# Patient Record
Sex: MALE | Race: WHITE | NOT HISPANIC OR LATINO | Employment: OTHER | URBAN - METROPOLITAN AREA
[De-identification: names, ages, dates, MRNs, and addresses within clinical notes are randomized per-mention and may not be internally consistent; named-entity substitution may affect disease eponyms.]

---

## 2017-01-04 ENCOUNTER — TELEPHONE (OUTPATIENT)
Dept: INTERNAL MEDICINE | Facility: CLINIC | Age: 72
End: 2017-01-04

## 2017-01-04 RX ORDER — VARENICLINE TARTRATE 0.5 (11)-1
KIT ORAL
Qty: 1 PACKAGE | Refills: 0 | Status: SHIPPED | OUTPATIENT
Start: 2017-01-04 | End: 2017-04-17

## 2017-01-04 NOTE — TELEPHONE ENCOUNTER
----- Message from Frida Cole sent at 1/4/2017  8:09 AM CST -----  Contact: Wife-Vanessa- [phone]  Patients wife Vanessa is calling for the patient. Patient stopped taking varenicline (CHANTIX) 1 mg Tab and would like to start taking it again. He would need to start over with the first box again.  If you can send the prescription to the pharmacy for him.    [facility]/PHARMACY #[id number] - [place], [place] - [address].

## 2017-01-04 NOTE — TELEPHONE ENCOUNTER
Patient requesting to restart the chantix start box@ 1mg. Send to [facility]    LS: 11-22-16

## 2017-01-12 ENCOUNTER — TELEPHONE (OUTPATIENT)
Dept: CARDIOLOGY | Facility: CLINIC | Age: 72
End: 2017-01-12

## 2017-01-12 RX ORDER — ROSUVASTATIN CALCIUM 20 MG/1
20 TABLET, COATED ORAL DAILY
Qty: 30 TABLET | Refills: 11 | Status: ON HOLD | OUTPATIENT
Start: 2017-01-12 | End: 2017-08-14 | Stop reason: HOSPADM

## 2017-01-12 NOTE — TELEPHONE ENCOUNTER
Pt's wife calling to report pt complaining of pain in his legs for months now. Wife states pt can barely walk in AM due to leg pain.Wife states she feels pain is caused by Atorvastatin. Wife asking for alternative medication. Please advise.

## 2017-01-12 NOTE — TELEPHONE ENCOUNTER
D/C atorvastatin. Wait 2 weeks, then begin rosuvastatin  20 mg. He can take it every other day for a few weeks, then increase to once a day. If he has pain w rosuvastatin he should cut back to every other day.  Notify pharmacy.

## 2017-01-12 NOTE — TELEPHONE ENCOUNTER
Wife notified, verbalized understanding.Melissa with [facility] pharmacy notified to cancel Atorvastatin 20mg prescription from pt's profile.

## 2017-01-18 ENCOUNTER — TELEPHONE (OUTPATIENT)
Dept: INTERNAL MEDICINE | Facility: CLINIC | Age: 72
End: 2017-01-18

## 2017-01-18 RX ORDER — HYDROCODONE BITARTRATE AND ACETAMINOPHEN 5; 325 MG/1; MG/1
1 TABLET ORAL EVERY 6 HOURS PRN
Qty: 30 TABLET | Refills: 0 | Status: SHIPPED | OUTPATIENT
Start: 2017-01-18 | End: 2017-01-28

## 2017-01-18 NOTE — TELEPHONE ENCOUNTER
----- Message from Magaly Dos Santos sent at 1/18/2017 11:15 AM CST -----  Contact: pt wife-Vanessa  [phone]  RX request - refill or new RX.  Is this a refill or new RX:  refill  RX name and strength: hydrocodone 5-325mg  Directions:   Is this a 30 day or 90 day RX:  30  [facility]@[phone]    Comments:  Pt not sure if this need to be picked up or sent to the pharmacy,please advise

## 2017-01-19 ENCOUNTER — TELEPHONE (OUTPATIENT)
Dept: INTERNAL MEDICINE | Facility: CLINIC | Age: 72
End: 2017-01-19

## 2017-01-19 NOTE — TELEPHONE ENCOUNTER
----- Message from Magaly Dos Santos sent at 1/18/2017  4:20 PM CST -----  Contact: pt [phone]  Pt wife will like to know the status on a script that was to be sent in earlier today,please advise

## 2017-03-14 ENCOUNTER — LAB VISIT (OUTPATIENT)
Dept: LAB | Facility: HOSPITAL | Age: 72
End: 2017-03-14
Attending: INTERNAL MEDICINE
Payer: MEDICARE

## 2017-03-14 DIAGNOSIS — E78.00 HYPERCHOLESTEREMIA: ICD-10-CM

## 2017-03-14 DIAGNOSIS — I25.119 CORONARY ARTERY DISEASE INVOLVING NATIVE CORONARY ARTERY OF NATIVE HEART WITH ANGINA PECTORIS: ICD-10-CM

## 2017-03-14 LAB
ALT SERPL W/O P-5'-P-CCNC: 13 U/L
ANION GAP SERPL CALC-SCNC: 9 MMOL/L
AST SERPL-CCNC: 18 U/L
BUN SERPL-MCNC: 17 MG/DL
CALCIUM SERPL-MCNC: 9.5 MG/DL
CHLORIDE SERPL-SCNC: 108 MMOL/L
CHOLEST/HDLC SERPL: 5.7 {RATIO}
CO2 SERPL-SCNC: 26 MMOL/L
CREAT SERPL-MCNC: 1.4 MG/DL
EST. GFR  (AFRICAN AMERICAN): 58 ML/MIN/1.73 M^2
EST. GFR  (NON AFRICAN AMERICAN): 50.2 ML/MIN/1.73 M^2
GLUCOSE SERPL-MCNC: 108 MG/DL
HDL/CHOLESTEROL RATIO: 17.5 %
HDLC SERPL-MCNC: 183 MG/DL
HDLC SERPL-MCNC: 32 MG/DL
LDLC SERPL CALC-MCNC: 126.4 MG/DL
NONHDLC SERPL-MCNC: 151 MG/DL
POTASSIUM SERPL-SCNC: 4.2 MMOL/L
SODIUM SERPL-SCNC: 143 MMOL/L
TRIGL SERPL-MCNC: 123 MG/DL

## 2017-03-14 PROCEDURE — 36415 COLL VENOUS BLD VENIPUNCTURE: CPT | Mod: PO

## 2017-03-14 PROCEDURE — 84460 ALANINE AMINO (ALT) (SGPT): CPT

## 2017-03-14 PROCEDURE — 84450 TRANSFERASE (AST) (SGOT): CPT

## 2017-03-14 PROCEDURE — 80048 BASIC METABOLIC PNL TOTAL CA: CPT

## 2017-03-14 PROCEDURE — 80061 LIPID PANEL: CPT

## 2017-03-21 ENCOUNTER — OFFICE VISIT (OUTPATIENT)
Dept: CARDIOLOGY | Facility: CLINIC | Age: 72
End: 2017-03-21
Payer: MEDICARE

## 2017-03-21 VITALS
HEART RATE: 84 BPM | DIASTOLIC BLOOD PRESSURE: 84 MMHG | WEIGHT: 195.75 LBS | HEIGHT: 70 IN | SYSTOLIC BLOOD PRESSURE: 140 MMHG | BODY MASS INDEX: 28.02 KG/M2

## 2017-03-21 DIAGNOSIS — I48.92 ATRIAL FLUTTER WITH RAPID VENTRICULAR RESPONSE: ICD-10-CM

## 2017-03-21 DIAGNOSIS — N18.30 CKD (CHRONIC KIDNEY DISEASE) STAGE 3, GFR 30-59 ML/MIN: ICD-10-CM

## 2017-03-21 DIAGNOSIS — I74.10 AORTIC MURAL THROMBUS: Primary | ICD-10-CM

## 2017-03-21 DIAGNOSIS — Z72.0 TOBACCO ABUSE: ICD-10-CM

## 2017-03-21 DIAGNOSIS — Z95.828 S/P ASCENDING AORTIC REPLACEMENT: ICD-10-CM

## 2017-03-21 DIAGNOSIS — I25.2 OLD MYOCARDIAL INFARCTION: ICD-10-CM

## 2017-03-21 DIAGNOSIS — I10 ESSENTIAL HYPERTENSION: ICD-10-CM

## 2017-03-21 DIAGNOSIS — E78.00 HYPERCHOLESTEREMIA: ICD-10-CM

## 2017-03-21 DIAGNOSIS — I25.10 CORONARY ARTERY DISEASE INVOLVING NATIVE CORONARY ARTERY OF NATIVE HEART WITHOUT ANGINA PECTORIS: ICD-10-CM

## 2017-03-21 DIAGNOSIS — S22.32XG CLOSED FRACTURE OF ONE RIB OF LEFT SIDE WITH DELAYED HEALING, SUBSEQUENT ENCOUNTER: ICD-10-CM

## 2017-03-21 PROBLEM — S22.32XA CLOSED FRACTURE OF ONE RIB OF LEFT SIDE: Status: ACTIVE | Noted: 2017-03-21

## 2017-03-21 PROCEDURE — 99999 PR PBB SHADOW E&M-EST. PATIENT-LVL III: CPT | Mod: PBBFAC,,, | Performed by: INTERNAL MEDICINE

## 2017-03-21 PROCEDURE — 93000 ELECTROCARDIOGRAM COMPLETE: CPT | Mod: S$GLB,,, | Performed by: INTERNAL MEDICINE

## 2017-03-21 PROCEDURE — 99214 OFFICE O/P EST MOD 30 MIN: CPT | Mod: S$GLB,,, | Performed by: INTERNAL MEDICINE

## 2017-03-21 RX ORDER — ASPIRIN 81 MG/1
81 TABLET ORAL DAILY
Refills: 0 | Status: ON HOLD
Start: 2017-03-21 | End: 2017-08-14 | Stop reason: HOSPADM

## 2017-03-21 NOTE — PATIENT INSTRUCTIONS
LDL - bad type - improves with diet and medications: typically statins; most other medications can lower LDL but have not been proven to prevent heart attacks.             May not improve significantly with exercise alone.             Ideally less than 70     HDL - good type - improves with exercise             Ideally greater than 50    TGs (triglycerides) - also bad - can change very quickly and considerably with food - improves with diet and exercise            In some cases a low carbohydrate diet will lower TGs better than a low fat diet.            Ideal range     Sugar, fat and cholesterol in food:    A sensible diet that limits the intake of sugars, saturated (bad) fats and trans fats while increasing the intake of unsaturated (good)  fats and plant proteins is the basis of the current dietary recommendations.      We now recommend drastically reducing the intake of sugar. There is less emphasis on excluding fat. And cholesterol in our food is no longer a significant concern. However please do not confuse this with the role of cholesterol in our blood and arteries. It is still cholesterol that clogs up our arteries whether it comes from our food or is manufactured by our bodies.       Most foods that are high in cholesterol are also high in saturated fat. But there is way more saturated fat than cholesterol in these foods. On the other hand there are a handful of foods that are high in cholesterol but do not contain much saturated fat: eggs, shrimp, crab legs and crawfish; these are OK to eat.       Saturated fat is the bad fat - you should limit your intake of this. Deep fried foods, meats and other animal fats are high in saturated fat. Cookies, donuts and most dessert and cakes are usually high in both saturated fat and sugar.       Unsaturated fat is the good fat. It contains the same number of calories as saturated fat but does not get deposited in our arteries. The Mediterranean style diet  encourages the intake of unsaturated fat - olive oil, avocado and unsalted nuts.      You should eat a few servings of vegetables (and fruit as long as you are not diabetic) everyday. Substitute some plant proteins in place of meat: soy, beans, lentils, quinoa and oatmeal.     Do not use stick butter or stick margarine. Butter that comes in a tub is soft butter. It consists of 1/2 butter and 1/2 canola or another type of vegetable oil. It is fine to use that.       Trans fats should definitely be avoided. Most foods that are labelled as containing 0 gms of trans fat can still contain several hundred milligrams of trans fat: creamer, margarine, refrigerator dough, deep fried foods, ready made frosting, potato, corn and torilla chips, cakes, cookies, pie crusts and crackers containing shortening made with hydrogenated vegetable oil.      =======================    Avoid NSAIDs: Ibuprofen, Advil, Motrin, Aleeve, Naproxen, Meloxicam, Mobic, Diclofenac and Voltaren.  Aleeve (naproxen) is safer from the cardiac stand point but should be avoided if kidney function is not normal.  Also do not take NSAIDs at the same time as aspirin - it may make aspirin less effective.  You can take Tylenol for pain. It is not a NSAID. Limit Tylenol to 4 tabs (500 mg each) in a 24 hr period.

## 2017-03-21 NOTE — ASSESSMENT & PLAN NOTE
No imaging performed since surgery. Creatinine a bit elevated will avoid contrast CT study.  Noncontrast CT chest.

## 2017-03-21 NOTE — PROGRESS NOTES
Subjective:   Patient ID:  Clint Brown is a 71 y.o. male who presents for follow-up of Coronary Artery Disease      Problem List:  dissection/intramural hematoma of thoracic aorta  CAD - nonobstructive  NSTEMI  HTN    HPI:   Clint Brown is s/p surgical repair by Dr. MARILYN Williamson of a dissection/intramural hematoma of the thoracic aorta that extended the entire length of the thoracic aorta. At the time of surgery there was blood in the pericardium. The aortic valve was intact and did not need to be replaced. He had presented a few days earlier to [facility] with chest discomfort and a NSTEMI. Coronary angiography was negative except for a nonobstructive lesion in the ramus.The ascending aorta was minimally enlarged measuring 4.2 cm. The descending thoracic aorta was minimally enlarged measuring 3.5 cm.   Leg stiffness w atorvastatin. Switched to rosuvastatin but did not fill prescription.   He continues to smoke. He had nausea w Chantix. Their 49 yr old daughter  last week.   He developed severe pain on the side of his chest in . Fracture of the 10th rib on left side.  He has not been checking his BP at home.      Review of Systems   Constitution: Positive for malaise/fatigue. Negative for weight gain and weight loss.   Cardiovascular: Positive for chest pain and dyspnea on exertion. Negative for leg swelling and palpitations.   Respiratory: Positive for wheezing.    Hematologic/Lymphatic: Does not bruise/bleed easily.   Musculoskeletal: Negative for arthritis and muscle cramps.   Gastrointestinal: Negative for abdominal pain and melena.   Genitourinary: Positive for frequency and nocturia.   Neurological: Positive for numbness.   Psychiatric/Behavioral: The patient is nervous/anxious.        Current Outpatient Prescriptions   Medication Sig    aspirin 325 MG tablet Take 1 tablet (325 mg total) by mouth once daily.    DOCUSATE SODIUM (COLACE ORAL) Take 1 capsule by mouth once daily.      "labetalol (NORMODYNE) 200 MG tablet TAKE 1 TABLET (200 MG TOTAL) BY MOUTH EVERY 12 (TWELVE) HOURS.    multivitamin (THERAGRAN) per tablet Take 1 tablet by mouth once daily.    omeprazole (PRILOSEC) 20 MG capsule Take 1 capsule (20 mg total) by mouth once daily.    ondansetron (ZOFRAN) 4 MG tablet Take 1 tablet (4 mg total) by mouth every 8 (eight) hours as needed for Nausea.    rosuvastatin (CRESTOR) 20 MG tablet Not taking    varenicline (CHANTIX STARTING MONTH PAK) 0.5 mg (11)- 1 mg (42) tablet Not taking         Social History   Substance Use Topics    Smoking status: Current Every Day Smoker     Packs/day: 1.00     Years: 55.00     Types: Cigarettes    Smokeless tobacco: Never Used    Alcohol use No         Objective:     Physical Exam   Constitutional: He is oriented to person, place, and time. He appears well-developed and well-nourished.   BP (!) 140/84  Pulse 84  Ht 5' 10" (1.778 m)  Wt 88.8 kg (195 lb 12.3 oz)  BMI 28.09 kg/m2     HENT:   Head: Normocephalic.   Neck: No JVD present.   Cardiovascular: Normal rate, regular rhythm, S1 normal and S2 normal.    No murmur heard.  Pulmonary/Chest: Breath sounds normal. Chest wall is not dull to percussion.   Abdominal: Soft. He exhibits no mass. There is no splenomegaly or hepatomegaly.   Musculoskeletal:        Right lower leg: He exhibits no edema.        Left lower leg: He exhibits no edema.   Neurological: He is alert and oriented to person, place, and time. Gait normal.   Skin: No bruising noted. Nails show no clubbing.   Psychiatric: He has a normal mood and affect. His speech is normal and behavior is normal.           Lab Results   Component Value Date    CHOL 183 03/14/2017    HDL 32 (L) 03/14/2017    LDLCALC 126.4 03/14/2017    TRIG 123 03/14/2017    CHOLHDL 17.5 (L) 03/14/2017     Lab Results   Component Value Date     03/14/2017    CREATININE 1.4 03/14/2017    BUN 17 03/14/2017     03/14/2017    K 4.2 03/14/2017     " 03/14/2017    CO2 26 03/14/2017     Lab Results   Component Value Date    ALT 13 03/14/2017    AST 18 03/14/2017    ALKPHOS 79 11/20/2016    BILITOT 0.8 11/20/2016         Assessment:     1. Aortic mural thrombus    2. S/P ascending aortic replacement    3. Coronary artery disease - nonobstructive ramus only    4. Old myocardial infarction    5. Essential hypertension    6. Hypercholesteremia    7. CKD (chronic kidney disease) stage 3, GFR 30-59 ml/min    8. Tobacco abuse    9. Closed fracture of one rib of left side     10. Atrial flutter - post op - resolved          Plan:     Aortic mural thrombus  No imaging performed since surgery. Creatinine a bit elevated will avoid contrast CT study.  Noncontrast CT chest.    Hypercholesteremia  Cholesterol education.  Resume rosuvastatin.      Essential hypertension  Stable.   Monitor BP at home.    CKD (chronic kidney disease) stage 3, GFR 30-59 ml/min  Slightly better.  Avoid NSAIDs.    RTC 1 year.

## 2017-03-21 NOTE — MR AVS SNAPSHOT
[facility]   [facility]  [address] [address]  Phone: [phone]                  Clint Brown   3/21/2017 8:30 AM   Office Visit    Description:  Male : 1945   Provider:  Vito Vick MD   Department:  [facility]           Reason for Visit     Coronary Artery Disease           Diagnoses this Visit        Comments    Aortic mural thrombus    -  Primary     Atrial flutter with rapid ventricular response         S/P ascending aortic replacement         Coronary artery disease involving native coronary artery of native heart without angina pectoris         Old myocardial infarction         Essential hypertension         Hypercholesteremia         CKD (chronic kidney disease) stage 3, GFR 30-59 ml/min         Tobacco abuse                To Do List           Goals (5 Years of Data)     None      Follow-Up and Disposition     Return in about 1 year (around 3/21/2018).       These Medications        Disp Refills Start End    aspirin (ECOTRIN) 81 MG EC tablet  0 3/21/2017 3/21/2018    Take 1 tablet (81 mg total) by mouth once daily. - Oral    Pharmacy: [facility]/pharmacy #[id number] - [place] - [address]  #: [phone]         [facility]     [facility] Care [facility] -  Assistance  Registered nurses in the [facility] Center provide clinical advisement, health education, appointment booking, and other advisory services.  Call for this free service at [phone].             Medications           Message regarding Medications     Verify the changes and/or additions to your medication regime listed below are the same as discussed with your clinician today.  If any of these changes or additions are incorrect, please notify your healthcare provider.        START taking these NEW medications        Refills    aspirin (ECOTRIN) 81 MG EC tablet 0    Sig: Take 1 tablet (81 mg total) by mouth once daily.    Class: No Print    Route:  "Oral      STOP taking these medications     hydrOXYzine pamoate (VISTARIL) 50 MG Cap Take 1 capsule (50 mg total) by mouth nightly as needed (insomnia).    methylPREDNISolone (MEDROL, RAÚL,) 4 mg tablet use as directed    aspirin 325 MG tablet Take 1 tablet (325 mg total) by mouth once daily.           Verify that the below list of medications is an accurate representation of the medications you are currently taking.  If none reported, the list may be blank. If incorrect, please contact your healthcare provider. Carry this list with you in case of emergency.           Current Medications     DOCUSATE SODIUM (COLACE ORAL) Take 1 capsule by mouth once daily.     labetalol (NORMODYNE) 200 MG tablet TAKE 1 TABLET (200 MG TOTAL) BY MOUTH EVERY 12 (TWELVE) HOURS.    multivitamin (THERAGRAN) per tablet Take 1 tablet by mouth once daily.    omeprazole (PRILOSEC) 20 MG capsule Take 1 capsule (20 mg total) by mouth once daily.    ondansetron (ZOFRAN) 4 MG tablet Take 1 tablet (4 mg total) by mouth every 8 (eight) hours as needed for Nausea.    aspirin (ECOTRIN) 81 MG EC tablet Take 1 tablet (81 mg total) by mouth once daily.    rosuvastatin (CRESTOR) 20 MG tablet Take 1 tablet (20 mg total) by mouth once daily.    varenicline (CHANTIX STARTING MONTH PAK) 0.5 mg (11)- 1 mg (42) tablet Take one 0.5mg tab by mouth once daily X3 days,then increase to one 0.5mg tab twice daily X4 days,then increase to one 1mg tab twice daily           Clinical Reference Information           Your Vitals Were     BP Pulse Height Weight BMI    140/84 84 5' 10" (1.778 m) 88.8 kg (195 lb 12.3 oz) 28.09 kg/m2      Blood Pressure          Most Recent Value    BP  (!)  140/84      Allergies as of 3/21/2017     Cephalexin    Codeine    Iodine And Iodide Containing Products    Lipitor [Atorvastatin]      Immunizations Administered on Date of Encounter - 3/21/2017     None      Orders Placed During Today's Visit      Normal Orders This Visit    EKG 12-lead  "      [facility] Sign-Up     Activating your [facility] account is as easy as 1-2-3!     1) Visit [email or web address], select Sign Up Now, enter this activation code and your date of birth, then select Next.  [email or web address]  Expires: 5/5/2017 10:01 AM      2) Create a username and password to use when you visit [facility] in the future and select a security question in case you lose your password and select Next.    3) Enter your e-mail address and click Sign Up!    Additional Information  If you have questions, please e-mail [email or web address] or call [phone] to talk to our [facility] staff. Remember, [facility] is NOT to be used for urgent needs. For medical emergencies, dial 911.         Instructions    LDL - bad type - improves with diet and medications: typically statins; most other medications can lower LDL but have not been proven to prevent heart attacks.             May not improve significantly with exercise alone.             Ideally less than 70     HDL - good type - improves with exercise             Ideally greater than 50    TGs (triglycerides) - also bad - can change very quickly and considerably with food - improves with diet and exercise            In some cases a low carbohydrate diet will lower TGs better than a low fat diet.            Ideal range     Sugar, fat and cholesterol in food:    A sensible diet that limits the intake of sugars, saturated (bad) fats and trans fats while increasing the intake of unsaturated (good)  fats and plant proteins is the basis of the current dietary recommendations.      We now recommend drastically reducing the intake of sugar. There is less emphasis on excluding fat. And cholesterol in our food is no longer a significant concern. However please do not confuse this with the role of cholesterol in our blood and arteries. It is still cholesterol that clogs up our arteries whether it comes from our food or is manufactured by our bodies.       Most foods that  are high in cholesterol are also high in saturated fat. But there is way more saturated fat than cholesterol in these foods. On the other hand there are a handful of foods that are high in cholesterol but do not contain much saturated fat: eggs, shrimp, crab legs and crawfish; these are OK to eat.       Saturated fat is the bad fat - you should limit your intake of this. Deep fried foods, meats and other animal fats are high in saturated fat. Cookies, donuts and most dessert and cakes are usually high in both saturated fat and sugar.       Unsaturated fat is the good fat. It contains the same number of calories as saturated fat but does not get deposited in our arteries. The Mediterranean style diet encourages the intake of unsaturated fat - olive oil, avocado and unsalted nuts.      You should eat a few servings of vegetables (and fruit as long as you are not diabetic) everyday. Substitute some plant proteins in place of meat: soy, beans, lentils, quinoa and oatmeal.     Do not use stick butter or stick margarine. Butter that comes in a tub is soft butter. It consists of 1/2 butter and 1/2 canola or another type of vegetable oil. It is fine to use that.       Trans fats should definitely be avoided. Most foods that are labelled as containing 0 gms of trans fat can still contain several hundred milligrams of trans fat: creamer, margarine, refrigerator dough, deep fried foods, ready made frosting, potato, corn and torilla chips, cakes, cookies, pie crusts and crackers containing shortening made with hydrogenated vegetable oil.      =======================    Avoid NSAIDs: Ibuprofen, Advil, Motrin, Aleeve, Naproxen, Meloxicam, Mobic, Diclofenac and Voltaren.  Aleeve (naproxen) is safer from the cardiac stand point but should be avoided if kidney function is not normal.  Also do not take NSAIDs at the same time as aspirin - it may make aspirin less effective.  You can take Tylenol for pain. It is not a NSAID. Limit  Tylenol to 4 tabs (500 mg each) in a 24 hr period.             Smoking Cessation     If you would like to quit smoking:   You may be eligible for free services if you are a [place] resident and started smoking cigarettes before September 1, 1988.  Call the Smoking Cessation [employer] (SCT) toll free at (800) 979-0082 or (171) 000-6342.   Call 1-800-QUIT-NOW if you do not meet the above criteria.            Language Assistance Services     ATTENTION: Language assistance services are available, free of charge. Please call [phone].      ATENCIÓN: Si habla español, tiene a hebert disposición servicios gratuitos de asistencia lingüística. Llame al [phone].     CHÚ Ý: N?u b?n nói Ti?ng Vi?t, có các d?ch v? h? tr? ngôn ng? mi?n phí dành cho b?n. G?i s? [phone].         [facility] complies with applicable Federal civil rights laws and does not discriminate on the basis of race, color, national origin, age, disability, or sex.

## 2017-03-27 ENCOUNTER — HOSPITAL ENCOUNTER (OUTPATIENT)
Dept: RADIOLOGY | Facility: HOSPITAL | Age: 72
Discharge: HOME | End: 2017-03-27
Attending: INTERNAL MEDICINE
Payer: MEDICARE

## 2017-03-27 ENCOUNTER — OFFICE VISIT (OUTPATIENT)
Dept: INTERNAL MEDICINE | Facility: CLINIC | Age: 72
End: 2017-03-27
Payer: COMMERCIAL

## 2017-03-27 VITALS
BODY MASS INDEX: 28.06 KG/M2 | RESPIRATION RATE: 16 BRPM | DIASTOLIC BLOOD PRESSURE: 70 MMHG | HEART RATE: 64 BPM | TEMPERATURE: 99 F | SYSTOLIC BLOOD PRESSURE: 132 MMHG | WEIGHT: 196 LBS | HEIGHT: 70 IN

## 2017-03-27 DIAGNOSIS — R06.02 SOB (SHORTNESS OF BREATH): Primary | ICD-10-CM

## 2017-03-27 DIAGNOSIS — R05.9 COUGH: ICD-10-CM

## 2017-03-27 DIAGNOSIS — R06.02 SOB (SHORTNESS OF BREATH): ICD-10-CM

## 2017-03-27 DIAGNOSIS — Z91.89 AT RISK FOR SLEEP APNEA: ICD-10-CM

## 2017-03-27 PROCEDURE — 99214 OFFICE O/P EST MOD 30 MIN: CPT | Mod: S$GLB,,, | Performed by: INTERNAL MEDICINE

## 2017-03-27 PROCEDURE — 99999 PR PBB SHADOW E&M-EST. PATIENT-LVL IV: CPT | Mod: PBBFAC,,, | Performed by: INTERNAL MEDICINE

## 2017-03-27 PROCEDURE — 71020 XR CHEST PA AND LATERAL: CPT | Mod: 26,,, | Performed by: RADIOLOGY

## 2017-03-27 PROCEDURE — 71020 XR CHEST PA AND LATERAL: CPT | Mod: TC,PO

## 2017-03-27 RX ORDER — BENZONATATE 200 MG/1
200 CAPSULE ORAL 3 TIMES DAILY PRN
Qty: 30 CAPSULE | Refills: 0 | Status: SHIPPED | OUTPATIENT
Start: 2017-03-27 | End: 2017-04-06

## 2017-03-27 NOTE — PROGRESS NOTES
Subjective:       Patient ID: Clint Brown is a 71 y.o. male who presents for Shortness of Breath and Cough (dry)      Cough   This is a new problem. Episode onset: in the past 5 days. The problem has been unchanged. The problem occurs constantly. The cough is non-productive. Associated symptoms include chest pain (held something overhead and shortly after, reports onset of left lateral chest wall pain in  area of prior fracture, no pain with cough or breathing), shortness of breath (and ARROYO) and wheezing. Pertinent negatives include no chills, ear congestion, ear pain, fever, headaches, hemoptysis, myalgias, nasal congestion, postnasal drip, rash, rhinorrhea or sweats. Nothing aggravates the symptoms. He has tried OTC cough suppressant for the symptoms. The treatment provided mild relief. His past medical history is significant for pneumonia. There is no history of asthma or COPD.   Shortness of Breath   This is a new problem. The current episode started in the past 7 days. The problem occurs intermittently. The problem has been waxing and waning. Associated symptoms include chest pain (held something overhead and shortly after, reports onset of left lateral chest wall pain in  area of prior fracture, no pain with cough or breathing) and wheezing. Pertinent negatives include no abdominal pain, ear pain, fever, headaches, hemoptysis, leg swelling, orthopnea, PND, rash, rhinorrhea, sputum production, swollen glands or vomiting. The symptoms are aggravated by any activity. The patient has no known risk factors for DVT/PE. He has tried nothing for the symptoms. His past medical history is significant for CAD and pneumonia. There is no history of allergies, asthma, COPD or a heart failure.      No PND, no orthopnea, no LE edema      STOP-Bang Questionnaire         YES             NO       x  Snoring?  Do you snore loudly (loud enough to be heard through closed doors, or your bed partner elbows you for snoring at  night)?        x  Tired?  Do you often feel tired, fatigued, or sleepy during the daytime (such as falling asleep during driving)?        x  Observed?  Has anyone observed you stop breathing or choking/gasping during your sleep?        x  Pressure?  Do you have or are being treated for high blood pressure?         x Body mass index more than 35 kg/m2?          x  Age older than 50 years old?             x Neck size large? (measured around Augie's apple)  For male, is your shirt collar 17 inches or larger?  For female, is your shirt collar 16 inches or larger?        x  Gender = Male?           Review of Systems   Constitutional: Positive for fatigue. Negative for chills, diaphoresis and fever.   HENT: Negative for congestion, ear discharge, ear pain, postnasal drip, rhinorrhea, sinus pressure and tinnitus.    Eyes: Negative for visual disturbance.   Respiratory: Positive for cough, shortness of breath (and ARROYO) and wheezing. Negative for hemoptysis, sputum production and chest tightness.    Cardiovascular: Positive for chest pain (held something overhead and shortly after, reports onset of left lateral chest wall pain in  area of prior fracture, no pain with cough or breathing). Negative for palpitations, orthopnea, leg swelling and PND.   Gastrointestinal: Negative for abdominal pain, diarrhea, nausea and vomiting.   Musculoskeletal: Negative for arthralgias and myalgias.   Skin: Negative for rash.   Neurological: Negative for dizziness, weakness, light-headedness and headaches.   Hematological: Negative for adenopathy.       Objective:      Physical Exam   Constitutional: He is oriented to person, place, and time. Vital signs are normal. He appears well-developed and well-nourished. No distress.   HENT:   Head: Normocephalic and atraumatic.   Right Ear: Hearing, tympanic membrane, external ear and ear canal normal. Tympanic membrane is not erythematous and not bulging.   Left Ear: Hearing, tympanic membrane,  external ear and ear canal normal. Tympanic membrane is not erythematous and not bulging.   Nose: Nose normal. Right sinus exhibits no maxillary sinus tenderness and no frontal sinus tenderness. Left sinus exhibits no maxillary sinus tenderness and no frontal sinus tenderness.   Mouth/Throat: Uvula is midline, oropharynx is clear and moist and mucous membranes are normal. No oropharyngeal exudate or posterior oropharyngeal erythema.   Eyes: EOM and lids are normal.   Neck: Trachea normal and normal range of motion. Neck supple.   Cardiovascular: Normal rate, normal heart sounds, intact distal pulses and normal pulses.  An irregular rhythm present.   No murmur heard.  Pulmonary/Chest: Effort normal. No tachypnea and no bradypnea. No respiratory distress. He has decreased breath sounds in the right lower field and the left lower field. He has no wheezes. He has no rhonchi.   Abdominal: Soft. Bowel sounds are normal. He exhibits no distension. There is no tenderness.   Musculoskeletal: He exhibits no edema or tenderness.   Lymphadenopathy:     He has no cervical adenopathy.        Right: No supraclavicular adenopathy present.        Left: No supraclavicular adenopathy present.   Neurological: He is alert and oriented to person, place, and time. Coordination and gait normal.   Skin: Skin is warm, dry and intact. No rash noted. He is not diaphoretic.   Psychiatric: He has a normal mood and affect.   Vitals reviewed.      Assessment:       1. SOB (shortness of breath)    2. Cough    3. At risk for sleep apnea        Plan:       -- check CXR  -- cbc, cmp, bnp, reviewed recent EKG  -- tessalon perles 200mg three times daily as needed for cough  -- referral to Sleep Medicine    Patricia De La Torre MD

## 2017-03-27 NOTE — MR AVS SNAPSHOT
[place] [facility]   [facility]  [place] [address]  Phone: [phone]  Fax: [phone]                  Clint Brown   3/27/2017 3:40 PM   Office Visit    Description:  Male : 1945   Provider:  Patricia De La Torre MD   Department:  [place] [place] [facility]           Reason for Visit     Shortness of Breath     Cough           Diagnoses this Visit        Comments    SOB (shortness of breath)    -  Primary     Cough         At risk for sleep apnea                To Do List           Future Appointments        Provider Department Dept Phone    3/30/2017 7:00 AM [place] [address] [address] [facility] [phone]    2017 8:00 AM LAB, [place] [place] [facility] [phone]      Goals (5 Years of Data)     None       These Medications        Disp Refills Start End    benzonatate (TESSALON) 200 MG capsule 30 capsule 0 3/27/2017 2017    Take 1 capsule (200 mg total) by mouth 3 (three) times daily as needed. - Oral    Pharmacy: [facility]/pharmacy #[id number] - [place] - [address]  #: [phone]         [facility]     [facility] On [facility] Nurse Care Line -  Assistance  Registered nurses in the [facility] Center provide clinical advisement, health education, appointment booking, and other advisory services.  Call for this free service at [phone].             Medications           Message regarding Medications     Verify the changes and/or additions to your medication regime listed below are the same as discussed with your clinician today.  If any of these changes or additions are incorrect, please notify your healthcare provider.        START taking these NEW medications        Refills    benzonatate (TESSALON) 200 MG capsule 0    Sig: Take 1 capsule (200 mg total) by mouth 3 (three) times daily as needed.    Class: Normal    Route: Oral           Verify that the below list of medications is an accurate  "representation of the medications you are currently taking.  If none reported, the list may be blank. If incorrect, please contact your healthcare provider. Carry this list with you in case of emergency.           Current Medications     aspirin (ECOTRIN) 81 MG EC tablet Take 1 tablet (81 mg total) by mouth once daily.    DOCUSATE SODIUM (COLACE ORAL) Take 1 capsule by mouth once daily.     labetalol (NORMODYNE) 200 MG tablet TAKE 1 TABLET (200 MG TOTAL) BY MOUTH EVERY 12 (TWELVE) HOURS.    multivitamin (THERAGRAN) per tablet Take 1 tablet by mouth once daily.    omeprazole (PRILOSEC) 20 MG capsule Take 1 capsule (20 mg total) by mouth once daily.    ondansetron (ZOFRAN) 4 MG tablet Take 1 tablet (4 mg total) by mouth every 8 (eight) hours as needed for Nausea.    rosuvastatin (CRESTOR) 20 MG tablet Take 1 tablet (20 mg total) by mouth once daily.    varenicline (CHANTIX STARTING MONTH RAÚL) 0.5 mg (11)- 1 mg (42) tablet Take one 0.5mg tab by mouth once daily X3 days,then increase to one 0.5mg tab twice daily X4 days,then increase to one 1mg tab twice daily    benzonatate (TESSALON) 200 MG capsule Take 1 capsule (200 mg total) by mouth 3 (three) times daily as needed.           Clinical Reference Information           Your Vitals Were     BP Pulse Temp Resp    132/70 (BP Location: Left arm, Patient Position: Sitting, BP Method: Manual) 64 98.9 °F (37.2 °C) (Oral) 16    Height Weight BMI    5' 10" (1.778 m) 88.9 kg (195 lb 15.8 oz) 28.12 kg/m2      Blood Pressure          Most Recent Value    BP  132/70      Allergies as of 3/27/2017     Cephalexin    Codeine    Iodine And Iodide Containing Products    Lipitor [Atorvastatin]      Immunizations Administered on Date of Encounter - 3/27/2017     None      Orders Placed During Today's Visit      Normal Orders This Visit    Ambulatory consult to Sleep Disorders     Future Labs/Procedures Expected by Expires    Brain natriuretic peptide  3/27/2017 5/26/2018    CBC auto " differential  3/27/2017 5/26/2018    Comprehensive metabolic panel  3/27/2017 5/26/2018    X-Ray Chest PA And Lateral  3/27/2017 3/27/2018      [facility] Sign-Up     Activating your [facility] account is as easy as 1-2-3!     1) Visit [email or web address], select Sign Up Now, enter this activation code and your date of birth, then select Next.  [email or web address]  Expires: 5/5/2017 10:01 AM      2) Create a username and password to use when you visit [facility] in the future and select a security question in case you lose your password and select Next.    3) Enter your e-mail address and click Sign Up!    Additional Information  If you have questions, please e-mail [email or web address] or call [phone] to talk to our [facility] staff. Remember, [facility] is NOT to be used for urgent needs. For medical emergencies, dial 911.         Smoking Cessation     If you would like to quit smoking:   You may be eligible for free services if you are a [place] resident and started smoking cigarettes before September 1, 1988.  Call the Smoking Cessation Trust ([facility]) toll free at (306) [phone] or ([phone] 435-2958.   Call 6-239-QUIT-NOW if you do not meet the above criteria.            Language Assistance Services     ATTENTION: Language assistance services are available, free of charge. Please call [phone].      ATENCIÓN: Si habla español, tiene a hebert disposición servicios gratuitos de asistencia lingüística. Llame al [phone].     CHÚ Ý: N?u b?n nói Ti?ng Vi?t, có các d?ch v? h? tr? ngôn ng? mi?n phí dành cho b?n. G?i s? [phone].         [facility] complies with applicable Federal civil rights laws and does not discriminate on the basis of race, color, national origin, age, disability, or sex.

## 2017-03-30 ENCOUNTER — HOSPITAL ENCOUNTER (OUTPATIENT)
Dept: RADIOLOGY | Facility: HOSPITAL | Age: 72
Discharge: HOME | End: 2017-03-30
Attending: INTERNAL MEDICINE
Payer: MEDICARE

## 2017-03-30 DIAGNOSIS — Z95.828 S/P ASCENDING AORTIC REPLACEMENT: ICD-10-CM

## 2017-03-30 DIAGNOSIS — I74.10 AORTIC MURAL THROMBUS: ICD-10-CM

## 2017-03-30 PROCEDURE — 71250 CT THORAX DX C-: CPT | Mod: TC

## 2017-03-30 PROCEDURE — 71250 CT THORAX DX C-: CPT | Mod: 26,,, | Performed by: RADIOLOGY

## 2017-04-17 ENCOUNTER — HOSPITAL ENCOUNTER (OUTPATIENT)
Dept: RADIOLOGY | Facility: HOSPITAL | Age: 72
Discharge: HOME | End: 2017-04-17
Attending: STUDENT IN AN ORGANIZED HEALTH CARE EDUCATION/TRAINING PROGRAM
Payer: COMMERCIAL

## 2017-04-17 ENCOUNTER — OFFICE VISIT (OUTPATIENT)
Dept: PULMONOLOGY | Facility: CLINIC | Age: 72
End: 2017-04-17
Payer: MEDICARE

## 2017-04-17 VITALS
BODY MASS INDEX: 27.58 KG/M2 | HEART RATE: 70 BPM | HEIGHT: 70 IN | WEIGHT: 192.69 LBS | OXYGEN SATURATION: 94 % | DIASTOLIC BLOOD PRESSURE: 64 MMHG | SYSTOLIC BLOOD PRESSURE: 105 MMHG

## 2017-04-17 DIAGNOSIS — J90 PLEURAL EFFUSION, LEFT: ICD-10-CM

## 2017-04-17 DIAGNOSIS — J90 PLEURAL EFFUSION, LEFT: Primary | ICD-10-CM

## 2017-04-17 LAB
APPEARANCE FLD: NORMAL
BODY FLD TYPE: NORMAL
BODY FLUID SOURCE, LDH: NORMAL
COLOR FLD: NORMAL
EOSINOPHIL NFR FLD MANUAL: 4 %
LDH FLD L TO P-CCNC: 5523 U/L
LYMPHOCYTES NFR FLD MANUAL: 91 %
MONOS+MACROS NFR FLD MANUAL: 2 %
NEUTROPHILS NFR FLD MANUAL: 3 %
PROT FLD-MCNC: 2.9 G/DL
SPECIMEN SOURCE: NORMAL
WBC # FLD: 5961 /CU MM

## 2017-04-17 PROCEDURE — 88305 TISSUE EXAM BY PATHOLOGIST: CPT | Performed by: PATHOLOGY

## 2017-04-17 PROCEDURE — 88342 IMHCHEM/IMCYTCHM 1ST ANTB: CPT | Mod: 26,,, | Performed by: PATHOLOGY

## 2017-04-17 PROCEDURE — 83615 LACTATE (LD) (LDH) ENZYME: CPT | Mod: 91

## 2017-04-17 PROCEDURE — 87205 SMEAR GRAM STAIN: CPT

## 2017-04-17 PROCEDURE — 1159F MED LIST DOCD IN RCRD: CPT | Mod: S$GLB,,, | Performed by: INTERNAL MEDICINE

## 2017-04-17 PROCEDURE — 99204 OFFICE O/P NEW MOD 45 MIN: CPT | Mod: 25,S$GLB,, | Performed by: INTERNAL MEDICINE

## 2017-04-17 PROCEDURE — 1157F ADVNC CARE PLAN IN RCRD: CPT | Mod: 8P,S$GLB,, | Performed by: INTERNAL MEDICINE

## 2017-04-17 PROCEDURE — 87070 CULTURE OTHR SPECIMN AEROBIC: CPT

## 2017-04-17 PROCEDURE — 3074F SYST BP LT 130 MM HG: CPT | Mod: S$GLB,,, | Performed by: INTERNAL MEDICINE

## 2017-04-17 PROCEDURE — 89051 BODY FLUID CELL COUNT: CPT

## 2017-04-17 PROCEDURE — 99999 PR PBB SHADOW E&M-EST. PATIENT-LVL IV: CPT | Mod: PBBFAC,,, | Performed by: INTERNAL MEDICINE

## 2017-04-17 PROCEDURE — 71020 XR CHEST PA AND LATERAL: CPT | Mod: 26,,, | Performed by: RADIOLOGY

## 2017-04-17 PROCEDURE — 3078F DIAST BP <80 MM HG: CPT | Mod: S$GLB,,, | Performed by: INTERNAL MEDICINE

## 2017-04-17 PROCEDURE — 88341 IMHCHEM/IMCYTCHM EA ADD ANTB: CPT | Mod: 26,59,, | Performed by: PATHOLOGY

## 2017-04-17 PROCEDURE — 71020 XR CHEST PA AND LATERAL: CPT | Mod: TC

## 2017-04-17 PROCEDURE — 84157 ASSAY OF PROTEIN OTHER: CPT

## 2017-04-17 PROCEDURE — 88365 INSITU HYBRIDIZATION (FISH): CPT | Mod: 26,,, | Performed by: PATHOLOGY

## 2017-04-17 PROCEDURE — 88112 CYTOPATH CELL ENHANCE TECH: CPT | Mod: 26,,, | Performed by: PATHOLOGY

## 2017-04-17 PROCEDURE — 1160F RVW MEDS BY RX/DR IN RCRD: CPT | Mod: S$GLB,,, | Performed by: INTERNAL MEDICINE

## 2017-04-17 RX ORDER — BENZONATATE 100 MG/1
100 CAPSULE ORAL 3 TIMES DAILY PRN
COMMUNITY
End: 2017-07-19

## 2017-04-17 NOTE — PROGRESS NOTES
Pulmonary Clinic: Initial Consultaiton       HPI     72 y/o male with a PMH significant for a 55 pack year history of tobacco (actively smoking, no prior history of PFTS/COPD), Type A Aortic Dissection s/p ascending aortic graft repair (4/13/16), CAD (non-obstructive), HTN, HLD, and CKD who presents as a referral by his PCP (Dr. De La Torre) after a recent CT chest for interval surveillance of aortic mural thrombus was performed (3/30/17)-->post surgical changes noted of ascending aortic graft repair for thoracic intramural hematoma, small left pleural effusion with associated compressive atelectasis of LLL.     In clinic today with progressive ARROYO for the last two months (rib fracture last November with air conditioning, reaggravated a couple of weeks ago with another air conditioner incident) associated with a non-productive cough and intermittent wheezing, no fevers, chills, night sweats or unintentional weight loss.  Patient's mother had lung cancer (diagnosed in her 70's).  Patient states he doesn't use any inhalers and has never been diagnosed with COPD.  No prior history of heart failure, liver failure or kidney failure.    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    3/16    Thoracic aortic dissection     Type A dissection s/p repair 4/13/16     Past Surgical History:   Procedure Laterality Date    APPENDECTOMY      CARDIAC CATHETERIZATION      KIDNEY STONE SURGERY      4 surgeries     Review of patient's allergies indicates:   Allergen Reactions    Cephalexin      Other reaction(s): Unknown    Iodine and iodide containing products Nausea And Vomiting    Lipitor [atorvastatin] Other (See Comments)     Leg cramps         Review of Systems      Negative, except per HPI    Objective:       Vitals:     "04/17/17 1350   BP: 105/64   Pulse: 70   SpO2: (!) 94%   Weight: 87.4 kg (192 lb 10.9 oz)   Height: 5' 10" (1.778 m)       Physical Exam     Gen: AAOx3, NAD  Head: NC/AT  Eyes: PERRLA, EOMI  Ears: no discharge  Nose: no nasal polyps  Throat: no tonsillar exudate  Neck: no masses  Lungs: CTAB, no wheezing, rhonci or rales  Cardiac: S1/S2=normal, no murmurs, rubs or gallops  Abdomen: soft, nontender, nondistended, BS+  Extremities: no edema  Skin: no rashes        Assessment/Plan:       70 y/o male with a PMH significant for a 55 pack year history of tobacco (actively smoking, no prior history of PFTS/COPD), Type A Aortic Dissection s/p ascending aortic graft repair (4/13/16), CAD (non-obstructive), HTN, HLD, and CKD who presents as a referral by his PCP (Dr. De La Torre) after a recent CT chest for interval surveillance of aortic mural thrombus was performed (3/30/17)-->post surgical changes noted of ascending aortic graft repair for thoracic intramural hematoma, small left pleural effusion with associated compressive atelectasis of LLL.     In clinic today with progressive ARROYO for the last two months (rib fracture last November with air conditioning, reaggravated a couple of weeks ago with another air conditioner incident) associated with a non-productive cough and intermittent wheezing, no fevers, chills, night sweats or unintentional weight loss.  Patient's mother had lung cancer (diagnosed in her 70's).  Patient states he doesn't use any inhalers and has never been diagnosed with COPD.  No prior history of heart failure, liver failure or kidney failure.    Small Left Sided Pleural Effusion/Atelectasis, in the setting of reaggravated rib fractures (moving air conditioners).      -Working Differential at this point: Pleural Effusion 2/2 Lung Injury vs. Malignancy.  Low suspicion for empyema/parapneumonic effusion given lack of fevers, chills, productive cough.  No prior history of heart, liver or kidney failure (which " would typically be bilateral)    -Diagnostic and Therapeutic Thoracentesis performed in clinic: 1400 cc (sent for WBC/LDH/Total Protein/Cytology)  -PFTs and 6 minute walk test ordered 1 month from now (when more optimized from a respiratory standpoint)  -Follow up chest x-ray ordered s/p thoracentesis in clinic today.    Adrien Dumont MD, MSc  Pulmonary/Critical Care Fellow    Thoracentesis Procedure Note    04/17/2017    Pre-operative Diagnosis:   Pleural Effusion on Left    Post-operative Diagnosis:  same    Indications:   Pleural Effusion on Left   Dyspnea    Consent:   Informed consent was obtained. Risks of the procedure were discussed including infection, bleeding, pain, and pneumothorax. The patient signed the consent freely in the presence of a witness (nursing staff) after all questions were answered.     Procedure Details:  After the patient was positioned, an acceptable site for fluid aspiration was visualized and marked under ultrasound guidance. The usual sterile field was created using Chlorhexadine solution and sterile drapes. 1% plain lidocaine was then used to create a wheal, and then given via deeper infiltration between the rib space.  A small nick was created using a #11 blade scalpel. The thoracentesis catheter was inserted without difficulty, and fluid was obtained via aspiration with 60mL syringe. The catheter was then withdrawn and a clean dressing was applied.     Findings:  1400 ml of bloody pleural fluid was obtained. Samples were sent for microbiology, cell count, and cytology.     Plan:   A follow up chest x-ray was ordered.    Adrien Dumont MD, MSc  Pulmonary/Critical Care Fellow

## 2017-04-17 NOTE — LETTER
April 17, 2017      Patricia De La Torre MD  [address]  [place] [address]           [facility] - Pulmonary [facility]  [address]  [place] [address]  Phone: [phone]          Patient: Clint Brown   MR Number: [id number]   YOB: 1945   Date of Visit: 4/17/2017       Dear Dr. Patricia De La Torre:    Thank you for referring Clint Brown to me for evaluation. Attached you will find relevant portions of my assessment and plan of care.    If you have questions, please do not hesitate to call me. I look forward to following Clint Brown along with you.    Sincerely,    Bree Hurd MD    Enclosure  CC:  No Recipients    If you would like to receive this communication electronically, please contact [email or web address] or [phone] to request more information on [facility] access.    For providers and/or their staff who would like to refer a patient to [facility], please contact us through our one-stop-shop provider referral line, [facility], at [phone].    If you feel you have received this communication in error or would no longer like to receive these types of communications, please e-mail [email or web address]

## 2017-04-18 ENCOUNTER — TELEPHONE (OUTPATIENT)
Dept: CARDIOLOGY | Facility: CLINIC | Age: 72
End: 2017-04-18

## 2017-04-18 NOTE — TELEPHONE ENCOUNTER
----- Message from Vito Vick MD sent at 4/18/2017  8:48 AM CDT -----  CT scan of the chest looks fine. The part of the aorta behind the heart has shrunk a bit in size - that is good, it means it is less swollen.

## 2017-04-18 NOTE — PROGRESS NOTES
[facility]  Thoracentesis  Procedure Note    Pre-operative Diagnosis:   Pleural Effusion on Left     Post-operative Diagnosis:  same     Indications:   Pleural Effusion on Left   Dyspnea     Consent:   Informed consent was obtained. Risks of the procedure were discussed including infection, bleeding, pain, and pneumothorax. The patient signed the consent freely in the presence of a witness (nursing staff) after all questions were answered.      Procedure Details:  After the patient was positioned, an acceptable site for fluid aspiration was visualized and marked under ultrasound guidance. The usual sterile field was created using Chlorhexadine solution and sterile drapes. 1% plain lidocaine was then used to create a wheal, and then given via deeper infiltration between the rib space. A small nick was created using a #11 blade scalpel. The thoracentesis catheter was inserted without difficulty, and fluid was obtained via aspiration with 60mL syringe. The catheter was then withdrawn and a clean dressing was applied.      Findings:  1400 ml of bloody pleural fluid was obtained. Samples were sent for microbiology, cell count, and cytology.      Plan:   A follow up chest x-ray was ordered.     Adrien Dumont MD, MSc  Pulmonary/Critical Care Fellow  Attestation: I was present and scrubbed for the entire procedure.     Bree Hurd MD

## 2017-04-20 LAB
BACTERIA FLD AEROBE CULT: NO GROWTH
GRAM STN SPEC: NORMAL
GRAM STN SPEC: NORMAL

## 2017-04-21 ENCOUNTER — PATIENT MESSAGE (OUTPATIENT)
Dept: PULMONOLOGY | Facility: CLINIC | Age: 72
End: 2017-04-21

## 2017-04-26 ENCOUNTER — HOSPITAL ENCOUNTER (OUTPATIENT)
Dept: RADIOLOGY | Facility: HOSPITAL | Age: 72
Discharge: HOME | End: 2017-04-26
Attending: INTERNAL MEDICINE
Payer: COMMERCIAL

## 2017-04-26 DIAGNOSIS — R06.02 SOB (SHORTNESS OF BREATH): ICD-10-CM

## 2017-04-26 DIAGNOSIS — R06.02 SOB (SHORTNESS OF BREATH): Primary | ICD-10-CM

## 2017-04-26 PROCEDURE — 71020 XR CHEST PA AND LATERAL: CPT | Mod: 26,,, | Performed by: RADIOLOGY

## 2017-04-26 PROCEDURE — 71020 XR CHEST PA AND LATERAL: CPT | Mod: TC

## 2017-05-11 ENCOUNTER — HOSPITAL ENCOUNTER (OUTPATIENT)
Dept: PULMONOLOGY | Facility: CLINIC | Age: 72
Discharge: HOME | End: 2017-05-11
Payer: MEDICARE

## 2017-05-11 ENCOUNTER — OFFICE VISIT (OUTPATIENT)
Dept: PULMONOLOGY | Facility: CLINIC | Age: 72
End: 2017-05-11
Payer: MEDICARE

## 2017-05-11 VITALS — HEIGHT: 70 IN | WEIGHT: 187 LBS | BODY MASS INDEX: 26.77 KG/M2

## 2017-05-11 VITALS
BODY MASS INDEX: 26.89 KG/M2 | WEIGHT: 187.81 LBS | DIASTOLIC BLOOD PRESSURE: 78 MMHG | HEIGHT: 70 IN | OXYGEN SATURATION: 94 % | SYSTOLIC BLOOD PRESSURE: 116 MMHG | HEART RATE: 81 BPM

## 2017-05-11 DIAGNOSIS — J44.9 CHRONIC OBSTRUCTIVE PULMONARY DISEASE, UNSPECIFIED COPD TYPE: ICD-10-CM

## 2017-05-11 DIAGNOSIS — J44.9 CHRONIC OBSTRUCTIVE PULMONARY DISEASE, UNSPECIFIED COPD TYPE: Primary | ICD-10-CM

## 2017-05-11 PROCEDURE — 3074F SYST BP LT 130 MM HG: CPT | Mod: S$GLB,,, | Performed by: INTERNAL MEDICINE

## 2017-05-11 PROCEDURE — 1160F RVW MEDS BY RX/DR IN RCRD: CPT | Mod: S$GLB,,, | Performed by: INTERNAL MEDICINE

## 2017-05-11 PROCEDURE — 99214 OFFICE O/P EST MOD 30 MIN: CPT | Mod: S$GLB,,, | Performed by: INTERNAL MEDICINE

## 2017-05-11 PROCEDURE — 99999 PR PBB SHADOW E&M-EST. PATIENT-LVL III: CPT | Mod: PBBFAC,,, | Performed by: INTERNAL MEDICINE

## 2017-05-11 PROCEDURE — 94620 PR PULMONARY STRESS TESTING,SIMPLE: CPT | Mod: S$GLB,,, | Performed by: INTERNAL MEDICINE

## 2017-05-11 PROCEDURE — 1159F MED LIST DOCD IN RCRD: CPT | Mod: S$GLB,,, | Performed by: INTERNAL MEDICINE

## 2017-05-11 PROCEDURE — 3078F DIAST BP <80 MM HG: CPT | Mod: S$GLB,,, | Performed by: INTERNAL MEDICINE

## 2017-05-11 PROCEDURE — 1125F AMNT PAIN NOTED PAIN PRSNT: CPT | Mod: S$GLB,,, | Performed by: INTERNAL MEDICINE

## 2017-05-11 RX ORDER — HYDROCODONE BITARTRATE AND ACETAMINOPHEN 10; 325 MG/1; MG/1
1 TABLET ORAL EVERY 6 HOURS PRN
Qty: 50 TABLET | Refills: 0 | Status: SHIPPED | OUTPATIENT
Start: 2017-05-11 | End: 2017-05-11 | Stop reason: SDUPTHER

## 2017-05-11 RX ORDER — HYDROCODONE BITARTRATE AND ACETAMINOPHEN 10; 325 MG/1; MG/1
1 TABLET ORAL EVERY 6 HOURS PRN
Qty: 50 TABLET | Refills: 0 | Status: ON HOLD | OUTPATIENT
Start: 2017-05-11 | End: 2017-06-01 | Stop reason: HOSPADM

## 2017-05-12 DIAGNOSIS — R06.02 SOB (SHORTNESS OF BREATH): ICD-10-CM

## 2017-05-12 DIAGNOSIS — J91.0 MALIGNANT PLEURAL EFFUSION: Primary | ICD-10-CM

## 2017-05-12 NOTE — PROGRESS NOTES
"Subjective:       Patient ID: Clint Brown is a 71 y.o. male.    Chief Complaint: No chief complaint on file.    HPI Comments: 71 year old male with hx of tobacco abuse who presents for follow up for pleural effusion. Patient had cells suggestive of malignant effusion. Cytology sent out to [facility] and is still pending. Patient has had severe dyspnea while ambulating. He continues to smoke. Unintentional weight loss.        Review of Systems   Constitutional: Positive for weight loss, fatigue and weakness.   Respiratory: Positive for cough and shortness of breath. Negative for hemoptysis and sputum production.    Cardiovascular: Negative for chest pain and leg swelling.   Musculoskeletal: Negative for arthralgias and myalgias.   Skin: Negative for rash.       Objective:       Vitals:    05/11/17 1559   BP: 116/78   Pulse: 81   SpO2: (!) 94%   Weight: 85.2 kg (187 lb 13.3 oz)   Height: 5' 10" (1.778 m)   PainSc:   4   PainLoc: Rib Cage       Physical Exam   Constitutional: He appears well-developed and well-nourished.   HENT:   Head: Normocephalic.   Nose: Nose normal.   Neck: Normal range of motion. Neck supple.   Cardiovascular: Normal rate and regular rhythm.    Pulmonary/Chest: Normal expansion, symmetric chest wall expansion, effort normal and breath sounds normal. He has no rhonchi. He has no rales.   Abdominal: Soft. Bowel sounds are normal.   Musculoskeletal: Normal range of motion. He exhibits no edema or deformity.   Skin: Skin is warm and dry. No rash noted. No erythema.   Psychiatric: He has a normal mood and affect. His behavior is normal. Judgment and thought content normal.   Nursing note and vitals reviewed.    Personal Diagnostic Review  Pulse oximetry, exercise Desaturation to 86% with exercise. 2L with improvement in O2 sat.   No flowsheet data found.      Assessment:       1. Chronic obstructive pulmonary disease, unspecified COPD type        Outpatient Encounter Prescriptions as of " 5/11/2017   Medication Sig Dispense Refill    aspirin (ECOTRIN) 81 MG EC tablet Take 1 tablet (81 mg total) by mouth once daily.  0    DOCUSATE SODIUM (COLACE ORAL) Take 1 capsule by mouth once daily.       labetalol (NORMODYNE) 200 MG tablet TAKE 1 TABLET (200 MG TOTAL) BY MOUTH EVERY 12 (TWELVE) HOURS. 60 tablet 11    multivitamin (THERAGRAN) per tablet Take 1 tablet by mouth once daily.      omeprazole (PRILOSEC) 20 MG capsule Take 1 capsule (20 mg total) by mouth once daily. 30 capsule 11    rosuvastatin (CRESTOR) 20 MG tablet Take 1 tablet (20 mg total) by mouth once daily. 30 tablet 11    benzonatate (TESSALON) 100 MG capsule Take 100 mg by mouth 3 (three) times daily as needed for Cough.      hydrocodone-acetaminophen 10-325mg (NORCO)  mg Tab Take 1 tablet by mouth every 6 (six) hours as needed for Pain. 50 tablet 0    ondansetron (ZOFRAN) 4 MG tablet Take 1 tablet (4 mg total) by mouth every 8 (eight) hours as needed for Nausea. 20 tablet 0    [DISCONTINUED] hydrocodone-acetaminophen 10-325mg (NORCO)  mg Tab Take 1 tablet by mouth every 6 (six) hours as needed for Pain. 50 tablet 0     No facility-administered encounter medications on file as of 5/11/2017.      Orders Placed This Encounter   Procedures    Stress test, pulmonary     Standing Status:   Future     Number of Occurrences:   1     Standing Expiration Date:   5/11/2018     Plan:       71 year old male with     1. 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 Sent for special stains to [facility]. Awaiting results.     2. ARROYO- patient with desaturation with exercise to 86%. Will start patient on home O2.    Follow up after Pathology results.     Bree Hurd MD

## 2017-05-12 NOTE — PROCEDURES
Clint Brown is a 71 y.o.  male patient, who presents for a 6 minute walk test ordered by Bree Hurd MD.  The diagnosis is Qualify for Oxygen; COPD.  The patient's BMI is 26.9 kg/m2. Predicted distance (lower limit of normal) is 343.35 meters.    Test Results:    The test was not completed.  The patient stopped 1 time for a total of 232 seconds.  The total time walked was 128 seconds.  During walking, the patient reported:  Dyspnea.  The patient used supplemental oxygen during repeat testing.     05/11/2017---------Distance: 121.92 meters (400 feet)     O2 Sat % Supplemental Oxygen Heart Rate Blood Pressure Kathi Scale   Pre-exercise  (Resting) 95 % Room Air 90 bpm 132/74 mmHg 0   During Exercise 86 % Room Air 102 bpm 149/74 mmHg 4   Post-exercise   95 % Room Air  92 bpm         Recovery Time: 57 seconds    Oxygen Qualification:     O2 Sat % Supplemental Oxygen Heart Rate Blood Pressure Kathi Scale   Pre-exercise  (Resting) 97 % 2 L/M  88 bpm  144/71 mmHg 0    During Exercise 98 %  2 L/M  101 bpm  152/71 mmHg 2    Post-exercise   97 %  2 L/M  94 bpm            Performing nurse/tech: KAMRYN Lindsey      PREVIOUS STUDY:   The patient has not had a previous study.      CLINICAL INTERPRETATION:  Six minute walk distance is 121.92 meters (400 feet) with somewhat heavy dyspnea.  During exercise, there was significant desaturation while breathing room air.  Both blood pressure and heart rate remained stable with walking.  The patient did not report non-pulmonary symptoms during exercise.  Severe exercise impairment is likely due to desaturation and subjective symptoms.  The patient did not complete the study, walking 128 seconds of the 360 second test.  The patient may benefit from using supplemental oxygen during exertion.  No previous study performed.  Based upon age and body mass index, exercise capacity is less than predicted.   Oxygen saturation did improve while breathing supplemental oxygen.

## 2017-05-16 ENCOUNTER — PATIENT MESSAGE (OUTPATIENT)
Dept: PULMONOLOGY | Facility: CLINIC | Age: 72
End: 2017-05-16

## 2017-05-18 ENCOUNTER — TELEPHONE (OUTPATIENT)
Dept: PULMONOLOGY | Facility: CLINIC | Age: 72
End: 2017-05-18

## 2017-05-18 ENCOUNTER — HOSPITAL ENCOUNTER (OUTPATIENT)
Dept: RADIOLOGY | Facility: HOSPITAL | Age: 72
Discharge: HOME | End: 2017-05-18
Attending: INTERNAL MEDICINE
Payer: MEDICARE

## 2017-05-18 DIAGNOSIS — J91.0 MALIGNANT PLEURAL EFFUSION: ICD-10-CM

## 2017-05-18 NOTE — TELEPHONE ENCOUNTER
----- Message from Jacki Chavira sent at 5/17/2017  9:35 AM CDT -----  Contact: Bonnie/ Daughter [phone]  Bonnie is requesting a call on today at [phone] concerning portable oxygen for her Father.

## 2017-05-18 NOTE — TELEPHONE ENCOUNTER
Pt daughter does not have a voice mail set up. A message has been sent to Dr Hurd to respond to pt

## 2017-05-19 ENCOUNTER — HOSPITAL ENCOUNTER (OUTPATIENT)
Dept: RADIOLOGY | Facility: HOSPITAL | Age: 72
Discharge: HOME | End: 2017-05-19
Attending: INTERNAL MEDICINE
Payer: MEDICARE

## 2017-05-19 ENCOUNTER — TELEPHONE (OUTPATIENT)
Dept: PULMONOLOGY | Facility: CLINIC | Age: 72
End: 2017-05-19

## 2017-05-19 PROCEDURE — 74150 CT ABDOMEN W/O CONTRAST: CPT | Mod: 26,,, | Performed by: RADIOLOGY

## 2017-05-19 PROCEDURE — 71250 CT THORAX DX C-: CPT | Mod: TC

## 2017-05-19 PROCEDURE — 74150 CT ABDOMEN W/O CONTRAST: CPT | Mod: TC

## 2017-05-19 NOTE — TELEPHONE ENCOUNTER
----- Message from Bree Hurd MD sent at 5/18/2017 12:54 PM CDT -----  Contact: Bonnie/ Daughter [phone]  Rachelle has spoken to the daughter regarding this.  ----- Message -----     From: Nivia Enciso     Sent: 5/18/2017  12:39 PM       To: Bree Hurd MD    Pt would like a order in for a portable concentrator  O2 and can you put a referral in for the oncology   ----- Message -----     From: Bree Hurd MD     Sent: 5/18/2017  12:27 PM       To: Rachelle Be LPN, Nivia Enciso    I didn't schedule an Oncology appointment, but we can.     Bree  ----- Message -----     From: Nivia Enciso     Sent: 5/18/2017  12:24 PM       To: Bree Hurd MD    Please check pt e-mail   ----- Message -----     From: Jacki Chavira     Sent: 5/17/2017   9:35 AM       To: Vannessa Saucedo    Bonnie is requesting a call on today at [phone] concerning portable oxygen for her Father.

## 2017-05-22 ENCOUNTER — TELEPHONE (OUTPATIENT)
Dept: PULMONOLOGY | Facility: CLINIC | Age: 72
End: 2017-05-22

## 2017-05-22 NOTE — TELEPHONE ENCOUNTER
----- Message from Amarilys Gallagher sent at 5/19/2017  1:11 PM CDT -----  Contact: pt  daughter Bonnie  [phone]  Vannessa   -   Calling to get orders for an portable concentrator for the pt   Call back number [phone]  Thanks,

## 2017-05-23 ENCOUNTER — OFFICE VISIT (OUTPATIENT)
Dept: PULMONOLOGY | Facility: CLINIC | Age: 72
End: 2017-05-23
Payer: MEDICARE

## 2017-05-23 ENCOUNTER — HOSPITAL ENCOUNTER (OUTPATIENT)
Dept: PULMONOLOGY | Facility: CLINIC | Age: 72
Discharge: HOME | End: 2017-05-23
Payer: MEDICARE

## 2017-05-23 VITALS
OXYGEN SATURATION: 93 % | BODY MASS INDEX: 26.64 KG/M2 | DIASTOLIC BLOOD PRESSURE: 78 MMHG | HEART RATE: 96 BPM | WEIGHT: 186.06 LBS | SYSTOLIC BLOOD PRESSURE: 118 MMHG | HEIGHT: 70 IN

## 2017-05-23 DIAGNOSIS — J90 PLEURAL EFFUSION: ICD-10-CM

## 2017-05-23 DIAGNOSIS — J90 PLEURAL EFFUSION: Primary | ICD-10-CM

## 2017-05-23 LAB
POST FEV1 FVC: 0.71
POST FEV1: 1.49
POST FVC: 2.09
PRE FEV1 FVC: 71
PRE FEV1: 1.37
PRE FVC: 1.93
PREDICTED FEV1 FVC: 79
PREDICTED FEV1: 2.91
PREDICTED FVC: 3.67

## 2017-05-23 PROCEDURE — 88305 TISSUE EXAM BY PATHOLOGIST: CPT | Performed by: PATHOLOGY

## 2017-05-23 PROCEDURE — 88305 TISSUE EXAM BY PATHOLOGIST: CPT | Mod: 26,,, | Performed by: PATHOLOGY

## 2017-05-23 PROCEDURE — 94060 EVALUATION OF WHEEZING: CPT | Mod: S$GLB,,, | Performed by: INTERNAL MEDICINE

## 2017-05-23 PROCEDURE — 94727 GAS DIL/WSHOT DETER LNG VOL: CPT | Mod: S$GLB,,, | Performed by: INTERNAL MEDICINE

## 2017-05-23 PROCEDURE — 94729 DIFFUSING CAPACITY: CPT | Mod: S$GLB,,, | Performed by: INTERNAL MEDICINE

## 2017-05-23 PROCEDURE — 99214 OFFICE O/P EST MOD 30 MIN: CPT | Mod: 25,S$GLB,, | Performed by: INTERNAL MEDICINE

## 2017-05-23 PROCEDURE — 99999 PR PBB SHADOW E&M-EST. PATIENT-LVL IV: CPT | Mod: PBBFAC,,, | Performed by: INTERNAL MEDICINE

## 2017-05-23 PROCEDURE — 88112 CYTOPATH CELL ENHANCE TECH: CPT | Mod: 26,,, | Performed by: PATHOLOGY

## 2017-05-23 PROCEDURE — 32554 ASPIRATE PLEURA W/O IMAGING: CPT | Mod: LT,S$GLB,, | Performed by: INTERNAL MEDICINE

## 2017-05-23 NOTE — PROGRESS NOTES
[facility] - Pulmonary Services  Thoracentesis  Procedure Note    SUMMARY     Date of Procedure:      Procedure: [unfilled]     * Surgery not found *    @ORCONDITIONALSMARKERRY(ORSMISHA,[facility],1,Assisting,NOASTSURG)@    Indications: SOB    Pre-Operative Diagnosis: Pleural effusion    Post-Operative Diagnosis: same    Anesthesia: * No surgery found *            Consent: Informed consent was obtained. Risks of the procedure were discussed including: infection, bleeding, pain, pneumothorax.    Under sterile conditions the patient was positioned. Chlorhexadine solution and sterile drapes were utilized.  1% plain lidocaine was used to anesthetize between the rib space after localized under ultrasound. Fluid was obtained after catheter inserted without  difficulty and suction applied with minimal blood loss.  A dressing was applied to the wound and wound care instructions were provided.     1750 ml of serosanguinous pleural fluid was obtained. A sample was sent to Pathology for cytogenetics, flow, and cell counts, as well as for infection analysis.    Plan:  Follow up with Thoracic surgery  Follow up Cytology        Complications: None; patient tolerated the procedure well.    Total IV Fluids: 1750 ml                Condition: stable    Disposition: home    Attestation: I was present and scrubbed for the entire procedure.

## 2017-05-23 NOTE — PROGRESS NOTES
"Subjective:       Patient ID: Clint Brown is a 71 y.o. male.    Chief Complaint: No chief complaint on file.    72 y/o male with a PMH significant for a 55 pack year history of tobacco (actively smoking, no prior history of PFTS/COPD), Type A Aortic Dissection s/p ascending aortic graft repair (4/13/16), CAD (non-obstructive), HTN, HLD, and CKD who presents as a referral by his PCP (Dr. De La Torre) after a recent CT chest for interval surveillance of aortic mural thrombus was performed (3/30/17)-->post surgical changes noted of ascending aortic graft repair for thoracic intramural hematoma, small left pleural effusion with associated compressive atelectasis of LLL.      In clinic today with progressive ARROYO for the last two months (rib fracture last November with air conditioning, reaggravated a couple of weeks ago with another air conditioner incident) associated with a non-productive cough and intermittent wheezing, no fevers, chills, night sweats or unintentional weight loss.  Patient's mother had lung cancer (diagnosed in her 70's).  Patient states he doesn't use any inhalers and has never been diagnosed with COPD.  No prior history of heart failure, liver failure or kidney failure.    Patient had pathology that was suggestive of malignancy. Pathology sent out to [facility] and was unable to identify cancer type.     He continues to have SOB. He presents for thoracentesis.       Review of Systems   Respiratory: Positive for cough, shortness of breath and dyspnea on extertion.        Objective:       Vitals:    05/23/17 1104   BP: 118/78   Pulse: 96   SpO2: (!) 93%   Weight: 84.4 kg (186 lb 1.1 oz)   Height: 5' 10" (1.778 m)   PainSc: 0-No pain       Physical Exam   Constitutional: He appears well-developed and well-nourished. No distress.   Cardiovascular: Normal rate, regular rhythm and normal heart sounds.    Pulmonary/Chest: Normal expansion. He has decreased breath sounds.   Musculoskeletal: Normal range of " motion. He exhibits no edema or deformity.   Skin: He is not diaphoretic.   Nursing note and vitals reviewed.    Personal Diagnostic Review  none pertinent  No flowsheet data found.      Assessment:       1. Pleural effusion        Outpatient Encounter Prescriptions as of 5/23/2017   Medication Sig Dispense Refill    aspirin (ECOTRIN) 81 MG EC tablet Take 1 tablet (81 mg total) by mouth once daily.  0    DOCUSATE SODIUM (COLACE ORAL) Take 1 capsule by mouth once daily.       hydrocodone-acetaminophen 10-325mg (NORCO)  mg Tab Take 1 tablet by mouth every 6 (six) hours as needed for Pain. 50 tablet 0    labetalol (NORMODYNE) 200 MG tablet TAKE 1 TABLET (200 MG TOTAL) BY MOUTH EVERY 12 (TWELVE) HOURS. 60 tablet 11    multivitamin (THERAGRAN) per tablet Take 1 tablet by mouth once daily.      omeprazole (PRILOSEC) 20 MG capsule Take 1 capsule (20 mg total) by mouth once daily. 30 capsule 11    ondansetron (ZOFRAN) 4 MG tablet Take 1 tablet (4 mg total) by mouth every 8 (eight) hours as needed for Nausea. 20 tablet 0    rosuvastatin (CRESTOR) 20 MG tablet Take 1 tablet (20 mg total) by mouth once daily. 30 tablet 11    benzonatate (TESSALON) 100 MG capsule Take 100 mg by mouth 3 (three) times daily as needed for Cough.       No facility-administered encounter medications on file as of 5/23/2017.      No orders of the defined types were placed in this encounter.    Plan:          71 year old male with     Lung malignancy- L lung nodules and lymphadenopathy. Patient's thoracentesis fluid sent to [facility] concerning for malignancy but unable to identify source. Report pasted below.     There is pleural involvement on the left side. Differential includes mesothelioma. Patient has an asbestos exposure history. Patient is a smoker. Cytology took 4 weeks to return and was non diagnostic. Will refer to Thoracic surgery for evaluation for possible VATS biopsy/pleurodesis.       L pleural effusion- Patient has recurrent  effusion. Thoracentesis performed today in clinic. See procedure note.    Bree Hurd MD

## 2017-05-25 ENCOUNTER — OFFICE VISIT (OUTPATIENT)
Dept: CARDIOTHORACIC SURGERY | Facility: CLINIC | Age: 72
End: 2017-05-25
Payer: MEDICARE

## 2017-05-25 VITALS
WEIGHT: 184.06 LBS | DIASTOLIC BLOOD PRESSURE: 66 MMHG | BODY MASS INDEX: 26.35 KG/M2 | HEIGHT: 70 IN | SYSTOLIC BLOOD PRESSURE: 103 MMHG | OXYGEN SATURATION: 95 % | TEMPERATURE: 84 F

## 2017-05-25 DIAGNOSIS — J90 PLEURAL EFFUSION: Primary | ICD-10-CM

## 2017-05-25 PROCEDURE — 99999 PR PBB SHADOW E&M-EST. PATIENT-LVL IV: CPT | Mod: PBBFAC,,, | Performed by: THORACIC SURGERY (CARDIOTHORACIC VASCULAR SURGERY)

## 2017-05-25 PROCEDURE — 99205 OFFICE O/P NEW HI 60 MIN: CPT | Mod: S$GLB,,, | Performed by: THORACIC SURGERY (CARDIOTHORACIC VASCULAR SURGERY)

## 2017-05-25 NOTE — PROGRESS NOTES
History & Physical    SUBJECTIVE:     History of Present Illness:  71 year old male smoker with PMH of Type A ascending aortic dissection repaired on 4/13/17, CAD, HTN, HLD, and CKD presents for surgical evaluation of recurrent left pleural effusion and mediastinal lymphadenopathy. Patient reports he was in his usual state of health after ascending aortic dissection and repair in April 2016. He was back at work. About 1-2 months ago he began having worsening left chest wall pain and SOB. After seeing his cardiologist, a chest CT was ordered which revealed a large effusion and mediastinal lymphadenopathy. He has had two thoracenteses, 4/17/17 and 5/23/17 both drained over 1.5L of serosanguinous fluid. Today he reports breathing has improved since last thoracentesis. Only wears home O2 with activity. Left rib pain is constant, worse at night. Denies fever,chills, weight loss or changes in appetite. L PSH significant for sternotomy for ascending aortic dissection repair and appendectomy.     Current smoker. 1/2 ppd. 55 pack year history. Works as an air conditioning contractor.      Chief Complaint   Patient presents with    Consult       Review of patient's allergies indicates:   Allergen Reactions    Cephalexin      Other reaction(s): Unknown    Iodine and iodide containing products Nausea And Vomiting    Lipitor [atorvastatin] Other (See Comments)     Leg cramps       Current Outpatient Prescriptions   Medication Sig Dispense Refill    aspirin (ECOTRIN) 81 MG EC tablet Take 1 tablet (81 mg total) by mouth once daily.  0    benzonatate (TESSALON) 100 MG capsule Take 100 mg by mouth 3 (three) times daily as needed for Cough.      DOCUSATE SODIUM (COLACE ORAL) Take 1 capsule by mouth once daily.       hydrocodone-acetaminophen 10-325mg (NORCO)  mg Tab Take 1 tablet by mouth every 6 (six) hours as needed for Pain. 50 tablet 0    labetalol (NORMODYNE) 200 MG tablet TAKE 1 TABLET (200 MG TOTAL) BY MOUTH  EVERY 12 (TWELVE) HOURS. 60 tablet 11    multivitamin (THERAGRAN) per tablet Take 1 tablet by mouth once daily.      omeprazole (PRILOSEC) 20 MG capsule Take 1 capsule (20 mg total) by mouth once daily. 30 capsule 11    ondansetron (ZOFRAN) 4 MG tablet Take 1 tablet (4 mg total) by mouth every 8 (eight) hours as needed for Nausea. 20 tablet 0    rosuvastatin (CRESTOR) 20 MG tablet Take 1 tablet (20 mg total) by mouth once daily. 30 tablet 11     No current facility-administered medications for this visit.        Past Medical History:   Diagnosis Date    Atrial flutter     cardioversion 4/12/16    Chronic kidney disease     Coronary artery disease     [facility] 4/1/16: 50% proximal RAMUS    Hypertension     Kidney stones     Old 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/20/2016    3/16    Thoracic aortic dissection     Type A dissection s/p repair 4/13/16     Past Surgical History:   Procedure Laterality Date    APPENDECTOMY      CARDIAC CATHETERIZATION      KIDNEY STONE SURGERY      4 surgeries     No family history on file.  Social History   Substance Use Topics    Smoking status: Current Every Day Smoker     Packs/day: 0.25     Years: 55.00     Types: Cigarettes    Smokeless tobacco: Never Used    Alcohol use No        Review of Systems:  Review of Systems   Constitutional: Positive for activity change and fatigue. Negative for appetite change, fever and unexpected weight change.   HENT: Negative for congestion, sore throat, trouble swallowing and voice change.    Eyes: Negative.    Respiratory: Positive for cough, chest tightness, shortness of breath and wheezing.         Left chest wall pain   Cardiovascular: Negative.    Gastrointestinal: Negative.    Endocrine: Negative.    Genitourinary: Negative.    Musculoskeletal: Negative.         Left rib pain   Skin: Negative.    Allergic/Immunologic: Negative.    Neurological: Negative.    Hematological: Negative.    Psychiatric/Behavioral: Negative.        OBJECTIVE:  "    Vital Signs (Most Recent)  Temp: (!) 84 °F (28.9 °C) (05/25/17 0956)  BP: 103/66 (05/25/17 0956)  SpO2: 95 % (05/25/17 0956)  5' 10" (1.778 m)  83.5 kg (184 lb 1.4 oz)     Physical Exam:  Physical Exam   Constitutional: He is oriented to person, place, and time. He appears well-developed and well-nourished.   HENT:   Head: Normocephalic and atraumatic.   Eyes: Pupils are equal, round, and reactive to light.   Neck: Normal range of motion. Neck supple. No thyromegaly present.   Cardiovascular: Intact distal pulses and normal pulses.  An irregularly irregular rhythm present.   No murmur heard.  Pulmonary/Chest: No accessory muscle usage. No tachypnea. No respiratory distress. He has decreased breath sounds in the right lower field, the left middle field and the left lower field. He has wheezes in the right upper field and the left upper field.   Left chest wall tenderness    Abdominal: Soft. Bowel sounds are normal. He exhibits no distension. There is no tenderness.   Musculoskeletal: Normal range of motion. He exhibits no edema or deformity.   Lymphadenopathy:     He has no cervical adenopathy.   Neurological: He is alert and oriented to person, place, and time.   Skin: Skin is warm and dry.   Psychiatric: He has a normal mood and affect.   Vitals reviewed.    Laboratory    FEV1- 1.37 47%  DLCO- 11.8 24%    Diagnostic Results:    5/23/17 2D ECHO CONCLUSIONS     1 - Normal left ventricular systolic function (EF 55-60%).     2 - Normal right ventricular systolic function .     3 - Normal left ventricular diastolic function.     4 - The estimated PA systolic pressure is 26 mmHg.     5- The aortic root at the sinus of valsalva is dilated and measures 4.2 cm.     6- The dissection flap/ Thickening in the aortic root seen on the prior study, not seen on this study (although this study was not done for a dignosis of dissection hence dedicated images of the root with color flow were not performed).     Chest and Abdomen " CT:   1.  Prominence of the left hilar region with questionable 2.6 cm lesion at the bifurcation of left upper and lower bronchi.  Evaluation limited without the use of contrast.  This appears more prominent compared to previous CTs and concerning for potential neoplastic process and possible primary malignancy, although alternatively this finding could represent nonopacified pulmonary vasculature.  2.   Large left-sided pleural effusion resulting in near complete collapse of the left lower lobe and partial collapse of the left upper lobe.  Potential underlying pulmonary lesion difficult to exclude.  3.  Mediastinal lymphadenopathy concerning for local spread of disease.  Lymph nodes appear increased in size and number compared to recent CT from 3/30/17.  Abnormal soft tissue attenuation visualized within the left anterior aspect of the mediastinum for possible pleural thickening with additional questionable areas of mild pleural thickening along the posterior aspect of the left lung.  4.  No acute intra-abdominal process identified.  Additional abdominal findings as detailed above.    ASSESSMENT/PLAN:     71 year old male smoker with PMH of Type A ascending aortic dissection repaired on 4/13/17, CAD, HTN, HLD, and CKD presents for surgical evaluation of recurrent left pleural effusion and mediastinal lymphadenopathy.     PLAN:Plan    Proceed with left VATS for drainage of effusion, possible talc pleurodesis, possible Pleurx, mediastinal LN biopsy, pleural biopsy and possible wedge biopsy   Appropriate patient education regarding the denise-operative period as well as intraperative details were discussed. Risks, including but not limited to, bleeding, infection, pain and anesthetic complication were discussed. Patient was given the opportunity to ask questions and to have those questions answered to their satisfaction. Patient verbalized understanding to both procedure and associated risks. Consent was  obtained.    ATTENDING ATTESTATION:    I evaluated the patient and I agree with the assessment and plan.  Recurrent left pleural effusion, previous pleural fluid cytology suspicious for malignancy.  CT also shows mediastinal and hilar LAD.  Plan for Left thoracoscopy with drainage of effusion, pleural biopsy, pleurodesis vs. PleurX.  Offered 5/26, patient requests 5/31.

## 2017-05-25 NOTE — LETTER
May 25, 2017      Bree Hurd MD  [address]  [address]  [place] [address]           [facility] Thoracic Surgery  [facility]  [place] [address]  Phone: [phone]  Fax: [phone]          Patient: Clint Brown   MR Number: [id number]   YOB: 1945   Date of Visit: 5/25/2017       Dear Dr. Bree Hurd:    Thank you for referring Clint Brown to me for evaluation. Attached you will find relevant portions of my assessment and plan of care.    If you have questions, please do not hesitate to call me. I look forward to following Clint Brown along with you.    Sincerely,    Evans Armas MD    Enclosure  CC:  No Recipients    If you would like to receive this communication electronically, please contact [email or web address] or [phone] to request more information on [employer] Link access.    For providers and/or their staff who would like to refer a patient to [facility], please contact us through our one-stop-shop provider referral line, [facility] [place], at [phone].    If you feel you have received this communication in error or would no longer like to receive these types of communications, please e-mail [email or web address]

## 2017-05-30 ENCOUNTER — TELEPHONE (OUTPATIENT)
Dept: CARDIOTHORACIC SURGERY | Facility: CLINIC | Age: 72
End: 2017-05-30

## 2017-05-31 ENCOUNTER — ANESTHESIA EVENT (OUTPATIENT)
Dept: SURGERY | Facility: HOSPITAL | Age: 72
DRG: 167 | End: 2017-05-31
Payer: MEDICARE

## 2017-05-31 ENCOUNTER — ANESTHESIA (OUTPATIENT)
Dept: SURGERY | Facility: HOSPITAL | Age: 72
DRG: 167 | End: 2017-05-31
Payer: MEDICARE

## 2017-05-31 ENCOUNTER — HOSPITAL ENCOUNTER (INPATIENT)
Facility: HOSPITAL | Age: 72
LOS: 1 days | Discharge: HOME HEALTH | DRG: 167 | End: 2017-06-01
Attending: THORACIC SURGERY (CARDIOTHORACIC VASCULAR SURGERY) | Admitting: THORACIC SURGERY (CARDIOTHORACIC VASCULAR SURGERY)
Payer: MEDICARE

## 2017-05-31 DIAGNOSIS — N18.9 ACUTE ON CHRONIC RENAL FAILURE: ICD-10-CM

## 2017-05-31 DIAGNOSIS — J90 PLEURAL EFFUSION: Primary | ICD-10-CM

## 2017-05-31 DIAGNOSIS — N17.9 ACUTE ON CHRONIC RENAL FAILURE: ICD-10-CM

## 2017-05-31 DIAGNOSIS — I25.10 CORONARY ARTERY DISEASE INVOLVING NATIVE CORONARY ARTERY OF NATIVE HEART WITHOUT ANGINA PECTORIS: ICD-10-CM

## 2017-05-31 DIAGNOSIS — C80.1 SPINDLE CELL CARCINOMA: Primary | ICD-10-CM

## 2017-05-31 LAB
ABO + RH BLD: NORMAL
ANION GAP SERPL CALC-SCNC: 10 MMOL/L
BASOPHILS # BLD AUTO: 0.02 K/UL
BASOPHILS NFR BLD: 0.2 %
BLD GP AB SCN CELLS X3 SERPL QL: NORMAL
BUN SERPL-MCNC: 17 MG/DL
CALCIUM SERPL-MCNC: 8.9 MG/DL
CHLORIDE SERPL-SCNC: 105 MMOL/L
CO2 SERPL-SCNC: 22 MMOL/L
CREAT SERPL-MCNC: 1 MG/DL
DIFFERENTIAL METHOD: ABNORMAL
EOSINOPHIL # BLD AUTO: 0.1 K/UL
EOSINOPHIL NFR BLD: 1.3 %
ERYTHROCYTE [DISTWIDTH] IN BLOOD BY AUTOMATED COUNT: 13.1 %
EST. GFR  (AFRICAN AMERICAN): >60 ML/MIN/1.73 M^2
EST. GFR  (NON AFRICAN AMERICAN): >60 ML/MIN/1.73 M^2
GLUCOSE SERPL-MCNC: 113 MG/DL
GRAM STN SPEC: NORMAL
GRAM STN SPEC: NORMAL
HCT VFR BLD AUTO: 34.3 %
HGB BLD-MCNC: 11.1 G/DL
LYMPHOCYTES # BLD AUTO: 0.7 K/UL
LYMPHOCYTES NFR BLD: 8.6 %
MAGNESIUM SERPL-MCNC: 1.8 MG/DL
MCH RBC QN AUTO: 28.7 PG
MCHC RBC AUTO-ENTMCNC: 32.4 %
MCV RBC AUTO: 89 FL
MONOCYTES # BLD AUTO: 1 K/UL
MONOCYTES NFR BLD: 11.3 %
NEUTROPHILS # BLD AUTO: 6.6 K/UL
NEUTROPHILS NFR BLD: 78.1 %
PHOSPHATE SERPL-MCNC: 4.3 MG/DL
PLATELET # BLD AUTO: 227 K/UL
PMV BLD AUTO: 10.2 FL
POCT GLUCOSE: 118 MG/DL (ref 70–110)
POTASSIUM SERPL-SCNC: 5.1 MMOL/L
RBC # BLD AUTO: 3.87 M/UL
SODIUM SERPL-SCNC: 137 MMOL/L
WBC # BLD AUTO: 8.47 K/UL

## 2017-05-31 PROCEDURE — 36000711: Performed by: THORACIC SURGERY (CARDIOTHORACIC VASCULAR SURGERY)

## 2017-05-31 PROCEDURE — 0W9B40Z DRAINAGE OF LEFT PLEURAL CAVITY WITH DRAINAGE DEVICE, PERCUTANEOUS ENDOSCOPIC APPROACH: ICD-10-PCS | Performed by: THORACIC SURGERY (CARDIOTHORACIC VASCULAR SURGERY)

## 2017-05-31 PROCEDURE — 25000003 PHARM REV CODE 250: Performed by: PHYSICIAN ASSISTANT

## 2017-05-31 PROCEDURE — 32550 INSERT PLEURAL CATH: CPT | Mod: ,,, | Performed by: THORACIC SURGERY (CARDIOTHORACIC VASCULAR SURGERY)

## 2017-05-31 PROCEDURE — 83735 ASSAY OF MAGNESIUM: CPT

## 2017-05-31 PROCEDURE — 82962 GLUCOSE BLOOD TEST: CPT | Performed by: THORACIC SURGERY (CARDIOTHORACIC VASCULAR SURGERY)

## 2017-05-31 PROCEDURE — 37000009 HC ANESTHESIA EA ADD 15 MINS: Performed by: THORACIC SURGERY (CARDIOTHORACIC VASCULAR SURGERY)

## 2017-05-31 PROCEDURE — 84100 ASSAY OF PHOSPHORUS: CPT

## 2017-05-31 PROCEDURE — 88305 TISSUE EXAM BY PATHOLOGIST: CPT | Mod: 26,,, | Performed by: PATHOLOGY

## 2017-05-31 PROCEDURE — 88342 IMHCHEM/IMCYTCHM 1ST ANTB: CPT | Mod: 26,,, | Performed by: PATHOLOGY

## 2017-05-31 PROCEDURE — 88112 CYTOPATH CELL ENHANCE TECH: CPT | Mod: 26,,, | Performed by: PATHOLOGY

## 2017-05-31 PROCEDURE — 63600175 PHARM REV CODE 636 W HCPCS: Performed by: NURSE ANESTHETIST, CERTIFIED REGISTERED

## 2017-05-31 PROCEDURE — 86901 BLOOD TYPING SEROLOGIC RH(D): CPT

## 2017-05-31 PROCEDURE — 63600175 PHARM REV CODE 636 W HCPCS: Performed by: ANESTHESIOLOGY

## 2017-05-31 PROCEDURE — 87102 FUNGUS ISOLATION CULTURE: CPT

## 2017-05-31 PROCEDURE — 71000033 HC RECOVERY, INTIAL HOUR: Performed by: THORACIC SURGERY (CARDIOTHORACIC VASCULAR SURGERY)

## 2017-05-31 PROCEDURE — 27000221 HC OXYGEN, UP TO 24 HOURS

## 2017-05-31 PROCEDURE — 0BBP4ZX EXCISION OF LEFT PLEURA, PERCUTANEOUS ENDOSCOPIC APPROACH, DIAGNOSTIC: ICD-10-PCS | Performed by: THORACIC SURGERY (CARDIOTHORACIC VASCULAR SURGERY)

## 2017-05-31 PROCEDURE — 88305 TISSUE EXAM BY PATHOLOGIST: CPT | Performed by: PATHOLOGY

## 2017-05-31 PROCEDURE — 20600001 HC STEP DOWN PRIVATE ROOM

## 2017-05-31 PROCEDURE — 71000039 HC RECOVERY, EACH ADD'L HOUR: Performed by: THORACIC SURGERY (CARDIOTHORACIC VASCULAR SURGERY)

## 2017-05-31 PROCEDURE — 25000003 PHARM REV CODE 250: Performed by: NURSE ANESTHETIST, CERTIFIED REGISTERED

## 2017-05-31 PROCEDURE — 63600175 PHARM REV CODE 636 W HCPCS

## 2017-05-31 PROCEDURE — 88341 IMHCHEM/IMCYTCHM EA ADD ANTB: CPT | Mod: 26,,, | Performed by: PATHOLOGY

## 2017-05-31 PROCEDURE — 87070 CULTURE OTHR SPECIMN AEROBIC: CPT

## 2017-05-31 PROCEDURE — 85025 COMPLETE CBC W/AUTO DIFF WBC: CPT

## 2017-05-31 PROCEDURE — D9220A PRA ANESTHESIA: Mod: CRNA,ICN,, | Performed by: NURSE ANESTHETIST, CERTIFIED REGISTERED

## 2017-05-31 PROCEDURE — 88331 PATH CONSLTJ SURG 1 BLK 1SPC: CPT | Mod: 26,,, | Performed by: PATHOLOGY

## 2017-05-31 PROCEDURE — C1729 CATH, DRAINAGE: HCPCS | Performed by: THORACIC SURGERY (CARDIOTHORACIC VASCULAR SURGERY)

## 2017-05-31 PROCEDURE — 25000003 PHARM REV CODE 250: Performed by: THORACIC SURGERY (CARDIOTHORACIC VASCULAR SURGERY)

## 2017-05-31 PROCEDURE — 88341 IMHCHEM/IMCYTCHM EA ADD ANTB: CPT | Mod: 26,59,, | Performed by: PATHOLOGY

## 2017-05-31 PROCEDURE — 86900 BLOOD TYPING SEROLOGIC ABO: CPT

## 2017-05-31 PROCEDURE — 94760 N-INVAS EAR/PLS OXIMETRY 1: CPT

## 2017-05-31 PROCEDURE — D9220A PRA ANESTHESIA: Mod: ANES,ICN,, | Performed by: ANESTHESIOLOGY

## 2017-05-31 PROCEDURE — 80048 BASIC METABOLIC PNL TOTAL CA: CPT

## 2017-05-31 PROCEDURE — 37000008 HC ANESTHESIA 1ST 15 MINUTES: Performed by: THORACIC SURGERY (CARDIOTHORACIC VASCULAR SURGERY)

## 2017-05-31 PROCEDURE — 36000710: Performed by: THORACIC SURGERY (CARDIOTHORACIC VASCULAR SURGERY)

## 2017-05-31 PROCEDURE — 32609 THORACOSCOPY W/BX PLEURA: CPT | Mod: ,,, | Performed by: THORACIC SURGERY (CARDIOTHORACIC VASCULAR SURGERY)

## 2017-05-31 PROCEDURE — 27201423 OPTIME MED/SURG SUP & DEVICES STERILE SUPPLY: Performed by: THORACIC SURGERY (CARDIOTHORACIC VASCULAR SURGERY)

## 2017-05-31 PROCEDURE — 87075 CULTR BACTERIA EXCEPT BLOOD: CPT

## 2017-05-31 PROCEDURE — 87205 SMEAR GRAM STAIN: CPT

## 2017-05-31 PROCEDURE — 87116 MYCOBACTERIA CULTURE: CPT

## 2017-05-31 RX ORDER — DROPERIDOL 2.5 MG/ML
0.62 INJECTION, SOLUTION INTRAMUSCULAR; INTRAVENOUS
Status: DISCONTINUED | OUTPATIENT
Start: 2017-05-31 | End: 2017-05-31

## 2017-05-31 RX ORDER — METOCLOPRAMIDE HYDROCHLORIDE 5 MG/ML
INJECTION INTRAMUSCULAR; INTRAVENOUS
Status: DISPENSED
Start: 2017-05-31 | End: 2017-06-01

## 2017-05-31 RX ORDER — METOCLOPRAMIDE HYDROCHLORIDE 5 MG/ML
10 INJECTION INTRAMUSCULAR; INTRAVENOUS ONCE AS NEEDED
Status: COMPLETED | OUTPATIENT
Start: 2017-05-31 | End: 2017-05-31

## 2017-05-31 RX ORDER — LIDOCAINE HCL/PF 100 MG/5ML
SYRINGE (ML) INTRAVENOUS
Status: DISCONTINUED | OUTPATIENT
Start: 2017-05-31 | End: 2017-05-31

## 2017-05-31 RX ORDER — OXYCODONE HYDROCHLORIDE 5 MG/1
5 TABLET ORAL EVERY 4 HOURS PRN
Status: DISCONTINUED | OUTPATIENT
Start: 2017-05-31 | End: 2017-06-01 | Stop reason: HOSPADM

## 2017-05-31 RX ORDER — LIDOCAINE HYDROCHLORIDE 10 MG/ML
1 INJECTION, SOLUTION EPIDURAL; INFILTRATION; INTRACAUDAL; PERINEURAL ONCE
Status: COMPLETED | OUTPATIENT
Start: 2017-05-31 | End: 2017-05-31

## 2017-05-31 RX ORDER — PROPOFOL 10 MG/ML
VIAL (ML) INTRAVENOUS
Status: DISCONTINUED | OUTPATIENT
Start: 2017-05-31 | End: 2017-05-31

## 2017-05-31 RX ORDER — ONDANSETRON 2 MG/ML
INJECTION INTRAMUSCULAR; INTRAVENOUS
Status: DISCONTINUED | OUTPATIENT
Start: 2017-05-31 | End: 2017-05-31

## 2017-05-31 RX ORDER — LABETALOL 200 MG/1
200 TABLET, FILM COATED ORAL EVERY 12 HOURS
Status: DISCONTINUED | OUTPATIENT
Start: 2017-06-01 | End: 2017-06-01 | Stop reason: HOSPADM

## 2017-05-31 RX ORDER — ACETAMINOPHEN 10 MG/ML
INJECTION, SOLUTION INTRAVENOUS
Status: DISCONTINUED | OUTPATIENT
Start: 2017-05-31 | End: 2017-05-31

## 2017-05-31 RX ORDER — ROSUVASTATIN CALCIUM 20 MG/1
20 TABLET, COATED ORAL DAILY
Status: DISCONTINUED | OUTPATIENT
Start: 2017-06-01 | End: 2017-06-01 | Stop reason: HOSPADM

## 2017-05-31 RX ORDER — MIDAZOLAM HYDROCHLORIDE 1 MG/ML
INJECTION, SOLUTION INTRAMUSCULAR; INTRAVENOUS
Status: DISCONTINUED | OUTPATIENT
Start: 2017-05-31 | End: 2017-05-31

## 2017-05-31 RX ORDER — ASPIRIN 81 MG/1
81 TABLET ORAL DAILY
Status: DISCONTINUED | OUTPATIENT
Start: 2017-06-01 | End: 2017-06-01 | Stop reason: HOSPADM

## 2017-05-31 RX ORDER — SODIUM CHLORIDE 9 MG/ML
INJECTION, SOLUTION INTRAVENOUS CONTINUOUS
Status: DISCONTINUED | OUTPATIENT
Start: 2017-05-31 | End: 2017-05-31

## 2017-05-31 RX ORDER — CLINDAMYCIN PHOSPHATE 900 MG/50ML
900 INJECTION, SOLUTION INTRAVENOUS
Status: COMPLETED | OUTPATIENT
Start: 2017-05-31 | End: 2017-05-31

## 2017-05-31 RX ORDER — FENTANYL CITRATE 50 UG/ML
INJECTION, SOLUTION INTRAMUSCULAR; INTRAVENOUS
Status: DISCONTINUED | OUTPATIENT
Start: 2017-05-31 | End: 2017-05-31

## 2017-05-31 RX ORDER — NEOSTIGMINE METHYLSULFATE 1 MG/ML
INJECTION, SOLUTION INTRAVENOUS
Status: DISCONTINUED | OUTPATIENT
Start: 2017-05-31 | End: 2017-05-31

## 2017-05-31 RX ORDER — ACETAMINOPHEN 325 MG/1
650 TABLET ORAL EVERY 4 HOURS PRN
Status: DISCONTINUED | OUTPATIENT
Start: 2017-05-31 | End: 2017-06-01 | Stop reason: HOSPADM

## 2017-05-31 RX ORDER — SODIUM CHLORIDE 9 MG/ML
INJECTION, SOLUTION INTRAVENOUS CONTINUOUS
Status: DISCONTINUED | OUTPATIENT
Start: 2017-05-31 | End: 2017-06-01

## 2017-05-31 RX ORDER — ONDANSETRON 8 MG/1
8 TABLET, ORALLY DISINTEGRATING ORAL EVERY 8 HOURS PRN
Status: DISCONTINUED | OUTPATIENT
Start: 2017-05-31 | End: 2017-06-01 | Stop reason: HOSPADM

## 2017-05-31 RX ORDER — HYDROMORPHONE HYDROCHLORIDE 1 MG/ML
0.2 INJECTION, SOLUTION INTRAMUSCULAR; INTRAVENOUS; SUBCUTANEOUS EVERY 5 MIN PRN
Status: DISCONTINUED | OUTPATIENT
Start: 2017-05-31 | End: 2017-05-31 | Stop reason: HOSPADM

## 2017-05-31 RX ORDER — HYDROMORPHONE HYDROCHLORIDE 1 MG/ML
INJECTION, SOLUTION INTRAMUSCULAR; INTRAVENOUS; SUBCUTANEOUS
Status: COMPLETED
Start: 2017-05-31 | End: 2017-05-31

## 2017-05-31 RX ORDER — PHENYLEPHRINE HYDROCHLORIDE 10 MG/ML
INJECTION INTRAVENOUS
Status: DISCONTINUED | OUTPATIENT
Start: 2017-05-31 | End: 2017-05-31

## 2017-05-31 RX ORDER — GLYCOPYRROLATE 0.2 MG/ML
INJECTION INTRAMUSCULAR; INTRAVENOUS
Status: DISCONTINUED | OUTPATIENT
Start: 2017-05-31 | End: 2017-05-31

## 2017-05-31 RX ORDER — OXYCODONE HYDROCHLORIDE 5 MG/1
10 TABLET ORAL EVERY 4 HOURS PRN
Status: DISCONTINUED | OUTPATIENT
Start: 2017-05-31 | End: 2017-06-01 | Stop reason: HOSPADM

## 2017-05-31 RX ORDER — ROCURONIUM BROMIDE 10 MG/ML
INJECTION, SOLUTION INTRAVENOUS
Status: DISCONTINUED | OUTPATIENT
Start: 2017-05-31 | End: 2017-05-31

## 2017-05-31 RX ADMIN — LIDOCAINE HYDROCHLORIDE 10 MG: 10 INJECTION, SOLUTION EPIDURAL; INFILTRATION; INTRACAUDAL; PERINEURAL at 09:05

## 2017-05-31 RX ADMIN — FENTANYL CITRATE 125 MCG: 50 INJECTION, SOLUTION INTRAMUSCULAR; INTRAVENOUS at 10:05

## 2017-05-31 RX ADMIN — HYDROMORPHONE HYDROCHLORIDE 0.2 MG: 1 INJECTION, SOLUTION INTRAMUSCULAR; INTRAVENOUS; SUBCUTANEOUS at 01:05

## 2017-05-31 RX ADMIN — METOCLOPRAMIDE 10 MG: 5 INJECTION, SOLUTION INTRAMUSCULAR; INTRAVENOUS at 01:05

## 2017-05-31 RX ADMIN — FENTANYL CITRATE 25 MCG: 50 INJECTION, SOLUTION INTRAMUSCULAR; INTRAVENOUS at 12:05

## 2017-05-31 RX ADMIN — CLINDAMYCIN PHOSPHATE 900 MG: 18 INJECTION, SOLUTION INTRAVENOUS at 10:05

## 2017-05-31 RX ADMIN — FENTANYL CITRATE 50 MCG: 50 INJECTION, SOLUTION INTRAMUSCULAR; INTRAVENOUS at 12:05

## 2017-05-31 RX ADMIN — PHENYLEPHRINE HYDROCHLORIDE 200 MCG: 10 INJECTION INTRAVENOUS at 12:05

## 2017-05-31 RX ADMIN — OXYCODONE HYDROCHLORIDE 10 MG: 5 TABLET ORAL at 10:05

## 2017-05-31 RX ADMIN — OXYCODONE HYDROCHLORIDE 10 MG: 5 TABLET ORAL at 02:05

## 2017-05-31 RX ADMIN — NEOSTIGMINE METHYLSULFATE 5 MG: 1 INJECTION INTRAVENOUS at 12:05

## 2017-05-31 RX ADMIN — LIDOCAINE HYDROCHLORIDE 20 MG: 20 INJECTION, SOLUTION INTRAVENOUS at 10:05

## 2017-05-31 RX ADMIN — PHENYLEPHRINE HYDROCHLORIDE 100 MCG: 10 INJECTION INTRAVENOUS at 11:05

## 2017-05-31 RX ADMIN — ROCURONIUM BROMIDE 40 MG: 10 INJECTION, SOLUTION INTRAVENOUS at 10:05

## 2017-05-31 RX ADMIN — SODIUM CHLORIDE, SODIUM GLUCONATE, SODIUM ACETATE, POTASSIUM CHLORIDE, MAGNESIUM CHLORIDE, SODIUM PHOSPHATE, DIBASIC, AND POTASSIUM PHOSPHATE: .53; .5; .37; .037; .03; .012; .00082 INJECTION, SOLUTION INTRAVENOUS at 11:05

## 2017-05-31 RX ADMIN — GLYCOPYRROLATE 0.6 MG: 0.2 INJECTION, SOLUTION INTRAMUSCULAR; INTRAVENOUS at 12:05

## 2017-05-31 RX ADMIN — ONDANSETRON 8 MG: 8 TABLET, ORALLY DISINTEGRATING ORAL at 04:05

## 2017-05-31 RX ADMIN — SODIUM CHLORIDE: 0.9 INJECTION, SOLUTION INTRAVENOUS at 09:05

## 2017-05-31 RX ADMIN — PROPOFOL 100 MG: 10 INJECTION, EMULSION INTRAVENOUS at 10:05

## 2017-05-31 RX ADMIN — OXYCODONE HYDROCHLORIDE 10 MG: 5 TABLET ORAL at 06:05

## 2017-05-31 RX ADMIN — ONDANSETRON 4 MG: 2 INJECTION INTRAMUSCULAR; INTRAVENOUS at 12:05

## 2017-05-31 RX ADMIN — ACETAMINOPHEN 1000 MG: 10 INJECTION, SOLUTION INTRAVENOUS at 11:05

## 2017-05-31 RX ADMIN — MIDAZOLAM HYDROCHLORIDE 2 MG: 1 INJECTION, SOLUTION INTRAMUSCULAR; INTRAVENOUS at 10:05

## 2017-05-31 RX ADMIN — SODIUM CHLORIDE: 0.9 INJECTION, SOLUTION INTRAVENOUS at 04:05

## 2017-05-31 NOTE — PLAN OF CARE
S/p Pleurx 5/31/17. CM attempt to see pt earlier in PACU but, resting quietly with eyes closed. Cm called pt's wife/ Vanessa [phone] and informed pt has pleurx cath and plan is to dc home with home health. CM offered HH list, declined. Wishes for first available. CM informed her [facility] can take him. She wishes to cont to speak to CM but, will call back bc its visiting hours in PACU. Of note, pt wears oxygen at home and has brought portable tank with him. She is unaware of who is provider. CM will update SW.        05/31/17 6932   Discharge Assessment   Assessment Type Discharge Planning Assessment   Confirmed/corrected address and phone number on facesheet? Yes   Assessment information obtained from? Caregiver   Expected Length of Stay (days) 1   Prior to hospitilization cognitive status: Alert/Oriented   Prior to hospitalization functional status: Independent   Current cognitive status: Alert/Oriented   Current Functional Status: Independent   Arrived From admitted as an inpatient   Lives With spouse   Able to Return to Prior Arrangements yes   Is patient able to care for self after discharge? Yes   How many people do you have in your home that can help with your care after discharge? 1   Who are your caregiver(s) and their phone number(s)? Vanessa, wife [phone]   Patient's perception of discharge disposition admitted as an inpatient   Readmission Within The Last 30 Days no previous admission in last 30 days   Patient currently being followed by outpatient case management? No   Patient currently receives home health services? No   Does the patient currently use HME? No   Patient currently receives private duty nursing? No   Patient currently receives any other outside agency services? No   Equipment Currently Used at Home oxygen   Do you have any problems affording any of your prescribed medications? No   Is the patient taking medications as prescribed? yes   Do you have any financial concerns  preventing you from receiving the healthcare you need? No   Does the patient have transportation to healthcare appointments? Yes   Transportation Available family or friend will provide   On Dialysis? No   Does the patient receive services at the Coumadin Clinic? No   Discharge Plan A Home Health;Home with family   Discharge Plan B Home with family   Patient/Family In Agreement With Plan yes

## 2017-05-31 NOTE — PROGRESS NOTES
Notified Dr. Medrano that RN is awaiting orders. He states okay to get portable CXR and then the physician assistant will place orders.

## 2017-05-31 NOTE — PLAN OF CARE
[facility]    HOME HEALTH ORDERS  FACE TO FACE ENCOUNTER    Patient Name: Clint Brown  YOB: 1945    PCP: Jean Claude Griffith DO   PCP Address: [address] / [place] [address]  PCP Phone Number: [phone]  PCP Fax: [phone]    Encounter Date: 05/31/2017    Admit to Home Health    Diagnoses:  Active Hospital Problems    Diagnosis  POA    Pleural effusion [J90]  Yes      Resolved Hospital Problems    Diagnosis Date Resolved POA   No resolved problems to display.       Future Appointments  Date Time Provider Department Center   6/19/2017 8:00 AM LAB, [facility] [place]           I have seen and examined this patient face to face today. My clinical findings that support the need for the home health skilled services and home bound status are the following:  Weakness/numbness causing balance and gait disturbance due to Surgery making it taxing to leave home.    Allergies:  Review of patient's allergies indicates:   Allergen Reactions    Cephalexin      Other reaction(s): Unknown    Iodine and iodide containing products Nausea And Vomiting    Lipitor [atorvastatin] Other (See Comments)     Leg cramps       Diet: regular diet    Activities: activity as tolerated and no heavy lifting for 2 weeks    Nursing:   SN to complete comprehensive assessment including routine vital signs. Instruct on disease process and s/s of complications to report to MD. Review/verify medication list sent home with the patient at time of discharge  and instruct patient/caregiver as needed. Frequency may be adjusted depending on start of care date.    Notify MD if SBP > 160 or < 90; DBP > 90 or < 50; HR > 120 or < 50; Temp > 101      CONSULTS:    Aide to provide assistance with personal care, ADLs, and vital signs.    MISCELLANEOUS CARE:      Pleurx Home Health Instructions    Please drain Pleurx catheter every other day and record the amount of drainage. Please also take  note of the color of the drainage. Encourage patient to change positions and cough at least once while draining to get more fluid. Patient can shower and go about normal activities as long as the dressing provided in Pleurx kits in securely intact.  If gauze underneath become wet or dirty, please change.   Notify MD if:   - Patient is draining more than 1,000 mL in one sitting.   - Drainage abruptly stops.  It is normal for drainage to gradually decrease.  - You see purulent drainage coming from the catheter or near the drain exit site.   - The catheter will not flush and appears clogged.  - Any signs of infection at the Pleurx catheter exit site.    Th[facility] Surgery Clinic: [phone]          Medications: Review discharge medications with patient and family and provide education.      Current Discharge Medication List      CONTINUE these medications which have NOT CHANGED    Details   aspirin (ECOTRIN) 81 MG EC tablet Take 1 tablet (81 mg total) by mouth once daily.  Refills: 0      DOCUSATE SODIUM (COLACE ORAL) Take 1 capsule by mouth once daily.       hydrocodone-acetaminophen 10-325mg (NORCO)  mg Tab Take 1 tablet by mouth every 6 (six) hours as needed for Pain.  Qty: 50 tablet, Refills: 0      labetalol (NORMODYNE) 200 MG tablet TAKE 1 TABLET (200 MG TOTAL) BY MOUTH EVERY 12 (TWELVE) HOURS.  Qty: 60 tablet, Refills: 11      omeprazole (PRILOSEC) 20 MG capsule Take 1 capsule (20 mg total) by mouth once daily.  Qty: 30 capsule, Refills: 11      benzonatate (TESSALON) 100 MG capsule Take 100 mg by mouth 3 (three) times daily as needed for Cough.      multivitamin (THERAGRAN) per tablet Take 1 tablet by mouth once daily.      ondansetron (ZOFRAN) 4 MG tablet Take 1 tablet (4 mg total) by mouth every 8 (eight) hours as needed for Nausea.  Qty: 20 tablet, Refills: 0    Associated Diagnoses: Nausea      rosuvastatin (CRESTOR) 20 MG tablet Take 1 tablet (20 mg total) by mouth once daily.  Qty: 30 tablet,  Refills: 11             I certify that this patient is confined to his home and needs intermittent skilled nursing care.      Jennifer Cunningham PA-C  Thoracic Surgery  [id number]

## 2017-05-31 NOTE — PROGRESS NOTES
CXR completed by x-ray tech. Pt continues to c/o nausea. Pt given Reglan first because droperidol not available at this time.

## 2017-05-31 NOTE — PLAN OF CARE
"PAULINO received phone call from pt's wife [phone] PAULINO informed her about HH and they will see pt tomorrow and they will call pt for visit. She states she doesn't think pt will discharge home today bc he is "out of it". Paulino informed her HH will be arranged either way. She is agreeable to dc plan. Will cont to follow.       "

## 2017-05-31 NOTE — INTERVAL H&P NOTE
The patient has been examined and the H&P has been reviewed:    I concur with the findings and no changes have occurred since H&P was written.    Anesthesia/Surgery risks, benefits and alternative options discussed and understood by patient/family.          Active Hospital Problems    Diagnosis  POA    Pleural effusion [J90]  Yes      Resolved Hospital Problems    Diagnosis Date Resolved POA   No resolved problems to display.

## 2017-05-31 NOTE — ANESTHESIA RELEASE NOTE
Anesthesia Release from PACU Note    Patient name: Clint Brown    Procedure(s): Procedure(s) (LRB):  THORACOSCOPY-VIDEO ASSISTED W/ DRAINAGE PLEURAL EFFUSION (Left)  INSERTION-CATHETER-CHEST (Left)  BIOPSY-PLEURA (Left)    Anesthesia type: general    Post pain: adequate analgesia    Post assessment: no apparent complications    Last vitals:   Vitals:    05/31/17 1615   BP: 128/72   Pulse: 70   Resp: 14   Temp:        Post vital signs: stable    Level of consciousness: alert     Nausea/Vomiting: no nausea/no vomiting    Complications: none    Airway Patency:  patent    Respiratory: unassisted    Cardiovascular: stable and blood pressure at baseline    Hydration: euvolemic

## 2017-05-31 NOTE — H&P (VIEW-ONLY)
History & Physical    SUBJECTIVE:     History of Present Illness:  71 year old male smoker with PMH of Type A ascending aortic dissection repaired on 4/13/17, CAD, HTN, HLD, and CKD presents for surgical evaluation of recurrent left pleural effusion and mediastinal lymphadenopathy. Patient reports he was in his usual state of health after ascending aortic dissection and repair in April 2016. He was back at work. About 1-2 months ago he began having worsening left chest wall pain and SOB. After seeing his cardiologist, a chest CT was ordered which revealed a large effusion and mediastinal lymphadenopathy. He has had two thoracenteses, 4/17/17 and 5/23/17 both drained over 1.5L of serosanguinous fluid. Today he reports breathing has improved since last thoracentesis. Only wears home O2 with activity. Left rib pain is constant, worse at night. Denies fever,chills, weight loss or changes in appetite. L PSH significant for sternotomy for ascending aortic dissection repair and appendectomy.     Current smoker. 1/2 ppd. 55 pack year history. Works as an air conditioning contractor.      Chief Complaint   Patient presents with    Consult       Review of patient's allergies indicates:   Allergen Reactions    Cephalexin      Other reaction(s): Unknown    Iodine and iodide containing products Nausea And Vomiting    Lipitor [atorvastatin] Other (See Comments)     Leg cramps       Current Outpatient Prescriptions   Medication Sig Dispense Refill    aspirin (ECOTRIN) 81 MG EC tablet Take 1 tablet (81 mg total) by mouth once daily.  0    benzonatate (TESSALON) 100 MG capsule Take 100 mg by mouth 3 (three) times daily as needed for Cough.      DOCUSATE SODIUM (COLACE ORAL) Take 1 capsule by mouth once daily.       hydrocodone-acetaminophen 10-325mg (NORCO)  mg Tab Take 1 tablet by mouth every 6 (six) hours as needed for Pain. 50 tablet 0    labetalol (NORMODYNE) 200 MG tablet TAKE 1 TABLET (200 MG TOTAL) BY MOUTH  EVERY 12 (TWELVE) HOURS. 60 tablet 11    multivitamin (THERAGRAN) per tablet Take 1 tablet by mouth once daily.      omeprazole (PRILOSEC) 20 MG capsule Take 1 capsule (20 mg total) by mouth once daily. 30 capsule 11    ondansetron (ZOFRAN) 4 MG tablet Take 1 tablet (4 mg total) by mouth every 8 (eight) hours as needed for Nausea. 20 tablet 0    rosuvastatin (CRESTOR) 20 MG tablet Take 1 tablet (20 mg total) by mouth once daily. 30 tablet 11     No current facility-administered medications for this visit.        Past Medical History:   Diagnosis Date    Atrial flutter     cardioversion 4/12/16    Chronic kidney disease     Coronary artery disease     [facility] 4/1/16: 50% proximal RAMUS    Hypertension     Kidney stones     Old 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/20/2016    3/16    Thoracic aortic dissection     Type A dissection s/p repair 4/13/16     Past Surgical History:   Procedure Laterality Date    APPENDECTOMY      CARDIAC CATHETERIZATION      KIDNEY STONE SURGERY      4 surgeries     No family history on file.  Social History   Substance Use Topics    Smoking status: Current Every Day Smoker     Packs/day: 0.25     Years: 55.00     Types: Cigarettes    Smokeless tobacco: Never Used    Alcohol use No        Review of Systems:  Review of Systems   Constitutional: Positive for activity change and fatigue. Negative for appetite change, fever and unexpected weight change.   HENT: Negative for congestion, sore throat, trouble swallowing and voice change.    Eyes: Negative.    Respiratory: Positive for cough, chest tightness, shortness of breath and wheezing.         Left chest wall pain   Cardiovascular: Negative.    Gastrointestinal: Negative.    Endocrine: Negative.    Genitourinary: Negative.    Musculoskeletal: Negative.         Left rib pain   Skin: Negative.    Allergic/Immunologic: Negative.    Neurological: Negative.    Hematological: Negative.    Psychiatric/Behavioral: Negative.        OBJECTIVE:  "    Vital Signs (Most Recent)  Temp: (!) 84 °F (28.9 °C) (05/25/17 0956)  BP: 103/66 (05/25/17 0956)  SpO2: 95 % (05/25/17 0956)  5' 10" (1.778 m)  83.5 kg (184 lb 1.4 oz)     Physical Exam:  Physical Exam   Constitutional: He is oriented to person, place, and time. He appears well-developed and well-nourished.   HENT:   Head: Normocephalic and atraumatic.   Eyes: Pupils are equal, round, and reactive to light.   Neck: Normal range of motion. Neck supple. No thyromegaly present.   Cardiovascular: Intact distal pulses and normal pulses.  An irregularly irregular rhythm present.   No murmur heard.  Pulmonary/Chest: No accessory muscle usage. No tachypnea. No respiratory distress. He has decreased breath sounds in the right lower field, the left middle field and the left lower field. He has wheezes in the right upper field and the left upper field.   Left chest wall tenderness    Abdominal: Soft. Bowel sounds are normal. He exhibits no distension. There is no tenderness.   Musculoskeletal: Normal range of motion. He exhibits no edema or deformity.   Lymphadenopathy:     He has no cervical adenopathy.   Neurological: He is alert and oriented to person, place, and time.   Skin: Skin is warm and dry.   Psychiatric: He has a normal mood and affect.   Vitals reviewed.    Laboratory    FEV1- 1.37 47%  DLCO- 11.8 24%    Diagnostic Results:    5/23/17 2D ECHO CONCLUSIONS     1 - Normal left ventricular systolic function (EF 55-60%).     2 - Normal right ventricular systolic function .     3 - Normal left ventricular diastolic function.     4 - The estimated PA systolic pressure is 26 mmHg.     5- The aortic root at the sinus of valsalva is dilated and measures 4.2 cm.     6- The dissection flap/ Thickening in the aortic root seen on the prior study, not seen on this study (although this study was not done for a dignosis of dissection hence dedicated images of the root with color flow were not performed).     Chest and Abdomen " CT:   1.  Prominence of the left hilar region with questionable 2.6 cm lesion at the bifurcation of left upper and lower bronchi.  Evaluation limited without the use of contrast.  This appears more prominent compared to previous CTs and concerning for potential neoplastic process and possible primary malignancy, although alternatively this finding could represent nonopacified pulmonary vasculature.  2.   Large left-sided pleural effusion resulting in near complete collapse of the left lower lobe and partial collapse of the left upper lobe.  Potential underlying pulmonary lesion difficult to exclude.  3.  Mediastinal lymphadenopathy concerning for local spread of disease.  Lymph nodes appear increased in size and number compared to recent CT from 3/30/17.  Abnormal soft tissue attenuation visualized within the left anterior aspect of the mediastinum for possible pleural thickening with additional questionable areas of mild pleural thickening along the posterior aspect of the left lung.  4.  No acute intra-abdominal process identified.  Additional abdominal findings as detailed above.    ASSESSMENT/PLAN:     71 year old male smoker with PMH of Type A ascending aortic dissection repaired on 4/13/17, CAD, HTN, HLD, and CKD presents for surgical evaluation of recurrent left pleural effusion and mediastinal lymphadenopathy.     PLAN:Plan    Proceed with left VATS for drainage of effusion, possible talc pleurodesis, possible Pleurx, mediastinal LN biopsy, pleural biopsy and possible wedge biopsy   Appropriate patient education regarding the denise-operative period as well as intraperative details were discussed. Risks, including but not limited to, bleeding, infection, pain and anesthetic complication were discussed. Patient was given the opportunity to ask questions and to have those questions answered to their satisfaction. Patient verbalized understanding to both procedure and associated risks. Consent was  obtained.    ATTENDING ATTESTATION:    I evaluated the patient and I agree with the assessment and plan.  Recurrent left pleural effusion, previous pleural fluid cytology suspicious for malignancy.  CT also shows mediastinal and hilar LAD.  Plan for Left thoracoscopy with drainage of effusion, pleural biopsy, pleurodesis vs. PleurX.  Offered 5/26, patient requests 5/31.

## 2017-05-31 NOTE — PROGRESS NOTES
DORON Deleon at , connected PleurX catheter to chest tube drainage system. She placed chest tube to -20cm H20 suction. Pt tolerated well. Pt noted to have serosanguineous colored drainage from tube.

## 2017-05-31 NOTE — OP NOTE
Date of Procedure: 5/31/2017    Pre-operative Diagnosis: Malignant Left Pleural Effusion    Post-operative Diagnosis: Same    Procedure(s): 1. Left Thoracoscopy with Drainage of Pleural Effusion and Pleural Biopsy.  2. Insertion of Indwelling Tunneled Left Pleural Catheter (PleurX)    Surgeon: Evans Armas MD    Assistant(s): Uziel Medrano DO    Anesthesia: GETA    Findings: ~1L serosanguinous fluid drained.  Diffuse parietal pleural thickening with scattered plaques.  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  Left Pleural Biopsies and Debris for Permanent    Complications: None    Indications for Procedure: 70 yo male with recurrent left pleural effusion after thoracentesis.  Cytology revealed malignant cells but not able to provide final histologic diagnosis.  It was decided to proceed with thoracoscopy for diagnosis and definitive management of effusion.  Risks, benefits and possible outcomes were discussed in detail with the patient, and he and his family were given the opportunity to ask questions and have those questions answered to their satisfaction.  he desires to proceed and signed consent.    Procedure in Detail: The patient was taken to the operating room and placed supine on the operating table.  Adequate general anesthesia was achieved.  Bronchial blocker was placed and its position and function were confirmed with bronchoscopy.  He was turned to the right lateral decubitus position, padded appropriately and secured.  Left chest was prepped and draped in standard sterile fashion.  Intravenous antibiotics were administered.  Left lung was isolated.  Time-out was performed.  A 1.5cm incision was made in the 8th intercostal space in the posterior axillary line.  Port and camera were inserted.  There was no evidence of extraparenchymal  disease.  A 3cm incision was made in the 5th intercostal space in the anterior axillary line.  Subcutaneous tissue was divided with electrocautery and the chest was entered.  Wound protector was placed.  Over one liter of serosanguinous fluid was drained.  There was diffuse parietal pleural thickening and scattered pleural plaques.  Multiple biopsies were taken.  Frozen section revealed spindle cell neoplasm.  It was decided to proceed with PleurX placement.  A separate incision was made in the 8th intercostal space in the anterior axillary line. The pleurx catheter was then passed through the incision, tunneled in the subcutaneous tissue and brought out posteriorly through the camera port incision. The catheter was then passed into the pleural space under direct vision and manipulated to sit over the diaphragm terminating in the diaphragmatic sulcus posteriorly. The Pleurx was secured to the skin anteriorly with silk suture. The lung was inflated under direct vision.  Incisions were closed in layers with absorbable suture.  Steri-strips and sterile dressings were applied.  All sponge, needle and instrument counts were correct at the end of the case.  The patient tolerated the procedure well.  There were no immediate complications.  He was extubated in the operating room.    Disposition: PACU in stable condition

## 2017-05-31 NOTE — PROGRESS NOTES
DORON Deleon called to have family at  for PleurX teaching. Pt's wife currently at home. She states she will be arriving in about 30 minutes.  Notified DORON Deleon.

## 2017-05-31 NOTE — PROGRESS NOTES
Pt's family at . Updated on plan of care. Pt and pt's wife have decided to stay overnight in hospital. DORON Deleon notified of patient's wife arrival.

## 2017-05-31 NOTE — PROGRESS NOTES
DORON Deleon at bs to come see patient and provide teaching for PleurX catheter to patient, pt's wife, and daughter.

## 2017-05-31 NOTE — PLAN OF CARE
Pt has no preference with HH.  SW sent the referral to [facility] through [facility]. The pt was accepted and they will see the pt tomorrow.    Nivia Salvador, [facility]  [phone]

## 2017-05-31 NOTE — ANESTHESIA POSTPROCEDURE EVALUATION
"Anesthesia Post Evaluation    Patient: Clint Brown    Procedure(s) Performed: Procedure(s) (LRB):  THORACOSCOPY-VIDEO ASSISTED W/ DRAINAGE PLEURAL EFFUSION (Left)  INSERTION-CATHETER-CHEST (Left)  BIOPSY-PLEURA (Left)    Final Anesthesia Type: general  Patient location during evaluation: PACU  Patient participation: Yes- Able to Participate  Level of consciousness: awake  Post-procedure vital signs: reviewed and stable  Pain management: adequate  Airway patency: patent  PONV status at discharge: No PONV  Anesthetic complications: no      Cardiovascular status: stable  Respiratory status: unassisted  Hydration status: euvolemic  Follow-up not needed.        Visit Vitals  /72   Pulse 70   Temp 36.4 °C (97.5 °F) (Oral)   Resp 14   Ht 5' 10" (1.778 m)   Wt 82.6 kg (182 lb)   SpO2 97%   BMI 26.11 kg/m²       Pain/Bernice Score: Pain Assessment Performed: Yes (5/31/2017  3:30 PM)  Presence of Pain: complains of pain/discomfort (5/31/2017  3:30 PM)  Pain Rating Prior to Med Admin: 5 (5/31/2017  2:12 PM)  Bernice Score: 10 (5/31/2017  3:30 PM)      "

## 2017-05-31 NOTE — TRANSFER OF CARE
"Anesthesia Transfer of Care Note    Patient: Clint Brown    Procedure(s) Performed: Procedure(s) (LRB):  THORACOSCOPY-VIDEO ASSISTED W/ DRAINAGE PLEURAL EFFUSION (Left)  INSERTION-CATHETER-CHEST (Left)  BIOPSY-PLEURA (Left)    Patient location: PACU    Anesthesia Type: general    Transport from OR: Transported from OR on 6-10 L/min O2 by face mask with adequate spontaneous ventilation    Post pain: adequate analgesia    Post assessment: no apparent anesthetic complications and tolerated procedure well    Post vital signs: stable    Level of consciousness: awake    Nausea/Vomiting: no nausea/vomiting    Complications: none    Transfer of care protocol was followed      Last vitals:   Visit Vitals  /72 (BP Location: Right arm, Patient Position: Lying, BP Method: Automatic)   Pulse 86   Temp 36.8 °C (98.3 °F) (Oral)   Resp 18   Ht 5' 10" (1.778 m)   Wt 82.6 kg (182 lb)   SpO2 97%   BMI 26.11 kg/m²     "

## 2017-06-01 VITALS
RESPIRATION RATE: 18 BRPM | SYSTOLIC BLOOD PRESSURE: 147 MMHG | HEIGHT: 70 IN | BODY MASS INDEX: 26.05 KG/M2 | DIASTOLIC BLOOD PRESSURE: 71 MMHG | HEART RATE: 80 BPM | WEIGHT: 182 LBS | OXYGEN SATURATION: 94 % | TEMPERATURE: 98 F

## 2017-06-01 LAB
ANION GAP SERPL CALC-SCNC: 12 MMOL/L
BASOPHILS # BLD AUTO: 0.01 K/UL
BASOPHILS NFR BLD: 0.1 %
BUN SERPL-MCNC: 16 MG/DL
CALCIUM SERPL-MCNC: 8.8 MG/DL
CHLORIDE SERPL-SCNC: 105 MMOL/L
CO2 SERPL-SCNC: 23 MMOL/L
CREAT SERPL-MCNC: 0.9 MG/DL
DIFFERENTIAL METHOD: ABNORMAL
EOSINOPHIL # BLD AUTO: 0.1 K/UL
EOSINOPHIL NFR BLD: 0.9 %
ERYTHROCYTE [DISTWIDTH] IN BLOOD BY AUTOMATED COUNT: 13.2 %
EST. GFR  (AFRICAN AMERICAN): >60 ML/MIN/1.73 M^2
EST. GFR  (NON AFRICAN AMERICAN): >60 ML/MIN/1.73 M^2
GLUCOSE SERPL-MCNC: 101 MG/DL
HCT VFR BLD AUTO: 35.8 %
HGB BLD-MCNC: 11.4 G/DL
LYMPHOCYTES # BLD AUTO: 1.2 K/UL
LYMPHOCYTES NFR BLD: 12.8 %
MAGNESIUM SERPL-MCNC: 1.6 MG/DL
MCH RBC QN AUTO: 28.5 PG
MCHC RBC AUTO-ENTMCNC: 31.8 %
MCV RBC AUTO: 90 FL
MONOCYTES # BLD AUTO: 0.8 K/UL
MONOCYTES NFR BLD: 9.3 %
NEUTROPHILS # BLD AUTO: 6.9 K/UL
NEUTROPHILS NFR BLD: 76.6 %
PHOSPHATE SERPL-MCNC: 3.2 MG/DL
PLATELET # BLD AUTO: 224 K/UL
PMV BLD AUTO: 9.8 FL
POTASSIUM SERPL-SCNC: 4.6 MMOL/L
RBC # BLD AUTO: 4 M/UL
SODIUM SERPL-SCNC: 140 MMOL/L
WBC # BLD AUTO: 8.96 K/UL

## 2017-06-01 PROCEDURE — 85025 COMPLETE CBC W/AUTO DIFF WBC: CPT

## 2017-06-01 PROCEDURE — 36415 COLL VENOUS BLD VENIPUNCTURE: CPT

## 2017-06-01 PROCEDURE — 25000003 PHARM REV CODE 250: Performed by: PHYSICIAN ASSISTANT

## 2017-06-01 PROCEDURE — 80048 BASIC METABOLIC PNL TOTAL CA: CPT

## 2017-06-01 PROCEDURE — 84100 ASSAY OF PHOSPHORUS: CPT

## 2017-06-01 PROCEDURE — 83735 ASSAY OF MAGNESIUM: CPT

## 2017-06-01 RX ORDER — OXYCODONE AND ACETAMINOPHEN 5; 325 MG/1; MG/1
1 TABLET ORAL EVERY 4 HOURS PRN
Qty: 61 TABLET | Refills: 0 | Status: SHIPPED | OUTPATIENT
Start: 2017-06-01 | End: 2017-06-22 | Stop reason: SDUPTHER

## 2017-06-01 RX ORDER — ONDANSETRON 4 MG/1
4 TABLET, ORALLY DISINTEGRATING ORAL EVERY 6 HOURS PRN
Qty: 21 TABLET | Refills: 0 | Status: SHIPPED | OUTPATIENT
Start: 2017-06-01 | End: 2017-06-02

## 2017-06-01 RX ADMIN — OXYCODONE HYDROCHLORIDE 10 MG: 5 TABLET ORAL at 11:06

## 2017-06-01 RX ADMIN — ONDANSETRON 8 MG: 8 TABLET, ORALLY DISINTEGRATING ORAL at 10:06

## 2017-06-01 RX ADMIN — ACETAMINOPHEN 650 MG: 325 TABLET ORAL at 08:06

## 2017-06-01 RX ADMIN — LABETALOL HYDROCHLORIDE 200 MG: 200 TABLET, FILM COATED ORAL at 08:06

## 2017-06-01 RX ADMIN — ROSUVASTATIN CALCIUM 20 MG: 20 TABLET, FILM COATED ORAL at 08:06

## 2017-06-01 RX ADMIN — ASPIRIN 81 MG: 81 TABLET, COATED ORAL at 08:06

## 2017-06-01 RX ADMIN — SODIUM CHLORIDE: 0.9 INJECTION, SOLUTION INTRAVENOUS at 02:06

## 2017-06-01 RX ADMIN — OXYCODONE HYDROCHLORIDE 10 MG: 5 TABLET ORAL at 06:06

## 2017-06-01 RX ADMIN — OXYCODONE HYDROCHLORIDE 10 MG: 5 TABLET ORAL at 02:06

## 2017-06-01 NOTE — ANESTHESIA PREPROCEDURE EVALUATION
06/01/2017  Clint Brown is a 71 y.o., male.    Anesthesia Evaluation    I have reviewed the Patient Summary Reports.     I have reviewed the Medications.     Review of Systems  Anesthesia Hx:  History of prior surgery of interest to airway management or planning:  Denies Personal Hx of Anesthesia complications.   Social:  Non-Smoker, No Alcohol Use    Hematology/Oncology:  Hematology Normal   Oncology Normal     EENT/Dental:EENT/Dental Normal   Cardiovascular:   Exercise tolerance: good Hypertension, well controlled Past MI (remote, asymp) CAD (good function on recent TTE, excellent METs) asymptomatic  Open type A dissection repair   Pulmonary:   COPD, mild New unilateral pleural effusion, drained twice, on home O2 at night PRN, mild SOB   Renal/:   Chronic Renal Disease, CRI    Hepatic/GI:  Hepatic/GI Normal    Musculoskeletal:  Musculoskeletal Normal    Neurological:  Neurology Normal    Dermatological:  Skin Normal    Psych:  Psychiatric Normal           Physical Exam  General:  Well nourished    Airway/Jaw/Neck:  Airway Findings: Mouth Opening: Normal Tongue: Normal  General Airway Assessment: Adult, Average  Mallampati: III  TM Distance: Normal, at least 6 cm     Eyes/Ears/Nose:  EYES/EARS/NOSE FINDINGS: Normal   Dental:  Dental Findings: In tact   Chest/Lungs:  Chest/Lungs Findings: Normal Respiratory Rate, Decreased Breathe Sounds Left, Rhonchi     Heart/Vascular:  Heart Findings: Rate: Normal  Rhythm: Regular Rhythm  Sounds: Normal  Heart murmur: negative       Mental Status:  Mental Status Findings:  Cooperative, Alert and Oriented         Anesthesia Plan  Type of Anesthesia, risks & benefits discussed:  Anesthesia Type:  general  Patient's Preference:   Intra-op Monitoring Plan:   Intra-op Monitoring Plan Comments:   Post Op Pain Control Plan:   Post Op Pain Control Plan Comments:   Induction:    IV  Beta Blocker:  Patient is not currently on a Beta-Blocker (No further documentation required).       Informed Consent: Patient understands risks and agrees with Anesthesia plan.  Questions answered. Anesthesia consent signed with patient.  ASA Score: 3     Day of Surgery Review of History & Physical:    H&P update referred to the surgeon.     Anesthesia Plan Notes:   71M asymp CAD with good function/METs, HTN, CRI, new unilateral pleural effusion for VATs-biopsy and drainage under GETA, OLV, possible postop vent/ICU        Ready For Surgery From Anesthesia Perspective.

## 2017-06-01 NOTE — PROGRESS NOTES
Progress Note    Admit Date: 5/31/2017   LOS: 1 day     SUBJECTIVE:     NAEON. Pain moderately controlled. Post-op nausea resolved. Tolerating clears. Stable on room air. UOP good.     Follow-up For:  1. Left Thoracoscopy with Drainage of Pleural Effusion and Pleural Biopsy.  2. Insertion of Indwelling Tunneled Left Pleural Catheter (PleurX)    Scheduled Meds:   aspirin  81 mg Oral Daily    labetalol  200 mg Oral Q12H    rosuvastatin  20 mg Oral Daily     Continuous Infusions:   PRN Meds:acetaminophen, ondansetron, oxycodone, oxycodone    Review of patient's allergies indicates:   Allergen Reactions    Cephalexin      Other reaction(s): Unknown    Iodine and iodide containing products Nausea And Vomiting    Lipitor [atorvastatin] Other (See Comments)     Leg cramps       Review of Systems  Constitutional: negative  Respiratory: positive for dyspnea on exertion and pleurisy/chest pain, negative for cough and hemoptysis  Cardiovascular: negative for chest pressure/discomfort and palpitations  Gastrointestinal: positive for nausea, negative for diarrhea and vomiting  Musculoskeletal:negative    OBJECTIVE:     Vital Signs (Most Recent)  Temp: 98.4 °F (36.9 °C) (06/01/17 0739)  Pulse: 80 (06/01/17 0739)  Resp: 18 (06/01/17 0739)  BP: (!) 147/71 (06/01/17 0739)  SpO2: (!) 94 % (06/01/17 0739)    Vital Signs Range (Last 24H):  Temp:  [97 °F (36.1 °C)-98.4 °F (36.9 °C)]   Pulse:  [64-88]   Resp:  [12-20]   BP: (111-165)/(60-85)   SpO2:  [93 %-100 %]     I & O (Last 24H):  Intake/Output Summary (Last 24 hours) at 06/01/17 0748  Last data filed at 06/01/17 0518   Gross per 24 hour   Intake          3466.67 ml   Output              770 ml   Net          2696.67 ml     Physical Exam:  General: well developed, well nourished, no distress  Lungs:  normal respiratory effort and diminished breath sounds bibasilar  Cardiovascular: Heart: regular rate and rhythm, S1, S2 normal, no murmur, click, rub or gallop. Chest Wall: left  sided chest wall tenderness. Extremities: no cyanosis or edema, or clubbing. Pulses: 2+ and symmetric.  Abdomen/Rectal: Abdomen: soft, non-tender non-distented; bowel sounds normal; no masses,  no organomegaly. Rectal: not examined  Musculoskeletal:no clubbing, cyanosis  Neurologic: Normal strength and tone. No focal numbness or weakness    Laboratory:  CBC:   Recent Labs  Lab 06/01/17  0539   WBC 8.96   RBC 4.00*   HGB 11.4*   HCT 35.8*      MCV 90   MCH 28.5   MCHC 31.8*     BMP:   Recent Labs  Lab 06/01/17  0539         K 4.6      CO2 23   BUN 16   CREATININE 0.9   CALCIUM 8.8   MG 1.6     CMP:   Recent Labs  Lab 06/01/17  0539      CALCIUM 8.8      K 4.6   CO2 23      BUN 16   CREATININE 0.9     Coagulation: No results for input(s): INR, APTT in the last 168 hours.    Invalid input(s): PT    Diagnostic Results:  X-Ray: Reviewed   Pleurx in good position. No pneumothorax.    ASSESSMENT/PLAN:     71 year old male POD1 s/p left VATS for drainage of effusion, pleural biopsy and Pleurx insertion     Plan:  Regular diet  HLIV   Cap pleurx drain   IS and duonebs   Restart home meds including anti-hypertensives.   Dispo-Discharge today after home health secured       Adrienne Gillespie PA-C  Thoracic Surgery  [id number]     ATTENDING ATTESTATION:    I evaluated the patient and I agree with the assessment and plan.

## 2017-06-01 NOTE — PROGRESS NOTES
Discharge education completed with patient; patient verbalized understanding. Prescriptions given to patient. IVs removed per orders, catheters intact, patient tolerated well. Family at the bedside. Awaiting transportation. JUANJOSE.

## 2017-06-01 NOTE — PLAN OF CARE
Problem: Patient Care Overview  Goal: Plan of Care Review  Outcome: Ongoing (interventions implemented as appropriate)  POC reviewed with patient. Pt AAOx4, VSS, afebrile, SpO2> 92% on room air. Pt complains of moderate pain, well relieved with PRN meds. L chest incision with guaze and transparent dressing CDI. L chest PleureX drain site with guaze and transparent dressing CDI. Both dressings changed during shift, per orders. Chest tube to continuous wall suction at -20, serosanguinous output monitored. No subq emphysema present.   Fluids continued. Pt tolerating clear liquid diet, will advance as tolerated. Urinating adequately per urinal. Up with 1 person assist. Patient remains free from falls and injuries, will continue to monitor.

## 2017-06-01 NOTE — DISCHARGE SUMMARY
[facility]  General Surgery  Discharge Summary      Patient Name: Clint Brown  MRN: [id number]  Admission Date: 5/31/2017  Hospital Length of Stay: 1 days  Discharge Date and Time:  06/01/2017 9:12 AM  Attending Physician: Evans Armas MD   Discharging Provider: DORON Escoto  Primary Care Provider: Jean Claude Griffith DO     HPI:     71 year old male smoker with PMH of Type A ascending aortic dissection repaired on 4/13/17, CAD, HTN, HLD, and CKD presents for surgical evaluation of recurrent left pleural effusion and mediastinal lymphadenopathy. Patient reports he was in his usual state of health after ascending aortic dissection and repair in April 2016. He was back at work. About 1-2 months ago he began having worsening left chest wall pain and SOB. After seeing his cardiologist, a chest CT was ordered which revealed a large effusion and mediastinal lymphadenopathy. He has had two thoracenteses, 4/17/17 and 5/23/17 both drained over 1.5L of serosanguinous fluid. Today he reports breathing has improved since last thoracentesis. Only wears home O2 with activity. Left rib pain is constant, worse at night. Denies fever,chills, weight loss or changes in appetite. L PSH significant for sternotomy for ascending aortic dissection repair and appendectomy.   Current smoker. 1/2 ppd. 55 pack year history. Works as an air conditioning contractor.       Follow-up For:  1. Left Thoracoscopy with Drainage of Pleural Effusion and Pleural Biopsy.  2. Insertion of Indwelling Tunneled Left Pleural Catheter (PleurX)       Hospital Course:   Patient was admitted for the abovementioned procedure which he tolerated well. He did have post-op nausea and poor pain control initially. Pleurx connected to [facility]rium for drainage overnight and capped after POD1 CXR stable. Patient and family educated on Pleurx drainage. Discharged home.       Consults: None     Significant Diagnostic Studies: Labs:   BMP:    Recent Labs  Lab 05/31/17  1339 06/01/17  0539   * 101    140   K 5.1 4.6    105   CO2 22* 23   BUN 17 16   CREATININE 1.0 0.9   CALCIUM 8.9 8.8   MG 1.8 1.6   , CMP   Recent Labs  Lab 05/31/17  1339 06/01/17  0539    140   K 5.1 4.6    105   CO2 22* 23   * 101   BUN 17 16   CREATININE 1.0 0.9   CALCIUM 8.9 8.8   ANIONGAP 10 12   ESTGFRAFRICA >60.0 >60.0   EGFRNONAA >60.0 >60.0    and CBC   Recent Labs  Lab 05/31/17  1339 06/01/17  0539   WBC 8.47 8.96   HGB 11.1* 11.4*   HCT 34.3* 35.8*    224     Radiology: X-Ray: CXR: X-Ray Chest 1 View (CXR):   Results for orders placed or performed during the hospital encounter of 05/31/17   X-Ray Chest 1 View    Narrative    Time of Procedure: 06/01/17 05:19:00  Accession # [id number]    Comparison: 5/31/2017.    Number of views: 1.    Findings:  There is a small amount of dependent LEFT pleural fluid.  LEFT Pleurx catheter is difficult to appreciate because of technical factor; it would probably be better seen on lateral chest radiograph.    Sternal sutures are aligned and intact.  Mediastinal structures are midline.    I detect no acute pulmonary disease and no RIGHT pleural fluid.    There is no pneumothorax, pneumomediastinum, pneumoperitoneum or significant osseous abnormality.    Impression    Stable radiographic appearance of the chest in this patient with LEFT Pleurx catheter.      Electronically signed by: Nenita Levi MD  Date:     06/01/17  Time:    08:08     and X-Ray Chest PA and Lateral (CXR): No results found for this visit on 05/31/17.    Pending Diagnostic Studies:     None        Final Active Diagnoses:    Diagnosis Date Noted POA    PRINCIPAL PROBLEM:  Pleural effusion [J90]  Yes    Chronic obstructive pulmonary disease [J44.9]  Yes    CKD (chronic kidney disease) stage 3, GFR 30-59 ml/min [N18.3] 06/27/2016 Yes    Hyperglycemia [R73.9] 04/14/2016 Yes    Tobacco abuse [Z72.0] 04/01/2016 Yes    Essential  hypertension [I10] 04/01/2016 Yes      Problems Resolved During this Admission:    Diagnosis Date Noted Date Resolved POA      Discharged Condition: good    Disposition:     Follow Up:  Follow-up Information     [facility].    Specialty:  Home Health Services  Why:  Home Health  Contact information:  [address]  [address]  [place] [address]  [phone]             Evans Armas MD In 2 weeks.    Specialty:  Cardiothoracic Surgery  Contact information:  [address]  [facility] [id number] [phone]                 Patient Instructions:   No discharge procedures on file.  Medications:  Reconciled Home Medications:   Current Discharge Medication List      START taking these medications    Details   ondansetron (ZOFRAN-ODT) 4 MG TbDL Take 1 tablet (4 mg total) by mouth every 6 (six) hours as needed (nausea).  Qty: 21 tablet, Refills: 0      oxycodone-acetaminophen (PERCOCET) 5-325 mg per tablet Take 1 tablet by mouth every 4 (four) hours as needed for Pain.  Qty: 61 tablet, Refills: 0         CONTINUE these medications which have NOT CHANGED    Details   aspirin (ECOTRIN) 81 MG EC tablet Take 1 tablet (81 mg total) by mouth once daily.  Refills: 0      DOCUSATE SODIUM (COLACE ORAL) Take 1 capsule by mouth once daily.       labetalol (NORMODYNE) 200 MG tablet TAKE 1 TABLET (200 MG TOTAL) BY MOUTH EVERY 12 (TWELVE) HOURS.  Qty: 60 tablet, Refills: 11      omeprazole (PRILOSEC) 20 MG capsule Take 1 capsule (20 mg total) by mouth once daily.  Qty: 30 capsule, Refills: 11      benzonatate (TESSALON) 100 MG capsule Take 100 mg by mouth 3 (three) times daily as needed for Cough.      multivitamin (THERAGRAN) per tablet Take 1 tablet by mouth once daily.      ondansetron (ZOFRAN) 4 MG tablet Take 1 tablet (4 mg total) by mouth every 8 (eight) hours as needed for Nausea.  Qty: 20 tablet, Refills: 0    Associated Diagnoses: Nausea      rosuvastatin (CRESTOR) 20 MG tablet Take 1 tablet (20  mg total) by mouth once daily.  Qty: 30 tablet, Refills: 11         STOP taking these medications       hydrocodone-acetaminophen 10-325mg (NORCO)  mg Tab Comments:   Reason for Stopping:               DORON Escoto  General Surgery  [facility]    ATTENDING ATTESTATION:    I evaluated the patient and I agree with the assessment and plan.

## 2017-06-01 NOTE — PLAN OF CARE
Problem: Patient Care Overview  Goal: Plan of Care Review  Outcome: Ongoing (interventions implemented as appropriate)  POC reviewed with patient. Pt AAOx4, VSS, afebrile, SpO2> 92% on room air. Pt complains of moderate pain, well relieved with PRN meds. L chest incision with guaze and transparent dressing CDI. L chest PleureX drain site with guaze and transparent dressing CDI. Both dressings changed during shift, per orders. Chest tube to continuous wall suction at -20, serosanguinous output monitored. Fluids continued. Pt tolerating clear liquid diet, will advance as tolerated. Urinating adequately per urinal. Up with 1 person assist. Patient remains free from falls and injuries, will continue to monitor.

## 2017-06-02 ENCOUNTER — TELEPHONE (OUTPATIENT)
Dept: CARDIOTHORACIC SURGERY | Facility: CLINIC | Age: 72
End: 2017-06-02

## 2017-06-02 DIAGNOSIS — R11.0 NAUSEA: ICD-10-CM

## 2017-06-02 RX ORDER — ONDANSETRON 4 MG/1
4 TABLET, FILM COATED ORAL EVERY 8 HOURS PRN
Qty: 31 TABLET | Refills: 0 | Status: SHIPPED | OUTPATIENT
Start: 2017-06-02 | End: 2017-06-23 | Stop reason: ALTCHOICE

## 2017-06-02 NOTE — TELEPHONE ENCOUNTER
Spoke with the pt's daughter, reviewed all appointments and instructions. She is agreeable to all appts and denies any questions.   Pt is doing well, HH nurse is coming today to admit the pt and will drain his pleurx. Minimal drainage is expected as the pt was drained yesterday. Advised the pt and his daughter that he should be drained at least twice a week, output amounts will dictate the frequency of being drained. Pt has our phone # to call with any needs and to pass on our phone # to the HH nurse.   Also advised the pt that Zofran was sent to his pharmacy in the event of nausea.    ----- Message from Gonsalo Wright sent at 6/2/2017  8:23 AM CDT -----  Contact: Yue//Daughter  Caller is returning a call for the pt//please call back at [phone]//thank you

## 2017-06-03 LAB — BACTERIA SPEC AEROBE CULT: NO GROWTH

## 2017-06-05 ENCOUNTER — HOSPITAL ENCOUNTER (OUTPATIENT)
Dept: RADIOLOGY | Facility: HOSPITAL | Age: 72
Discharge: HOME | End: 2017-06-05
Attending: THORACIC SURGERY (CARDIOTHORACIC VASCULAR SURGERY)
Payer: MEDICARE

## 2017-06-05 DIAGNOSIS — C80.1 SPINDLE CELL CARCINOMA: ICD-10-CM

## 2017-06-05 PROCEDURE — 70553 MRI BRAIN STEM W/O & W/DYE: CPT | Mod: 26,,, | Performed by: RADIOLOGY

## 2017-06-05 PROCEDURE — 70553 MRI BRAIN STEM W/O & W/DYE: CPT | Mod: TC

## 2017-06-05 PROCEDURE — A9585 GADOBUTROL INJECTION: HCPCS | Performed by: THORACIC SURGERY (CARDIOTHORACIC VASCULAR SURGERY)

## 2017-06-05 PROCEDURE — 25500020 PHARM REV CODE 255: Performed by: THORACIC SURGERY (CARDIOTHORACIC VASCULAR SURGERY)

## 2017-06-05 RX ORDER — GADOBUTROL 604.72 MG/ML
9 INJECTION INTRAVENOUS
Status: COMPLETED | OUTPATIENT
Start: 2017-06-05 | End: 2017-06-05

## 2017-06-05 RX ADMIN — GADOBUTROL 9 ML: 604.72 INJECTION INTRAVENOUS at 07:06

## 2017-06-07 LAB — BACTERIA SPEC ANAEROBE CULT: NORMAL

## 2017-06-13 ENCOUNTER — HOSPITAL ENCOUNTER (OUTPATIENT)
Dept: RADIOLOGY | Facility: HOSPITAL | Age: 72
Discharge: HOME | End: 2017-06-13
Attending: THORACIC SURGERY (CARDIOTHORACIC VASCULAR SURGERY)
Payer: MEDICARE

## 2017-06-13 DIAGNOSIS — C80.1 SPINDLE CELL CARCINOMA: ICD-10-CM

## 2017-06-13 PROCEDURE — A9552 F18 FDG: HCPCS

## 2017-06-13 PROCEDURE — 78815 PET IMAGE W/CT SKULL-THIGH: CPT | Mod: 26,,, | Performed by: RADIOLOGY

## 2017-06-14 ENCOUNTER — DOCUMENTATION ONLY (OUTPATIENT)
Dept: CARDIOTHORACIC SURGERY | Facility: CLINIC | Age: 72
End: 2017-06-14

## 2017-06-14 NOTE — PATIENT CARE CONFERENCE
"[facility]      THORACIC MULTIDISCIPLINARY TUMOR BOARD  PATIENT REVIEW FORM  ________________________________________________________________________    CLINIC #: [id number]  DATE: 06/14/2017    TUMOR SITE:   Pleural Effusion, Mediastinal Lymphadenopathy    PRESENTER:   Dr. Armas    PATIENT SUMMARY:   72 y/o smoker with PMH of Type A ascending aortic dissection repaired on 4/13/17, CAD, HTN, HLD, and CKD presents for surgical evaluation of recurrent left pleural effusion and mediastinal lymphadenopathy. Patient reports he was in his usual state of health after ascending aortic dissection and repair in April 2016. About 1-2 months ago he began having worsening left chest wall pain and SOB. After seeing his cardiologist, a chest CT was ordered which revealed a large effusion and mediastinal lymphadenopathy. He has had two thoracenteses, 4/17/17 and 5/23/17 both drained over 1.5L of serosanguinous fluid. Today he reports breathing has improved since last thoracentesis. Only wears home oxygen with activity. Left rib pain is constant, worse at night. Current smoker, 1/2 ppd, 55 pack year history.   PET on 6/13/17 revealed "Index lesions are left retrocardiac pleura SUV max 14.63.  Subcarinal lymphadenopathy SUV max 15.14.  Left para-aortic upper abdomen lymphadenopathy SUV max 16.69, left iliac bone metastasis SUV max 15.15."   Pathology still pending per [place].      BOARD RECOMMENDATIONS:   Present the patient again with confirmed pathology. If mesothelioma is confirmed, refer to heme/onc.    CONSULT NEEDED:     [] Surgery    [] Hem/Onc    [] Rad/Onc    [] Dietary                 [] Social Service    [] Psychology       [] Pulmonology    Clinical Stage: Tumor  Node(s)  Metastasis   Pathologic Stage: Tumor  Node(s)  Metastasis        GROUP STAGE:     [] O    [] 1A    [] IB    [] IIA    [] IIB     [] IIIA     [] IIIB     [] IIIC    [] IV                               [] Local recurrence     [] Regional recurrence   "   [] Distant recurrence                   [] NSCLC     [] SCLC     Tumor type     Unstageable:      [] Yes     [] No  Metastatic site(s):           [x] [facility] Treatment Guidelines reviewed and care planned is consistent with guidelines.         (i.e., NCCN, NCI, PD, ACO, AUA, etc.)    PRESENTATION AT CANCER CONFERENCE:         [] Prospective    [] Retrospective     [] Follow-Up          [] Eligible for clinical trial

## 2017-06-15 ENCOUNTER — TELEPHONE (OUTPATIENT)
Dept: CARDIOTHORACIC SURGERY | Facility: CLINIC | Age: 72
End: 2017-06-15

## 2017-06-15 NOTE — TELEPHONE ENCOUNTER
Spoke with pathology, they contacted [facility] for an update on pt's pathology report. Pathologist may be able to provide a prelimary report today, but it is not certain. Discussed with Dr. Nichols, will move pt's appt to next Friday 6/23/17 so a final report is available.  Moved pt's appt with Dr. Nichols to 6/23 at . Spoke with the pt and he is agreeable with the new appt. He is aware of Dr. Armas's f/u on 6/22.

## 2017-06-19 ENCOUNTER — LAB VISIT (OUTPATIENT)
Dept: LAB | Facility: HOSPITAL | Age: 72
End: 2017-06-19
Attending: INTERNAL MEDICINE
Payer: MEDICARE

## 2017-06-19 DIAGNOSIS — I25.10 CORONARY ATHEROSCLEROSIS OF UNSPECIFIED TYPE OF VESSEL, NATIVE OR GRAFT: Primary | ICD-10-CM

## 2017-06-19 LAB
ALT SERPL W/O P-5'-P-CCNC: 47 U/L
AST SERPL-CCNC: 49 U/L
CHOLEST/HDLC SERPL: 3.4 {RATIO}
HDL/CHOLESTEROL RATIO: 29.8 %
HDLC SERPL-MCNC: 104 MG/DL
HDLC SERPL-MCNC: 31 MG/DL
LDLC SERPL CALC-MCNC: 58.6 MG/DL
NONHDLC SERPL-MCNC: 73 MG/DL
TRIGL SERPL-MCNC: 72 MG/DL

## 2017-06-19 PROCEDURE — 84460 ALANINE AMINO (ALT) (SGPT): CPT

## 2017-06-19 PROCEDURE — 80061 LIPID PANEL: CPT

## 2017-06-19 PROCEDURE — 84450 TRANSFERASE (AST) (SGOT): CPT

## 2017-06-20 ENCOUNTER — TELEPHONE (OUTPATIENT)
Dept: CARDIOTHORACIC SURGERY | Facility: CLINIC | Age: 72
End: 2017-06-20

## 2017-06-20 NOTE — PHYSICIAN QUERY
PT Name: Clint Brown  MR #: [id number]    Physician Query Form - Pathology Findings Clarification     CDS/: Funmilayo Beal               Contact information:  This form is a permanent document in the medical record.     Query Date: June 20, 2017      By submitting this query, we are merely seeking further clarification of documentation.  Please utilize your independent clinical judgment when addressing the question(s) below.      The medical record contains the following:     Findings Supporting Clinical Information Location in Medical Record     PLEURA, AND BIOPSIES AND CYTOLOGY PREPARATIONS ([id number]; 5/31/17; AND [id number]; 5/31/17)  SHOWING BIPHASIC MALIGNANCY, POORLY-DIFFERENTIATED BIPHASIC MESOTHELIOMA IS FAVORED   Malignant Left Pleural Effusion    Left Thoracoscopy with Drainage of Pleural Effusion and Pleural Biopsy   Path report, OP note     Please document the clinical significance of the Pathologists findings of __MESOTHELIOMA______.          [X] I agree with the Pathology Findings        [  ] I do not agree with the Pathology Findings        [  ] Clinically Insignificant        [  ] Clinically Undetermined        [  ] Other/Clarification of Findings: ______________________________________________    Please document in your progress notes daily for the duration of treatment until resolved and include in your discharge summary.

## 2017-06-20 NOTE — TELEPHONE ENCOUNTER
Received a call from home health nurse concerning delay of pleurx kits to pt home.  Given 1 pleurx drainage container so she could drain catheter on 6/21 due to non delivery of ordered pleurx and bad weather.

## 2017-06-21 ENCOUNTER — DOCUMENTATION ONLY (OUTPATIENT)
Dept: CARDIOTHORACIC SURGERY | Facility: CLINIC | Age: 72
End: 2017-06-21

## 2017-06-21 NOTE — PATIENT CARE CONFERENCE
"[facility]      THORACIC MULTIDISCIPLINARY TUMOR BOARD  PATIENT REVIEW FORM  ________________________________________________________________________    CLINIC #: [id number]  DATE: 06/21/2017    TUMOR SITE:   Mesothelioma    PRESENTER:        PATIENT SUMMARY:   72 y/o smoker with PMH of Type A ascending aortic dissection repaired on 4/13/17, CAD, HTN, HLD, and CKD presents for surgical evaluation of recurrent left pleural effusion and mediastinal lymphadenopathy. Patient reports he was in his usual state of health after ascending aortic dissection and repair in April 2016. About 1-2 months ago he began having worsening left chest wall pain and SOB. After seeing his cardiologist, a chest CT was ordered which revealed a large effusion and mediastinal lymphadenopathy. He had two thoracenteses, 4/17/17 and 5/23/17, both drained over 1.5L of serosanguinous fluid. Only wears home oxygen with activity. Left rib pain is constant, worse at night. Current smoker, 1/2 ppd, 55 pack year history.   PET on 6/13/17 revealed "Index lesions are left retrocardiac pleura SUV max 14.63.  Subcarinal lymphadenopathy SUV max 15.14.  Left para-aortic upper abdomen lymphadenopathy SUV max 16.69, left iliac bone metastasis SUV max 15.15."   Pathology sent to [place], confirmed poorly-differentiated biphasic mesothelioma.     BOARD RECOMMENDATIONS:   Refer to heme/onc, start chemotherapy.    CONSULT NEEDED:     [] Surgery    [x] Hem/Onc    [] Rad/Onc    [] Dietary                 [] Social Service    [] Psychology       [] Pulmonology    Clinical Stage: Tumor  Node(s)  Metastasis   Pathologic Stage: Tumor  Node(s)  Metastasis       GROUP STAGE:     [] O    [] 1A    [] IB    [] IIA    [] IIB     [] IIIA     [] IIIB     [] IIIC    [] IV                               [] Local recurrence     [] Regional recurrence     [] Distant recurrence                   [] NSCLC     [] SCLC     Tumor type: Mesothelioma    Unstageable:      [] Yes     " [] No  Metastatic site(s):          [x] [facility] Treatment Guidelines reviewed and care planned is consistent with guidelines.         (i.e., NCCN, NCI, PD, ACO, AUA, etc.)    PRESENTATION AT CANCER CONFERENCE:         [] Prospective    [] Retrospective     [] Follow-Up          [] Eligible for clinical trial

## 2017-06-22 ENCOUNTER — HOSPITAL ENCOUNTER (OUTPATIENT)
Dept: RADIOLOGY | Facility: HOSPITAL | Age: 72
Discharge: HOME | End: 2017-06-22
Attending: THORACIC SURGERY (CARDIOTHORACIC VASCULAR SURGERY)
Payer: MEDICARE

## 2017-06-22 ENCOUNTER — OFFICE VISIT (OUTPATIENT)
Dept: CARDIOTHORACIC SURGERY | Facility: CLINIC | Age: 72
End: 2017-06-22
Payer: MEDICARE

## 2017-06-22 VITALS
OXYGEN SATURATION: 95 % | SYSTOLIC BLOOD PRESSURE: 111 MMHG | WEIGHT: 174.81 LBS | HEIGHT: 70 IN | HEART RATE: 82 BPM | BODY MASS INDEX: 25.03 KG/M2 | DIASTOLIC BLOOD PRESSURE: 63 MMHG

## 2017-06-22 DIAGNOSIS — J90 PLEURAL EFFUSION: ICD-10-CM

## 2017-06-22 DIAGNOSIS — C45.9: Primary | ICD-10-CM

## 2017-06-22 PROCEDURE — 71020 XR CHEST PA AND LATERAL: CPT | Mod: 26,,, | Performed by: RADIOLOGY

## 2017-06-22 PROCEDURE — 99024 POSTOP FOLLOW-UP VISIT: CPT | Mod: S$GLB,,, | Performed by: THORACIC SURGERY (CARDIOTHORACIC VASCULAR SURGERY)

## 2017-06-22 PROCEDURE — 99999 PR PBB SHADOW E&M-EST. PATIENT-LVL V: CPT | Mod: PBBFAC,,, | Performed by: THORACIC SURGERY (CARDIOTHORACIC VASCULAR SURGERY)

## 2017-06-22 RX ORDER — OXYCODONE AND ACETAMINOPHEN 5; 325 MG/1; MG/1
1 TABLET ORAL EVERY 4 HOURS PRN
Qty: 31 TABLET | Refills: 0 | Status: SHIPPED | OUTPATIENT
Start: 2017-06-22 | End: 2017-06-23 | Stop reason: SDUPTHER

## 2017-06-22 RX ORDER — OXYCODONE AND ACETAMINOPHEN 5; 325 MG/1; MG/1
1 TABLET ORAL EVERY 4 HOURS PRN
Qty: 31 TABLET | Refills: 0 | Status: SHIPPED | OUTPATIENT
Start: 2017-06-22 | End: 2017-06-22 | Stop reason: SDUPTHER

## 2017-06-22 NOTE — PROGRESS NOTES
Subjective:       Patient ID: Clint Brown is a 71 y.o. male.    Chief Complaint: Post-op Evaluation    HPI   71 year old male smoker with Type A ascending aortic dissection repaired on 4/13/17, CAD, HTN, HLD, CKD and recurrent left pleural effusion and mediastinal lymphadenopathy s/p Left VATS with drainage of pleural effusion with pleural biopsy and PleurX placement on 5/31/17. Uncomplicated post-op course. Draining every other day, apx 300mL. Last drained yesterday. Endorses fatigue and Left chest wall soreness. Infrequent cough. Ambulating and tolerating diet. Denies fever, chills, SOB, CP, nausea, vomiting.     Review of Systems   Constitutional: Positive for activity change (decreased) and fatigue.   Eyes: Negative for pain.   Respiratory: Positive for cough. Negative for shortness of breath.    Cardiovascular: Positive for chest pain (chest wall pain ). Negative for palpitations.   Gastrointestinal: Negative for abdominal pain.   Genitourinary: Negative for difficulty urinating.   Musculoskeletal: Negative for arthralgias.   Skin: Negative for color change and rash.         Objective:      Physical Exam   Constitutional: He is oriented to person, place, and time. He appears well-developed.   HENT:   Head: Normocephalic and atraumatic.   Eyes: EOM are normal. Pupils are equal, round, and reactive to light.   Neck: Normal range of motion. Neck supple.   Cardiovascular: Normal rate, regular rhythm and normal heart sounds.    Pulmonary/Chest: Effort normal. He has decreased breath sounds in the left lower field.   Abdominal: Soft.   Neurological: He is alert and oriented to person, place, and time.   Skin:   Left Pleurx with suture in place. No surrounding erythema or fluctuance.  Left VATS incisions with steri strips. Removed in clinic. Healing well, no drainage or erythema.     Vitals reviewed.        Pathology   Biphasic malignancy, poorly-differentiated biphasic mesothelioma is favored.     CXR  6/22/17:  Cardiac size remains normal.  postoperative changes are noted.  No pneumothorax can be seen.  On the left there is some pleural thickening and pleural fluid blunting the left costophrenic angle.  Appearance is similar to previous exam    Assessment:       71 year old male smoker with Type A ascending aortic dissection repaired on 4/13/17, CAD, HTN, HLD, and CKD presents for surgical evaluation of recurrent left pleural effusion and mediastinal lymphadenopathy s/p Left VATS with drainage of pleural effusion with pleural biopsy and PleurX placement on 5/31/17. Pathology reviewed with patient.     Plan:       Will plan for left side port placement on 6/28/17   RTC in 3-4 weeks with CXR. Will try to coordinate with Dr. Nichols appointment.   Keep consult visit with Dr. Nichols tomorrow.   Appropriate patient education regarding the denise-operative period as well as intraperative details were discussed. Risks, including but not limited to, bleeding, infection, pain and anesthetic complication were discussed. Patient was given the opportunity to ask questions and to have those questions answered to their satisfaction. Patient verbalized understanding to both procedure and associated risks. Consent was obtained.    ATTENDING ATTESTATION:    I evaluated the patient and I agree with the assessment and plan.  Pleurx in place and draining well.  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  PET shows metastatic disease to paraortic LN and left iliac.  Appointment with medical oncology tomorrow.  Will set up for port-a-cath placement on 6/28/2017.

## 2017-06-23 ENCOUNTER — HOSPITAL ENCOUNTER (OUTPATIENT)
Dept: CARDIOLOGY | Facility: CLINIC | Age: 72
Discharge: HOME | End: 2017-06-23
Payer: MEDICARE

## 2017-06-23 ENCOUNTER — INITIAL CONSULT (OUTPATIENT)
Dept: HEMATOLOGY/ONCOLOGY | Facility: CLINIC | Age: 72
End: 2017-06-23
Payer: MEDICARE

## 2017-06-23 ENCOUNTER — PATIENT MESSAGE (OUTPATIENT)
Dept: HEMATOLOGY/ONCOLOGY | Facility: CLINIC | Age: 72
End: 2017-06-23

## 2017-06-23 VITALS
WEIGHT: 175.06 LBS | TEMPERATURE: 99 F | OXYGEN SATURATION: 93 % | HEIGHT: 70 IN | BODY MASS INDEX: 25.06 KG/M2 | HEART RATE: 106 BPM | DIASTOLIC BLOOD PRESSURE: 65 MMHG | RESPIRATION RATE: 18 BRPM | SYSTOLIC BLOOD PRESSURE: 111 MMHG

## 2017-06-23 DIAGNOSIS — R11.0 NAUSEA: ICD-10-CM

## 2017-06-23 DIAGNOSIS — G89.3 NEOPLASM RELATED PAIN: ICD-10-CM

## 2017-06-23 DIAGNOSIS — K21.9 GASTROESOPHAGEAL REFLUX DISEASE, ESOPHAGITIS PRESENCE NOT SPECIFIED: ICD-10-CM

## 2017-06-23 DIAGNOSIS — R63.0 ANOREXIA: ICD-10-CM

## 2017-06-23 DIAGNOSIS — C45.9 MESOTHELIOMA: Primary | ICD-10-CM

## 2017-06-23 DIAGNOSIS — I49.9 IRREGULAR HEART BEAT: ICD-10-CM

## 2017-06-23 PROCEDURE — 93000 ELECTROCARDIOGRAM COMPLETE: CPT | Mod: S$GLB,,, | Performed by: INTERNAL MEDICINE

## 2017-06-23 PROCEDURE — 99205 OFFICE O/P NEW HI 60 MIN: CPT | Mod: 25,S$GLB,, | Performed by: INTERNAL MEDICINE

## 2017-06-23 PROCEDURE — 99999 PR PBB SHADOW E&M-EST. PATIENT-LVL V: CPT | Mod: PBBFAC,,, | Performed by: INTERNAL MEDICINE

## 2017-06-23 PROCEDURE — 1159F MED LIST DOCD IN RCRD: CPT | Mod: S$GLB,,, | Performed by: INTERNAL MEDICINE

## 2017-06-23 PROCEDURE — 96372 THER/PROPH/DIAG INJ SC/IM: CPT | Mod: S$GLB,,, | Performed by: INTERNAL MEDICINE

## 2017-06-23 PROCEDURE — 1125F AMNT PAIN NOTED PAIN PRSNT: CPT | Mod: S$GLB,,, | Performed by: INTERNAL MEDICINE

## 2017-06-23 RX ORDER — CYANOCOBALAMIN 1000 UG/ML
1000 INJECTION, SOLUTION INTRAMUSCULAR; SUBCUTANEOUS
Status: COMPLETED | OUTPATIENT
Start: 2017-06-23 | End: 2017-06-23

## 2017-06-23 RX ORDER — ONDANSETRON HYDROCHLORIDE 8 MG/1
8 TABLET, FILM COATED ORAL 4 TIMES DAILY PRN
Qty: 60 TABLET | Refills: 2 | Status: ON HOLD | OUTPATIENT
Start: 2017-06-23 | End: 2017-08-14 | Stop reason: HOSPADM

## 2017-06-23 RX ORDER — PROMETHAZINE HYDROCHLORIDE 25 MG/1
25 TABLET ORAL EVERY 6 HOURS PRN
Qty: 60 TABLET | Refills: 7 | Status: SHIPPED | OUTPATIENT
Start: 2017-06-23 | End: 2017-06-30

## 2017-06-23 RX ORDER — CYPROHEPTADINE HYDROCHLORIDE 4 MG/1
4 TABLET ORAL 4 TIMES DAILY
Qty: 120 TABLET | Refills: 3 | Status: ON HOLD | OUTPATIENT
Start: 2017-06-23 | End: 2017-08-14 | Stop reason: HOSPADM

## 2017-06-23 RX ORDER — MORPHINE SULFATE 15 MG/1
15 TABLET, FILM COATED, EXTENDED RELEASE ORAL 2 TIMES DAILY
Qty: 60 TABLET | Refills: 0 | Status: SHIPPED | OUTPATIENT
Start: 2017-06-23 | End: 2017-07-20 | Stop reason: SDUPTHER

## 2017-06-23 RX ORDER — OXYCODONE AND ACETAMINOPHEN 5; 325 MG/1; MG/1
1 TABLET ORAL EVERY 4 HOURS PRN
Qty: 120 TABLET | Refills: 0 | Status: SHIPPED | OUTPATIENT
Start: 2017-06-23 | End: 2017-07-20 | Stop reason: SDUPTHER

## 2017-06-23 RX ORDER — FOLIC ACID 1 MG/1
1 TABLET ORAL DAILY
Qty: 60 TABLET | Refills: 6 | Status: ON HOLD | OUTPATIENT
Start: 2017-06-23 | End: 2017-08-14 | Stop reason: HOSPADM

## 2017-06-23 RX ORDER — DEXAMETHASONE 6 MG/1
6 TABLET ORAL 2 TIMES DAILY WITH MEALS
Qty: 60 TABLET | Refills: 3 | Status: SHIPPED | OUTPATIENT
Start: 2017-06-23 | End: 2017-07-03

## 2017-06-23 RX ADMIN — CYANOCOBALAMIN 1000 MCG: 1000 INJECTION, SOLUTION INTRAMUSCULAR; SUBCUTANEOUS at 08:06

## 2017-06-23 NOTE — PROGRESS NOTES
DATE OF CONSULTATION:  06/23/2017    REASON FOR VISIT:  Mesothelioma, biphasic.    HISTORY OF PRESENT ILLNESS:  Mr. Clint Brown is a 71-year-old male who has had   symptoms of some left-sided heaviness in his chest and pain, which prompted an   Emergency Room visit in November 2016.  He had an x-ray done, which did not   reveal any abnormalities and hence was discharged; however, his symptoms   persisted.  He underwent a CT chest in 03/03/2017 and was noted to have some   mild pleural effusion.  Eventually, he got referred to see Pulmonary  for the   left-sided effusion that was tapped on 04/17/2017.  Pathology revealed malignant   epithelial neoplasm.  This was again sent to [facility] and was noted to have   biphasic malignant mesothelioma.  He was also referred to see Dr. Armas and   underwent another procedure on 05/31/2017, which was a VATS with the drainage of   effusion and pathology now confirmed that it is malignant biphasic   mesothelioma.  He had scans done, MRI brain on 06/05/2017, which did not reveal   any evidence of metastatic disease.  PET scan on 06/13/2017 revealed a left   retrocardiac pleura with an SUV max of 14.63, subcarinal lymphadenopathy with an   SUV max of 15, left paraaortic upper abdomen lymphadenopathy with an SUV max of   16, left iliac bone metastasis with an SUV max of 15.5.  He is considered to be   stage IV secondary to metastasis to the upper abdomen as well as to the left   iliac bones, and because of the biphasic histology, has been referred to discuss   palliative chemotherapy.  He is accompanied to the clinic today with his wife   and daughter.  His main complaints include appetite changes, he has lost about   20 pounds since the diagnosis of this problem.  He also has cough and shortness   of breath.  He does have a PleurX catheter in place.  He is also very anxious   and nervous.    REVIEW OF SYSTEMS:  CONSTITUTIONAL:  Reports appetite change, fatigue and unexpected  weight change.  HEENT:  Negative for mouth sores.  EYES:  Reports visual disturbances.  CHEST:  Reports cough and shortness of breath.  CARDIOVASCULAR:  Denies chest pain.   GASTROINTESTINAL: Negative for abdominal pain, diarrhea and abdominal   distention.  GENITOURINARY:  Negative for frequency.  MUSCULOSKELETAL:  Negative for back pain.  SKIN:  Negative for rash.  NEUROLOGIC:  Negative for headaches.  HEMATOLOGIC:  Negative for adenopathy.  PSYCHIATRIC AND BEHAVIORAL:  He is nervous and anxious.    COMORBID MEDICAL CONDITIONS:  He has had his aortic dissection repaired last   year in April 2017.  Old MI several years ago.  Chronic kidney disease, atrial   flutter for which he underwent cardioversion in April 2016.  Thoracic aortic   dissection repaired in April 2016.    PAST SURGICAL HISTORY:  Kidney stone.    PAST SURGICAL HISTORY:  Appendectomy and cardiac catheterization.    SOCIAL HISTORY:  He smokes 0.25 packs a day for the last 55 years.  Denies any   drug use.  He does have some vague history of exposure to asbestos.  His father   worked in a javier industry and he worked in tiles and with pipes but he does   say that it is a prolonged exposure, he says it is a sporadic exposure.    PHYSICAL EXAMINATION:  VITALS:  Reviewed in EPIC.  GENERAL:  The patient is oriented to person, place and time, appears  moderately   built, moderately nourished, in no acute distress.  HEENT:  The right and left ears are normal.  Mouth and throat:  No oropharyngeal   exudates.  No sinus tenderness.  EYES:  Conjunctivae and lids are normal.  NECK:  Supple.  No JVD.  No thyromegaly.  CARDIOVASCULAR:  Normal rate, regular rhythm.  No edema or tenderness in the   extremities.  PULMONARY AND CHEST:  Decreased breath sounds noted on the left side.  Right   side within normal limits.  ABDOMEN:  Soft, nontender, nondistended.  No hepatomegaly or splenomegaly.  MUSCULOSKELETAL:  Normal range of motion.  Gait is normal.  No clubbing or    cyanosis.  LYMPHADENOPATHY:  No submental, submandibular or supraclavicular lymph nodes   noted.  NEUROLOGIC:  Alert and oriented to person, place and time, has normal strength,   normal reflexes.  No sensory deficit.  Gait is normal.  SKIN:  Warm, dry and intact.  No bruising, no rashes.  Nail show no clubbing.  PSYCHIATRIC:  Normal mood and affect.  Speech, behavior, judgment, thought   content, cognition and memory are normal.    LABORATORY DATA:  From today reveals a CBC, with a hemoglobin 12.3, otherwise   within normal limits.  CMP with a creatinine of 1.2, albumin of 2.4, glucose   112.  Otherwise, within normal limits.  Mag 1.6, within normal limits, and phos   3.7, within normal limits.  Imaging and pathology as discussed above.    ASSESSMENT AND PLAN:  Mr. Clint Brown is a 71-year-old male with a new   diagnosis of biphasic mesothelioma, which is metastatic to the abdominal lymph   nodes as well as iliac bone.  He is considered to be stage IV at this time and   treatment is offered for palliative intent and control of symptoms.  I have   recommended him to a regimen of cisplatin, Alimta and Avastin.  The cycle 1 will   be with cisplatin and Alimta, we will add Avastin to cycle 2, although we will   obtain insurance approval for all three drugs.  We will plan on starting it as   soon as insurance approves.  Side effects were discussed, handouts were   provided.  He has been given prescriptions for folic acid to  start today.  He   received B12 injection in the clinic and dexamethasone with instructions to   start the day before and for 4 days after chemo as well as Phenergan and Zofran   were given.  For neoplasm related pain, he is taking Percocet 5 to 6 times a   day, so I have given him long-acting MS Contin 15 mg b.i.d. and also refilled   his Percocet.  For anorexia, I have started him on Percocet and for GERD I have   started him on Zantac as Prilosec interferes with absorption of Alimta.  I have    told him to stop taking Prilosec.  He is also noted to have irregular heartbeat   on exam today, so I have recommended for him to undergo an EKG today and I will   call him with the results of that once available.  His distress score is 2 and   no intervention is indicated.  All of the patient's and family's questions were   answered to their satisfaction.      SPS/HN  dd: 06/23/2017 09:32:08 (CDT)  td: 06/24/2017 02:57:52 (CDT)  Doc ID   #[id number]  Job ID #[id number]    CC:       Answers for HPI/ROS submitted by the patient on 6/21/2017   appetite change : Yes  unexpected weight change: Yes  visual disturbance: Yes  cough: Yes  shortness of breath: Yes  chest pain: Yes  abdominal pain: No  diarrhea: No  frequency: Yes  back pain: No  rash: No  headaches: No  adenopathy: Yes  nervous/ anxious: Yes    Distress Score    Distress Score: 2        Practical Problems Physical Problems   : No Appearance: No   Housing: No Bathing / Dressing: No   Insurance / Financial: No Breathing: Yes    Transportation: No  Changes in Urination: No    Work / School: No  Constipation: No   Treatment Decisions: No  Diarrhea: No     Eating: No    Family Problems Fatigue: No    Dealing with Children: No Feeling Swollen: No    Dealing with Partner: No Fevers: No    Ability to Have Children: No  Getting Around: Yes    Family Health Issues: No  Indigestion: No     Memory / Concentration: No   Emotional Problems Mouth Sores: No    Depression: No  Nausea: No    Fears: No  Nose Dry / Congested: No    Nervousness: No  Pain: No    Sadness: No Sexual: No    Worry: No Skin Dry / Itchy: No    Lost of Interest in Usual Activities: No Sleep: No     Substance Abuse: No    Spiritual/Religions Concerns Tingling in Hands / Feet: No   Spritual / Spiritism Concerns: No         Other Problems              Dictated # [id number]

## 2017-06-23 NOTE — PATIENT INSTRUCTIONS
Cisplatin injection  What is this medicine?  CISPLATIN (SIS asia tin) is a chemotherapy drug. It targets fast dividing cells, like cancer cells, and causes these cells to die. This medicine is used to treat many types of cancer like bladder, ovarian, and testicular cancers.  How should I use this medicine?  This drug is given as an infusion into a vein. It is administered in a hospital or clinic by a specially trained health care professional.  Talk to your pediatrician regarding the use of this medicine in children. Special care may be needed.  What side effects may I notice from receiving this medicine?  Side effects that you should report to your doctor or health care professional as soon as possible:  · allergic reactions like skin rash, itching or hives, swelling of the face, lips, or tongue  · signs of infection - fever or chills, cough, sore throat, pain or difficulty passing urine  · signs of decreased platelets or bleeding - bruising, pinpoint red spots on the skin, black, tarry stools, nosebleeds  · signs of decreased red blood cells - unusually weak or tired, fainting spells, lightheadedness  · breathing problems  · changes in hearing  · gout pain  · low blood counts - This drug may decrease the number of white blood cells, red blood cells and platelets. You may be at increased risk for infections and bleeding.  · nausea and vomiting  · pain, swelling, redness or irritation at the injection site  · pain, tingling, numbness in the hands or feet  · problems with balance, movement  · trouble passing urine or change in the amount of urine  Side effects that usually do not require medical attention (report to your doctor or health care professional if they continue or are bothersome):  · changes in vision  · loss of appetite  · metallic taste in the mouth or changes in taste  What may interact with this medicine?  · dofetilide  · foscarnet  · medicines for seizures  · medicines to increase blood counts like  filgrastim, pegfilgrastim, sargramostim  · probenecid  · pyridoxine used with altretamine  · rituximab  · some antibiotics like amikacin, gentamicin, neomycin, polymyxin B, streptomycin, tobramycin  · sulfinpyrazone  · vaccines  · zalcitabine  Talk to your doctor or health care professional before taking any of these medicines:  · acetaminophen  · aspirin  · ibuprofen  · ketoprofen  · naproxen  What if I miss a dose?  It is important not to miss a dose. Call your doctor or health care professional if you are unable to keep an appointment.  Where should I keep my medicine?  This drug is given in a hospital or clinic and will not be stored at home.  What should I tell my health care provider before I take this medicine?  They need to know if you have any of these conditions:  · blood disorders  · hearing problems  · kidney disease  · recent or ongoing radiation therapy  · an unusual or allergic reaction to cisplatin, carboplatin, other chemotherapy, other medicines, foods, dyes, or preservatives  · pregnant or trying to get pregnant  · breast-feeding  What should I watch for while using this medicine?  Your condition will be monitored carefully while you are receiving this medicine. You will need important blood work done while you are taking this medicine.  This drug may make you feel generally unwell. This is not uncommon, as chemotherapy can affect healthy cells as well as cancer cells. Report any side effects. Continue your course of treatment even though you feel ill unless your doctor tells you to stop.  In some cases, you may be given additional medicines to help with side effects. Follow all directions for their use.  Call your doctor or health care professional for advice if you get a fever, chills or sore throat, or other symptoms of a cold or flu. Do not treat yourself. This drug decreases your body's ability to fight infections. Try to avoid being around people who are sick.  This medicine may increase  your risk to bruise or bleed. Call your doctor or health care professional if you notice any unusual bleeding.  Be careful brushing and flossing your teeth or using a toothpick because you may get an infection or bleed more easily. If you have any dental work done, tell your dentist you are receiving this medicine.  Avoid taking products that contain aspirin, acetaminophen, ibuprofen, naproxen, or ketoprofen unless instructed by your doctor. These medicines may hide a fever.  Do not become pregnant while taking this medicine. Women should inform their doctor if they wish to become pregnant or think they might be pregnant. There is a potential for serious side effects to an unborn child. Talk to your health care professional or pharmacist for more information. Do not breast-feed an infant while taking this medicine.  Drink fluids as directed while you are taking this medicine. This will help protect your kidneys.  Call your doctor or health care professional if you get diarrhea. Do not treat yourself.  Date Last Reviewed:   NOTE:This sheet is a summary. It may not cover all possible information. If you have questions about this medicine, talk to your doctor, pharmacist, or health care provider. Copyright© 2016 [employer]        Cisplatin injection  What is this medicine?  CISPLATIN (SIS asia tin) is a chemotherapy drug. It targets fast dividing cells, like cancer cells, and causes these cells to die. This medicine is used to treat many types of cancer like bladder, ovarian, and testicular cancers.  How should I use this medicine?  This drug is given as an infusion into a vein. It is administered in a hospital or clinic by a specially trained health care professional.  Talk to your pediatrician regarding the use of this medicine in children. Special care may be needed.  What side effects may I notice from receiving this medicine?  Side effects that you should report to your doctor or health care professional as soon  as possible:  · allergic reactions like skin rash, itching or hives, swelling of the face, lips, or tongue  · signs of infection - fever or chills, cough, sore throat, pain or difficulty passing urine  · signs of decreased platelets or bleeding - bruising, pinpoint red spots on the skin, black, tarry stools, nosebleeds  · signs of decreased red blood cells - unusually weak or tired, fainting spells, lightheadedness  · breathing problems  · changes in hearing  · gout pain  · low blood counts - This drug may decrease the number of white blood cells, red blood cells and platelets. You may be at increased risk for infections and bleeding.  · nausea and vomiting  · pain, swelling, redness or irritation at the injection site  · pain, tingling, numbness in the hands or feet  · problems with balance, movement  · trouble passing urine or change in the amount of urine  Side effects that usually do not require medical attention (report to your doctor or health care professional if they continue or are bothersome):  · changes in vision  · loss of appetite  · metallic taste in the mouth or changes in taste  What may interact with this medicine?  · dofetilide  · foscarnet  · medicines for seizures  · medicines to increase blood counts like filgrastim, pegfilgrastim, sargramostim  · probenecid  · pyridoxine used with altretamine  · rituximab  · some antibiotics like amikacin, gentamicin, neomycin, polymyxin B, streptomycin, tobramycin  · sulfinpyrazone  · vaccines  · zalcitabine  Talk to your doctor or health care professional before taking any of these medicines:  · acetaminophen  · aspirin  · ibuprofen  · ketoprofen  · naproxen  What if I miss a dose?  It is important not to miss a dose. Call your doctor or health care professional if you are unable to keep an appointment.  Where should I keep my medicine?  This drug is given in a hospital or clinic and will not be stored at home.  What should I tell my health care provider  before I take this medicine?  They need to know if you have any of these conditions:  · blood disorders  · hearing problems  · kidney disease  · recent or ongoing radiation therapy  · an unusual or allergic reaction to cisplatin, carboplatin, other chemotherapy, other medicines, foods, dyes, or preservatives  · pregnant or trying to get pregnant  · breast-feeding  What should I watch for while using this medicine?  Your condition will be monitored carefully while you are receiving this medicine. You will need important blood work done while you are taking this medicine.  This drug may make you feel generally unwell. This is not uncommon, as chemotherapy can affect healthy cells as well as cancer cells. Report any side effects. Continue your course of treatment even though you feel ill unless your doctor tells you to stop.  In some cases, you may be given additional medicines to help with side effects. Follow all directions for their use.  Call your doctor or health care professional for advice if you get a fever, chills or sore throat, or other symptoms of a cold or flu. Do not treat yourself. This drug decreases your body's ability to fight infections. Try to avoid being around people who are sick.  This medicine may increase your risk to bruise or bleed. Call your doctor or health care professional if you notice any unusual bleeding.  Be careful brushing and flossing your teeth or using a toothpick because you may get an infection or bleed more easily. If you have any dental work done, tell your dentist you are receiving this medicine.  Avoid taking products that contain aspirin, acetaminophen, ibuprofen, naproxen, or ketoprofen unless instructed by your doctor. These medicines may hide a fever.  Do not become pregnant while taking this medicine. Women should inform their doctor if they wish to become pregnant or think they might be pregnant. There is a potential for serious side effects to an unborn child. Talk  to your health care professional or pharmacist for more information. Do not breast-feed an infant while taking this medicine.  Drink fluids as directed while you are taking this medicine. This will help protect your kidneys.  Call your doctor or health care professional if you get diarrhea. Do not treat yourself.  Date Last Reviewed:   NOTE:This sheet is a summary. It may not cover all possible information. If you have questions about this medicine, talk to your doctor, pharmacist, or health care provider. Copyright© 2016 [employer]        Pemetrexed injection  What is this medicine?  PEMETREXED (PEM e TREX ed) is a chemotherapy drug. This medicine affects cells that are rapidly growing, such as cancer cells and cells in your mouth and stomach. It is usually used to treat lung cancers like non-small cell lung cancer and mesothelioma. It may also be used to treat other cancers.  How should I use this medicine?  This drug is given as an infusion into a vein. It is administered in a hospital or clinic by a specially trained health care professional.  Talk to your pediatrician regarding the use of this medicine in children. Special care may be needed.  What side effects may I notice from receiving this medicine?  Side effects that you should report to your doctor or health care professional as soon as possible:  · allergic reactions like skin rash, itching or hives, swelling of the face, lips, or tongue  · low blood counts - this medicine may decrease the number of white blood cells, red blood cells and platelets. You may be at increased risk for infections and bleeding.  · signs of infection - fever or chills, cough, sore throat, pain or difficulty passing urine  · signs of decreased platelets or bleeding - bruising, pinpoint red spots on the skin, black, tarry stools, blood in the urine  · signs of decreased red blood cells - unusually weak or tired, fainting spells, lightheadedness  · breathing problems, like a  dry cough  · changes in emotions or moods  · chest pain  · confusion  · diarrhea  · high blood pressure  · mouth or throat sores or ulcers  · pain, swelling, warmth in the leg  · pain on swallowing  · swelling of the ankles, feet, hands  · trouble passing urine or change in the amount of urine  · vomiting  · yellowing of the eyes or skin  Side effects that usually do not require medical attention (report to your doctor or health care professional if they continue or are bothersome):  · hair loss  · loss of appetite  · nausea  · stomach upset  What may interact with this medicine?  · aspirin and aspirin-like medicines  · medicines to increase blood counts like filgrastim, pegfilgrastim, sargramostim  · methotrexate  · NSAIDS, medicines for pain and inflammation, like ibuprofen or naproxen  · probenecid  · pyrimethamine  · vaccines  Talk to your doctor or health care professional before taking any of these medicines:  · acetaminophen  · aspirin  · ibuprofen  · ketoprofen  · naproxen  What if I miss a dose?  It is important not to miss your dose. Call your doctor or health care professional if you are unable to keep an appointment.  Where should I keep my medicine?  This drug is given in a hospital or clinic and will not be stored at home.  What should I tell my health care provider before I take this medicine?  They need to know if you have any of these conditions:  · if you frequently drink alcohol containing beverages  · infection (especially a virus infection such as chickenpox, cold sores, or herpes)  · kidney disease  · liver disease  · low blood counts, like low platelets, red bloods, or white blood cells  · an unusual or allergic reaction to pemetrexed, mannitol, other medicines, foods, dyes, or preservatives  · pregnant or trying to get pregnant  · breast-feeding  What should I watch for while using this medicine?  Visit your doctor for checks on your progress. This drug may make you feel generally unwell.  This is not uncommon, as chemotherapy can affect healthy cells as well as cancer cells. Report any side effects. Continue your course of treatment even though you feel ill unless your doctor tells you to stop.  In some cases, you may be given additional medicines to help with side effects. Follow all directions for their use.  Call your doctor or health care professional for advice if you get a fever, chills or sore throat, or other symptoms of a cold or flu. Do not treat yourself. This drug decreases your body's ability to fight infections. Try to avoid being around people who are sick.  This medicine may increase your risk to bruise or bleed. Call your doctor or health care professional if you notice any unusual bleeding.  Be careful brushing and flossing your teeth or using a toothpick because you may get an infection or bleed more easily. If you have any dental work done, tell your dentist you are receiving this medicine.  Avoid taking products that contain aspirin, acetaminophen, ibuprofen, naproxen, or ketoprofen unless instructed by your doctor. These medicines may hide a fever.  Call your doctor or health care professional if you get diarrhea or mouth sores. Do not treat yourself.  To protect your kidneys, drink water or other fluids as directed while you are taking this medicine.  Men and women must use effective birth control while taking this medicine. You may also need to continue using effective birth control for a time after stopping this medicine. Do not become pregnant while taking this medicine. Tell your doctor right away if you think that you or your partner might be pregnant. There is a potential for serious side effects to an unborn child. Talk to your health care professional or pharmacist for more information. Do not breast-feed an infant while taking this medicine. This medicine may lower sperm counts.  Date Last Reviewed:   NOTE:This sheet is a summary. It may not cover all possible  information. If you have questions about this medicine, talk to your doctor, pharmacist, or health care provider. Copyright© 2016 [employer]        Discharge Instructions for Chemotherapy  Your healthcare provider prescribed a type of medicine therapy for you called chemotherapy. Healthcare providers prescribe chemotherapy for many different types of illnesses, including cancer. There are many types of chemotherapy. This sheet provides general guidelines on how you can take care of yourself after your chemotherapy.  Mouth care  Dont be discouraged if you get mouth sores, even if you are following all your healthcare providers instructions. Many people get mouth sores as a side effect of chemotherapy. Heres what you can do to prevent mouth sores:  · Keep your mouth clean. Brush your teeth with a soft-bristle toothbrush after every meal.  · Ask if you should use a toothpaste with fluoride, or a mixture of 1 teaspoon of salt in 8-ounces of water to brush your teeth.   · Use an oral swab or special soft toothbrush if your gums bleed during regular brushing.  · Don't use dental floss if it causes your gums to bleed.  · Use any mouthwashes given to you as directed.  · If you cant tolerate regular methods, use salt and baking soda to clean your mouth. Mix 1 teaspoon of salt and 1 teaspoon of baking soda into an 8-ounce glass of warm water. Swish and spit.  · If you wear dentures, you may be told to wear them only when you eat, ask your healthcare provider. Clean dentures twice a day and soak in antimicrobial solution when you aren't wearing them. Rinse your mouth after each meal.   · Watch your mouth and tongue for white patches. This may be a sign of a type of yeast infection (thrush), a common side effect of chemotherapy. Be sure to tell your healthcare provider about these patches. Medicine can be prescribed to treat it.  Other home care  Here's what else you can do:  · Try to exercise. Exercise keeps you strong  and keeps your heart and lungs active. Walking and yoga are good types of exercise.   · Keep clean. During chemotherapy, your body cant fight infection very well. Take short baths or showers.  ¨ Wash your hands before you eat and after going to the bathroom.  ¨ Use moisturizing soap. Chemotherapy can make your skin dry.  ¨ Apply moisturizing lotion several times a day to help relieve dry skin.  ¨ Dont take very hot or very cold showers or baths.  · Dont be surprised if your chemotherapy causes slight burns to your skin--usually on the hands and feet. Some medicines used in high doses cause this to happen. Ask for a special cream to help relieve the burn and protect your skin.  · Avoid people who are sick with illnesses and diseases you could catch, such as colds, flu, measles, or chicken pox as well as people who have recently had vaccinations for these illnesses.   · Let your healthcare provider know if your throat is sore. You may have an infection that needs treatment.  · Remember, many patients feel sick and lose their appetites during treatment. Eat small meals several times a day to keep your strength up:  ¨ Choose bland foods with little taste or smell if you are reacting strongly to food.  ¨ Be sure to cook all food thoroughly. This kills bacteria and helps you avoid infection.  ¨ Eat foods that are soft. Soft foods are less likely to cause stomach irritation.  ¨ Try to eat a variety of foods for a well-balanced diet. Drink plenty of fluids and eat foods with fiber to avoid constipation.      When to call your healthcare provider  Call your healthcare provider right away if you have any of the following:  · Unexplained bleeding  · Trouble concentrating  · Ongoing fatigue  · Shortness of breath, wheezing, trouble breathing, or bad cough  · Rapid, irregular heartbeat, or chest pain  · Dizziness, lightheadedness  · Constant feeling of being cold  · Hives or a cut or rash that swells, turns red, feels hot or  painful, or begins to ooze  · Burning when you urinate  · Fever of 100.4°F (38°C) or higher, or chills   Date Last Reviewed: 5/1/2016 © 2000-2016 [employer]. [address] [place] [address], [place] [address]. All rights reserved. This information is not intended as a substitute for professional medical care. Always follow your healthcare professional's instructions.        Mouth Care During Chemotherapy     Brush gently with an extra soft toothbrush and mild toothpaste.     Mouth sores (stomatitis) and dry mouth are common side effects of chemotherapy and radiation therapy. These side effects occur because these treatments affect normal cells as well as cancer cells. Using the tips on this handout may help you feel better.   Remedies that help  · Rinse with 1/2 teaspoon baking soda and 1/4 teaspoon salt mixed in 1 cup of warm water. This helps keep your mouth free of germs.  · Use products that coat and protect the mouth and throat. Or use medications that coat and soothe mouth sores themselves.  · Numb your mouth and throat with special sprays or lozenges to make eating easier.  Prevent mouth sores  · Buy an extra soft toothbrush and mild toothpaste.  · Gently brush your teeth and gums.  · Have your dentist treat any dental problems before your therapy begins.  Moisten a dry mouth  · Drink plenty of water. Take frequent sips or suck on ice chips.  · Suck on sugar-free candy and lozenges. Chew sugar-free gum.  · Use products that moisten the mouth if your doctor prescribes them.  · Apply lip balm to help prevent dry lips.  · Avoid mouthwash that contains alcohol.  Choose foods less likely to irritate  Try foods that are:  · Soft and go down smoothly, such as a milkshake or food puréed with a   · Served cold or at room temperature  · Cooked until tender and cut into small pieces  Avoid foods that are:  · Sharp or crunchy  · Hot, salty, or spicy  · Acidic, such as citrus juices  When to seek medical  advice  · You develop mouth sores  · Mouth pain keeps you from eating or resting   Date Last Reviewed: 1/5/2016  © 8791-1262 [employer]. [address] [place] [address], [place] [address]. All rights reserved. This information is not intended as a substitute for professional medical care. Always follow your healthcare professional's instructions.        Nutrition During Chemotherapy     Drink plenty of liquids, such as water.     During chemotherapy, the energy provided by a healthy diet can help you rebuild normal cells. It can also help you keep up your strength and fight infection. As a result, you may feel better and be more able to cope with side effects. Ask your doctor about your nutrition needs.  Drink plenty of fluids  · Fluids help the body produce urine and decrease constipation. They help prevent kidney and bladder problems. They also help replace fluids lost from vomiting and diarrhea.  · Try water, unsweetened juices, and other flavored drinks without caffeine. They flush toxins from the body.  Get enough calories  · Calories are fuel. The body uses this fuel to perform all of its functions, including healing.  · Its OK to be lean, but be sure you are not underweight. If you are, try eating more calories.  · Eat calorie-dense foods such as avocados, peanut butter, eggs, and ice cream.  · If you need extra calories, add butter, gravy, and sauces to foods (if tolerated).  · If you don't need the extra calories, try to limit foods that are fried, greasy, or high in fat or added sugar.  Eat protein, fruits, and vegetables  · Protein builds muscle, bone, skin, and blood. It helps your body heal and fight infection. It also helps boost your energy level.  · Good protein choices include yogurt, eggs, chicken, lean meats, beans, and peanut butter.  · Fruits and vegetables are full of important vitamins, minerals, and fiber to help your body function properly.  · Try to eat a variety of vegetables,  fruits, whole grains, and beans.  · Ask your doctor about instant protein powder or other supplements.  Eating right during treatment  Side effects may make it a little harder to eat well on some days. The following tips will help you continue to get the nutrition you need:  · Be open to new foods and recipes.  · Eat small portions often and slowly.  · Have a healthy snack instead of a meal if you are not very hungry.  · Try eating in a new setting.  · Physical activity, such as walking, can help increase your appetite. Try to be active for at least 30 minutes each day.  · Boost your diet by getting the vitamins and minerals you need from fruits, vegetables, and whole grains.  · If you live alone and are not up to cooking, ask your healthcare provider about Meals on Wheels or other outreach programs.  · Sometimes, it is best to follow your appetitie. Eat when you are hungry, but when you ar enot, forcing yourself to eat can make you feel bad, nauseated, or even cause you to vomit.   Date Last Reviewed: 1/6/2016 © 2000-2016 [employer]. [address] [place] [address], [place] [address]. All rights reserved. This information is not intended as a substitute for professional medical care. Always follow your healthcare professional's instructions.

## 2017-06-23 NOTE — Clinical Note
Schedule CBC, CMP, mag and phos and see me to start New Cisplatin and ALimta, Avastin Day 2 IV fluids

## 2017-06-23 NOTE — PROGRESS NOTES
Distress Screening Results: Psychosocial Distress screening score of Distress Score: 2 noted and reviewed. No intervention indicated.  Answers for HPI/ROS submitted by the patient on 6/21/2017   appetite change : Yes  unexpected weight change: Yes  visual disturbance: Yes  cough: Yes  shortness of breath: Yes  chest pain: Yes  abdominal pain: No  diarrhea: No  frequency: Yes  back pain: No  rash: No  headaches: No  adenopathy: Yes  nervous/ anxious: Yes

## 2017-06-23 NOTE — LETTER
June 23, 2017      Evans Armas MD  [address]  [facility] [address]           [facility]ology On[facility]  [address]  [facility] [address]  Phone: [phone]          Patient: Clint Brown   MR Number: [id number]   YOB: 1945   Date of Visit: 6/23/2017       Dear Dr. Evans Armas:    Thank you for referring Clint Brown to me for evaluation. Attached you will find relevant portions of my assessment and plan of care.    If you have questions, please do not hesitate to call me. I look forward to following Clint Brown along with you.    Sincerely,    Josephine Nichols MD    Enclosure  CC:  No Recipients    If you would like to receive this communication electronically, please contact [email or web address] or [phone] to request more information on [employer] access.    For providers and/or their staff who would like to refer a patient to [facility], please contact us through our one-stop-shop provider referral line, [facility] , at [phone].    If you feel you have received this communication in error or would no longer like to receive these types of communications, please e-mail [email or web address]

## 2017-06-27 ENCOUNTER — TELEPHONE (OUTPATIENT)
Dept: CARDIOTHORACIC SURGERY | Facility: CLINIC | Age: 72
End: 2017-06-27

## 2017-06-28 ENCOUNTER — HOSPITAL ENCOUNTER (OUTPATIENT)
Facility: HOSPITAL | Age: 72
Discharge: HOME | End: 2017-06-28
Attending: THORACIC SURGERY (CARDIOTHORACIC VASCULAR SURGERY) | Admitting: THORACIC SURGERY (CARDIOTHORACIC VASCULAR SURGERY)
Payer: MEDICARE

## 2017-06-28 ENCOUNTER — ANESTHESIA EVENT (OUTPATIENT)
Dept: SURGERY | Facility: HOSPITAL | Age: 72
End: 2017-06-28
Payer: MEDICARE

## 2017-06-28 ENCOUNTER — ANESTHESIA (OUTPATIENT)
Dept: SURGERY | Facility: HOSPITAL | Age: 72
End: 2017-06-28
Payer: MEDICARE

## 2017-06-28 ENCOUNTER — SURGERY (OUTPATIENT)
Age: 72
End: 2017-06-28

## 2017-06-28 VITALS
OXYGEN SATURATION: 100 % | WEIGHT: 175 LBS | DIASTOLIC BLOOD PRESSURE: 51 MMHG | SYSTOLIC BLOOD PRESSURE: 111 MMHG | RESPIRATION RATE: 18 BRPM | BODY MASS INDEX: 25.05 KG/M2 | HEIGHT: 70 IN | HEART RATE: 85 BPM | TEMPERATURE: 98 F

## 2017-06-28 DIAGNOSIS — C45.9 MESOTHELIOMA: Primary | ICD-10-CM

## 2017-06-28 DIAGNOSIS — G89.3 NEOPLASM RELATED PAIN: ICD-10-CM

## 2017-06-28 LAB
ABO + RH BLD: NORMAL
BLD GP AB SCN CELLS X3 SERPL QL: NORMAL

## 2017-06-28 PROCEDURE — 25000003 PHARM REV CODE 250: Performed by: THORACIC SURGERY (CARDIOTHORACIC VASCULAR SURGERY)

## 2017-06-28 PROCEDURE — 25000003 PHARM REV CODE 250: Performed by: ANESTHESIOLOGY

## 2017-06-28 PROCEDURE — D9220A PRA ANESTHESIA: Mod: CRNA,,, | Performed by: NURSE ANESTHETIST, CERTIFIED REGISTERED

## 2017-06-28 PROCEDURE — C1788 PORT, INDWELLING, IMP: HCPCS | Performed by: THORACIC SURGERY (CARDIOTHORACIC VASCULAR SURGERY)

## 2017-06-28 PROCEDURE — D9220A PRA ANESTHESIA: Mod: ANES,,, | Performed by: ANESTHESIOLOGY

## 2017-06-28 PROCEDURE — 71000015 HC POSTOP RECOV 1ST HR: Performed by: THORACIC SURGERY (CARDIOTHORACIC VASCULAR SURGERY)

## 2017-06-28 PROCEDURE — 71000044 HC DOSC ROUTINE RECOVERY FIRST HOUR: Performed by: THORACIC SURGERY (CARDIOTHORACIC VASCULAR SURGERY)

## 2017-06-28 PROCEDURE — 63600175 PHARM REV CODE 636 W HCPCS: Performed by: NURSE ANESTHETIST, CERTIFIED REGISTERED

## 2017-06-28 PROCEDURE — 86900 BLOOD TYPING SEROLOGIC ABO: CPT

## 2017-06-28 PROCEDURE — 37000008 HC ANESTHESIA 1ST 15 MINUTES: Performed by: THORACIC SURGERY (CARDIOTHORACIC VASCULAR SURGERY)

## 2017-06-28 PROCEDURE — 25000003 PHARM REV CODE 250: Performed by: NURSE ANESTHETIST, CERTIFIED REGISTERED

## 2017-06-28 PROCEDURE — 86850 RBC ANTIBODY SCREEN: CPT

## 2017-06-28 PROCEDURE — 37000009 HC ANESTHESIA EA ADD 15 MINS: Performed by: THORACIC SURGERY (CARDIOTHORACIC VASCULAR SURGERY)

## 2017-06-28 PROCEDURE — 36000706: Performed by: THORACIC SURGERY (CARDIOTHORACIC VASCULAR SURGERY)

## 2017-06-28 PROCEDURE — 25000003 PHARM REV CODE 250: Performed by: PHYSICIAN ASSISTANT

## 2017-06-28 PROCEDURE — 36000707: Performed by: THORACIC SURGERY (CARDIOTHORACIC VASCULAR SURGERY)

## 2017-06-28 DEVICE — KIT POWERPORT SINGLE 8FR: Type: IMPLANTABLE DEVICE | Site: CHEST | Status: FUNCTIONAL

## 2017-06-28 RX ORDER — HEPARIN SODIUM (PORCINE) LOCK FLUSH IV SOLN 100 UNIT/ML 100 UNIT/ML
SOLUTION INTRAVENOUS
Status: DISCONTINUED | OUTPATIENT
Start: 2017-06-28 | End: 2017-06-28 | Stop reason: HOSPADM

## 2017-06-28 RX ORDER — BUPIVACAINE HYDROCHLORIDE 5 MG/ML
INJECTION, SOLUTION EPIDURAL; INTRACAUDAL
Status: DISCONTINUED | OUTPATIENT
Start: 2017-06-28 | End: 2017-06-28 | Stop reason: HOSPADM

## 2017-06-28 RX ORDER — OXYCODONE AND ACETAMINOPHEN 5; 325 MG/1; MG/1
1 TABLET ORAL EVERY 4 HOURS PRN
Qty: 6 TABLET | Refills: 0 | Status: ON HOLD | OUTPATIENT
Start: 2017-06-28 | End: 2017-07-13 | Stop reason: SDUPTHER

## 2017-06-28 RX ORDER — PROPOFOL 10 MG/ML
VIAL (ML) INTRAVENOUS
Status: DISCONTINUED | OUTPATIENT
Start: 2017-06-28 | End: 2017-06-28

## 2017-06-28 RX ORDER — PROPOFOL 10 MG/ML
VIAL (ML) INTRAVENOUS CONTINUOUS PRN
Status: DISCONTINUED | OUTPATIENT
Start: 2017-06-28 | End: 2017-06-28

## 2017-06-28 RX ORDER — LIDOCAINE HCL/PF 100 MG/5ML
SYRINGE (ML) INTRAVENOUS
Status: DISCONTINUED | OUTPATIENT
Start: 2017-06-28 | End: 2017-06-28

## 2017-06-28 RX ORDER — SODIUM CHLORIDE 9 MG/ML
INJECTION, SOLUTION INTRAVENOUS CONTINUOUS
Status: DISCONTINUED | OUTPATIENT
Start: 2017-06-28 | End: 2017-06-28 | Stop reason: HOSPADM

## 2017-06-28 RX ORDER — LIDOCAINE HYDROCHLORIDE 10 MG/ML
1 INJECTION, SOLUTION EPIDURAL; INFILTRATION; INTRACAUDAL; PERINEURAL ONCE
Status: COMPLETED | OUTPATIENT
Start: 2017-06-28 | End: 2017-06-28

## 2017-06-28 RX ADMIN — HEPARIN SODIUM (PORCINE) LOCK FLUSH IV SOLN 100 UNIT/ML 100 UNITS: 100 SOLUTION at 09:06

## 2017-06-28 RX ADMIN — PROPOFOL 100 MCG/KG/MIN: 10 INJECTION, EMULSION INTRAVENOUS at 09:06

## 2017-06-28 RX ADMIN — BUPIVACAINE HYDROCHLORIDE 9 ML: 5 INJECTION, SOLUTION EPIDURAL; INTRACAUDAL; PERINEURAL at 10:06

## 2017-06-28 RX ADMIN — PROPOFOL 20 MG: 10 INJECTION, EMULSION INTRAVENOUS at 09:06

## 2017-06-28 RX ADMIN — LIDOCAINE HYDROCHLORIDE 50 MG: 20 INJECTION, SOLUTION INTRAVENOUS at 09:06

## 2017-06-28 RX ADMIN — PROPOFOL 30 MG: 10 INJECTION, EMULSION INTRAVENOUS at 09:06

## 2017-06-28 RX ADMIN — PROPOFOL 50 MG: 10 INJECTION, EMULSION INTRAVENOUS at 09:06

## 2017-06-28 RX ADMIN — SODIUM CHLORIDE: 0.9 INJECTION, SOLUTION INTRAVENOUS at 08:06

## 2017-06-28 RX ADMIN — LIDOCAINE HYDROCHLORIDE 10 MG: 10 INJECTION, SOLUTION EPIDURAL; INFILTRATION; INTRACAUDAL; PERINEURAL at 08:06

## 2017-06-28 RX ADMIN — Medication 2 G: at 09:06

## 2017-06-28 NOTE — INTERVAL H&P NOTE
The patient has been examined and the H&P has been reviewed:    I concur with the findings and no changes have occurred since H&P was written.    Anesthesia/Surgery risks, benefits and alternative options discussed and understood by patient/family.          Active Hospital Problems    Diagnosis  POA    Mesothelioma [C45.9]  Yes      Resolved Hospital Problems    Diagnosis Date Resolved POA   No resolved problems to display.

## 2017-06-28 NOTE — ANESTHESIA RELEASE NOTE
"Anesthesia Release from PACU Note    Patient: Clint Brown    Procedure(s) Performed: Procedure(s) (LRB):  INSERTION-PORT (Left)    Anesthesia type: general    Post pain: Adequate analgesia    Post assessment: no apparent anesthetic complications    Last Vitals:   Visit Vitals  BP (!) 103/50   Pulse 79   Temp 36.8 °C (98.2 °F) (Skin)   Resp 20   Ht 5' 10" (1.778 m)   Wt 79.4 kg (175 lb)   SpO2 95%   BMI 25.11 kg/m²       Post vital signs: stable    Level of consciousness: awake    Nausea/Vomiting: no nausea/no vomiting    Complications: none    Airway Patency: patent    Respiratory: unassisted    Cardiovascular: stable and blood pressure at baseline    Hydration: euvolemic  "

## 2017-06-28 NOTE — TRANSFER OF CARE
"Anesthesia Transfer of Care Note    Patient: Clint Brown    Procedure(s) Performed: Procedure(s) (LRB):  INSERTION-PORT (Left)    Patient location: [facility]    Anesthesia Type: general    Transport from OR: Transported from OR on room air with adequate spontaneous ventilation    Post pain: adequate analgesia    Post assessment: no apparent anesthetic complications    Post vital signs: stable    Level of consciousness: sedated    Nausea/Vomiting: no nausea/vomiting    Complications: none    Transfer of care protocol was followed      Last vitals:   Visit Vitals  BP 92/75   Pulse 85   Temp 36.8 °C (98.2 °F) (Skin)   Resp 20   Ht 5' 10" (1.778 m)   Wt 79.4 kg (175 lb)   SpO2 100%   BMI 25.11 kg/m²     "

## 2017-06-28 NOTE — DISCHARGE INSTRUCTIONS
Vascular Access Port Implantation   Port implantation is surgery to place (implant) a port under the skin. For vascular access, it is placed into a vein. The port allows medicines or nutrition to be sent right into your bloodstream. Blood can also be taken or given through the port. During the procedure, a long, thin tube called a catheter is threaded into one of your large veins. The tube is then attached to the port. This usually sits under the skin of your chest and causes a small bump. To use the port, a special needle is passed through your skin and into the port. The needle can stay in your skin for up to 7 days, if needed. A port can stay in place for weeks or months or longer.    Why is a vascular access port needed?  A vascular access port may allow healthcare providers to give you:  · Chemotherapy or other cancer-fighting drugs  · IV treatments, such as antibiotics or nutrition  · Hemodialysis (for kidney failure)  The port may also be used to draw blood.  Before the procedure  Follow any instructions you are given on how to prepare.  Tell your provider about any medicines you are taking. This includes:  · All prescription medicines  · Over-the-counter medicines such as aspirin or ibuprofen  · Herbs, vitamins, and other supplements  Also be sure your provider knows:  · If you are pregnant or think you may be pregnant  · If you are allergic to any medicines or substances, especially local anesthetics or iodine  · Your full medical history, including why you will need the port  · If you plan on doing any contact sports  During the procedure  · Before the procedure, an IV may be put into a vein in your arm or hand. This gives you fluids and medicines. You may be given medicine through the IV to help you relax during the procedure. This is called sedation. But some surgeons place ports using general anesthesia.  · The chest is used most often for the port. In some cases, your belly (abdomen) or arm will be  used instead.  · The skin over the insertion area is numbed with local anesthetic.  · Ultrasound or X-rays are used to help the healthcare provider guide the catheter into the proper location during the procedure.  · A cut (incision) is made in the skin where the port will be placed. A small pocket for the port is formed under the skin.  · A second small incision is made in the skin near the first incision. A tunnel under the skin is created. The catheter is put through the tunnel and into the blood vessel.  · The skin is closed over the port. It is held shut with stitches (sutures) or surgical glue or tape. The second small incision is also closed.  · A chest X-ray may be done to make sure the port is placed properly.  After the procedure  You may be taken to a recovery room where youll recover from the sedation. Nurses will check on you as you rest. If you have pain, nurses can give you medicine. If you are not staying in the hospital overnight, you will be sent home a few hours after the procedure is done. A healthcare provider will tell you when you can go home. An adult family member or friend will need to drive you home.  Recovering at home  · Take pain medicine as directed by your healthcare provider.  · Take it easy for 24 hours after the procedure. Avoid physical activity and heavy lifting until your healthcare provider says its OK.  · Keep the port clean and dry. Ask when you can shower again. You will need to keep the port dry by covering it when you shower.  · Care for the insertion site as you are directed.  · Dont swim, bathe, or do other activities that cause water to cover the insertion site.  · To keep the port from getting blocked with blood clots, flush it as often as directed. You should be shown the proper way to flush the port before you go home. It is important to follow these directions.     Risks and possible complications of implantation  · Bleeding  · Infection of the insertion  site  · Damage to a blood vessel  · Nerve injury or irritation  · Collapsed lung (for chest port placements)  · Skin breakdown over the port  Risks and possible complications of having a port  · Blocked  port or catheter  · Leakage or breakage of the port or catheter  · The port moves out of position  · Blood clot  · Skin or bloodstream infection  · Skin breakdown over the port      When to seek medical care  Call your healthcare provider right away if you have any of the following:  · A fever of 100.4°F (38.0°C) or higher  · You can't access or use the port properly  · You can't flush the port or get a blood return  · The skin near the port is red, warm, swollen, or broken  · You have shoulder pain on the side where the port is located  · You feel a heart flutter or racing heart   · Swollen arm, if the port is placed in your arm   Date Last Reviewed: 7/1/2016  © 4631-5793 [employer]. [address] [place] [address], [place] [address]. All rights reserved. This information is not intended as a substitute for professional medical care. Always follow your healthcare professional's instructions.

## 2017-06-28 NOTE — OP NOTE
Date of Procedure: 6/28/2017    Pre-operative Diagnosis: Biphasic Mesothelioma    Post-operative Diagnosis: Same    Procedure(s): Insertion of Left Subclavian Vein 8-[place] PowerPort Using Fluoroscopic Guidance    Surgeon: Evans Armas MD    Assistant(s): Patti Miller MD    Anesthesia: Local/MAC    Findings: Normal Anatomy.  Tip of tubing in SVC    Estimated Blood Loss: Minimal    Specimen(s): None    Complications: None    Indications for Procedure: 70 yo male with advanced stage Malignant Pleural Mesothelioma, Biphasic Variant.  He is not a candidate for surgical management.  It was decided to proceed with port-a-cath placement for systemic chemotherapy.  Risks, benefits and possible outcomes were discussed in detail with the patient, and he and his family were given the opportunity to ask questions and have those questions answered to their satisfaction.  he desires to proceed and signed consent.    Procedure in Detail: The patient was taken to the operating room and placed in the supine position on the OR table. Intravenous sedation and analgesia were administered. Pre-operative IV antibiotics were administered. Left upper chest and neck were prepped and draped in standard sterile fashion. Time-out was performed. Local anesthesia was infiltrated into the subcutaneous tissue in the area of the anticipated access point and subcutaneous pocket. Using anatomic landmarks, access to the left subclavian vein was achieved using a large bore needle. Dark, non-pulsatile blood was aspirated. Guidewire was passed and correct placement was confirmed with fluoroscopy with the wire traversing the diaphragm. An incision was made in the left upper chest and a subcutaneous pocket was created. Port tubing was cut to the appropriate length and connected to the port hub. The tubing was tunneled subcutaneously. Peel-away dilator sheath was then passed over the wire under fluoroscopic guidance and wire was removed. Tubing was  passed through the peel away sheath. Placement was confirmed using fluoroscopy and showed the tip of the catheter in the SVC. Peel-away sheath was removed. The port was placed in the subcutaneous pocket and secured with absorbable suture. The port was aspirated, then flushed with normal saline followed by heparin flush. Incisions were closed in layers. Steri-strips and sterile dressings were applied. All sponge, needle and instrument counts were correct at the end of the case. The patient tolerated the procedure well. There were no immediate complications.     Disposition: PACU in stable condition

## 2017-06-28 NOTE — ANESTHESIA POSTPROCEDURE EVALUATION
"Anesthesia Post Evaluation    Patient: Clint Brown    Procedure(s) Performed: Procedure(s) (LRB):  INSERTION-PORT (Left)    Final Anesthesia Type: general  Patient location during evaluation: PACU  Patient participation: Yes- Able to Participate  Level of consciousness: awake and alert  Pain management: adequate  Airway patency: patent  PONV status at discharge: No PONV  Anesthetic complications: no      Cardiovascular status: blood pressure returned to baseline  Respiratory status: unassisted, spontaneous ventilation and room air  Hydration status: euvolemic  Follow-up not needed.        Visit Vitals  BP (!) 103/50   Pulse 79   Temp 36.8 °C (98.2 °F) (Skin)   Resp 20   Ht 5' 10" (1.778 m)   Wt 79.4 kg (175 lb)   SpO2 95%   BMI 25.11 kg/m²       Pain/Bernice Score: Pain Assessment Performed: Yes (6/28/2017 10:35 AM)  Presence of Pain: non-verbal indicators absent (6/28/2017 10:35 AM)  Bernice Score: 9 (6/28/2017 10:35 AM)      "

## 2017-06-28 NOTE — BRIEF OP NOTE
[facility]  Brief Operative Note     SUMMARY     Surgery Date: 6/28/2017     Surgeon(s) and Role:     * Evans Armas MD - Primary     * Patti Miller MD - Resident - Assisting    Pre-op Diagnosis:  Mediastinal lymphadenopathy [R59.0]    Post-op Diagnosis:  Post-Op Diagnosis Codes:     * Mediastinal lymphadenopathy [R59.0]    Procedure(s) (LRB):  INSERTION-PORT (Left)    Anesthesia: General    Description of the findings of the procedure:  left subclavian port    Findings/Key Components: left subclavian port placed and in good position, confirmed in the SVC under fluoroscopic guidance    Estimated Blood Loss: * No values recorded between 6/28/2017  9:46 AM and 6/28/2017 10:34 AM *         Specimens:   Specimen (12h ago through future)    None          Discharge Note    SUMMARY     Admit Date: 6/28/2017    Discharge Date and Time:  06/28/2017 11:01 AM    Hospital Course (synopsis of major diagnoses, care, treatment, and services provided during the course of the hospital stay): Patient admitted for outpatient procedure. Tolerated well, with no immediate complications. CXR with no evidence of pneumothorax. Discharged from PACU.      Final Diagnosis: Post-Op Diagnosis Codes:     * Mediastinal lymphadenopathy [R59.0]    Disposition: Home or Self Care    Medications:  Reconciled Home Medications:   Current Discharge Medication List      START taking these medications    Details   !! oxycodone-acetaminophen (PERCOCET) 5-325 mg per tablet Take 1 tablet by mouth every 4 (four) hours as needed.  Qty: 6 tablet, Refills: 0       !! - Potential duplicate medications found. Please discuss with provider.      CONTINUE these medications which have NOT CHANGED    Details   cyproheptadine (PERIACTIN) 4 mg tablet Take 1 tablet (4 mg total) by mouth 4 (four) times daily.  Qty: 120 tablet, Refills: 3    Associated Diagnoses: Anorexia      DOCUSATE SODIUM (COLACE ORAL) Take 1 capsule by mouth once  daily.       folic acid (FOLVITE) 1 MG tablet Take 1 tablet (1 mg total) by mouth once daily. Start today  Qty: 60 tablet, Refills: 6    Associated Diagnoses: Nausea      labetalol (NORMODYNE) 200 MG tablet TAKE 1 TABLET (200 MG TOTAL) BY MOUTH EVERY 12 (TWELVE) HOURS.  Qty: 60 tablet, Refills: 11      aspirin (ECOTRIN) 81 MG EC tablet Take 1 tablet (81 mg total) by mouth once daily.  Refills: 0      benzonatate (TESSALON) 100 MG capsule Take 100 mg by mouth 3 (three) times daily as needed for Cough.      dexamethasone (DECADRON) 6 MG tablet Take 1 tablet (6 mg total) by mouth 2 (two) times daily with meals. Start The day before and for 4 days after chemo. DO NOT TAKE ON THE DAY OF CHEMO  Qty: 60 tablet, Refills: 3    Associated Diagnoses: Nausea      morphine (MS CONTIN) 15 MG 12 hr tablet Take 1 tablet (15 mg total) by mouth 2 (two) times daily.  Qty: 60 tablet, Refills: 0    Associated Diagnoses: Neoplasm related pain      multivitamin (THERAGRAN) per tablet Take 1 tablet by mouth once daily.      omeprazole (PRILOSEC) 20 MG capsule Take 1 capsule (20 mg total) by mouth once daily.  Qty: 30 capsule, Refills: 11      ondansetron (ZOFRAN) 8 MG tablet Take 1 tablet (8 mg total) by mouth 4 (four) times daily as needed for Nausea.  Qty: 60 tablet, Refills: 2    Associated Diagnoses: Nausea      !! oxycodone-acetaminophen (PERCOCET) 5-325 mg per tablet Take 1 tablet by mouth every 4 (four) hours as needed for Pain.  Qty: 120 tablet, Refills: 0    Associated Diagnoses: Neoplasm related pain      promethazine (PHENERGAN) 25 MG tablet Take 1 tablet (25 mg total) by mouth every 6 (six) hours as needed for Nausea.  Qty: 60 tablet, Refills: 7    Associated Diagnoses: Nausea      ranitidine (ZANTAC) 150 MG tablet Take 1 tablet (150 mg total) by mouth 2 (two) times daily.  Qty: 60 tablet, Refills: 1    Associated Diagnoses: Gastroesophageal reflux disease, esophagitis presence not specified      rosuvastatin (CRESTOR) 20 MG  tablet Take 1 tablet (20 mg total) by mouth once daily.  Qty: 30 tablet, Refills: 11       !! - Potential duplicate medications found. Please discuss with provider.          Discharge Procedure Orders  Diet general     Activity as tolerated     Call MD for:  temperature >100.4     Call MD for:  difficulty breathing or increased cough     Call MD for:  redness, tenderness, or signs of infection (pain, swelling, redness, odor or green/yellow discharge around incision site)     Call MD for:  severe uncontrolled pain     Call MD for:  persistent nausea and vomiting or diarrhea     Remove dressing in 48 hours   Order Comments: Steristrips to stay in place x 2 weeks.     Shower on day dressing removed (No bath)       Follow-up Information     Evans Armas MD.    Specialty:  Cardiothoracic Surgery  Why:  As needed  Contact information:  [place]  [facility] [address] [phone]

## 2017-06-29 ENCOUNTER — TELEPHONE (OUTPATIENT)
Dept: CARDIOLOGY | Facility: CLINIC | Age: 72
End: 2017-06-29

## 2017-06-29 ENCOUNTER — TELEPHONE (OUTPATIENT)
Dept: CARDIOTHORACIC SURGERY | Facility: CLINIC | Age: 72
End: 2017-06-29

## 2017-06-29 NOTE — ANESTHESIA PREPROCEDURE EVALUATION
06/29/2017  Clint Brown is a 71 y.o., male.    Pre-op Assessment    I have reviewed the Patient Summary Reports.      I have reviewed the Medications.     Review of Systems  Anesthesia Hx:  History of prior surgery of interest to airway management or planning:  Denies Personal Hx of Anesthesia complications.   Social:  Non-Smoker, No Alcohol Use    Hematology/Oncology:  Hematology Normal      Oncology: mesothelioma.   EENT/Dental:EENT/Dental Normal   Cardiovascular:   Exercise tolerance: good Hypertension, well controlled Past MI (remote, asymp) CAD (good function on recent TTE, excellent METs) asymptomatic  Open type A dissection repair   Pulmonary:   COPD, mild Recurrent L pleural effusion, last pleurex drain two days ago. On home O2 at night PRN, mild SOB   Renal/:   Chronic Renal Disease, CRI    Hepatic/GI:  Hepatic/GI Normal    Musculoskeletal:  Musculoskeletal Normal    Neurological:  Neurology Normal    Dermatological:  Skin Normal    Psych:  Psychiatric Normal           Physical Exam  General:  Well nourished    Airway/Jaw/Neck:  Airway Findings: Mouth Opening: Normal Tongue: Normal  General Airway Assessment: Adult, Average  Mallampati: III  TM Distance: Normal, at least 6 cm     Eyes/Ears/Nose:  EYES/EARS/NOSE FINDINGS: Normal   Dental:  Dental Findings: In tact   Chest/Lungs:  Chest/Lungs Findings: Normal Respiratory Rate, Decreased Breathe Sounds Left, Rhonchi     Heart/Vascular:  Heart Findings: Rate: Normal  Rhythm: Regular Rhythm  Sounds: Normal  Heart murmur: negative       Mental Status:  Mental Status Findings:  Cooperative, Alert and Oriented         Anesthesia Plan  Type of Anesthesia, risks & benefits discussed:  Anesthesia Type:  general  Patient's Preference:   Intra-op Monitoring Plan:   Intra-op Monitoring Plan Comments:   Post Op Pain Control Plan:   Post Op Pain Control Plan  Comments:   Induction:   IV  Beta Blocker:  Patient is not currently on a Beta-Blocker (No further documentation required).       Informed Consent: Patient understands risks and agrees with Anesthesia plan.  Questions answered. Anesthesia consent signed with patient.  ASA Score: 3     Day of Surgery Review of History & Physical:    H&P update referred to the surgeon.     Anesthesia Plan Notes:   71M asymp CAD with good function/METs, HTN, CRI, mesothelioma for port placement under GA NC TIVA        Ready For Surgery From Anesthesia Perspective.

## 2017-06-29 NOTE — TELEPHONE ENCOUNTER
Reviewed lipid profile drawn by [facility] on 5/20/17. within normal limits. No change in meds.

## 2017-06-29 NOTE — TELEPHONE ENCOUNTER
Called to check on the pt after port placement, he is recovering well this morning and reports only soreness. He agrees to remove the dressing tomorrow prior to taking a shower and will leave steri strips in place. Advised him that the infusion nurse can remove any remaining steri strips next week prior to accessing his port. He is agreeable and will call with any needs.  Message sent to Dr. Nichols's nurse confirming port placement and to notify us with any port issues/concerns.

## 2017-07-04 LAB — FUNGUS SPEC CULT: NORMAL

## 2017-07-05 ENCOUNTER — TELEPHONE (OUTPATIENT)
Dept: HEMATOLOGY/ONCOLOGY | Facility: CLINIC | Age: 72
End: 2017-07-05

## 2017-07-05 NOTE — TELEPHONE ENCOUNTER
spoke with wife in regards to pt needing to reschedule appointments due to not feeling well, wife has confirmed new appointments on 07/19/17.

## 2017-07-11 ENCOUNTER — HOSPITAL ENCOUNTER (INPATIENT)
Facility: HOSPITAL | Age: 72
LOS: 2 days | Discharge: HOME | DRG: 309 | End: 2017-07-13
Attending: EMERGENCY MEDICINE | Admitting: INTERNAL MEDICINE
Payer: MEDICARE

## 2017-07-11 DIAGNOSIS — C45.9 MESOTHELIOMA: ICD-10-CM

## 2017-07-11 DIAGNOSIS — Z86.79 HISTORY OF AORTIC DISSECTION: ICD-10-CM

## 2017-07-11 DIAGNOSIS — I10 ESSENTIAL HYPERTENSION: ICD-10-CM

## 2017-07-11 DIAGNOSIS — I37.1 NONRHEUMATIC PULMONARY VALVE INSUFFICIENCY: ICD-10-CM

## 2017-07-11 DIAGNOSIS — I48.3 TYPICAL ATRIAL FLUTTER: Primary | ICD-10-CM

## 2017-07-11 DIAGNOSIS — R00.0 TACHYCARDIA: ICD-10-CM

## 2017-07-11 LAB
ALBUMIN SERPL BCP-MCNC: 2 G/DL
ALP SERPL-CCNC: 168 U/L
ALT SERPL W/O P-5'-P-CCNC: 38 U/L
ANION GAP SERPL CALC-SCNC: 9 MMOL/L
AST SERPL-CCNC: 27 U/L
BASOPHILS # BLD AUTO: 0.02 K/UL
BASOPHILS NFR BLD: 0.2 %
BILIRUB SERPL-MCNC: 0.7 MG/DL
BNP SERPL-MCNC: 490 PG/ML
BUN SERPL-MCNC: 20 MG/DL
CALCIUM SERPL-MCNC: 9.2 MG/DL
CHLORIDE SERPL-SCNC: 101 MMOL/L
CO2 SERPL-SCNC: 27 MMOL/L
CREAT SERPL-MCNC: 1.2 MG/DL
DIFFERENTIAL METHOD: ABNORMAL
EOSINOPHIL # BLD AUTO: 0.1 K/UL
EOSINOPHIL NFR BLD: 0.9 %
ERYTHROCYTE [DISTWIDTH] IN BLOOD BY AUTOMATED COUNT: 14.6 %
EST. GFR  (AFRICAN AMERICAN): >60 ML/MIN/1.73 M^2
EST. GFR  (NON AFRICAN AMERICAN): >60 ML/MIN/1.73 M^2
GLUCOSE SERPL-MCNC: 113 MG/DL
HCT VFR BLD AUTO: 42.1 %
HGB BLD-MCNC: 13.5 G/DL
INR PPP: 1.3
INR PPP: 3.3
LYMPHOCYTES # BLD AUTO: 1.1 K/UL
LYMPHOCYTES NFR BLD: 11.1 %
MCH RBC QN AUTO: 28.2 PG
MCHC RBC AUTO-ENTMCNC: 32.1 %
MCV RBC AUTO: 88 FL
MONOCYTES # BLD AUTO: 1.6 K/UL
MONOCYTES NFR BLD: 16.2 %
NEUTROPHILS # BLD AUTO: 6.9 K/UL
NEUTROPHILS NFR BLD: 71.2 %
PLATELET # BLD AUTO: 316 K/UL
PMV BLD AUTO: 10 FL
POTASSIUM SERPL-SCNC: 4.7 MMOL/L
PROT SERPL-MCNC: 6.8 G/DL
PROTHROMBIN TIME: 13.5 SEC
PROTHROMBIN TIME: 32.8 SEC
RBC # BLD AUTO: 4.79 M/UL
SODIUM SERPL-SCNC: 137 MMOL/L
TROPONIN I SERPL DL<=0.01 NG/ML-MCNC: 0.01 NG/ML
WBC # BLD AUTO: 9.67 K/UL

## 2017-07-11 PROCEDURE — 99291 CRITICAL CARE FIRST HOUR: CPT

## 2017-07-11 PROCEDURE — 25000003 PHARM REV CODE 250: Performed by: INTERNAL MEDICINE

## 2017-07-11 PROCEDURE — 25000003 PHARM REV CODE 250: Performed by: EMERGENCY MEDICINE

## 2017-07-11 PROCEDURE — 96365 THER/PROPH/DIAG IV INF INIT: CPT

## 2017-07-11 PROCEDURE — 93005 ELECTROCARDIOGRAM TRACING: CPT

## 2017-07-11 PROCEDURE — 20000000 HC ICU ROOM

## 2017-07-11 PROCEDURE — 96375 TX/PRO/DX INJ NEW DRUG ADDON: CPT

## 2017-07-11 PROCEDURE — 80053 COMPREHEN METABOLIC PANEL: CPT

## 2017-07-11 PROCEDURE — 96367 TX/PROPH/DG ADDL SEQ IV INF: CPT

## 2017-07-11 PROCEDURE — 85610 PROTHROMBIN TIME: CPT | Mod: 91

## 2017-07-11 PROCEDURE — 84484 ASSAY OF TROPONIN QUANT: CPT

## 2017-07-11 PROCEDURE — 85025 COMPLETE CBC W/AUTO DIFF WBC: CPT

## 2017-07-11 PROCEDURE — 85610 PROTHROMBIN TIME: CPT

## 2017-07-11 PROCEDURE — 93010 ELECTROCARDIOGRAM REPORT: CPT | Mod: ,,, | Performed by: INTERNAL MEDICINE

## 2017-07-11 PROCEDURE — 96366 THER/PROPH/DIAG IV INF ADDON: CPT

## 2017-07-11 PROCEDURE — 83880 ASSAY OF NATRIURETIC PEPTIDE: CPT

## 2017-07-11 PROCEDURE — 99291 CRITICAL CARE FIRST HOUR: CPT | Mod: ,,, | Performed by: INTERNAL MEDICINE

## 2017-07-11 RX ORDER — METOPROLOL TARTRATE 1 MG/ML
5 INJECTION, SOLUTION INTRAVENOUS
Status: COMPLETED | OUTPATIENT
Start: 2017-07-11 | End: 2017-07-11

## 2017-07-11 RX ORDER — PANTOPRAZOLE SODIUM 40 MG/1
40 TABLET, DELAYED RELEASE ORAL DAILY
Status: DISCONTINUED | OUTPATIENT
Start: 2017-07-12 | End: 2017-07-13 | Stop reason: HOSPADM

## 2017-07-11 RX ORDER — ESMOLOL HYDROCHLORIDE 20 MG/ML
100 INJECTION, SOLUTION INTRAVENOUS CONTINUOUS
Status: DISCONTINUED | OUTPATIENT
Start: 2017-07-11 | End: 2017-07-12

## 2017-07-11 RX ORDER — MORPHINE SULFATE 15 MG/1
15 TABLET, FILM COATED, EXTENDED RELEASE ORAL 2 TIMES DAILY
Status: DISCONTINUED | OUTPATIENT
Start: 2017-07-12 | End: 2017-07-13 | Stop reason: HOSPADM

## 2017-07-11 RX ORDER — METOPROLOL TARTRATE 1 MG/ML
INJECTION, SOLUTION INTRAVENOUS
Status: DISPENSED
Start: 2017-07-11 | End: 2017-07-12

## 2017-07-11 RX ORDER — GLUCAGON 1 MG
1 KIT INJECTION
Status: DISCONTINUED | OUTPATIENT
Start: 2017-07-11 | End: 2017-07-13 | Stop reason: HOSPADM

## 2017-07-11 RX ORDER — IBUPROFEN 200 MG
16 TABLET ORAL
Status: DISCONTINUED | OUTPATIENT
Start: 2017-07-11 | End: 2017-07-13 | Stop reason: HOSPADM

## 2017-07-11 RX ORDER — FUROSEMIDE 10 MG/ML
20 INJECTION INTRAMUSCULAR; INTRAVENOUS ONCE
Status: DISCONTINUED | OUTPATIENT
Start: 2017-07-11 | End: 2017-07-11

## 2017-07-11 RX ORDER — DILTIAZEM HYDROCHLORIDE 5 MG/ML
10 INJECTION INTRAVENOUS
Status: COMPLETED | OUTPATIENT
Start: 2017-07-11 | End: 2017-07-11

## 2017-07-11 RX ORDER — ESMOLOL HYDROCHLORIDE 10 MG/ML
40 INJECTION INTRAVENOUS ONCE
Status: COMPLETED | OUTPATIENT
Start: 2017-07-11 | End: 2017-07-11

## 2017-07-11 RX ORDER — DILTIAZEM HCL 1 MG/ML
5 INJECTION, SOLUTION INTRAVENOUS
Status: COMPLETED | OUTPATIENT
Start: 2017-07-11 | End: 2017-07-11

## 2017-07-11 RX ORDER — OXYCODONE AND ACETAMINOPHEN 5; 325 MG/1; MG/1
1 TABLET ORAL EVERY 4 HOURS PRN
Status: DISCONTINUED | OUTPATIENT
Start: 2017-07-11 | End: 2017-07-13 | Stop reason: HOSPADM

## 2017-07-11 RX ORDER — ONDANSETRON 4 MG/1
8 TABLET, FILM COATED ORAL 4 TIMES DAILY PRN
Status: DISCONTINUED | OUTPATIENT
Start: 2017-07-11 | End: 2017-07-13 | Stop reason: HOSPADM

## 2017-07-11 RX ORDER — IBUPROFEN 200 MG
24 TABLET ORAL
Status: DISCONTINUED | OUTPATIENT
Start: 2017-07-11 | End: 2017-07-13 | Stop reason: HOSPADM

## 2017-07-11 RX ORDER — FAMOTIDINE 20 MG/1
20 TABLET, FILM COATED ORAL 2 TIMES DAILY
Status: DISCONTINUED | OUTPATIENT
Start: 2017-07-12 | End: 2017-07-13 | Stop reason: HOSPADM

## 2017-07-11 RX ORDER — ROSUVASTATIN CALCIUM 20 MG/1
20 TABLET, COATED ORAL NIGHTLY
Status: DISCONTINUED | OUTPATIENT
Start: 2017-07-11 | End: 2017-07-13 | Stop reason: HOSPADM

## 2017-07-11 RX ADMIN — ROSUVASTATIN CALCIUM 20 MG: 5 TABLET ORAL at 11:07

## 2017-07-11 RX ADMIN — ESMOLOL HYDROCHLORIDE 100 MCG/KG/MIN: 20 INJECTION INTRAVENOUS at 09:07

## 2017-07-11 RX ADMIN — ESMOLOL HYDROCHLORIDE 40 MG: 10 INJECTION, SOLUTION INTRAVENOUS at 09:07

## 2017-07-11 RX ADMIN — SODIUM CHLORIDE 500 ML: 0.9 INJECTION, SOLUTION INTRAVENOUS at 07:07

## 2017-07-11 RX ADMIN — DILTIAZEM HYDROCHLORIDE 5 MG/HR: 5 INJECTION INTRAVENOUS at 08:07

## 2017-07-11 RX ADMIN — ESMOLOL HYDROCHLORIDE 100 MCG/KG/MIN: 20 INJECTION INTRAVENOUS at 11:07

## 2017-07-11 RX ADMIN — METOPROLOL TARTRATE 5 MG: 5 INJECTION INTRAVENOUS at 06:07

## 2017-07-11 RX ADMIN — DILTIAZEM HYDROCHLORIDE 10 MG: 5 INJECTION INTRAVENOUS at 07:07

## 2017-07-11 NOTE — ED TRIAGE NOTES
Clint Brown, a 71 y.o. male presents to the ED seen by [facility] today and he was tachy at 155. Pt has pleurex catheter where he is drained every other day.  Pt c/o fatigue, SOB, and some chest discomfort at this time.  Pt has histroy of AAA and mesotheliom. Pt denies abdominal pain.     Chief Complaint   Patient presents with    Tachycardia     hx ruptured aorta, mesothelioma     Review of patient's allergies indicates:   Allergen Reactions    Iodine and iodide containing products Nausea And Vomiting    Lipitor [atorvastatin] Other (See Comments)     Leg cramps    Cephalexin Nausea And Vomiting     Past Medical History:   Diagnosis Date    Atrial flutter     cardioversion 4/12/16    Chronic kidney disease     Coronary artery disease     [facility] 4/1/16: 50% proximal RAMUS    Hypertension     Kidney stones     Old 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/20/2016    3/16    Thoracic aortic dissection     Type A dissection s/p repair 4/13/16

## 2017-07-11 NOTE — ED PROVIDER NOTES
Encounter Date: 7/11/2017    SCRIBE #1 NOTE: I, Tanvir Pizarro , am scribing for, and in the presence of,  Dr. Machuca . I have scribed the entire note.       History     Chief Complaint   Patient presents with    Tachycardia     hx ruptured aorta, mesothelioma     Time patient was seen by the provider: 6:09 PM    The patient is a 71 y.o. male with hx of:  HTN, old myocardial infarction, atrial flutter, thoracic aortic dissection, CAD,CKD, and mesothelioma that presents to the ED with a complaint of tachycardia. Pt states he was at home watching TV when his home health nurse came over and took his vital signs.He was told that his heart rate was elevated and that he needed to come to the ED.Pt endorses double vision, and feeling lightheaded. Of note: Pt is on 2 liters of oxygen at home and he has been taking long acting morphine twice daily for pain.              Review of patient's allergies indicates:   Allergen Reactions    Iodine and iodide containing products Nausea And Vomiting    Lipitor [atorvastatin] Other (See Comments)     Leg cramps    Cephalexin Nausea And Vomiting     Past Medical History:   Diagnosis Date    Atrial flutter     cardioversion 4/12/16    Chronic kidney disease     Coronary artery disease     [facility] 4/1/16: 50% proximal RAMUS    Hypertension     Kidney stones     Old 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/20/2016    3/16    Thoracic aortic dissection     Type A dissection s/p repair 4/13/16     Past Surgical History:   Procedure Laterality Date    APPENDECTOMY      CARDIAC CATHETERIZATION      KIDNEY STONE SURGERY      4 surgeries     History reviewed. No pertinent family history.  Social History   Substance Use Topics    Smoking status: Current Every Day Smoker     Packs/day: 0.25     Years: 55.00     Types: Cigarettes    Smokeless tobacco: Never Used      Comment: has Chantix at home    Alcohol use No     Review of Systems   Constitutional: Negative for fever.   HENT: Negative for sore  throat.    Eyes:        Pt endorses double vision    Respiratory: Negative for shortness of breath.    Cardiovascular: Negative for chest pain.   Gastrointestinal: Negative for nausea.   Genitourinary: Negative for dysuria.   Musculoskeletal: Negative for back pain.   Skin: Negative for rash.   Neurological: Positive for light-headedness. Negative for weakness.   Hematological: Does not bruise/bleed easily.       Physical Exam     Initial Vitals [07/11/17 1752]   BP Pulse Resp Temp SpO2   137/72 (!) 155 20 98.4 °F (36.9 °C) --      MAP       93.67         Physical Exam    Nursing note and vitals reviewed.  Constitutional: He appears well-developed and well-nourished. He is not diaphoretic.   HENT:   Head: Normocephalic and atraumatic.   Eyes: EOM are normal. Pupils are equal, round, and reactive to light.   Neck: Normal range of motion. Neck supple.   Cardiovascular:   Tachycardia, pt has radial pulses.     Pulmonary/Chest:   Pt has diminished breath sounds at left base    Abdominal: Soft. There is no tenderness.   Musculoskeletal: Normal range of motion. He exhibits no edema.   Neurological: He is alert and oriented to person, place, and time. He has normal strength.   Psychiatric: He has a normal mood and affect. His behavior is normal. Judgment and thought content normal.         ED Course   Procedures  Labs Reviewed   CBC W/ AUTO DIFFERENTIAL - Abnormal; Notable for the following:        Result Value    Hemoglobin 13.5 (*)     RDW 14.6 (*)     Mono # 1.6 (*)     Lymph% 11.1 (*)     Mono% 16.2 (*)     All other components within normal limits   B-TYPE NATRIURETIC PEPTIDE - Abnormal; Notable for the following:      (*)     All other components within normal limits   PROTIME-INR - Abnormal; Notable for the following:     Prothrombin Time 32.8 (*)     INR 3.3 (*)     All other components within normal limits   COMPREHENSIVE METABOLIC PANEL - Abnormal; Notable for the following:     Glucose 113 (*)     Albumin  2.0 (*)     Alkaline Phosphatase 168 (*)     All other components within normal limits   TROPONIN I     EKG Readings: (Independently Interpreted)   Narrow complex tachycardia rate of 156. I suspect A-flutter.           Medical Decision Making:   History:   Old Medical Records: I decided to obtain old medical records.  Initial Assessment:   71 y.o. male presents to the ED with atrial flutter. Pt is a little lightheaded, but does not have cardiac symptoms with it. I will give a low dose of lopressor in attempt to control rate. In review of his old records, pt did have atrial flutter in April 2016.   Independently Interpreted Test(s):   I have ordered and independently interpreted EKG Reading(s) - see prior notes  Clinical Tests:   Lab Tests: Ordered and Reviewed  Radiological Study: Ordered and Reviewed  Medical Tests: Ordered and Reviewed  ED Management:  Review of labs show a normal white count and hematocrit, his INR is 3.3, and his chemistry's are unremarkable. Pt's cardiac enzymes TROP is normal, BNP is elevated. CXR shows a left lower pleural effusion. Clinically, I doubt he has PNA based on symptoms.              Scribe Attestation:   Scribe #1: I performed the above scribed service and the documentation accurately describes the services I performed. I attest to the accuracy of the note.    Attending Attestation:         Attending Critical Care:   Critical Care Times:   Direct Patient Care (initial evaluation, reassessments, and time considering the case)................................................................22 minutes.   Additional History from reviewing old medical records or taking additional history from the family, EMS, PCP, etc.......................4 minutes.   Ordering, Reviewing, and Interpreting Diagnostic Studies...............................................................................................................4 minutes.    Documentation..................................................................................................................................................................................4 minutes.   Consultation with other Physicians. .................................................................................................................................................4 minutes.   ==============================================================  · Total Critical Care Time - exclusive of procedural time: 38 minutes.  ==============================================================    Physician Attestation for Scribe:  Physician Attestation Statement for Scribe #1: I, Dr. Machuca, reviewed documentation, as scribed by Tanvir Pizarro in my presence, and it is both accurate and complete.         Attending ED Notes:   Pt had a dip in his blood pressure after the lopressor. His heart remained at 150. Pt tolerated the mild hypotension well and his asymptomatic blood pressure resolved after 500 cc of saline. Will try cortisone drip for rate control.           ED Course     Clinical Impression:   The primary encounter diagnosis was Typical atrial flutter. Diagnoses of Tachycardia, Essential hypertension, and Mesothelioma were also pertinent to this visit.    Disposition:   Disposition: Admitted  Condition: Serious                        Brett Machuca MD  07/11/17 3718

## 2017-07-12 ENCOUNTER — ANESTHESIA (OUTPATIENT)
Dept: MEDSURG UNIT | Facility: HOSPITAL | Age: 72
DRG: 309 | End: 2017-07-12
Payer: MEDICARE

## 2017-07-12 ENCOUNTER — ANESTHESIA EVENT (OUTPATIENT)
Dept: MEDSURG UNIT | Facility: HOSPITAL | Age: 72
DRG: 309 | End: 2017-07-12
Payer: MEDICARE

## 2017-07-12 ENCOUNTER — SURGERY (OUTPATIENT)
Age: 72
End: 2017-07-12

## 2017-07-12 PROBLEM — I48.92 ATRIAL FLUTTER: Status: ACTIVE | Noted: 2017-07-12

## 2017-07-12 LAB
ALBUMIN SERPL BCP-MCNC: 1.9 G/DL
ALP SERPL-CCNC: 151 U/L
ALT SERPL W/O P-5'-P-CCNC: 35 U/L
ANION GAP SERPL CALC-SCNC: 11 MMOL/L
APTT BLDCRRT: <21 SEC
AST SERPL-CCNC: 23 U/L
BASOPHILS # BLD AUTO: 0.03 K/UL
BASOPHILS NFR BLD: 0.3 %
BILIRUB SERPL-MCNC: 0.7 MG/DL
BUN SERPL-MCNC: 22 MG/DL
CALCIUM SERPL-MCNC: 9 MG/DL
CHLORIDE SERPL-SCNC: 105 MMOL/L
CO2 SERPL-SCNC: 21 MMOL/L
CREAT SERPL-MCNC: 1.1 MG/DL
DIFFERENTIAL METHOD: ABNORMAL
EOSINOPHIL # BLD AUTO: 0.1 K/UL
EOSINOPHIL NFR BLD: 0.8 %
ERYTHROCYTE [DISTWIDTH] IN BLOOD BY AUTOMATED COUNT: 14.6 %
EST. GFR  (AFRICAN AMERICAN): >60 ML/MIN/1.73 M^2
EST. GFR  (NON AFRICAN AMERICAN): >60 ML/MIN/1.73 M^2
ESTIMATED PA SYSTOLIC PRESSURE: 25
FACT X PPP CHRO-ACNC: 0.25 IU/ML
GLUCOSE SERPL-MCNC: 146 MG/DL
HCT VFR BLD AUTO: 35.5 %
HGB BLD-MCNC: 11.5 G/DL
INR PPP: 1.2
LYMPHOCYTES # BLD AUTO: 1.4 K/UL
LYMPHOCYTES NFR BLD: 13.5 %
MAGNESIUM SERPL-MCNC: 1.7 MG/DL
MCH RBC QN AUTO: 27.7 PG
MCHC RBC AUTO-ENTMCNC: 32.4 %
MCV RBC AUTO: 86 FL
MITRAL VALVE MOBILITY: NORMAL
MONOCYTES # BLD AUTO: 1.5 K/UL
MONOCYTES NFR BLD: 15 %
NEUTROPHILS # BLD AUTO: 7 K/UL
NEUTROPHILS NFR BLD: 69.5 %
PHOSPHATE SERPL-MCNC: 3.8 MG/DL
PLATELET # BLD AUTO: 241 K/UL
PMV BLD AUTO: 9.9 FL
POTASSIUM SERPL-SCNC: 4.2 MMOL/L
PROT SERPL-MCNC: 6.3 G/DL
PROTHROMBIN TIME: 12.9 SEC
RBC # BLD AUTO: 4.15 M/UL
RETIRED EF AND QEF - SEE NOTES: 65 (ref 55–65)
SODIUM SERPL-SCNC: 137 MMOL/L
TRICUSPID VALVE REGURGITATION: NORMAL
WBC # BLD AUTO: 10.03 K/UL

## 2017-07-12 PROCEDURE — 85730 THROMBOPLASTIN TIME PARTIAL: CPT

## 2017-07-12 PROCEDURE — 20600001 HC STEP DOWN PRIVATE ROOM

## 2017-07-12 PROCEDURE — 5A2204Z RESTORATION OF CARDIAC RHYTHM, SINGLE: ICD-10-PCS | Performed by: INTERNAL MEDICINE

## 2017-07-12 PROCEDURE — 25000003 PHARM REV CODE 250: Performed by: HOSPITALIST

## 2017-07-12 PROCEDURE — 20000000 HC ICU ROOM

## 2017-07-12 PROCEDURE — 25000003 PHARM REV CODE 250: Performed by: INTERNAL MEDICINE

## 2017-07-12 PROCEDURE — 25000003 PHARM REV CODE 250

## 2017-07-12 PROCEDURE — 99222 1ST HOSP IP/OBS MODERATE 55: CPT | Mod: 25,GW,, | Performed by: INTERNAL MEDICINE

## 2017-07-12 PROCEDURE — 37000009 HC ANESTHESIA EA ADD 15 MINS: Performed by: INTERNAL MEDICINE

## 2017-07-12 PROCEDURE — B246ZZ4 ULTRASONOGRAPHY OF RIGHT AND LEFT HEART, TRANSESOPHAGEAL: ICD-10-PCS | Performed by: INTERNAL MEDICINE

## 2017-07-12 PROCEDURE — 25000003 PHARM REV CODE 250: Performed by: STUDENT IN AN ORGANIZED HEALTH CARE EDUCATION/TRAINING PROGRAM

## 2017-07-12 PROCEDURE — 93312 ECHO TRANSESOPHAGEAL: CPT

## 2017-07-12 PROCEDURE — 93312 ECHO TRANSESOPHAGEAL: CPT | Mod: 26,,, | Performed by: INTERNAL MEDICINE

## 2017-07-12 PROCEDURE — D9220A PRA ANESTHESIA: Mod: ANES,,, | Performed by: ANESTHESIOLOGY

## 2017-07-12 PROCEDURE — 27100006 CARDIOVERSION (DCCV)

## 2017-07-12 PROCEDURE — 93010 ELECTROCARDIOGRAM REPORT: CPT | Mod: ,,, | Performed by: INTERNAL MEDICINE

## 2017-07-12 PROCEDURE — 92960 CARDIOVERSION ELECTRIC EXT: CPT | Mod: ,,, | Performed by: INTERNAL MEDICINE

## 2017-07-12 PROCEDURE — 93325 DOPPLER ECHO COLOR FLOW MAPG: CPT

## 2017-07-12 PROCEDURE — 93320 DOPPLER ECHO COMPLETE: CPT | Mod: 26,,, | Performed by: INTERNAL MEDICINE

## 2017-07-12 PROCEDURE — 85610 PROTHROMBIN TIME: CPT

## 2017-07-12 PROCEDURE — 85025 COMPLETE CBC W/AUTO DIFF WBC: CPT

## 2017-07-12 PROCEDURE — 83735 ASSAY OF MAGNESIUM: CPT

## 2017-07-12 PROCEDURE — 80053 COMPREHEN METABOLIC PANEL: CPT

## 2017-07-12 PROCEDURE — 84100 ASSAY OF PHOSPHORUS: CPT

## 2017-07-12 PROCEDURE — 25000003 PHARM REV CODE 250: Performed by: EMERGENCY MEDICINE

## 2017-07-12 PROCEDURE — 37000008 HC ANESTHESIA 1ST 15 MINUTES: Performed by: INTERNAL MEDICINE

## 2017-07-12 PROCEDURE — 85520 HEPARIN ASSAY: CPT

## 2017-07-12 PROCEDURE — D9220A PRA ANESTHESIA: Mod: CRNA,,, | Performed by: NURSE ANESTHETIST, CERTIFIED REGISTERED

## 2017-07-12 PROCEDURE — 25000003 PHARM REV CODE 250: Performed by: NURSE ANESTHETIST, CERTIFIED REGISTERED

## 2017-07-12 PROCEDURE — 63600175 PHARM REV CODE 636 W HCPCS: Performed by: NURSE ANESTHETIST, CERTIFIED REGISTERED

## 2017-07-12 PROCEDURE — 93306 TTE W/DOPPLER COMPLETE: CPT | Mod: 26,,, | Performed by: INTERNAL MEDICINE

## 2017-07-12 PROCEDURE — 93306 TTE W/DOPPLER COMPLETE: CPT

## 2017-07-12 RX ORDER — ETOMIDATE 2 MG/ML
INJECTION INTRAVENOUS
Status: DISCONTINUED | OUTPATIENT
Start: 2017-07-12 | End: 2017-07-12

## 2017-07-12 RX ORDER — PROPOFOL 10 MG/ML
VIAL (ML) INTRAVENOUS
Status: DISCONTINUED | OUTPATIENT
Start: 2017-07-12 | End: 2017-07-12

## 2017-07-12 RX ORDER — LIDOCAINE HCL/PF 100 MG/5ML
SYRINGE (ML) INTRAVENOUS
Status: DISCONTINUED | OUTPATIENT
Start: 2017-07-12 | End: 2017-07-12

## 2017-07-12 RX ORDER — SODIUM CHLORIDE 9 MG/ML
INJECTION, SOLUTION INTRAVENOUS CONTINUOUS PRN
Status: DISCONTINUED | OUTPATIENT
Start: 2017-07-12 | End: 2017-07-12

## 2017-07-12 RX ORDER — SODIUM CHLORIDE 0.9 % (FLUSH) 0.9 %
3 SYRINGE (ML) INJECTION
Status: DISCONTINUED | OUTPATIENT
Start: 2017-07-12 | End: 2017-07-13 | Stop reason: HOSPADM

## 2017-07-12 RX ORDER — PHENYLEPHRINE HYDROCHLORIDE 10 MG/ML
INJECTION INTRAVENOUS
Status: DISCONTINUED | OUTPATIENT
Start: 2017-07-12 | End: 2017-07-12

## 2017-07-12 RX ORDER — HEPARIN SODIUM,PORCINE/D5W 25000/250
19 INTRAVENOUS SOLUTION INTRAVENOUS CONTINUOUS
Status: DISCONTINUED | OUTPATIENT
Start: 2017-07-12 | End: 2017-07-12

## 2017-07-12 RX ORDER — MIDAZOLAM HYDROCHLORIDE 1 MG/ML
INJECTION, SOLUTION INTRAMUSCULAR; INTRAVENOUS
Status: DISCONTINUED | OUTPATIENT
Start: 2017-07-12 | End: 2017-07-12

## 2017-07-12 RX ADMIN — ETOMIDATE 6 MG: 2 INJECTION, SOLUTION INTRAVENOUS at 09:07

## 2017-07-12 RX ADMIN — SODIUM CHLORIDE: 0.9 INJECTION, SOLUTION INTRAVENOUS at 08:07

## 2017-07-12 RX ADMIN — ESMOLOL HYDROCHLORIDE 250 MCG/KG/MIN: 20 INJECTION INTRAVENOUS at 06:07

## 2017-07-12 RX ADMIN — PROPOFOL 30 MG: 10 INJECTION, EMULSION INTRAVENOUS at 09:07

## 2017-07-12 RX ADMIN — PANTOPRAZOLE SODIUM 40 MG: 40 TABLET, DELAYED RELEASE ORAL at 09:07

## 2017-07-12 RX ADMIN — ESMOLOL HYDROCHLORIDE 225 MCG/KG/MIN: 20 INJECTION INTRAVENOUS at 04:07

## 2017-07-12 RX ADMIN — FAMOTIDINE 20 MG: 20 TABLET, FILM COATED ORAL at 08:07

## 2017-07-12 RX ADMIN — MORPHINE SULFATE 15 MG: 15 TABLET, EXTENDED RELEASE ORAL at 09:07

## 2017-07-12 RX ADMIN — APIXABAN 5 MG: 5 TABLET, FILM COATED ORAL at 01:07

## 2017-07-12 RX ADMIN — PHENYLEPHRINE HYDROCHLORIDE 100 MCG: 10 INJECTION INTRAVENOUS at 09:07

## 2017-07-12 RX ADMIN — DRONEDARONE 400 MG: 400 TABLET, FILM COATED ORAL at 08:07

## 2017-07-12 RX ADMIN — ROSUVASTATIN CALCIUM 20 MG: 5 TABLET ORAL at 08:07

## 2017-07-12 RX ADMIN — APIXABAN 5 MG: 5 TABLET, FILM COATED ORAL at 08:07

## 2017-07-12 RX ADMIN — LIDOCAINE HYDROCHLORIDE 40 MG: 20 INJECTION, SOLUTION INTRAVENOUS at 09:07

## 2017-07-12 RX ADMIN — HEPARIN SODIUM AND DEXTROSE 17 UNITS/KG/HR: 10000; 5 INJECTION INTRAVENOUS at 02:07

## 2017-07-12 RX ADMIN — MORPHINE SULFATE 15 MG: 15 TABLET, EXTENDED RELEASE ORAL at 08:07

## 2017-07-12 RX ADMIN — ESMOLOL HYDROCHLORIDE 200 MCG/KG/MIN: 20 INJECTION INTRAVENOUS at 07:07

## 2017-07-12 RX ADMIN — MIDAZOLAM 0.5 MG: 1 INJECTION INTRAMUSCULAR; INTRAVENOUS at 08:07

## 2017-07-12 RX ADMIN — ESMOLOL HYDROCHLORIDE 150 MCG/KG/MIN: 20 INJECTION INTRAVENOUS at 02:07

## 2017-07-12 NOTE — H&P
Cardiology History & Physical  Attending Physician: Brett Machuca MD  Chief Complaint: Atrial flutter     HPI:   71 y.o. gentleman that presents today after a home health nurse was taking his vitals earlier today and noticed that he had an elevated HR in the 150s. He presented to the ER and was found to be in 2:1 typical AFL.     PMHx of aortic dissection s/p repair in April 2016, HTN, non-obstructive CAD, CKD II.    He has malignant mesothelioma for which he is about to start chemotherapy for, had VATS and PleurX catheter.  Underwent PADMINI/DCCV prior to aortic dissection repair in April 2016. Not placed on anti-coagulation at that time in anticipation for aortic dissection repair. Reports he has not been on anti-coagulation since that time. On 2L NC O2 at baseline. In the ER, he was given 5 mg of IVP Lopressor x 3 and Cardizem 10 mg IVP x 1 and started on Cardizem gtt without improvement in his HR. Required esmolol gtt c PO Lopressor in April 2016 to control HR.     Reports feeling lightheaded and some occasional double vision over past few days. Otherwise, denies chest pain, palpitations, dizziness, syncope, SOB at rest, LE edema.     ROS:    Constitution: Negative for fever, chills, weight loss or gain.   HENT: Negative for sore throat, rhinorrhea, or headache.  Eyes: Negative for blurred or double vision.   Cardiovascular: See above  Pulmonary: Negative for SOB   Gastrointestinal: Negative for abdominal pain, nausea, vomiting, or diarrhea.   : Negative for dysuria.   Neurological: Negative for focal weakness or sensory changes.  PMH:     Past Medical History:   Diagnosis Date    Atrial flutter     cardioversion 4/12/16    Chronic kidney disease     Coronary artery disease     [facility] 4/1/16: 50% proximal RAMUS    Hypertension     Kidney stones     Old 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/20/2016    3/16    Thoracic aortic dissection     Type A dissection s/p repair 4/13/16     Past Surgical History:   Procedure  Laterality Date    APPENDECTOMY      CARDIAC CATHETERIZATION      KIDNEY STONE SURGERY      4 surgeries     Allergies:     Review of patient's allergies indicates:   Allergen Reactions    Iodine and iodide containing products Nausea And Vomiting    Lipitor [atorvastatin] Other (See Comments)     Leg cramps    Cephalexin Nausea And Vomiting     Medications:     No current facility-administered medications on file prior to encounter.      Current Outpatient Prescriptions on File Prior to Encounter   Medication Sig Dispense Refill    aspirin (ECOTRIN) 81 MG EC tablet Take 1 tablet (81 mg total) by mouth once daily.  0    benzonatate (TESSALON) 100 MG capsule Take 100 mg by mouth 3 (three) times daily as needed for Cough.      cyproheptadine (PERIACTIN) 4 mg tablet Take 1 tablet (4 mg total) by mouth 4 (four) times daily. 120 tablet 3    DOCUSATE SODIUM (COLACE ORAL) Take 1 capsule by mouth once daily.       folic acid (FOLVITE) 1 MG tablet Take 1 tablet (1 mg total) by mouth once daily. Start today 60 tablet 6    labetalol (NORMODYNE) 200 MG tablet TAKE 1 TABLET (200 MG TOTAL) BY MOUTH EVERY 12 (TWELVE) HOURS. 60 tablet 11    morphine (MS CONTIN) 15 MG 12 hr tablet Take 1 tablet (15 mg total) by mouth 2 (two) times daily. 60 tablet 0    multivitamin (THERAGRAN) per tablet Take 1 tablet by mouth once daily.      omeprazole (PRILOSEC) 20 MG capsule Take 1 capsule (20 mg total) by mouth once daily. (Patient taking differently: Take 20 mg by mouth every morning. ) 30 capsule 11    ondansetron (ZOFRAN) 8 MG tablet Take 1 tablet (8 mg total) by mouth 4 (four) times daily as needed for Nausea. 60 tablet 2    oxycodone-acetaminophen (PERCOCET) 5-325 mg per tablet Take 1 tablet by mouth every 4 (four) hours as needed for Pain. 120 tablet 0    oxycodone-acetaminophen (PERCOCET) 5-325 mg per tablet Take 1 tablet by mouth every 4 (four) hours as needed. 6 tablet 0    rosuvastatin (CRESTOR) 20 MG tablet Take 1  tablet (20 mg total) by mouth once daily. (Patient taking differently: Take 20 mg by mouth every evening. ) 30 tablet 11    ranitidine (ZANTAC) 150 MG tablet Take 1 tablet (150 mg total) by mouth 2 (two) times daily. 60 tablet 1       Inpatient Medications   Continuous Infusions:   esmolol 150 mcg/kg/min (07/11/17 2150)     Scheduled Meds:   furosemide  20 mg Intravenous Once     PRN Meds:dextrose 50%, dextrose 50%, glucagon (human recombinant), glucose, glucose     Social History:     Social History   Substance Use Topics    Smoking status: Current Every Day Smoker     Packs/day: 0.25     Years: 55.00     Types: Cigarettes    Smokeless tobacco: Never Used      Comment: has Chantix at home    Alcohol use No     Family History:   History reviewed. No pertinent family history.  Physical Exam:     Vitals:  Temp:  [98.4 °F (36.9 °C)]   Pulse:  [150-157]   Resp:  [20-28]   BP: ()/()   SpO2:  [93 %-100 %]  on 2L NC I/O's:  No intake or output data in the 24 hours ending 07/11/17 2200     General: NAD  Neuro: Grossly intact   HEENT: Visual fields and EOM intact, JESSICA, no conjunctival injection, no pallor  Neck: No anterior or posterior lymphadenopathy. No JVD. No carotid bruits.   Cardiac: S1, S2, no murmurs, rubs, or gallops. Pulses 2+, regular.   Pulmonary: Decreased BS in LLL base.   Abdominal: Soft, non-tender. No mass or hepatosplenomegaly. No rebound or rigidity. Bowel sounds present.   Extremities: No clubbing, cyanosis, or edema.   Skin: No bruising or rash    Labs:       Recent Labs  Lab 07/11/17 1941      K 4.7      CO2 27   BUN 20   CREATININE 1.2   ANIONGAP 9       Recent Labs  Lab 07/11/17 1941   AST 27   ALT 38   ALKPHOS 168*   BILITOT 0.7   ALBUMIN 2.0*       Recent Labs  Lab 07/11/17 1819   TROPONINI 0.009   *      Recent Labs  Lab 07/11/17 1819   WBC 9.67   HGB 13.5*   HCT 42.1      GRAN 71.2  6.9       Recent Labs  Lab 07/11/17 1819   INR 3.3*     Lab  Results   Component Value Date    CHOL 104 (L) 2017    HDL 31 (L) 2017    LDLCALC 58.6 (L) 2017    TRIG 72 2017     No results found for: HGBA1C     Micro:  Blood Cultures  No results found for: LABBLOO  Urine Cultures  Lab Results   Component Value Date    LABURIN  2007     MULTIPLE ORGANISMS ISOLATED. NONE IN PREDOMINANCE REPEAT IF CLINICALLY NECESSARY.       Imaging:    CXR (17)   One view: Central line in innominate vein.  There is cardiomegaly, left basal edema, left pleural fluid.  Right lung is clear.    EF   Date Value Ref Range Status   2016 55 55 - 65    2016 60 55 - 65    2016 65 55 - 65    Echo (16)  CONCLUSIONS     1 - Normal left ventricular systolic function (EF 55-60%).     2 - Normal right ventricular systolic function .     3 - Normal left ventricular diastolic function.     4 - The estimated PA systolic pressure is 26 mmHg.     5- The aortic root at the sinus of valsalva is dilated and measures 4.2 cm.     6- The dissection flap/ Thickening in the aortic root seen on the prior study, not seen on this study (although this study was not done for a dignosis of dissection hence dedicated images of the root with color flow were not performed).     EK:1 typical AFL, HR of 155    Telemetry: AFL in the 150s    Assessment:    71 y.o. gentleman that presents today after a home health nurse was taking his vitals earlier today and noticed that he had an elevated HR in the 150s. He presented to the ER and was found to be in 2:1 typical AFL.  He is about to start palliative chemotherapy for stage IV metastatic mesothelioma with mets to the abdominal lymph nodes and iliac bone.     Plan:   2:1 AFL  - Start on esmolol gtt this evening for improved HR control  - Patient was previously on Flecainide post PADMINI/DCCV in 2016, D/C'ed prior to surgery by CTS resident and not restarted following dissection repair.   - Will discuss with EP in AM regarding  PADMINI/DCCV and need for re-initiating AAD to maintain NSR, likely not candidate for RFA due to limited life expectancy from malignant mesothelioma  - Will hold off starting AC at this time as patient with elevated INR of 3.3, not currently on coumadin  - NPO past midnight    Elevated INR  - Unknown cause, patient reports he has not been on coumadin and labs show no evidence of liver dysfunction, not currently on any meds that would increase INR, albumin low which may indicate decreased synthetic function / poor nutrition. Could consider VItamin K supplementation and see if INR improves if indeed elevated following repeat INR  - Repeat INR, if WNL, can consider starting heparin gtt / Lovenox for AC    HTN  - Patient with BPs in the 90s - 100s in the ER, holding anti-hypertensives    Malignant mesothelioma  - Plan for palliative chemotherapy as outpatient  - Continue pain control with long acting MS contin    Discussed with on-call CCU staff, Dr. Hernandez    Signed:  Tim Thao MD, MPH  Cardiovascular Disease Fellow, PGY-4  Pager: [phone]  7/11/2017 10:00 PM    Addendum 07/12/2017  12:47 AM  Repeat INR of 1.3. Will start patient on heparin gtt for anti-coagulation.

## 2017-07-12 NOTE — PLAN OF CARE
Problem: Patient Care Overview  Goal: Plan of Care Review  Outcome: Ongoing (interventions implemented as appropriate)  Pt. Transferred to CMICU overnight. Pt. Remains in aflutter HR ~ 140s-150s. Esmolol gtt infusing and titrated as tolerated by pt. Heparin gtt infusing as ordered. Plan of care is for possible cardioversion during day shift. Plan of care was reviewed and discussed with pt. All questions and concerns were addressed. Pt. Verbalized understanding.

## 2017-07-12 NOTE — TRANSFER OF CARE
"Anesthesia Transfer of Care Note    Patient: Clint Brown    Procedure(s) Performed: Procedure(s) (LRB):  TRANSESOPHAGEAL ECHOCARDIOGRAM (PADMINI) (N/A)    Patient location: ICU    Anesthesia Type: general    Transport from OR: Transported from OR on 2-3 L/min O2 by NC with adequate spontaneous ventilation    Post pain: adequate analgesia    Post assessment: no apparent anesthetic complications    Post vital signs: stable    Level of consciousness: sedated    Nausea/Vomiting: no nausea/vomiting    Complications: none    Transfer of care protocol was followed      Last vitals:   Visit Vitals  BP (!) 81/53   Pulse (!) 140   Temp 36.7 °C (98 °F) (Oral)   Resp (!) 25   Ht 5' 10" (1.778 m)   Wt 79.4 kg (175 lb)   SpO2 98%   BMI 25.11 kg/m²     "

## 2017-07-12 NOTE — PROGRESS NOTES
Progress Note                                                                Team: Networked reference to record PCT    Staff: Lary Hernandez MD    Admit Date: 7/11/2017                     LOS: 1    SUBJECTIVE:     FOLLOW-UP FOR: <principal problem not specified>    H&P  71 y.o. gentleman that presents today after a home health nurse was taking his vitals earlier today and noticed that he had an elevated HR in the 150s. He presented to the ER and was found to be in 2:1 typical AFL.      PMHx of aortic dissection s/p repair in April 2016, HTN, non-obstructive CAD, CKD II.     He has malignant mesothelioma for which he is about to start chemotherapy for, had VATS and PleurX catheter.  Underwent PADMINI/DCCV prior to aortic dissection repair in April 2016. Not placed on anti-coagulation at that time in anticipation for aortic dissection repair. Reports he has not been on anti-coagulation since that time. On 2L NC O2 at baseline. In the ER, he was given 5 mg of IVP Lopressor x 3 and Cardizem 10 mg IVP x 1 and started on Cardizem gtt without improvement in his HR. Required esmolol gtt c PO Lopressor in April 2016 to control HR.     HOSPITAL COURSE:  Underwent PADMINI/CV with subsequent NSR.  Started on Eliquis 7/12.    INTERVAL HISTORY:  Now in NSR after PADMINI/CV.        MEDICATIONS:  Scheduled:   famotidine  20 mg Oral BID    morphine  15 mg Oral BID    pantoprazole  40 mg Oral Daily    rosuvastatin  20 mg Oral QHS     Continuous:   heparin (porcine) in D5W 17 Units/kg/hr (07/12/17 0800)     PRN:  dextrose 50%, dextrose 50%, glucagon (human recombinant), glucose, glucose, ondansetron, oxycodone-acetaminophen, sodium chloride 0.9%          OBJECTIVE:     VITALS    Temp:  [97.7 °F (36.5 °C)-98.4 °F (36.9 °C)]   Pulse:  []   Resp:  [14-30]   BP: ()/()   SpO2:  [93 %-100 %]   Body mass index is 25.11 kg/m².    Intake/Output Summary (Last 24 hours) at 07/12/17 1150  Last data filed at 07/12/17 1100    Gross per 24 hour   Intake           867.68 ml   Output                0 ml   Net           867.68 ml         PHYSICAL EXAM  Physical Exam   Constitutional: He appears well-developed.   HENT:   Head: Normocephalic and atraumatic.   Eyes: Conjunctivae and EOM are normal. Right eye exhibits no discharge. Left eye exhibits no discharge.   Neck: Normal range of motion.   Cardiovascular: Normal rate.    No murmur heard.  Pulmonary/Chest: Effort normal. No respiratory distress.   PleurX in place   Abdominal: Soft. Bowel sounds are normal.   Musculoskeletal: Normal range of motion.   Neurological: He is alert.   Skin: Skin is warm and dry.         LABS  CBC:   Recent Labs  Lab 07/11/17  1819 07/12/17  0226   WBC 9.67 10.03   HGB 13.5* 11.5*   HCT 42.1 35.5*    241     CMP:   Recent Labs  Lab 07/11/17  1941 07/12/17 0226    137   K 4.7 4.2    105   CO2 27 21*   * 146*   BUN 20 22   CREATININE 1.2 1.1   CALCIUM 9.2 9.0   PROT 6.8 6.3   ALBUMIN 2.0* 1.9*   BILITOT 0.7 0.7   ALKPHOS 168* 151*   AST 27 23   ALT 38 35   ANIONGAP 9 11   EGFRNONAA >60.0 >60.0           Imaging Results          X-Ray Chest AP Portable (Final result)  Result time 07/11/17 18:53:02    Final result by Kirby Reaves III, MD (07/11/17 18:53:02)                 Impression:     Left lower lobe pneumonia and pleural fluid.      Electronically signed by: KIRBY REAVES  Date:     07/11/17  Time:    18:53              Narrative:    One view: Central line in innominate vein.  There is cardiomegaly, left basal edema, left pleural fluid.  Right lung is clear.                                  Microbiology:     Microbiology Results (last 7 days)     ** No results found for the last 168 hours. **          ASSESSMENT/PLAN:     Current Problems List:  Active Hospital Problems    Diagnosis  POA    Tachycardia [R00.0]  Yes      Resolved Hospital Problems    Diagnosis Date Resolved POA   No resolved problems to display.          ASSESSMENT/PLAN    Atrial Flutter  - Patient was previously on Flecainide post PADMINI/DCCV in April 2016, D/C'ed prior to surgery by CTS  --s/p PADMINI/DCCV 7/12/17, now in NSR  --Started Eliquis 7/12  --Esmolol drip now off (7/11-7/12).  --antiarrhythmic to be determined given he has history of mesothelioma and amiodarone can cause lung toxicity potentially        HTN  - Patient with BPs in the 90s - 100s in the ER, holding anti-hypertensives     Malignant mesothelioma  - Plan for palliative chemotherapy as outpatient  - Continue pain control with long acting MS contin          Code:  Full Code    VTE Risk Mitigation         Ordered     High Risk of VTE  Once      07/11/17 3855              DISPO: stepping down now that he is not requiring esmolol infusion        Staff Attestation to follow.  Case discussed with Dr. David Mckeon, PGY-2

## 2017-07-12 NOTE — ANESTHESIA POSTPROCEDURE EVALUATION
"Anesthesia Post Evaluation    Patient: Clint Brown    Procedure(s) Performed: Procedure(s) (LRB):  TRANSESOPHAGEAL ECHOCARDIOGRAM (PADMINI) (N/A)    Final Anesthesia Type: general  Patient location during evaluation: ICU  Patient participation: Yes- Able to Participate  Level of consciousness: awake and alert  Post-procedure vital signs: reviewed and stable  Pain management: adequate  Airway patency: patent  PONV status at discharge: No PONV  Anesthetic complications: no      Cardiovascular status: blood pressure returned to baseline  Respiratory status: unassisted  Hydration status: euvolemic  Follow-up not needed.        Visit Vitals  BP (!) 105/55   Pulse 82   Temp 36.5 °C (97.7 °F) (Oral)   Resp 16   Ht 5' 10" (1.778 m)   Wt 79.4 kg (175 lb)   SpO2 100%   BMI 25.11 kg/m²       Pain/Bernice Score: Pain Assessment Performed: Yes (7/12/2017 11:00 AM)  Presence of Pain: denies (7/12/2017 11:00 AM)  Pain Rating Prior to Med Admin: 5 (7/12/2017  9:59 AM)      "

## 2017-07-12 NOTE — PROGRESS NOTES
Post EP Procedure Note  Referring Physician: Lary Hernandez MD  Procedure: TRANSESOPHAGEAL ECHOCARDIOGRAM (PADMINI) (N/A)    Intervention:   PADMINI / DCCV with successful conversion to NSR    Recommendations:   - Routine post-cath care  - D/C esmolol gtt  - Start Amiodarone 400 mg BID x 2 weeks then 200 mg daily  - Anti-coagulation as per primary team, will require AC for minimum of 4 weeks post DCCV  - Can follow-up with general cardiology as outpatient    Signed:  Tim Thao MD, MPH  PGY-IV  Cardiovascular Disease Fellow

## 2017-07-12 NOTE — PHYSICIAN QUERY
PT Name: Clint Brown  MR #: [id number]    Physician Query Form - Atrial Fibrillation Specificity     CDS/: Teetee Santo               Contact information: [email or web address]     This form is a permanent document in the medical record.     Query Date: July 12, 2017    By submitting this query, we are merely seeking further clarification of documentation. Please utilize your independent clinical judgment when addressing the question(s) below.    The medical record contains the following:   Indicators     Supporting Clinical Findings Location in Medical Record   x Atrial Fibrillation Cardioversion Indication: atrial fibrillation Procedure note 7/12    EKG results     x Medication amiodarone Procedure note 7/12   x Treatment Synchronized electrical cardioversion Procedure note 7/12    Other         Provider, please further specify the Atrial Fibrillation diagnosis.    [  ] Chronic  [ x ] Paroxysmal  [  ] Permanent  [  ] Persistent  [  ] Other (please specify): ____________________________  [  ] Clinically Undetermined    Please document in your progress notes daily for the duration of treatment until resolved, and include in your discharge summary.

## 2017-07-12 NOTE — CONSULTS
[facility]  Cardiology Consult Note    Attending Physician: Brett Machuca MD  Reason for Consult: Atrial Flutter    HPI:   Mr. Brown is a 72 yo M with a history of metastatic mesothelioma, Type A aortic dissection s/p repair in April 2016, atrial flutter, CAD, CKD, and HTN who presents with 2:1 atrial flutter.    Pt states that he has felt dizzy and had some double vision since this AM, but was unaware his heart rate was elevated until home health came and took his vitals.  His HR was in the 150s and he was sent to the ED for further evaluation.    On arrival EKG showed aflutter with 2:1 conduction and HRs in the 150s.  He has some mild chest tightness and lightheadedness associated with it.    He was given 5 mg IV metoprolol x 3 with no improvement in his HR.  He was then given 10 mg IV diltiazem and started on a dilt drip, but remained in the 150s.    Upon review of previous records, he had an episode of aflutter prior to his aortic dissection repair and his rate was difficult to control at that time as well requiring both an esmolol drip and PO metoprolol.  He was cardioverted at that time.     He was recently diagnosed with metastatic mesothelioma and is due to start palliative chemo soon.    Review of Systems   Review of Systems   Constitution: Negative for chills, fever and weakness.   HENT: Negative for headaches, hearing loss and hoarse voice.    Eyes: Positive for double vision. Negative for pain and photophobia.   Cardiovascular: Negative for leg swelling, near-syncope and orthopnea.   Respiratory: Negative for shortness of breath and wheezing.    Skin: Negative for dry skin, flushing and itching.   Musculoskeletal: Negative for falls, gout and joint pain.   Gastrointestinal: Negative for abdominal pain, constipation and diarrhea.   Genitourinary: Negative for dysuria, flank pain and frequency.   Neurological: Positive for dizziness. Negative for focal weakness, loss of balance and  numbness.         PMH:     Past Medical History:   Diagnosis Date    Atrial flutter     cardioversion 4/12/16    Chronic kidney disease     Coronary artery disease     [facility] 4/1/16: 50% proximal RAMUS    Hypertension     Kidney stones     Old 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/20/2016    3/16    Thoracic aortic dissection     Type A dissection s/p repair 4/13/16     Past Surgical History:   Procedure Laterality Date    APPENDECTOMY      CARDIAC CATHETERIZATION      KIDNEY STONE SURGERY      4 surgeries        Allergies:     Review of patient's allergies indicates:   Allergen Reactions    Iodine and iodide containing products Nausea And Vomiting    Lipitor [atorvastatin] Other (See Comments)     Leg cramps    Cephalexin Nausea And Vomiting        Medications:     No current facility-administered medications on file prior to encounter.      Current Outpatient Prescriptions on File Prior to Encounter   Medication Sig Dispense Refill    aspirin (ECOTRIN) 81 MG EC tablet Take 1 tablet (81 mg total) by mouth once daily.  0    benzonatate (TESSALON) 100 MG capsule Take 100 mg by mouth 3 (three) times daily as needed for Cough.      cyproheptadine (PERIACTIN) 4 mg tablet Take 1 tablet (4 mg total) by mouth 4 (four) times daily. 120 tablet 3    DOCUSATE SODIUM (COLACE ORAL) Take 1 capsule by mouth once daily.       folic acid (FOLVITE) 1 MG tablet Take 1 tablet (1 mg total) by mouth once daily. Start today 60 tablet 6    labetalol (NORMODYNE) 200 MG tablet TAKE 1 TABLET (200 MG TOTAL) BY MOUTH EVERY 12 (TWELVE) HOURS. 60 tablet 11    morphine (MS CONTIN) 15 MG 12 hr tablet Take 1 tablet (15 mg total) by mouth 2 (two) times daily. 60 tablet 0    multivitamin (THERAGRAN) per tablet Take 1 tablet by mouth once daily.      omeprazole (PRILOSEC) 20 MG capsule Take 1 capsule (20 mg total) by mouth once daily. (Patient taking differently: Take 20 mg by mouth every morning. ) 30 capsule 11    ondansetron (ZOFRAN) 8 MG  tablet Take 1 tablet (8 mg total) by mouth 4 (four) times daily as needed for Nausea. 60 tablet 2    oxycodone-acetaminophen (PERCOCET) 5-325 mg per tablet Take 1 tablet by mouth every 4 (four) hours as needed for Pain. 120 tablet 0    oxycodone-acetaminophen (PERCOCET) 5-325 mg per tablet Take 1 tablet by mouth every 4 (four) hours as needed. 6 tablet 0    rosuvastatin (CRESTOR) 20 MG tablet Take 1 tablet (20 mg total) by mouth once daily. (Patient taking differently: Take 20 mg by mouth every evening. ) 30 tablet 11    ranitidine (ZANTAC) 150 MG tablet Take 1 tablet (150 mg total) by mouth 2 (two) times daily. 60 tablet 1        Social History:     Social History   Substance Use Topics    Smoking status: Current Every Day Smoker     Packs/day: 0.25     Years: 55.00     Types: Cigarettes    Smokeless tobacco: Never Used      Comment: has Chantix at home    Alcohol use No        Family History:     History reviewed. No pertinent family history.     Physical Exam:     Vitals:  Temp:  [98.4 °F (36.9 °C)]   Pulse:  [150-157]   Resp:  [20-28]   BP: ()/()   SpO2:  [93 %-100 %]   I/O's:  No intake or output data in the 24 hours ending 07/11/17 2130     Physical Exam   Constitutional: He is oriented to person, place, and time. He appears well-developed and well-nourished. No distress.   HENT:   Head: Normocephalic and atraumatic.   Eyes: Conjunctivae and EOM are normal. Pupils are equal, round, and reactive to light.   Neck: Normal range of motion. Neck supple. No JVD present.   Cardiovascular: Exam reveals no gallop and no friction rub.    No murmur heard.  Tachycardic, regular   Pulmonary/Chest: No respiratory distress. He has no wheezes. He has no rales.   Pleurex catheter   Abdominal: Soft. Bowel sounds are normal. He exhibits no distension. There is no tenderness.   Musculoskeletal: Normal range of motion. He exhibits no edema.   Neurological: He is alert and oriented to person, place, and time. No  cranial nerve deficit.   Skin: Skin is warm and dry. No rash noted. No erythema.         Labs:     Recent Results (from the past 336 hour(s))   CBC auto differential    Collection Time: 07/11/17  6:19 PM   Result Value Ref Range    WBC 9.67 3.90 - 12.70 K/uL    Hemoglobin 13.5 (L) 14.0 - 18.0 g/dL    Hematocrit 42.1 40.0 - 54.0 %    Platelets 316 150 - 350 K/uL       No results found for this or any previous visit (from the past 336 hour(s)).    Lab Results   Component Value Date    CHOL 104 (L) 06/19/2017    HDL 31 (L) 06/19/2017    LDLCALC 58.6 (L) 06/19/2017    TRIG 72 06/19/2017         Recent Labs  Lab 07/11/17  1819   TROPONINI 0.009       No results found for: HGBA1C    Estimated Creatinine Clearance: 58.3 mL/min (based on Cr of 1.2).    Echo 5/23/16:      CONCLUSIONS     1 - Normal left ventricular systolic function (EF 55-60%).     2 - Normal right ventricular systolic function .     3 - Normal left ventricular diastolic function.     4 - The estimated PA systolic pressure is 26 mmHg.     5- The aortic root at the sinus of valsalva is dilated and measures 4.2 cm.     6- The dissection flap/ Thickening in the aortic root seen on the prior study, not seen on this study (although this study was not done for a dignosis of dissection hence dedicated images of the root with color flow were not performed).     Assessment & Recommendations:   In summary, Mr. Brown is a 70 yo M with a history of metastatic mesothelioma, Type A aortic dissection s/p repair in April 2016, atrial flutter, CAD, CKD, and HTN who presents with 2:1 atrial flutter.    #Atrial flutter: Will admit to CCU as pt required esmolol drip for rate control last time in addition to PO metoprolol.  Diltiazem and IV metoprolol did not improve his rate at all in the ED.  Will likely plan for PADMINI/DCCV tomorrow. NPO after midnight.    Thank you for this consult. Further recommendations are pending the attending addendum. Please do not hesitate to page  with questions.     Signed:  Yaneth Best MD, MPH  Cardiology Fellow, PGY-6  Pager: [phone]  7/11/2017 9:30 PM    Attending Addendum:

## 2017-07-12 NOTE — PROGRESS NOTES
Contacted Cards about PleurX Drain. Orders to not use until pulmonology could be consulted tomorrow morning as long as patient remains stable. Will continue to monitor. See flowsheet for full documentation.

## 2017-07-12 NOTE — PLAN OF CARE
07/12/17 1400   Discharge Assessment   Assessment Type Discharge Planning Assessment   Confirmed/corrected address and phone number on facesheet? Yes   Assessment information obtained from? Caregiver;Medical Record  (spouse and daughter)   Expected Length of Stay (days) 3   Prior to hospitilization cognitive status: Alert/Oriented   Prior to hospitalization functional status: Needs Assistance;Assistive Equipment   Current cognitive status: Alert/Oriented   Current Functional Status: Needs Assistance;Assistive Equipment   Arrived From home or self-care   Lives With spouse   Able to Return to Prior Arrangements yes   How many people do you have in your home that can help with your care after discharge? 1   Who are your caregiver(s) and their phone number(s)? spouse Vanessa Brown [phone]   Patient's perception of discharge disposition home health  (Pt had [facility] prior to admit)   Readmission Within The Last 30 Days no previous admission in last 30 days   Patient currently being followed by outpatient case management? No   Patient currently receives home health services? Yes   Patient previously received home health services and would like to resume services if necessary? Yes   If yes, name of home health provider: [facility] for SN and wound care/He has malignant mesothelioma for which he is about to start chemotherapy for, had VATS and PleurX catheter   Does the patient currently use HME? Yes   Name and contact number for HME provider: The patients spouse was unable to provide case mangaer with name of HME provider for Home O2   Patient currently receives private duty nursing? No   Patient currently receives any other outside agency services? No   Equipment Currently Used at Home oxygen;shower chair   Do you have any problems affording any of your prescribed medications? No   Is the patient taking medications as prescribed? yes   Do you have any financial concerns preventing you from receiving the healthcare you need?  No   Does the patient have transportation to healthcare appointments? Yes   Transportation Available car;family or friend will provide   On Dialysis? No   Does the patient receive services at the [facility]? No   Are there any open cases? No   Discharge Plan A Home Health;Home with family   Discharge Plan B Home with family   Patient/Family In Agreement With Plan yes     Met with patients spouse and daughter in room 3085. The patient is s/p cardioversion and is resting comfortably with eyes closed. The patient has a h/o malignant mesothelioma for which he is about to start chemotherapy for, had VATS and PleurX catheter place. He currently receiving Home Health Services with [facility] for PleurX catheter dressing changes and SN assessments. Call placed to Tianna MEZA inpatient coordinator for [facility] x [phone] and left message on [employer] to notify of the patients recent admission and need to re-initiate HH orders prior to discharge. Discussed with the spouse concerns for the patients current functional mobility and anticipated discharge needs regarding safety in the home. She stated he does not get around very much per his spouse and uses portable O2 @ 2LPM/NC. He has recently been instructed to use the O2 at night while sleeping. He currently does not have any other DME in the home except for O2 and a shower chair. The family discussed the desire to obtain an Rx for a rollator to help the patient with functional mobility. She stated her  has fallen recently and does not ambulate much because when he gets short of breath he needs a place to sit down immediately. Once the Cardiology service feels the patient is medically stable PT/OT should eval the patient for any additional DME needs and/or the need for ongoing HH PT/OT to be added to his current HH orders. Will discuss discharge needs assessment discussion with family with the Cardiology service. Will notify SW of the needs assessed and the need to initiate right care  referral for HH if necessary.

## 2017-07-12 NOTE — ANESTHESIA PREPROCEDURE EVALUATION
07/12/2017  Pre-operative evaluation for Procedure(s) (LRB):  TRANSESOPHAGEAL ECHOCARDIOGRAM (PADMINI) (N/A)  CARDIOVERSION    Clint Brown is a 71 y.o. male with PMHx of aortic dissection s/p repair in April 2016, HTN, non-obstructive CAD, CKD II. He has malignant mesothelioma for which he is about to start chemotherapy for, had VATS and PleurX catheter. He underwent PADMINI/DCCV prior to aortic dissection repair in April 2016. Not placed on anti-coagulation at that time in anticipation for aortic dissection repair. Reports he has not been on anti-coagulation since that time. He is scheduled for PADMINI/Cardioversion for afib.     LDA:   - Chest Tube   - Left Subclavian Port-a-cath  - 20G PIV in Right Hand  - 20G PIV in Left AC    Prev airway:   Present Prior to Hospital Arrival?: No; Placement Date: 05/31/17; Placement Time: 1050; Method of Intubation: Direct laryngoscopy; Inserted by: Other (fannie beard); Airway Device: Endotracheal Tube, Endobronchial Blocker; Mask Ventilation: Easy - oral; Intubated: Postinduction; Blade: Cecile #3; Airway Device Size: 8.0; Style: Cuffed; Cuff Inflation: Minimal occlusive pressure; Placement Verified By: Auscultation, Capnometry, ETT Condensation; Grade: Grade II; Complicating Factors: None; Intubation Findings: Positive EtCO2, Bilateral breath sounds, Atraumatic/Condition of teeth unchanged;  Depth of Insertion: 22; Securment: Lips; Complications: None; Breath Sounds: Equal Bilateral; Insertion Attempts: 1; Removal Date: 05/31/17;  Removal Time: 1220    Drips:   - esmolol gtt at 200 mcg/kg/min  - heparin gtt at 17 units/kg/hr    Patient Active Problem List   Diagnosis    Essential hypertension    Tobacco abuse    CAD - nonobstructive    Chest pain    History of nephrolithiasis    Aortic mural thrombus    Hyperglycemia    Acute on chronic renal failure    S/P ascending  aortic replacement    Hypercholesteremia    Old myocardial infarction    CKD (chronic kidney disease) stage 3, GFR 30-59 ml/min    Ulnar neuropathy at elbow of left upper extremity    Closed fracture of one rib of left side    Chronic obstructive pulmonary disease    Pleural effusion    Mesothelioma    Tachycardia       Review of patient's allergies indicates:   Allergen Reactions    Iodine and iodide containing products Nausea And Vomiting    Lipitor [atorvastatin] Other (See Comments)     Leg cramps    Cephalexin Nausea And Vomiting        No current facility-administered medications on file prior to encounter.      Current Outpatient Prescriptions on File Prior to Encounter   Medication Sig Dispense Refill    aspirin (ECOTRIN) 81 MG EC tablet Take 1 tablet (81 mg total) by mouth once daily.  0    benzonatate (TESSALON) 100 MG capsule Take 100 mg by mouth 3 (three) times daily as needed for Cough.      cyproheptadine (PERIACTIN) 4 mg tablet Take 1 tablet (4 mg total) by mouth 4 (four) times daily. 120 tablet 3    DOCUSATE SODIUM (COLACE ORAL) Take 1 capsule by mouth once daily.       folic acid (FOLVITE) 1 MG tablet Take 1 tablet (1 mg total) by mouth once daily. Start today 60 tablet 6    labetalol (NORMODYNE) 200 MG tablet TAKE 1 TABLET (200 MG TOTAL) BY MOUTH EVERY 12 (TWELVE) HOURS. 60 tablet 11    morphine (MS CONTIN) 15 MG 12 hr tablet Take 1 tablet (15 mg total) by mouth 2 (two) times daily. 60 tablet 0    multivitamin (THERAGRAN) per tablet Take 1 tablet by mouth once daily.      omeprazole (PRILOSEC) 20 MG capsule Take 1 capsule (20 mg total) by mouth once daily. (Patient taking differently: Take 20 mg by mouth every morning. ) 30 capsule 11    ondansetron (ZOFRAN) 8 MG tablet Take 1 tablet (8 mg total) by mouth 4 (four) times daily as needed for Nausea. 60 tablet 2    oxycodone-acetaminophen (PERCOCET) 5-325 mg per tablet Take 1 tablet by mouth every 4 (four) hours as needed for  Pain. 120 tablet 0    oxycodone-acetaminophen (PERCOCET) 5-325 mg per tablet Take 1 tablet by mouth every 4 (four) hours as needed. 6 tablet 0    rosuvastatin (CRESTOR) 20 MG tablet Take 1 tablet (20 mg total) by mouth once daily. (Patient taking differently: Take 20 mg by mouth every evening. ) 30 tablet 11    ranitidine (ZANTAC) 150 MG tablet Take 1 tablet (150 mg total) by mouth 2 (two) times daily. 60 tablet 1       Past Surgical History:   Procedure Laterality Date    APPENDECTOMY      CARDIAC CATHETERIZATION      KIDNEY STONE SURGERY      4 surgeries       Social History     Social History    Marital status:      Spouse name: N/A    Number of children: 2    Years of education: N/A     Occupational History    AC contractor       Social History Main Topics    Smoking status: Current Every Day Smoker     Packs/day: 0.25     Years: 55.00     Types: Cigarettes    Smokeless tobacco: Never Used      Comment: has Chantix at home    Alcohol use No    Drug use: No    Sexual activity: Yes     Partners: Female     Other Topics Concern    Not on file     Social History Narrative    No narrative on file         Vital Signs Range (Last 24H):  Temp:  [36.8 °C (98.2 °F)-36.9 °C (98.4 °F)]   Pulse:  [143-157]   Resp:  [20-30]   BP: ()/()   SpO2:  [93 %-100 %]       CBC:   Recent Labs      07/11/17 1819 07/12/17 0226   WBC  9.67  10.03   RBC  4.79  4.15*   HGB  13.5*  11.5*   HCT  42.1  35.5*   PLT  316  241   MCV  88  86   MCH  28.2  27.7   MCHC  32.1  32.4       CMP:   Recent Labs      07/11/17 1941 07/12/17 0226   NA  137  137   K  4.7  4.2   CL  101  105   CO2  27  21*   BUN  20  22   CREATININE  1.2  1.1   GLU  113*  146*   MG   --   1.7   PHOS   --   3.8   CALCIUM  9.2  9.0   ALBUMIN  2.0*  1.9*   PROT  6.8  6.3   ALKPHOS  168*  151*   ALT  38  35   AST  27  23   BILITOT  0.7  0.7       INR  Recent Labs      07/11/17 1819 07/11/17 2324 07/12/17 0226   INR  3.3*  1.3*   1.2   APTT   --    --   <21.0           Diagnostic Studies:      EKG 7/11/17:  Atrial flutter with 2:1 A-V conduction  Marked ST abnormality, possible inferior subendocardial injury  Abnormal ECG  When compared with ECG of 11-JUL-2017 18:00,  No significant change was found    2D Echo:  CONCLUSIONS     1 - Normal left ventricular systolic function (EF 55-60%).     2 - Normal right ventricular systolic function .     3 - Normal left ventricular diastolic function.     4 - The estimated PA systolic pressure is 26 mmHg.     5- The aortic root at the sinus of valsalva is dilated and measures 4.2 cm.     6- The dissection flap/ Thickening in the aortic root seen on the prior study, not seen on this study (although this study was not done for a dignosis of dissection hence dedicated images of the root with color flow were not performed).     This document has been electronically    SIGNED BY: Sherly Sewell MD On: 05/23/2016 17:01        Anesthesia Evaluation    I have reviewed the Patient Summary Reports.     I have reviewed the Medications.     Review of Systems  Anesthesia Hx:  History of prior surgery of interest to airway management or planning:  Denies Personal Hx of Anesthesia complications.   Social:  Non-Smoker, No Alcohol Use    Hematology/Oncology:  Hematology Normal      Oncology: mesothelioma.   EENT/Dental:EENT/Dental Normal   Cardiovascular:   Exercise tolerance: good Hypertension, well controlled Past MI (remote, asymp) CAD (good function on recent TTE, excellent METs) asymptomatic  Open type A dissection repair   Pulmonary:   COPD, mild Recurrent L pleural effusion, last pleurex drain two days ago. On home O2 at night PRN, mild SOB   Renal/:   Chronic Renal Disease, CRI    Hepatic/GI:  Hepatic/GI Normal    Musculoskeletal:  Musculoskeletal Normal    Neurological:  Neurology Normal    Dermatological:  Skin Normal    Psych:  Psychiatric Normal           Physical Exam  General:  Well nourished     Airway/Jaw/Neck:  Airway Findings: Mouth Opening: Normal Tongue: Normal  General Airway Assessment: Adult, Average  Mallampati: III  TM Distance: Normal, at least 6 cm     Eyes/Ears/Nose:  EYES/EARS/NOSE FINDINGS: Normal   Dental:  Dental Findings: In tact   Chest/Lungs:  Chest/Lungs Findings: Normal Respiratory Rate, Decreased Breathe Sounds Left, Rhonchi     Heart/Vascular:  Heart Findings: Rate: Normal  Rhythm: Regular Rhythm  Sounds: Normal  Heart murmur: negative       Mental Status:  Mental Status Findings:  Cooperative, Alert and Oriented         Anesthesia Plan  Type of Anesthesia, risks & benefits discussed:  Anesthesia Type:  general, MAC  Patient's Preference:   Intra-op Monitoring Plan:   Intra-op Monitoring Plan Comments:   Post Op Pain Control Plan:   Post Op Pain Control Plan Comments:   Induction:   IV  Beta Blocker:  Patient is on a Beta-Blocker and has received one dose within the past 24 hours (No further documentation required).       Informed Consent: Patient understands risks and agrees with Anesthesia plan.  Questions answered. Anesthesia consent signed with patient.  ASA Score: 3     Day of Surgery Review of History & Physical:    H&P update referred to the surgeon.         Ready For Surgery From Anesthesia Perspective.

## 2017-07-12 NOTE — H&P
TRANSESOPHAGEAL ECHOCARDIOGRAPHY   PRE-PROCEDURE NOTE    07/12/2017    HPI:     71 y.o. gentleman that presents today after a home health nurse was taking his vitals earlier today and noticed that he had an elevated HR in the 150s. He presented to the ER and was found to be in 2:1 typical AFL.      PMHx of stage IV malignant mesothelioma, aortic dissection s/p repair in April 2016, HTN, non-obstructive CAD, CKD II.    Dysphagia or odynophagia:  No  Liver Disease, esophageal disease, or known varices:  No  Upper GI Bleeding: No  Snoring:  No  Sleep Apnea:  No  Prior neck surgery or radiation:  No  Able to move neck in all directions:  Yes  History of anesthetic difficulties:  No  Family history of anesthetic difficulties:  No  Last oral intake:  9 PM on 7/11/17    Mallampati Class:  2  ASA Score:  4      Meds:     Scheduled Meds:   famotidine  20 mg Oral BID    morphine  15 mg Oral BID    pantoprazole  40 mg Oral Daily    rosuvastatin  20 mg Oral QHS     PRN Meds:dextrose 50%, dextrose 50%, glucagon (human recombinant), glucose, glucose, ondansetron, oxycodone-acetaminophen  Continuous Infusions:   esmolol 250 mcg/kg/min (07/12/17 0620)    heparin (porcine) in D5W 17 Units/kg/hr (07/12/17 0600)       Physical Exam:     Vitals:  Temp:  [98.2 °F (36.8 °C)-98.4 °F (36.9 °C)]   Pulse:  [143-157]   Resp:  [20-30]   BP: ()/()   SpO2:  [93 %-100 %]        Constitutional:  NAD, conversant  HEENT:   Sclera anicteric, Uvula midline, EOMI, OP clear  Neck:   No JVD, moves to all direction without any limitations  CV:   RRR, no murmurs / rubs / gallops, normal S1/S2  Pulm:   Decreased BS in LLL base. PleurX catheter in place.   GI:   Abdomen soft, NTND, +BS  Extremities:  No LE edema, warm with palpable pulses  Neuro:   AAOX3, no focal motor deficits      Labs:     Recent Results (from the past 336 hour(s))   CBC auto differential    Collection Time: 07/12/17  2:26 AM   Result Value Ref Range    WBC 10.03 3.90 -  12.70 K/uL    Hemoglobin 11.5 (L) 14.0 - 18.0 g/dL    Hematocrit 35.5 (L) 40.0 - 54.0 %    Platelets 241 150 - 350 K/uL   CBC auto differential    Collection Time: 07/11/17  6:19 PM   Result Value Ref Range    WBC 9.67 3.90 - 12.70 K/uL    Hemoglobin 13.5 (L) 14.0 - 18.0 g/dL    Hematocrit 42.1 40.0 - 54.0 %    Platelets 316 150 - 350 K/uL       No results found for this or any previous visit (from the past 336 hour(s)).    Estimated Creatinine Clearance: 63.6 mL/min (based on Cr of 1.1).    INR: 1.1    EKG:   Date: 7/11/17  2:1 typical AFL      Telemetry:  Telemetry currently shows: AFL in the 140s - 150s      Assessment & Plan:     PLAN:  1. PADMINI for evaluation of JABARI thrombus    -The risks, benefits & alternatives of the procedure were explained to the patient.    -The risks of transesophageal echo include but are not limited to:  Dental trauma, esophageal trauma/perforation, bleeding, laryngospasm/brochospasm, aspiration, sore throat/hoarseness, & dislodgement of the endotracheal tube/nasogastric tube (where applicable).    -The risks of moderate sedation include hypotension, respiratory depression, arrhythmias, bronchospasm, & death.    -Informed consent was obtained & the patient is agreeable to proceed with the procedure.    I will discuss with the attending physician. Attending addendum is to follow.    Signed:  Tim Thao MD  Cardiovascular Disease Fellow, PGY-IV  Pager: [phone]  7/12/2017 7:46 AM    Attending Addendum:

## 2017-07-12 NOTE — CONSULTS
Cardiac Electrophysiology Consult Note    Reason for Consultation: AFL management  Attending Requesting: Dr. Hernandez  SUBJECTIVE:     History of Present Illness:  Patient is a 71 y.o. male with hx of stage IV mesothelioma on palliative chemo, malignant effusion s/p PleurX, aortic dissection s/p repair 4/2016, HTN, non-obs CAD, CKD Ii, atrial flutter s/p DCCV 4/2016 presents after HH nurse noted tachycardia to 150s at home. He presented to the ER and was found to be in 2:1 typical flutter. This morning, he had a successful PADMINI/DCCV.  EP consulted for further management for atrial flutter. Telemetry with pt maintaining sinus rhythm. He has not been on anticoagulation or antiarrhythmics in the past; after his last DCCV, he was on flecainide briefly but discontinued prior to his AD repair. Denies a hx of thyroid disorders.  Denies cp, palpitations, SOB.     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 No pertinent family history.  Social History   Substance Use Topics    Smoking status: Current Every Day Smoker     Packs/day: 0.25     Years: 55.00     Types: Cigarettes    Smokeless tobacco: Never Used      Comment: has Chantix at home    Alcohol use No        Review of Systems:  Reviewed and otherwise negative except as noted  above.    OBJECTIVE:     Vital Signs (Most Recent)  Temp: 97.9 °F (36.6 °C) (07/12/17 1500)  Pulse: 86 (07/12/17 1600)  Resp: 20 (07/12/17 1600)  BP: (!) 117/57 (07/12/17 1600)  SpO2: 100 % (07/12/17 1600)    Vital Signs Range (Last 24H):  Temp:  [97.7 °F (36.5 °C)-98.4 °F (36.9 °C)]   Pulse:  []   Resp:  [14-30]   BP: ()/()   SpO2:  [93 %-100 %]     Physical Exam:  GEN: NAD, alert and oriented  HEENT: EOMI, no LAD, no JVD  Cv: RRR, no murmur, no rubs, gallups  PULM: L BS only at apex, dull at base, R side clear, EWOB  ABD: soft, NT/ND  EXT: no SHELBIE  Neuro: no focal deficits    Laboratory:  Chemistry:  Lab Results   Component Value Date     07/12/2017    K 4.2 07/12/2017     07/12/2017    CO2 21 (L) 07/12/2017    BUN 22 07/12/2017    CREATININE 1.1 07/12/2017    CALCIUM 9.0 07/12/2017     (H) 07/11/2017     CBC:   Lab Results   Component Value Date    WBC 10.03 07/12/2017    HGB 11.5 (L) 07/12/2017    HCT 35.5 (L) 07/12/2017    HCT 26 (L) 04/13/2016    MCV 86 07/12/2017     07/12/2017       Diagnostic Results:  ECG: Atrial flutter, 2:1, typical s/p DCCV. Now NSR    ASSESSMENT/PLAN:   71 y.o. male with hx of stage IV mesothelioma on palliative chemo, malignant effusion s/p PleurX, aortic dissection s/p repair 4/2016, HTN, non-obs CAD, CKD Ii, atrial flutter s/p DCCV 4/2016 presenting with 2:1 typical AFL s/p PADMINI/DCCV today. EP consulted for further management.    Discussed options of RFA of CTI vs medical management. Pt strongly prefers medical management at this time. Concern for use of amiodarone and pulm toxicity in setting of mesothelioma, malignant effusion. Other options include sotalol, tikosyn; however on palliative chemo and with CKD II thus would not be ideal options if transient renal dysfunction. Thus, would consider use of dronedarone for reduction of recurrence. If re-presents with atrial flutter, then can consider RFA of CTI.    1. Atrial Flutter s/p  DCCV  -Continue apixaban 5 bid  -Start dronedarone 400 bid    Thank you for this interesting consult. Plan discussed with attending , further recommendations as per attending addendum. Please call with any questions or concerns.    Elliott Tinoco MD  EP Service PGY4  [phone]

## 2017-07-13 VITALS
BODY MASS INDEX: 25.05 KG/M2 | HEIGHT: 70 IN | DIASTOLIC BLOOD PRESSURE: 64 MMHG | TEMPERATURE: 98 F | WEIGHT: 175 LBS | OXYGEN SATURATION: 93 % | RESPIRATION RATE: 20 BRPM | SYSTOLIC BLOOD PRESSURE: 121 MMHG | HEART RATE: 82 BPM

## 2017-07-13 LAB
ALBUMIN SERPL BCP-MCNC: 1.6 G/DL
ALP SERPL-CCNC: 128 U/L
ALT SERPL W/O P-5'-P-CCNC: 35 U/L
ANION GAP SERPL CALC-SCNC: 8 MMOL/L
AST SERPL-CCNC: 28 U/L
BASOPHILS # BLD AUTO: 0.01 K/UL
BASOPHILS NFR BLD: 0.1 %
BILIRUB SERPL-MCNC: 0.7 MG/DL
BUN SERPL-MCNC: 21 MG/DL
CALCIUM SERPL-MCNC: 9.1 MG/DL
CHLORIDE SERPL-SCNC: 106 MMOL/L
CO2 SERPL-SCNC: 24 MMOL/L
CREAT SERPL-MCNC: 0.9 MG/DL
DIFFERENTIAL METHOD: ABNORMAL
EOSINOPHIL # BLD AUTO: 0.1 K/UL
EOSINOPHIL NFR BLD: 1.3 %
ERYTHROCYTE [DISTWIDTH] IN BLOOD BY AUTOMATED COUNT: 14.5 %
EST. GFR  (AFRICAN AMERICAN): >60 ML/MIN/1.73 M^2
EST. GFR  (NON AFRICAN AMERICAN): >60 ML/MIN/1.73 M^2
GLUCOSE SERPL-MCNC: 85 MG/DL
HCT VFR BLD AUTO: 32.9 %
HGB BLD-MCNC: 10.3 G/DL
INR PPP: 1.3
LYMPHOCYTES # BLD AUTO: 0.8 K/UL
LYMPHOCYTES NFR BLD: 9.3 %
MAGNESIUM SERPL-MCNC: 1.5 MG/DL
MCH RBC QN AUTO: 27.5 PG
MCHC RBC AUTO-ENTMCNC: 31.3 %
MCV RBC AUTO: 88 FL
MONOCYTES # BLD AUTO: 1.6 K/UL
MONOCYTES NFR BLD: 19.5 %
NEUTROPHILS # BLD AUTO: 5.7 K/UL
NEUTROPHILS NFR BLD: 69.3 %
PHOSPHATE SERPL-MCNC: 3.5 MG/DL
PLATELET # BLD AUTO: 255 K/UL
PMV BLD AUTO: 10.1 FL
POTASSIUM SERPL-SCNC: 4.2 MMOL/L
PROT SERPL-MCNC: 5.7 G/DL
PROTHROMBIN TIME: 13.7 SEC
RBC # BLD AUTO: 3.75 M/UL
SODIUM SERPL-SCNC: 138 MMOL/L
WBC # BLD AUTO: 8.17 K/UL

## 2017-07-13 PROCEDURE — 83735 ASSAY OF MAGNESIUM: CPT

## 2017-07-13 PROCEDURE — 84100 ASSAY OF PHOSPHORUS: CPT

## 2017-07-13 PROCEDURE — 80053 COMPREHEN METABOLIC PANEL: CPT

## 2017-07-13 PROCEDURE — 25000003 PHARM REV CODE 250: Performed by: INTERNAL MEDICINE

## 2017-07-13 PROCEDURE — 25000003 PHARM REV CODE 250: Performed by: HOSPITALIST

## 2017-07-13 PROCEDURE — 85610 PROTHROMBIN TIME: CPT

## 2017-07-13 PROCEDURE — 85025 COMPLETE CBC W/AUTO DIFF WBC: CPT

## 2017-07-13 PROCEDURE — 99231 SBSQ HOSP IP/OBS SF/LOW 25: CPT | Mod: ,,, | Performed by: INTERNAL MEDICINE

## 2017-07-13 PROCEDURE — 63600175 PHARM REV CODE 636 W HCPCS: Performed by: NURSE PRACTITIONER

## 2017-07-13 PROCEDURE — 25000003 PHARM REV CODE 250: Performed by: STUDENT IN AN ORGANIZED HEALTH CARE EDUCATION/TRAINING PROGRAM

## 2017-07-13 PROCEDURE — 36415 COLL VENOUS BLD VENIPUNCTURE: CPT

## 2017-07-13 RX ORDER — MAGNESIUM SULFATE HEPTAHYDRATE 40 MG/ML
4 INJECTION, SOLUTION INTRAVENOUS ONCE
Status: COMPLETED | OUTPATIENT
Start: 2017-07-13 | End: 2017-07-13

## 2017-07-13 RX ORDER — METOPROLOL SUCCINATE 25 MG/1
25 TABLET, EXTENDED RELEASE ORAL DAILY
Qty: 60 TABLET | Refills: 2 | Status: ON HOLD | OUTPATIENT
Start: 2017-07-13 | End: 2017-08-14 | Stop reason: HOSPADM

## 2017-07-13 RX ADMIN — DRONEDARONE 400 MG: 400 TABLET, FILM COATED ORAL at 10:07

## 2017-07-13 RX ADMIN — MAGNESIUM SULFATE IN WATER 4 G: 40 INJECTION, SOLUTION INTRAVENOUS at 08:07

## 2017-07-13 RX ADMIN — FAMOTIDINE 20 MG: 20 TABLET, FILM COATED ORAL at 08:07

## 2017-07-13 RX ADMIN — MORPHINE SULFATE 15 MG: 15 TABLET, EXTENDED RELEASE ORAL at 08:07

## 2017-07-13 RX ADMIN — PANTOPRAZOLE SODIUM 40 MG: 40 TABLET, DELAYED RELEASE ORAL at 08:07

## 2017-07-13 RX ADMIN — APIXABAN 5 MG: 5 TABLET, FILM COATED ORAL at 08:07

## 2017-07-13 NOTE — DISCHARGE SUMMARY
[facility]  Cardiology  Discharge Summary      Patient Name: Clint Brown  MRN: [id number]  Admission Date: 7/11/2017  Hospital Length of Stay: 2 days  Discharge Date and Time: No discharge date for patient encounter.  Attending Physician: Lary Hernandez MD    Discharging Provider: Ibeth Mckeon MD  Primary Care Physician: Jean Claude Griffith DO    HPI:   71 y.o. gentleman that presents today after a home health nurse was taking his vitals earlier today and noticed that he had an elevated HR in the 150s. He presented to the ER and was found to be in 2:1 typical AFL.      PMHx of aortic dissection s/p repair in April 2016, HTN, non-obstructive CAD, CKD II.     He has malignant mesothelioma for which he is about to start chemotherapy for, had VATS and PleurX catheter.  Underwent PADMINI/DCCV prior to aortic dissection repair in April 2016. Not placed on anti-coagulation at that time in anticipation for aortic dissection repair. Reports he has not been on anti-coagulation since that time. On 2L NC O2 at baseline. In the ER, he was given 5 mg of IVP Lopressor x 3 and Cardizem 10 mg IVP x 1 and started on Cardizem gtt without improvement in his HR. Required esmolol gtt c PO Lopressor in April 2016 to control HR.     Procedure(s) (LRB):  TRANSESOPHAGEAL ECHOCARDIOGRAM (PADMINI) (N/A)     Indwelling Lines/Drains at time of discharge:  Lines/Drains/Airways     Central Venous Catheter Line                 Port A Cath Single Lumen 06/28/17 0954 left subclavian 15 days          Drain                 Chest Tube 05/31/17 1153 Left Pleural 15.5 Fr. 43 days         Drain/Device  05/31/17 0000 Left chest other (see comments) 43 days                Hospital Course:  Underwent PADMINI/CV on 7/12 with subsequent NSR.  Started on Eliquis 7/12 and dronedarone 7/13. Prior to discharge, Mr Brown expressed he may rethink undergoing palliative chemo because he would like to spend more time at home rather than in the  hospital.  He expressed he wanted this week to be at home before starting chemo however he had to spend most of this week in the hospital given recent events.   He was advised to have discussions with his family and with Dr. Nichols given this is a big decision.  Orders for follow up with EP and general cardiology were placed prior to discharge    Consults:   Consults         Status Ordering Provider     Inpatient consult to Cardiology  Once     Provider:  (Not yet assigned)    CAROLINE Erazo              Pending Diagnostic Studies:     None          Final Active Diagnoses:    Diagnosis Date Noted POA    PRINCIPAL PROBLEM:  Atrial flutter [I48.92] 07/12/2017 Yes    Tachycardia [R00.0] 07/11/2017 Yes      Problems Resolved During this Admission:    Diagnosis Date Noted Date Resolved POA     No new Assessment & Plan notes have been filed under this hospital service since the last note was generated.  Service: Cardiology      Discharged Condition: fair    Disposition: Home or Self Care    Follow Up:  Follow-up Information     [facility] CARDIOLOG[facility].    Specialty:  Cardiology  Contact information:  [address] [place] [address] [id number] [phone]               Patient Instructions:     Ambulatory Referral to Electrophysiology   Referral Priority: Routine Referral Type: Consultation   Referral Reason: Specialty Services Required    Referred to Provider: HARJINDER BONILLA Requested Specialty: Electrophysiology   Number of Visits Requested: 1      Ambulatory Referral to Cardiology   Referral Priority: Routine Referral Type: Consultation   Referral Reason: Specialty Services Required    Requested Specialty: Cardiology    Number of Visits Requested: 1      Diet Cardiac     Activity as tolerated       Medications:  Reconciled Home Medications:   Current Discharge Medication List      START taking these medications    Details   apixaban 5 mg Tab Take 1 tablet (5 mg total) by mouth 2 (two) times daily.  Qty:  60 tablet, Refills: 2      dronedarone (MULTAQ) 400 mg Tab Take 1 tablet (400 mg total) by mouth 2 (two) times daily with meals.  Qty: 30 tablet, Refills: 2      metoprolol succinate (TOPROL-XL) 25 MG 24 hr tablet Take 1 tablet (25 mg total) by mouth once daily.  Qty: 60 tablet, Refills: 2         CONTINUE these medications which have NOT CHANGED    Details   aspirin (ECOTRIN) 81 MG EC tablet Take 1 tablet (81 mg total) by mouth once daily.  Refills: 0      benzonatate (TESSALON) 100 MG capsule Take 100 mg by mouth 3 (three) times daily as needed for Cough.      cyproheptadine (PERIACTIN) 4 mg tablet Take 1 tablet (4 mg total) by mouth 4 (four) times daily.  Qty: 120 tablet, Refills: 3    Associated Diagnoses: Anorexia      DOCUSATE SODIUM (COLACE ORAL) Take 1 capsule by mouth once daily.       folic acid (FOLVITE) 1 MG tablet Take 1 tablet (1 mg total) by mouth once daily. Start today  Qty: 60 tablet, Refills: 6    Associated Diagnoses: Nausea      morphine (MS CONTIN) 15 MG 12 hr tablet Take 1 tablet (15 mg total) by mouth 2 (two) times daily.  Qty: 60 tablet, Refills: 0    Associated Diagnoses: Neoplasm related pain      multivitamin (THERAGRAN) per tablet Take 1 tablet by mouth once daily.      omeprazole (PRILOSEC) 20 MG capsule Take 1 capsule (20 mg total) by mouth once daily.  Qty: 30 capsule, Refills: 11      ondansetron (ZOFRAN) 8 MG tablet Take 1 tablet (8 mg total) by mouth 4 (four) times daily as needed for Nausea.  Qty: 60 tablet, Refills: 2    Associated Diagnoses: Nausea      oxycodone-acetaminophen (PERCOCET) 5-325 mg per tablet Take 1 tablet by mouth every 4 (four) hours as needed for Pain.  Qty: 120 tablet, Refills: 0    Associated Diagnoses: Neoplasm related pain      rosuvastatin (CRESTOR) 20 MG tablet Take 1 tablet (20 mg total) by mouth once daily.  Qty: 30 tablet, Refills: 11      ranitidine (ZANTAC) 150 MG tablet Take 1 tablet (150 mg total) by mouth 2 (two) times daily.  Qty: 60 tablet,  Refills: 1    Associated Diagnoses: Gastroesophageal reflux disease, esophagitis presence not specified             Time spent on the discharge of patient: 45 minutes    Ibeth Mckeon MD  Cardiology  [facility]

## 2017-07-13 NOTE — PROGRESS NOTES
PT D/C HOME PER MD ORDERS. TELE REMOVED, IV ACCESS REMOVED AND INTACT X1. VSS, NAD AND NO COMPLAINTS AT THIS TIME. PT GIVEN AND EXPLAINED MED LIST AND PRESCRIPTIONS. PT VERBALIZES COMPLETE UNDERSTANDING OF ALL D/C INSTRUCTIONS AND FOLLOW UP CARE. PT AWAITING FAMILY ARRIVAL AND PT ESCORT. WILL CONTINUE TO MONITOR

## 2017-07-13 NOTE — HOSPITAL COURSE
Underwent PADMINI/CV on 7/12 with subsequent NSR.  Started on Eliquis 7/12 and dronedarone 7/13. Prior to discharge, Mr Brown expressed he may rethink undergoing palliative chemo because he would like to spend more time at home rather than in the hospital.  He expressed he wanted this week to be at home before starting chemo however he had to spend most of this week in the hospital given recent events.   He was advised to have discussions with his family and with Dr. Nichols given this is a big decision.  Orders for follow up with EP and general cardiology were placed prior to discharge

## 2017-07-13 NOTE — PLAN OF CARE
Problem: Patient Care Overview  Goal: Plan of Care Review  Outcome: Ongoing (interventions implemented as appropriate)  Pt. Remains free from falls/ injury/trauma. No complaints of CP, SOB, pain/discomfort. Pt remain in sinus rhythm throughout the night. HR 80s-90s.Plan of Care reviewed with patient. VSS and NADN. Will continue to monitor.

## 2017-07-13 NOTE — HPI
71 y.o. gentleman that presents today after a home health nurse was taking his vitals earlier today and noticed that he had an elevated HR in the 150s. He presented to the ER and was found to be in 2:1 typical AFL.      PMHx of aortic dissection s/p repair in April 2016, HTN, non-obstructive CAD, CKD II.     He has malignant mesothelioma for which he is about to start chemotherapy for, had VATS and PleurX catheter.  Underwent PADMINI/DCCV prior to aortic dissection repair in April 2016. Not placed on anti-coagulation at that time in anticipation for aortic dissection repair. Reports he has not been on anti-coagulation since that time. On 2L NC O2 at baseline. In the ER, he was given 5 mg of IVP Lopressor x 3 and Cardizem 10 mg IVP x 1 and started on Cardizem gtt without improvement in his HR. Required esmolol gtt c PO Lopressor in April 2016 to control HR.

## 2017-07-13 NOTE — PLAN OF CARE
[facility]    HOME HEALTH ORDERS  FACE TO FACE ENCOUNTER    Patient Name: Clint Brown  YOB: 1945    PCP: Jean Claude Griffith DO   PCP Address: [address] [facility] / [place] [address]  PCP Phone Number: [phone]  PCP Fax: [phone]    Encounter Date: 07/13/2017    Admit to Home Health    Diagnoses:  Active Hospital Problems    Diagnosis  POA    Atrial flutter [I48.92]  Yes    Tachycardia [R00.0]  Yes      Resolved Hospital Problems    Diagnosis Date Resolved POA   No resolved problems to display.       Future Appointments  Date Time Provider Department Center   7/19/2017 10:45 AM LAB, HEMONC SAME DAY [facility]   7/19/2017 11:45 AM Josephine Nichols MD [facility] HEM ONC [facility]   7/20/2017 9:00 AM [facility]MO [facility]   7/21/2017 9:00 AM [facility]           I have seen and examined this patient face to face today. My clinical findings that support the need for the home health skilled services and home bound status are the following:  Weakness/numbness causing balance and gait disturbance due to Heart Failure and Malignancy/Cancer making it taxing to leave home.  Requiring assistive device to leave home due to unsteady gait caused by  Heart Failure, Weakness/Debility and Malignancy/Cancer.    Allergies:  Review of patient's allergies indicates:   Allergen Reactions    Iodine and iodide containing products Nausea And Vomiting    Lipitor [atorvastatin] Other (See Comments)     Leg cramps    Cephalexin Nausea And Vomiting       Diet: cardiac diet    Activities: activity as tolerated    Nursing:   SN to complete comprehensive assessment including routine vital signs. Instruct on disease process and s/s of complications to report to MD. Review/verify medication list sent home with the patient at time of discharge  and instruct patient/caregiver as needed. Frequency may be adjusted depending on start of care  date.    Notify MD if SBP > 160 or < 90; DBP > 90 or < 50; HR > 120 or < 50; Temp > 101;     PLEASE RESUME previous Home health orders placed on previous admission. No changes were made to his management of his Pleurex cath.     Medications: Review discharge medications with patient and family and provide education.      Current Discharge Medication List      START taking these medications    Details   apixaban 5 mg Tab Take 1 tablet (5 mg total) by mouth 2 (two) times daily.  Qty: 60 tablet, Refills: 2      dronedarone (MULTAQ) 400 mg Tab Take 1 tablet (400 mg total) by mouth 2 (two) times daily with meals.  Qty: 30 tablet, Refills: 2      metoprolol succinate (TOPROL-XL) 25 MG 24 hr tablet Take 1 tablet (25 mg total) by mouth once daily.  Qty: 60 tablet, Refills: 2         CONTINUE these medications which have NOT CHANGED    Details   aspirin (ECOTRIN) 81 MG EC tablet Take 1 tablet (81 mg total) by mouth once daily.  Refills: 0      benzonatate (TESSALON) 100 MG capsule Take 100 mg by mouth 3 (three) times daily as needed for Cough.      cyproheptadine (PERIACTIN) 4 mg tablet Take 1 tablet (4 mg total) by mouth 4 (four) times daily.  Qty: 120 tablet, Refills: 3    Associated Diagnoses: Anorexia      DOCUSATE SODIUM (COLACE ORAL) Take 1 capsule by mouth once daily.       folic acid (FOLVITE) 1 MG tablet Take 1 tablet (1 mg total) by mouth once daily. Start today  Qty: 60 tablet, Refills: 6    Associated Diagnoses: Nausea      morphine (MS CONTIN) 15 MG 12 hr tablet Take 1 tablet (15 mg total) by mouth 2 (two) times daily.  Qty: 60 tablet, Refills: 0    Associated Diagnoses: Neoplasm related pain      multivitamin (THERAGRAN) per tablet Take 1 tablet by mouth once daily.      omeprazole (PRILOSEC) 20 MG capsule Take 1 capsule (20 mg total) by mouth once daily.  Qty: 30 capsule, Refills: 11      ondansetron (ZOFRAN) 8 MG tablet Take 1 tablet (8 mg total) by mouth 4 (four) times daily as needed for Nausea.  Qty: 60  tablet, Refills: 2    Associated Diagnoses: Nausea      oxycodone-acetaminophen (PERCOCET) 5-325 mg per tablet Take 1 tablet by mouth every 4 (four) hours as needed for Pain.  Qty: 120 tablet, Refills: 0    Associated Diagnoses: Neoplasm related pain      rosuvastatin (CRESTOR) 20 MG tablet Take 1 tablet (20 mg total) by mouth once daily.  Qty: 30 tablet, Refills: 11      ranitidine (ZANTAC) 150 MG tablet Take 1 tablet (150 mg total) by mouth 2 (two) times daily.  Qty: 60 tablet, Refills: 1    Associated Diagnoses: Gastroesophageal reflux disease, esophagitis presence not specified             I certify that this patient is confined to his home and needs intermittent skilled nursing care.

## 2017-07-13 NOTE — NURSING TRANSFER
Nursing Transfer Note      7/12/2017     Transfer From: [id number]    Transfer via wheelchair    Transfer with cardiac monitoring    Transported by RN    Medicines sent: none    Chart send with patient: Yes    Patient reassessed at: 7/12/201710:20    Upon arrival to floor: cardiac monitor applied, patient oriented to room, call bell in reach and bed in lowest position

## 2017-07-13 NOTE — PROGRESS NOTES
Progress Note                                                                Team: Networked reference to record PCT    Staff: Shiela Gardner MD    Admit Date: 7/11/2017                     LOS: 2    SUBJECTIVE:     FOLLOW-UP FOR: <principal problem not specified>    H&P  71 y.o. gentleman that presents today after a home health nurse was taking his vitals earlier today and noticed that he had an elevated HR in the 150s. He presented to the ER and was found to be in 2:1 typical AFL.      PMHx of aortic dissection s/p repair in April 2016, HTN, non-obstructive CAD, CKD II.     He has malignant mesothelioma for which he is about to start chemotherapy for, had VATS and PleurX catheter.  Underwent PADMINI/DCCV prior to aortic dissection repair in April 2016. Not placed on anti-coagulation at that time in anticipation for aortic dissection repair. Reports he has not been on anti-coagulation since that time. On 2L NC O2 at baseline. In the ER, he was given 5 mg of IVP Lopressor x 3 and Cardizem 10 mg IVP x 1 and started on Cardizem gtt without improvement in his HR. Required esmolol gtt c PO Lopressor in April 2016 to control HR.     HOSPITAL COURSE:  Underwent PADMINI/CV on 7/12 with subsequent NSR.  Started on Eliquis 7/12 and dronedarone 7/13. Prior to discharge, Mr Brown expressed he may rethink undergoing palliative chemo because he would like to spend more time at home rather than in the hospital.  He expressed he wanted this week to be at home before starting chemo however he had to spend most of this week in the hospital given recent events.   He was advised to have discussions with his family and with Dr. Nichols given this is a big decision.  Orders for follow up with EP and general cardiology were placed prior to discharge.    INTERVAL HISTORY:  Remains in NSR.  On Eliquis on dronedarone.      MEDICATIONS:  Scheduled:   apixaban  5 mg Oral BID    dronedarone  400 mg Oral BID WM    famotidine  20 mg Oral BID     magnesium sulfate IVPB  4 g Intravenous Once    morphine  15 mg Oral BID    pantoprazole  40 mg Oral Daily    rosuvastatin  20 mg Oral QHS     Continuous:     PRN:  dextrose 50%, dextrose 50%, glucagon (human recombinant), glucose, glucose, ondansetron, oxycodone-acetaminophen, sodium chloride 0.9%          OBJECTIVE:     VITALS    Temp:  [97.7 °F (36.5 °C)-98.7 °F (37.1 °C)]   Pulse:  [82-95]   Resp:  [16-20]   BP: ()/(51-67)   SpO2:  [93 %-100 %]   Body mass index is 25.11 kg/m².    Intake/Output Summary (Last 24 hours) at 07/13/17 0955  Last data filed at 07/13/17 0600   Gross per 24 hour   Intake           516.06 ml   Output              270 ml   Net           246.06 ml         PHYSICAL EXAM  Physical Exam   Constitutional: He appears well-developed.   HENT:   Head: Normocephalic and atraumatic.   Eyes: Conjunctivae and EOM are normal. Right eye exhibits no discharge. Left eye exhibits no discharge.   Neck: Normal range of motion.   Cardiovascular: Normal rate and regular rhythm.    No murmur heard.  Pulmonary/Chest: Effort normal. No respiratory distress.   PleurX in place   Abdominal: Soft. Bowel sounds are normal.   Musculoskeletal: Normal range of motion.   Neurological: He is alert.   Skin: Skin is warm and dry.         LABS  CBC:     Recent Labs  Lab 07/11/17  1819 07/12/17  0226 07/13/17  0547   WBC 9.67 10.03 8.17   HGB 13.5* 11.5* 10.3*   HCT 42.1 35.5* 32.9*    241 255     CMP:     Recent Labs  Lab 07/11/17  1941 07/12/17  0226 07/13/17  0547    137 138   K 4.7 4.2 4.2    105 106   CO2 27 21* 24   * 146* 85   BUN 20 22 21   CREATININE 1.2 1.1 0.9   CALCIUM 9.2 9.0 9.1   PROT 6.8 6.3 5.7*   ALBUMIN 2.0* 1.9* 1.6*   BILITOT 0.7 0.7 0.7   ALKPHOS 168* 151* 128   AST 27 23 28   ALT 38 35 35   ANIONGAP 9 11 8   EGFRNONAA >60.0 >60.0 >60.0           Imaging Results          X-Ray Chest AP Portable (Final result)  Result time 07/11/17 18:53:02    Final result by Kirby PERAZA  Carlos VELÁZQUEZ MD (07/11/17 18:53:02)                 Impression:     Left lower lobe pneumonia and pleural fluid.      Electronically signed by: ROCÍO REAVES  Date:     07/11/17  Time:    18:53              Narrative:    One view: Central line in innominate vein.  There is cardiomegaly, left basal edema, left pleural fluid.  Right lung is clear.                                  Microbiology:     Microbiology Results (last 7 days)     ** No results found for the last 168 hours. **          ASSESSMENT/PLAN:     Current Problems List:  Active Hospital Problems    Diagnosis  POA    Atrial flutter [I48.92]  Yes    Tachycardia [R00.0]  Yes      Resolved Hospital Problems    Diagnosis Date Resolved POA   No resolved problems to display.         ASSESSMENT/PLAN    Atrial Flutter  - Patient was previously on Flecainide post PADMINI/DCCV in April 2016, D/C'ed prior to surgery by CTS  --s/p PADMINI/DCCV 7/12/17, now in NSR  --Started Eliquis 7/12, dronedarone 7/13  - (did not start amiodarone given potential for lung toxicity in the setting of mesothelioma)     HTN  - holding antihypertensives for now     Malignant mesothelioma  - Plan for palliative chemotherapy as outpatient, however he expressed he may rethink his decision to undergo palliative chemotherapy.  - Continue pain control with long acting MS contin          Code:  Full Code    VTE Risk Mitigation         Ordered     apixaban tablet 5 mg  2 times daily     Route:  Oral        07/12/17 1322     High Risk of VTE  Once      07/11/17 0199              DISPO: stepping down now that he is not requiring esmolol infusion        Staff Attestation to follow.  Case discussed with Dr. David Mckeon, PGY-2

## 2017-07-14 ENCOUNTER — TELEPHONE (OUTPATIENT)
Dept: INTERNAL MEDICINE | Facility: CLINIC | Age: 72
End: 2017-07-14

## 2017-07-14 NOTE — TELEPHONE ENCOUNTER
----- Message from Hailey Aldana sent at 7/14/2017  2:23 PM CDT -----  Contact: Josephine - [facility] at [phone]  Josephine has questions/concerns regarding pt's multiple medications. Please call/advise.

## 2017-07-17 ENCOUNTER — HOSPITAL ENCOUNTER (EMERGENCY)
Facility: HOSPITAL | Age: 72
Discharge: HOME | End: 2017-07-17
Attending: EMERGENCY MEDICINE
Payer: MEDICARE

## 2017-07-17 VITALS
TEMPERATURE: 99 F | OXYGEN SATURATION: 96 % | RESPIRATION RATE: 18 BRPM | HEART RATE: 82 BPM | DIASTOLIC BLOOD PRESSURE: 66 MMHG | SYSTOLIC BLOOD PRESSURE: 118 MMHG | HEIGHT: 70 IN | WEIGHT: 165 LBS | BODY MASS INDEX: 23.62 KG/M2

## 2017-07-17 DIAGNOSIS — I48.92 ATRIAL FLUTTER: ICD-10-CM

## 2017-07-17 DIAGNOSIS — R00.0 TACHYCARDIA: ICD-10-CM

## 2017-07-17 LAB
ANION GAP SERPL CALC-SCNC: 13 MMOL/L
BASOPHILS # BLD AUTO: 0.03 K/UL
BASOPHILS NFR BLD: 0.2 %
BUN SERPL-MCNC: 22 MG/DL
CALCIUM SERPL-MCNC: 9.5 MG/DL
CHLORIDE SERPL-SCNC: 103 MMOL/L
CO2 SERPL-SCNC: 22 MMOL/L
CREAT SERPL-MCNC: 1.3 MG/DL
DIFFERENTIAL METHOD: ABNORMAL
EOSINOPHIL # BLD AUTO: 0.1 K/UL
EOSINOPHIL NFR BLD: 0.4 %
ERYTHROCYTE [DISTWIDTH] IN BLOOD BY AUTOMATED COUNT: 14.6 %
EST. GFR  (AFRICAN AMERICAN): >60 ML/MIN/1.73 M^2
EST. GFR  (NON AFRICAN AMERICAN): 54.9 ML/MIN/1.73 M^2
GLUCOSE SERPL-MCNC: 113 MG/DL
HCT VFR BLD AUTO: 37.4 %
HGB BLD-MCNC: 11.6 G/DL
INR PPP: 1.4
LYMPHOCYTES # BLD AUTO: 1.1 K/UL
LYMPHOCYTES NFR BLD: 8.4 %
MAGNESIUM SERPL-MCNC: 1.4 MG/DL
MCH RBC QN AUTO: 27.5 PG
MCHC RBC AUTO-ENTMCNC: 31 %
MCV RBC AUTO: 89 FL
MONOCYTES # BLD AUTO: 1.3 K/UL
MONOCYTES NFR BLD: 9.8 %
NEUTROPHILS # BLD AUTO: 10.7 K/UL
NEUTROPHILS NFR BLD: 80.7 %
PLATELET # BLD AUTO: 330 K/UL
PMV BLD AUTO: 10.6 FL
POTASSIUM SERPL-SCNC: 4.3 MMOL/L
PROTHROMBIN TIME: 14 SEC
RBC # BLD AUTO: 4.22 M/UL
SODIUM SERPL-SCNC: 138 MMOL/L
TROPONIN I SERPL DL<=0.01 NG/ML-MCNC: 0.01 NG/ML
TSH SERPL DL<=0.005 MIU/L-ACNC: 2.42 UIU/ML
WBC # BLD AUTO: 13.24 K/UL

## 2017-07-17 PROCEDURE — 83735 ASSAY OF MAGNESIUM: CPT

## 2017-07-17 PROCEDURE — 93010 ELECTROCARDIOGRAM REPORT: CPT | Mod: ,,, | Performed by: INTERNAL MEDICINE

## 2017-07-17 PROCEDURE — 84443 ASSAY THYROID STIM HORMONE: CPT

## 2017-07-17 PROCEDURE — 99284 EMERGENCY DEPT VISIT MOD MDM: CPT | Mod: 25

## 2017-07-17 PROCEDURE — 99283 EMERGENCY DEPT VISIT LOW MDM: CPT | Mod: ,,, | Performed by: EMERGENCY MEDICINE

## 2017-07-17 PROCEDURE — 99284 EMERGENCY DEPT VISIT MOD MDM: CPT | Mod: ,,, | Performed by: INTERNAL MEDICINE

## 2017-07-17 PROCEDURE — 84484 ASSAY OF TROPONIN QUANT: CPT

## 2017-07-17 PROCEDURE — 85025 COMPLETE CBC W/AUTO DIFF WBC: CPT

## 2017-07-17 PROCEDURE — 93005 ELECTROCARDIOGRAM TRACING: CPT

## 2017-07-17 PROCEDURE — 80048 BASIC METABOLIC PNL TOTAL CA: CPT

## 2017-07-17 PROCEDURE — 85610 PROTHROMBIN TIME: CPT

## 2017-07-17 PROCEDURE — 93010 ELECTROCARDIOGRAM REPORT: CPT | Mod: 76,,, | Performed by: INTERNAL MEDICINE

## 2017-07-17 NOTE — ED PROVIDER NOTES
Encounter Date: 7/17/2017    SCRIBE #1 NOTE: I, Fabiana Deleon, am scribing for, and in the presence of,  Dr. Pradhan. I have scribed the following portions of the note - the EKG reading and the Resident attestation.       History     Chief Complaint   Patient presents with    Tachycardia     was shocked 2 weeks ago for atrial fib and now is back with elevated HR     HPI   The pt is a 72 YO M with history of aflutter, prior MI, CAD, thoracic aortic dissection, recently discharged 7/11 for a flutter (electrically cardioverted). Pt presents to the ED after seeing on home pulse ox that his heart rate was in the 140s the prior evening he states that he has remained in the 140s the entire time. Pt denies having Cp, SOB, Weakness, lightheadedness, confusion, syncope. During interview patient self converted to the 80s.        PMH: HTN, CKD, Mesothelioma  PMD: [facility]  Allergies: atorvastatin, cephalexin, iodine  Family: non contributory  Social: daily smoker, denies ETOH use        Review of patient's allergies indicates:   Allergen Reactions    Iodine and iodide containing products Nausea And Vomiting    Lipitor [atorvastatin] Other (See Comments)     Leg cramps    Cephalexin Nausea And Vomiting     Past Medical History:   Diagnosis Date    Atrial flutter     cardioversion 4/12/16    Chronic kidney disease     Coronary artery disease     [facility] 4/1/16: 50% proximal RAMUS    Hypertension     Kidney stones     Old 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/20/2016    3/16    Thoracic aortic dissection     Type A dissection s/p repair 4/13/16     Past Surgical History:   Procedure Laterality Date    APPENDECTOMY      CARDIAC CATHETERIZATION      KIDNEY STONE SURGERY      4 surgeries     History reviewed. No pertinent family history.  Social History   Substance Use Topics    Smoking status: Current Every Day Smoker     Packs/day: 0.25     Years: 55.00     Types: Cigarettes    Smokeless tobacco: Never Used      Comment:  has Chantix at home    Alcohol use No     Review of Systems   Constitutional: Negative for activity change, diaphoresis and fatigue.   Respiratory: Negative for chest tightness and shortness of breath.    Cardiovascular: Negative for chest pain and leg swelling.   Gastrointestinal: Negative for abdominal distention and abdominal pain.   Endocrine: Negative for cold intolerance and heat intolerance.   Skin: Negative for color change and pallor.   Neurological: Negative for dizziness, seizures, syncope, weakness and light-headedness.   Psychiatric/Behavioral: Negative for confusion.       Physical Exam     Initial Vitals [07/17/17 1315]   BP Pulse Resp Temp SpO2   118/75 (!) 137 18 98.6 °F (37 °C) 97 %      MAP       89.33         Physical Exam    Vitals reviewed.  Constitutional: He is not diaphoretic. No distress.   HENT:   Head: Normocephalic and atraumatic.   Eyes: EOM are normal. Pupils are equal, round, and reactive to light.   Neck: Normal range of motion.   Cardiovascular: Regular rhythm and intact distal pulses. Tachycardia present.    No murmur heard.  Tachycardic   Abdominal: Soft. Bowel sounds are normal. There is no tenderness.   Musculoskeletal: He exhibits no edema.   Neurological: He is alert and oriented to person, place, and time.   Skin: Capillary refill takes less than 2 seconds.         ED Course   Procedures  Labs Reviewed   CBC W/ AUTO DIFFERENTIAL - Abnormal; Notable for the following:        Result Value    WBC 13.24 (*)     RBC 4.22 (*)     Hemoglobin 11.6 (*)     Hematocrit 37.4 (*)     MCHC 31.0 (*)     RDW 14.6 (*)     Gran # 10.7 (*)     Mono # 1.3 (*)     Gran% 80.7 (*)     Lymph% 8.4 (*)     All other components within normal limits   BASIC METABOLIC PANEL - Abnormal; Notable for the following:     CO2 22 (*)     Glucose 113 (*)     eGFR if non  54.9 (*)     All other components within normal limits   MAGNESIUM - Abnormal; Notable for the following:     Magnesium 1.4  (*)     All other components within normal limits   PROTIME-INR - Abnormal; Notable for the following:     Prothrombin Time 14.0 (*)     INR 1.4 (*)     All other components within normal limits   TSH   TROPONIN I     EKG Readings: (Independently Interpreted)   13:22: Atrial flutter in 1-2 conduction with normal axis. T-wave inversions to inferior and lateral leads. 138 BPM   Other EKG Interpretations: 13:46: Normal sinus rhythm with normal axis. ST segments flattening to inferior and lateral leads. 87 BPM.     Imaging Results          X-Ray Chest PA And Lateral (Final result)  Result time 07/17/17 15:00:27    Final result by Leif Arroyo MD (07/17/17 15:00:27)                 Impression:     See above      Electronically signed by: Leif Arroyo MD  Date:     07/17/17  Time:    15:00              Narrative:    2 views    Postoperative changes and central venous catheter as before.  Mild cardiomegaly.  Left-sided pleural effusion and a combination of atelectatic changes.  The right lung is clear.                                   Medical Decision Making:   History:   Old Medical Records: I decided to obtain old medical records.  Independently Interpreted Test(s):   I have ordered and independently interpreted EKG Reading(s) - see prior notes  Clinical Tests:   Lab Tests: Reviewed and Ordered  Radiological Study: Reviewed and Ordered  Medical Tests: Ordered and Reviewed  Other:   I have discussed this case with another health care provider.       <> Summary of the Discussion: Cardiology             Scribe Attestation:   Scribe #1: I performed the above scribed service and the documentation accurately describes the services I performed. I attest to the accuracy of the note.    Attending Attestation:   Physician Attestation Statement for Resident:  As the supervising MD   Physician Attestation Statement: I have personally seen and examined this patient.   I agree with the above history. -: 71 y.o. male with history of  Afib and recent cardioversion presents for evaluation of tachycardia.    As the supervising MD I agree with the above PE.    As the supervising MD I agree with the above treatment, course, plan, and disposition.  I have reviewed and agree with the residents interpretation of the following: lab data, x-rays and EKG.          Physician Attestation for Scribe:  Physician Attestation Statement for Scribe #1: I, Dr. Pradhan, reviewed documentation, as scribed by Fabiana Deleon in my presence, and it is both accurate and complete.                 ED Course     Resident MDM PGY-1  The pt is a 72 YO M with history of aflutter , CAD, thoracic aortic dissection presenting in aflutter. Pt converted one week ago after medical therapy and cardioconversion. Pt self converted while in Ed to the 80s. No complaints of chest pain, SOB, syncope, confusion.Physical exam WNL. Vitals Stable. Differentials include aflutter less likely due to SVT, afib, vtach, anemia, ACS, tachycardia 2/2 hyperthyroidism.     Plan: cbc, bmp, trop, pt/inr, CXR, BNP, repeat EKG    Tamiko Santa MD MPH  [facility] PGY-1  2:22 PM 7/17/2017    Reevaluation HO-1: Pts hear rate remains in the 80s vitals stable. Labs reviewed WNL  (troponin 0.012). CXR shows mild cardiomegaly. Left sided pleural effusion and combination of atelectatic changes. Cardiology consulted.  Tamiko Santa MD MPH  [facility] PGY-1  2:55 PM 7/17/2017    Reevaluation HO-1: Cardiology has seen patient and will follow up outpatient. Discharge home.  Tamiko Santa MD MPH  [facility] PGY-1  5:02 PM 7/17/2017          Clinical Impression:   Diagnoses of Tachycardia and Atrial flutter were pertinent to this visit.    Disposition:   Disposition: Discharged  Condition: Stable                        Gilmer Pradhan MD  07/22/17 2045

## 2017-07-17 NOTE — ED NOTES
Patient identifiers for Clint Brown 71 y.o. male checked and correct.  Chief Complaint   Patient presents with    Tachycardia     was shocked 2 weeks ago for atrial fib and now is back with elevated HR     Past Medical History:   Diagnosis Date    Atrial flutter     cardioversion 4/12/16    Chronic kidney disease     Coronary artery disease     [facility] 4/1/16: 50% proximal RAMUS    Hypertension     Kidney stones     Old 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/20/2016    3/16    Thoracic aortic dissection     Type A dissection s/p repair 4/13/16     Allergies reported:   Review of patient's allergies indicates:   Allergen Reactions    Iodine and iodide containing products Nausea And Vomiting    Lipitor [atorvastatin] Other (See Comments)     Leg cramps    Cephalexin Nausea And Vomiting       LOC: Patient is awake, alert, and aware of environment with an appropriate affect. Patient is oriented x 3 and speaking appropriately.  APPEARANCE: Patient resting comfortably and in no acute distress. Patient is clean and well groomed, patient's clothing is properly fastened.  SKIN: The skin is warm and dry. Patient has normal skin turgor and moist mucus membranes. Skin is intact; no bruising or breakdown noted.  MUSKULOSKELETAL: Patient is moving all extremities well, no obvious deformities noted. Pulses intact.   RESPIRATORY: Airway is open and patent. Respirations are spontaneous and non-labored with normal effort and rate, BBS=clear  CARDIAC: Patient has a normal rate and rhythm now, However on arrival pt presented in AFIB RVR, but has since converted in the room. 87on cardiac monitor,No peripheral edema noted.   ABDOMEN: No distention noted. Bowel sounds active in all 4 quadrants. Soft and non-tender upon palpation. Pt has a drain on the LUQ, pt states the drain is there to drain fluid off his lungs.  NEUROLOGICAL: pupils 4mm, PERRL. Facial expression is symmetrical. Hand grasps are equal bilaterally. Normal sensation in all  extremities when touched with finger.

## 2017-07-17 NOTE — CONSULTS
[facility]  Cardiology Consult Note    Attending Physician: Gilmer Pradhan MD  Reason for Consult: Atrial flutter with RVR    HPI:   Mr. Brown is a 72 yo M with a history of metastatic mesothelioma, Type A aortic dissection s/p repair in April 2016, atrial flutter, CAD, CKD, and HTN who presents with 2:1 atrial flutter.     He has had 3 previous episodes of atrial flutter, one in April 2016 for which he was cardioverted, last week and required a second cardioversion.  Today's episode started around 1:30 PM yesterday.  On arrival to the ED he was still in 2:1 flutter, but converted back to NSR spontaneously.  He was started on Multaq during the last hospitalization and declined atrial flutter ablation at that time.    He was recently diagnosed with metastatic mesothelioma and is due to start palliative chemo soon.      Review of Systems   Review of Systems   Constitution: Negative for chills, fever and weakness.   HENT: Negative for ear pain, headaches and hearing loss.    Eyes: Negative for pain and photophobia.   Cardiovascular: Negative for dyspnea on exertion, leg swelling, near-syncope and orthopnea.   Respiratory: Negative for cough and shortness of breath.    Skin: Negative for dry skin, flushing and itching.   Musculoskeletal: Negative for gout, joint pain and joint swelling.   Gastrointestinal: Negative for abdominal pain, constipation and diarrhea.   Genitourinary: Negative for dysuria, flank pain and frequency.   Neurological: Negative for excessive daytime sleepiness, dizziness and focal weakness.         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      Current Outpatient Prescriptions on File Prior to Encounter   Medication Sig Dispense Refill    apixaban 5 mg Tab Take 1 tablet (5 mg total) by mouth 2 (two) times daily. 60 tablet 2    aspirin (ECOTRIN) 81 MG EC tablet Take 1 tablet (81 mg total) by mouth once daily.  0    benzonatate (TESSALON) 100 MG capsule Take 100 mg by mouth 3 (three) times daily as needed for Cough.      cyproheptadine (PERIACTIN) 4 mg tablet Take 1 tablet (4 mg total) by mouth 4 (four) times daily. 120 tablet 3    DOCUSATE SODIUM (COLACE ORAL) Take 1 capsule by mouth once daily.       dronedarone (MULTAQ) 400 mg Tab Take 1 tablet (400 mg total) by mouth 2 (two) times daily with meals. 30 tablet 2    folic acid (FOLVITE) 1 MG tablet Take 1 tablet (1 mg total) by mouth once daily. Start today 60 tablet 6    metoprolol succinate (TOPROL-XL) 25 MG 24 hr tablet Take 1 tablet (25 mg total) by mouth once daily. 60 tablet 2    morphine (MS CONTIN) 15 MG 12 hr tablet Take 1 tablet (15 mg total) by mouth 2 (two) times daily. 60 tablet 0    multivitamin (THERAGRAN) per tablet Take 1 tablet by mouth once daily.      omeprazole (PRILOSEC) 20 MG capsule Take 1 capsule (20 mg total) by mouth once daily. (Patient taking differently: Take 20 mg by mouth every morning. ) 30 capsule 11    ondansetron (ZOFRAN) 8 MG tablet Take 1 tablet (8 mg total) by mouth 4 (four) times daily as needed for Nausea. 60 tablet 2    oxycodone-acetaminophen (PERCOCET) 5-325 mg per tablet Take 1 tablet by mouth every 4  (four) hours as needed for Pain. 120 tablet 0    ranitidine (ZANTAC) 150 MG tablet Take 1 tablet (150 mg total) by mouth 2 (two) times daily. 60 tablet 1    rosuvastatin (CRESTOR) 20 MG tablet Take 1 tablet (20 mg total) by mouth once daily. (Patient taking differently: Take 20 mg by mouth every evening. ) 30 tablet 11        Social History:     Social History   Substance Use Topics    Smoking status: Current Every Day Smoker     Packs/day: 0.25     Years: 55.00     Types: Cigarettes    Smokeless tobacco: Never Used      Comment: has Chantix at home    Alcohol use No        Family History:     History reviewed. No pertinent family history.     Physical Exam:     Vitals:  Temp:  [98.6 °F (37 °C)]   Pulse:  []   Resp:  [18]   BP: (118-125)/(71-75)   SpO2:  [95 %-97 %]   I/O's:  No intake or output data in the 24 hours ending 07/17/17 1645     Physical Exam   Constitutional: He is oriented to person, place, and time. He appears well-developed and well-nourished. No distress.   HENT:   Head: Normocephalic and atraumatic.   Eyes: Conjunctivae and EOM are normal. Pupils are equal, round, and reactive to light.   Neck: Normal range of motion. Neck supple. No JVD present.   Cardiovascular: Normal rate and regular rhythm.  Exam reveals no gallop and no friction rub.    No murmur heard.  Pulmonary/Chest: Effort normal. No respiratory distress. He has no wheezes.   Abdominal: Soft. Bowel sounds are normal. He exhibits no distension. There is no tenderness.   Musculoskeletal: Normal range of motion. He exhibits no edema.   Neurological: He is alert and oriented to person, place, and time. No cranial nerve deficit.   Skin: Skin is warm and dry. No rash noted. No erythema.         Labs:     Recent Results (from the past 336 hour(s))   CBC auto differential    Collection Time: 07/17/17  2:17 PM   Result Value Ref Range    WBC 13.24 (H) 3.90 - 12.70 K/uL    Hemoglobin 11.6 (L) 14.0 - 18.0 g/dL    Hematocrit 37.4 (L) 40.0  - 54.0 %    Platelets 330 150 - 350 K/uL   CBC auto differential    Collection Time: 17  5:47 AM   Result Value Ref Range    WBC 8.17 3.90 - 12.70 K/uL    Hemoglobin 10.3 (L) 14.0 - 18.0 g/dL    Hematocrit 32.9 (L) 40.0 - 54.0 %    Platelets 255 150 - 350 K/uL   CBC auto differential    Collection Time: 17  2:26 AM   Result Value Ref Range    WBC 10.03 3.90 - 12.70 K/uL    Hemoglobin 11.5 (L) 14.0 - 18.0 g/dL    Hematocrit 35.5 (L) 40.0 - 54.0 %    Platelets 241 150 - 350 K/uL       Recent Results (from the past 336 hour(s))   Basic metabolic panel    Collection Time: 17  2:17 PM   Result Value Ref Range    Sodium 138 136 - 145 mmol/L    Potassium 4.3 3.5 - 5.1 mmol/L    Chloride 103 95 - 110 mmol/L    CO2 22 (L) 23 - 29 mmol/L    BUN, Bld 22 8 - 23 mg/dL    Creatinine 1.3 0.5 - 1.4 mg/dL    Calcium 9.5 8.7 - 10.5 mg/dL    Anion Gap 13 8 - 16 mmol/L       Lab Results   Component Value Date    CHOL 104 (L) 2017    HDL 31 (L) 2017    LDLCALC 58.6 (L) 2017    TRIG 72 2017         Recent Labs  Lab 17  1819 17  1417   TROPONINI 0.009 0.012       No results found for: HGBA1C    Estimated Creatinine Clearance: 53.8 mL/min (based on Cr of 1.3).    Echo 17:    CONCLUSIONS     1 - Normal left ventricular systolic function (EF 60-65%).     2 - Normal right ventricular systolic function .     3 - Indeterminate LV diastolic function- can not assess as patient just underwent cardioversion.     4 - Mild tricuspid regurgitation.     5 - The estimated PA systolic pressure is greater than 25 mmHg.     EK:1 atrial flutter with a rate of 138.  Date: 17    Telemetry:   Telemetry currently shows:  Acute Events:    Assessment & Recommendations:   In summary, Mr. Brown is a 72 yo M with a history of metastatic mesothelioma, Type A aortic dissection s/p repair in 2016, atrial flutter, CAD, CKD, and HTN who presents with 2:1 atrial flutter.    #Atrial flutter: Pt has  now spontaneously converted back to NSR.  Discussed options with the patient including ablation.  Advised pt that this rhythm was likely to recur, especially as he begins chemotherapy and that ablation would likely be the most successful at preventing future occurrences.    Pt stated his daughter was having surgery tomorrow so he was reluctant to stay and have the procedure done.  He would rather schedule it for sometime next week.    Dr. Blackwood's nurse will get in touch with the patient to schedule.  OK to d/c from cardiology perspective.  If patient has recurrence of palpitations, he can double his dose of metoprolol.      He will continue Eliquis and Multaq for now.    Discussed with Dr. Blackwood.      Thank you for this consult. Further recommendations are pending the attending addendum. Please do not hesitate to page with questions.     Signed:  Yaneth Best MD, MPH  Cardiology Fellow, PGY-6  Pager: [phone]  7/17/2017 4:45 PM    Attending Addendum:

## 2017-07-18 ENCOUNTER — TELEPHONE (OUTPATIENT)
Dept: INTERNAL MEDICINE | Facility: CLINIC | Age: 72
End: 2017-07-18

## 2017-07-19 ENCOUNTER — OFFICE VISIT (OUTPATIENT)
Dept: HEMATOLOGY/ONCOLOGY | Facility: CLINIC | Age: 72
End: 2017-07-19
Payer: MEDICARE

## 2017-07-19 ENCOUNTER — LAB VISIT (OUTPATIENT)
Dept: LAB | Facility: HOSPITAL | Age: 72
End: 2017-07-19
Payer: MEDICARE

## 2017-07-19 VITALS — WEIGHT: 166.88 LBS | HEIGHT: 70 IN | BODY MASS INDEX: 23.89 KG/M2

## 2017-07-19 DIAGNOSIS — C45.9 MESOTHELIOMA: ICD-10-CM

## 2017-07-19 DIAGNOSIS — I48.3 TYPICAL ATRIAL FLUTTER: ICD-10-CM

## 2017-07-19 DIAGNOSIS — C45.9 MESOTHELIOMA: Primary | ICD-10-CM

## 2017-07-19 LAB
ALBUMIN SERPL BCP-MCNC: 1.8 G/DL
ALP SERPL-CCNC: 217 U/L
ALT SERPL W/O P-5'-P-CCNC: 59 U/L
ANION GAP SERPL CALC-SCNC: 11 MMOL/L
AST SERPL-CCNC: 70 U/L
BILIRUB SERPL-MCNC: 0.7 MG/DL
BUN SERPL-MCNC: 22 MG/DL
CALCIUM SERPL-MCNC: 9.3 MG/DL
CHLORIDE SERPL-SCNC: 104 MMOL/L
CO2 SERPL-SCNC: 24 MMOL/L
CREAT SERPL-MCNC: 1.2 MG/DL
ERYTHROCYTE [DISTWIDTH] IN BLOOD BY AUTOMATED COUNT: 14.4 %
EST. GFR  (AFRICAN AMERICAN): >60 ML/MIN/1.73 M^2
EST. GFR  (NON AFRICAN AMERICAN): >60 ML/MIN/1.73 M^2
GLUCOSE SERPL-MCNC: 123 MG/DL
HCT VFR BLD AUTO: 35.1 %
HGB BLD-MCNC: 10.8 G/DL
MAGNESIUM SERPL-MCNC: 1.6 MG/DL
MCH RBC QN AUTO: 27 PG
MCHC RBC AUTO-ENTMCNC: 30.8 %
MCV RBC AUTO: 88 FL
NEUTROPHILS # BLD AUTO: 7.1 K/UL
PHOSPHATE SERPL-MCNC: 3.5 MG/DL
PLATELET # BLD AUTO: 295 K/UL
PMV BLD AUTO: 9.6 FL
POTASSIUM SERPL-SCNC: 4.5 MMOL/L
PROT SERPL-MCNC: 6.6 G/DL
RBC # BLD AUTO: 4 M/UL
SODIUM SERPL-SCNC: 139 MMOL/L
WBC # BLD AUTO: 9.07 K/UL

## 2017-07-19 PROCEDURE — 1125F AMNT PAIN NOTED PAIN PRSNT: CPT | Mod: S$GLB,,, | Performed by: INTERNAL MEDICINE

## 2017-07-19 PROCEDURE — 99999 PR PBB SHADOW E&M-EST. PATIENT-LVL III: CPT | Mod: PBBFAC,,, | Performed by: INTERNAL MEDICINE

## 2017-07-19 PROCEDURE — 1159F MED LIST DOCD IN RCRD: CPT | Mod: S$GLB,,, | Performed by: INTERNAL MEDICINE

## 2017-07-19 PROCEDURE — 99214 OFFICE O/P EST MOD 30 MIN: CPT | Mod: S$GLB,,, | Performed by: INTERNAL MEDICINE

## 2017-07-19 NOTE — Clinical Note
Cancel chemo tomorrow. He is going to see Cardiology for his Atrial flutter and when cleared he will call us and we can see him back and schedule chemo

## 2017-07-19 NOTE — PROGRESS NOTES
Subjective:       Patient ID: Clint Brown is a 71 y.o. male.    Chief Complaint: Mesothelioma; 4/10 left chest pain; increase SOB; dry/prod (yellow) cough; and Dysphagia  ONCOLOGIC HISTORY: Mr. Clint Brown is a 71-year-old male who has had symptoms of some left-sided heaviness in his chest and pain, which prompted an Emergency Room visit in November 2016.  He had an x-ray done, which did not reveal any abnormalities and hence was discharged; however, his symptoms persisted.  He underwent a CT chest in 03/03/2017 and was noted to have some mild pleural effusion.  Eventually, he got referred to see Pulmonary  for the left-sided effusion that was tapped on 04/17/2017.  Pathology revealed malignant epithelial neoplasm.  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  He was also referred to see Dr. Armas and underwent another procedure on 05/31/2017, which was a VATS with the drainage of effusion and pathology now confirmed that it is malignant biphasic mesothelioma.  He had scans done, MRI brain on 06/05/2017, which did not reveal any evidence of metastatic disease.  PET scan on 06/13/2017 revealed a left retrocardiac pleura with an SUV max of 14.63, subcarinal lymphadenopathy with an SUV max of 15, left paraaortic upper abdomen lymphadenopathy with an SUV max of 16, left iliac bone metastasis with an SUV max of 15.5.  He is considered to be stage IV secondary to metastasis to the upper abdomen as well as to the left iliac bones,    HPIHe comes in to discuss starting chemo. He went to the ED with atrial flutter X 2 episodes. He was seen by Cardiology and felt to be refractory to multaq and was recommended RFA. He is supposed to call them to schedule his appointment.    His pain from mesothelioma is well controlled with MS Contin 15 mg bid and percocet (only 1/day). He is sleeping better.  Appetite is poor even with periactin and he lost 9 lbs.  Denies any chest pain,  his shortness of breath  Is at baseline..      Review of Systems   Constitutional: Positive for fatigue. Negative for appetite change and unexpected weight change.   HENT: Negative for mouth sores.    Eyes: Negative for visual disturbance.   Respiratory: Positive for shortness of breath. Negative for cough.    Cardiovascular: Negative for chest pain.   Gastrointestinal: Negative for abdominal pain and diarrhea.   Genitourinary: Negative for frequency.   Musculoskeletal: Negative for back pain.   Skin: Negative for rash.   Neurological: Negative for headaches.   Hematological: Negative for adenopathy.   Psychiatric/Behavioral: The patient is not nervous/anxious.    All other systems reviewed and are negative.      PMFSH: all information reviewed and updated as relevant to today's visit  Objective:      Physical Exam   Constitutional: He is oriented to person, place, and time. He appears cachectic.   HENT:   Mouth/Throat: No oropharyngeal exudate.   Cardiovascular: Normal rate and normal heart sounds.    Pulmonary/Chest: Effort normal and breath sounds normal. He has no wheezes.   Abdominal: Soft. Bowel sounds are normal. There is no tenderness.   Musculoskeletal: He exhibits no edema or tenderness.   Lymphadenopathy:     He has no cervical adenopathy.   Neurological: He is alert and oriented to person, place, and time. Coordination normal.   Skin: Skin is warm and dry. No rash noted.   Psychiatric: He has a normal mood and affect. Judgment and thought content normal.   Vitals reviewed.      LABS:  WBC   Date Value Ref Range Status   07/19/2017 9.07 3.90 - 12.70 K/uL Final     Hemoglobin   Date Value Ref Range Status   07/19/2017 10.8 (L) 14.0 - 18.0 g/dL Final     POC Hematocrit   Date Value Ref Range Status   04/13/2016 26 (L) 36 - 54 %PCV Final     Hematocrit   Date Value Ref Range Status   07/19/2017 35.1 (L) 40.0 - 54.0 % Final     Platelets   Date Value Ref Range Status   07/19/2017 295 150 - 350 K/uL Final      Gran #   Date Value Ref Range Status   07/19/2017 7.1 1.8 - 7.7 K/uL Final     Comment:     The ANC is based on a white cell differential from an   automated cell counter. It has not been microscopically   reviewed for the presence of abnormal cells. Clinical   correlation is required.         Chemistry        Component Value Date/Time     07/19/2017 1051    K 4.5 07/19/2017 1051     07/19/2017 1051    CO2 24 07/19/2017 1051    BUN 22 07/19/2017 1051    CREATININE 1.2 07/19/2017 1051     (H) 07/19/2017 1051        Component Value Date/Time    CALCIUM 9.3 07/19/2017 1051    ALKPHOS 217 (H) 07/19/2017 1051    AST 70 (H) 07/19/2017 1051    ALT 59 (H) 07/19/2017 1051    BILITOT 0.7 07/19/2017 1051    ESTGFRAFRICA >60.0 07/19/2017 1051    EGFRNONAA >60.0 07/19/2017 1051          Assessment:       1. Mesothelioma    2. Typical atrial flutter        Plan:        1,2. Hold chemo until Atrial flutter is treated. He will call once that is done and we will schedule him for chemo.    Above care plan was discussed with patient and accompanying wife and all questions were addressed to their satisfaction

## 2017-07-20 ENCOUNTER — PATIENT MESSAGE (OUTPATIENT)
Dept: ELECTROPHYSIOLOGY | Facility: CLINIC | Age: 72
End: 2017-07-20

## 2017-07-20 ENCOUNTER — PATIENT MESSAGE (OUTPATIENT)
Dept: HEMATOLOGY/ONCOLOGY | Facility: CLINIC | Age: 72
End: 2017-07-20

## 2017-07-20 DIAGNOSIS — G89.3 NEOPLASM RELATED PAIN: ICD-10-CM

## 2017-07-20 RX ORDER — MORPHINE SULFATE 15 MG/1
15 TABLET, FILM COATED, EXTENDED RELEASE ORAL 2 TIMES DAILY
Qty: 60 TABLET | Refills: 0 | Status: ON HOLD | OUTPATIENT
Start: 2017-07-20 | End: 2017-08-14 | Stop reason: HOSPADM

## 2017-07-20 RX ORDER — OXYCODONE AND ACETAMINOPHEN 5; 325 MG/1; MG/1
1 TABLET ORAL EVERY 4 HOURS PRN
Qty: 120 TABLET | Refills: 0 | Status: ON HOLD | OUTPATIENT
Start: 2017-07-20 | End: 2017-08-14 | Stop reason: HOSPADM

## 2017-07-20 NOTE — PROGRESS NOTES
ABLATION EDUCATION CHECKLIST    7/28/17 @ 6 AM  Report to Cardiology Waiting Room on 3rd floor of the Hospital    (Do not report to clinic)  Directions for Reporting to Cardiology Waiting Area in the Hospital  If you park in the Parking Garage:  Take elevators to the 2nd floor  Walk up ramp and turn right by Gold Elevators  Take elevator to the 3rd floor  Upon exiting the elevator, turn away from the clinic areas  Walk long garcia around to front of hospital to area with windows overlooking [facility]  Check in at Reception Desk  OR  If family is dropping you off:  Have them drop you off at the front of the Hospital  (Near the ER, where all the flags are hung).  Take the E elevators to the 3rd floor.  Check in at the Reception Desk in the waiting room.      Do not eat or drink anything after: 12 midnight on the night before your procedure    Medications:   HOLD your Eliquis 1 day prior to your procedure. Last dose: Wednesday, July 26, 2017.  You may take your usual morning medications with a sip of water.    You will be spending the night after your procedure  You will need someone to drive you home the day after your procedure.    Your pain during your procedure will be managed by the anesthesia team.     THE ABOVE INSTRUCTIONS WERE GIVEN TO THE PATIENT VERBALLY AND THEY VERBALIZED UNDERSTANDING.  THEY DO NOT REQUIRE ANY SPECIAL NEEDS AND DO NOT HAVE ANY LEARNING BARRIERS.    Any need to reschedule or cancel procedures, or any questions regarding your procedures should be addressed directly with the Arrhythmia Department Nurses at the following phone number: [phone]

## 2017-07-24 ENCOUNTER — TELEPHONE (OUTPATIENT)
Dept: ELECTROPHYSIOLOGY | Facility: CLINIC | Age: 72
End: 2017-07-24

## 2017-07-24 ENCOUNTER — TELEPHONE (OUTPATIENT)
Dept: INTERNAL MEDICINE | Facility: CLINIC | Age: 72
End: 2017-07-24

## 2017-07-24 NOTE — TELEPHONE ENCOUNTER
----- Message from Rikki Garcia sent at 7/24/2017  8:56 AM CDT -----  Contact: Funmilayo/[facility]  Please georgie Funmilayo at [phone]. Has question regarding PT/INR orders for today. Dr BENITEZ Blackwood    Thank you

## 2017-07-24 NOTE — TELEPHONE ENCOUNTER
----- Message from Jo Figueroa sent at 7/24/2017 11:39 AM CDT -----  Contact: [facility]/gonzalez/[phone]  [facility] called in regards to recertifing the pt and needs a verbal ok from the dr.      Please advise

## 2017-07-25 ENCOUNTER — TELEPHONE (OUTPATIENT)
Dept: ELECTROPHYSIOLOGY | Facility: CLINIC | Age: 72
End: 2017-07-25

## 2017-07-25 NOTE — TELEPHONE ENCOUNTER
Spoke to [email or web address]  ; Verbal order Per Dr. Griffith; okay for home health to continue.  Order to be signed when [facility]   Sends it.

## 2017-07-25 NOTE — TELEPHONE ENCOUNTER
Pt's HH Nurse, Maria Esther, called to let us know that she is with the Pt now and he has a HR in the 140's. She states that the Pt has been at this rate all day. He is very cold and sweaty, SOB and very tired. His B/P today is 118/70. Advised that the Pt should go to the ER but he has made several trips for the same reason and states he isn't going back.  Advised I would speak with Dr Blackwood and his condition and get back with her.    Called Dr Blackwood and reported Pt's symptoms. He recommended that the Pt take a now dose  Of Toprol XL 25 mg and see how he feels in the morning.    Returned the call to Maria Esther. Advised of the extra dose of Toprol XL 25 mg today and to see how the Pt feels in the morning. She voiced understanding.

## 2017-07-26 ENCOUNTER — PATIENT MESSAGE (OUTPATIENT)
Dept: HEMATOLOGY/ONCOLOGY | Facility: CLINIC | Age: 72
End: 2017-07-26

## 2017-07-27 ENCOUNTER — LAB VISIT (OUTPATIENT)
Dept: LAB | Facility: HOSPITAL | Age: 72
End: 2017-07-27
Attending: INTERNAL MEDICINE
Payer: MEDICARE

## 2017-07-27 DIAGNOSIS — I48.92 ATRIAL FLUTTER: Primary | ICD-10-CM

## 2017-07-27 LAB
APTT BLDCRRT: 32.9 SEC
INR PPP: 1.4
PROTHROMBIN TIME: 14.1 SEC

## 2017-07-27 PROCEDURE — 85730 THROMBOPLASTIN TIME PARTIAL: CPT

## 2017-07-27 PROCEDURE — 85610 PROTHROMBIN TIME: CPT

## 2017-07-27 NOTE — TELEPHONE ENCOUNTER
He has not started chemo yet right?  Maybe the disease itself is causing him symptoms or maybe his cardiac issue which looks like are being addressed currently.  How is his heart rate and blood pressure?

## 2017-07-28 ENCOUNTER — ANESTHESIA EVENT (OUTPATIENT)
Dept: MEDSURG UNIT | Facility: HOSPITAL | Age: 72
End: 2017-07-28
Payer: MEDICARE

## 2017-07-28 ENCOUNTER — HOSPITAL ENCOUNTER (OUTPATIENT)
Facility: HOSPITAL | Age: 72
Discharge: HOME | End: 2017-07-29
Attending: INTERNAL MEDICINE | Admitting: INTERNAL MEDICINE
Payer: MEDICARE

## 2017-07-28 ENCOUNTER — ANESTHESIA (OUTPATIENT)
Dept: MEDSURG UNIT | Facility: HOSPITAL | Age: 72
End: 2017-07-28
Payer: MEDICARE

## 2017-07-28 DIAGNOSIS — I10 ESSENTIAL (PRIMARY) HYPERTENSION: ICD-10-CM

## 2017-07-28 DIAGNOSIS — I48.3 TYPICAL ATRIAL FLUTTER: Primary | ICD-10-CM

## 2017-07-28 DIAGNOSIS — I48.92 ATRIAL FLUTTER: ICD-10-CM

## 2017-07-28 LAB
DIASTOLIC DYSFUNCTION: NO
MITRAL VALVE REGURGITATION: NORMAL
RETIRED EF AND QEF - SEE NOTES: 55 (ref 55–65)

## 2017-07-28 PROCEDURE — 63600175 PHARM REV CODE 636 W HCPCS: Performed by: NURSE ANESTHETIST, CERTIFIED REGISTERED

## 2017-07-28 PROCEDURE — 93010 ELECTROCARDIOGRAM REPORT: CPT | Mod: ,,, | Performed by: INTERNAL MEDICINE

## 2017-07-28 PROCEDURE — D9220A PRA ANESTHESIA: Mod: ANES,,, | Performed by: ANESTHESIOLOGY

## 2017-07-28 PROCEDURE — 93653 COMPRE EP EVAL TX SVT: CPT | Mod: ,,, | Performed by: INTERNAL MEDICINE

## 2017-07-28 PROCEDURE — 27000221 HC OXYGEN, UP TO 24 HOURS

## 2017-07-28 PROCEDURE — D9220A PRA ANESTHESIA: Mod: CRNA,,, | Performed by: NURSE ANESTHETIST, CERTIFIED REGISTERED

## 2017-07-28 PROCEDURE — 93355 ECHO TRANSESOPHAGEAL (TEE): CPT | Mod: ,,, | Performed by: INTERNAL MEDICINE

## 2017-07-28 PROCEDURE — 25000003 PHARM REV CODE 250: Performed by: NURSE PRACTITIONER

## 2017-07-28 PROCEDURE — 37000008 HC ANESTHESIA 1ST 15 MINUTES: Performed by: INTERNAL MEDICINE

## 2017-07-28 PROCEDURE — 25000003 PHARM REV CODE 250: Performed by: INTERNAL MEDICINE

## 2017-07-28 PROCEDURE — 93010 ELECTROCARDIOGRAM REPORT: CPT | Mod: 76,,, | Performed by: INTERNAL MEDICINE

## 2017-07-28 PROCEDURE — 94799 UNLISTED PULMONARY SVC/PX: CPT

## 2017-07-28 PROCEDURE — 25000003 PHARM REV CODE 250

## 2017-07-28 PROCEDURE — 63600175 PHARM REV CODE 636 W HCPCS

## 2017-07-28 PROCEDURE — 94760 N-INVAS EAR/PLS OXIMETRY 1: CPT

## 2017-07-28 PROCEDURE — C1732 CATH, EP, DIAG/ABL, 3D/VECT: HCPCS

## 2017-07-28 PROCEDURE — 27100006 EP LAB PROCEDURE

## 2017-07-28 PROCEDURE — 37000009 HC ANESTHESIA EA ADD 15 MINS: Performed by: INTERNAL MEDICINE

## 2017-07-28 PROCEDURE — 25000003 PHARM REV CODE 250: Performed by: NURSE ANESTHETIST, CERTIFIED REGISTERED

## 2017-07-28 PROCEDURE — 93653 COMPRE EP EVAL TX SVT: CPT

## 2017-07-28 PROCEDURE — 93005 ELECTROCARDIOGRAM TRACING: CPT

## 2017-07-28 PROCEDURE — 93613 INTRACARDIAC EPHYS 3D MAPG: CPT | Mod: ,,, | Performed by: INTERNAL MEDICINE

## 2017-07-28 PROCEDURE — 93621 COMP EP EVL L PAC&REC C SINS: CPT | Mod: 26,,, | Performed by: INTERNAL MEDICINE

## 2017-07-28 PROCEDURE — 93355 ECHO TRANSESOPHAGEAL (TEE): CPT

## 2017-07-28 RX ORDER — MIDAZOLAM HYDROCHLORIDE 1 MG/ML
INJECTION, SOLUTION INTRAMUSCULAR; INTRAVENOUS
Status: DISCONTINUED | OUTPATIENT
Start: 2017-07-28 | End: 2017-07-28

## 2017-07-28 RX ORDER — MORPHINE SULFATE 15 MG/1
15 TABLET, FILM COATED, EXTENDED RELEASE ORAL 2 TIMES DAILY
Status: DISCONTINUED | OUTPATIENT
Start: 2017-07-28 | End: 2017-07-29 | Stop reason: HOSPADM

## 2017-07-28 RX ORDER — IBUPROFEN 400 MG/1
400 TABLET ORAL EVERY 6 HOURS PRN
Status: DISCONTINUED | OUTPATIENT
Start: 2017-07-28 | End: 2017-07-29 | Stop reason: HOSPADM

## 2017-07-28 RX ORDER — ROSUVASTATIN CALCIUM 20 MG/1
20 TABLET, COATED ORAL DAILY
Status: DISCONTINUED | OUTPATIENT
Start: 2017-07-29 | End: 2017-07-29 | Stop reason: HOSPADM

## 2017-07-28 RX ORDER — CYPROHEPTADINE HYDROCHLORIDE 4 MG/1
4 TABLET ORAL 4 TIMES DAILY
Status: DISCONTINUED | OUTPATIENT
Start: 2017-07-28 | End: 2017-07-29 | Stop reason: HOSPADM

## 2017-07-28 RX ORDER — SODIUM CHLORIDE 9 MG/ML
INJECTION, SOLUTION INTRAVENOUS CONTINUOUS
Status: DISCONTINUED | OUTPATIENT
Start: 2017-07-28 | End: 2017-07-29 | Stop reason: HOSPADM

## 2017-07-28 RX ORDER — FOLIC ACID 1 MG/1
1 TABLET ORAL DAILY
Status: DISCONTINUED | OUTPATIENT
Start: 2017-07-29 | End: 2017-07-29 | Stop reason: HOSPADM

## 2017-07-28 RX ORDER — PROPOFOL 10 MG/ML
VIAL (ML) INTRAVENOUS
Status: DISCONTINUED | OUTPATIENT
Start: 2017-07-28 | End: 2017-07-28

## 2017-07-28 RX ORDER — FENTANYL CITRATE 50 UG/ML
INJECTION, SOLUTION INTRAMUSCULAR; INTRAVENOUS
Status: DISCONTINUED | OUTPATIENT
Start: 2017-07-28 | End: 2017-07-28

## 2017-07-28 RX ORDER — ASPIRIN 81 MG/1
81 TABLET ORAL DAILY
Status: DISCONTINUED | OUTPATIENT
Start: 2017-07-29 | End: 2017-07-29 | Stop reason: HOSPADM

## 2017-07-28 RX ORDER — PROPOFOL 10 MG/ML
VIAL (ML) INTRAVENOUS CONTINUOUS PRN
Status: DISCONTINUED | OUTPATIENT
Start: 2017-07-28 | End: 2017-07-28

## 2017-07-28 RX ORDER — OXYCODONE AND ACETAMINOPHEN 5; 325 MG/1; MG/1
1 TABLET ORAL EVERY 4 HOURS PRN
Status: DISCONTINUED | OUTPATIENT
Start: 2017-07-28 | End: 2017-07-29 | Stop reason: HOSPADM

## 2017-07-28 RX ORDER — LIDOCAINE HCL/PF 100 MG/5ML
SYRINGE (ML) INTRAVENOUS
Status: DISCONTINUED | OUTPATIENT
Start: 2017-07-28 | End: 2017-07-28

## 2017-07-28 RX ORDER — PANTOPRAZOLE SODIUM 40 MG/1
40 TABLET, DELAYED RELEASE ORAL DAILY
Status: DISCONTINUED | OUTPATIENT
Start: 2017-07-29 | End: 2017-07-29 | Stop reason: HOSPADM

## 2017-07-28 RX ADMIN — FENTANYL CITRATE 25 MCG: 50 INJECTION, SOLUTION INTRAMUSCULAR; INTRAVENOUS at 02:07

## 2017-07-28 RX ADMIN — PROPOFOL 50 MCG/KG/MIN: 10 INJECTION, EMULSION INTRAVENOUS at 02:07

## 2017-07-28 RX ADMIN — LIDOCAINE HYDROCHLORIDE 100 MG: 20 INJECTION, SOLUTION INTRAVENOUS at 02:07

## 2017-07-28 RX ADMIN — MIDAZOLAM 0.5 MG: 1 INJECTION INTRAMUSCULAR; INTRAVENOUS at 02:07

## 2017-07-28 RX ADMIN — PROPOFOL 30 MG: 10 INJECTION, EMULSION INTRAVENOUS at 02:07

## 2017-07-28 RX ADMIN — SODIUM CHLORIDE, SODIUM GLUCONATE, SODIUM ACETATE, POTASSIUM CHLORIDE, MAGNESIUM CHLORIDE, SODIUM PHOSPHATE, DIBASIC, AND POTASSIUM PHOSPHATE: .53; .5; .37; .037; .03; .012; .00082 INJECTION, SOLUTION INTRAVENOUS at 04:07

## 2017-07-28 RX ADMIN — MORPHINE SULFATE 15 MG: 15 TABLET, EXTENDED RELEASE ORAL at 09:07

## 2017-07-28 RX ADMIN — VANCOMYCIN HYDROCHLORIDE 1 G: 1 INJECTION, POWDER, LYOPHILIZED, FOR SOLUTION INTRAVENOUS at 03:07

## 2017-07-28 RX ADMIN — PROPOFOL 20 MG: 10 INJECTION, EMULSION INTRAVENOUS at 02:07

## 2017-07-28 RX ADMIN — SODIUM CHLORIDE: 0.9 INJECTION, SOLUTION INTRAVENOUS at 02:07

## 2017-07-28 RX ADMIN — APIXABAN 5 MG: 2.5 TABLET, FILM COATED ORAL at 09:07

## 2017-07-28 RX ADMIN — SODIUM CHLORIDE 1000 ML: 0.9 INJECTION, SOLUTION INTRAVENOUS at 11:07

## 2017-07-28 RX ADMIN — CYPROHEPTADINE HYDROCHLORIDE 4 MG: 4 TABLET ORAL at 06:07

## 2017-07-28 NOTE — PROGRESS NOTES
Procedure Note    Procedure: RFA CTI atrial flutter    Operators:  MD Chad Loza MD    For full procedural details, refer to dictated note in Epic. Patient was brought to lab under conscious sedation and PADMINI ruled out JABARI thrombus. Underwent BL CFV access and CTI RFA without complication. Minimal blood loss, tolerated well    --monitor on telemetry overnight  --bed rest x 4 hours  --restart eliquis 6 hours after sheath removal  --stop multaq  --motrin prn chest pain  --groin checks  --anticipate DC in AM with f/u with Dr. Blackwood in 6 weeks    Further recommendations per attending addendum    Chad Torrez MD  PGY-[phone])  Cardiology Fellow

## 2017-07-28 NOTE — ANESTHESIA RELEASE NOTE
"Anesthesia Release from PACU Note    Patient: Clint Brown    Procedure(s) Performed: Procedure(s) (LRB):  ABLATION (N/A)    Anesthesia type: general    Post pain: Adequate analgesia    Post assessment: no apparent anesthetic complications, tolerated procedure well and no evidence of recall    Last Vitals:   Visit Vitals  /67   Pulse 94   Temp 36.2 °C (97.2 °F) (Temporal)   Resp 18   Ht 5' 10" (1.778 m)   Wt 74.8 kg (165 lb)   SpO2 95%   BMI 23.68 kg/m²       Post vital signs: stable    Level of consciousness: awake, alert  and oriented    Nausea/Vomiting: no nausea/no vomiting    Complications: none    Airway Patency: patent    Respiratory: unassisted    Cardiovascular: stable and blood pressure at baseline    Hydration: euvolemic  "

## 2017-07-28 NOTE — PLAN OF CARE
Pt resting quietly. Vital signs stable, pt remains on room air. Denies pain or nausea. Awaiting transfer to floor.

## 2017-07-28 NOTE — PLAN OF CARE
Patient arrived from home, oriented to environment, time allowed for questions, emotional support provided, iv started. Assessment complete

## 2017-07-28 NOTE — ANESTHESIA PREPROCEDURE EVALUATION
07/28/2017  Pre-operative evaluation for Procedure(s) (LRB):  TRANSESOPHAGEAL ECHOCARDIOGRAM (PADMINI) (N/A)  CARDIOVERSION    Clint Brown is a 71 y.o. male with PMHx of aortic dissection s/p repair in April 2016, HTN, non-obstructive CAD, CKD II. He has malignant mesothelioma for which he is about to start chemotherapy for, had VATS and PleurX catheter. He underwent PADMINI/DCCV prior to aortic dissection repair in April 2016. Not placed on anti-coagulation at that time in anticipation for aortic dissection repair. Reports he has not been on anti-coagulation since that time.       Prev airway:   Present Prior to Hospital Arrival?: No; Placement Date: 05/31/17; Placement Time: 1050; Method of Intubation: Direct laryngoscopy; Inserted by: Other (fannie beard); Airway Device: Endotracheal Tube, Endobronchial Blocker; Mask Ventilation: Easy - oral; Intubated: Postinduction; Blade: Cecile #3; Airway Device Size: 8.0; Style: Cuffed; Cuff Inflation: Minimal occlusive pressure; Placement Verified By: Auscultation, Capnometry, ETT Condensation; Grade: Grade II; Complicating Factors: None; Intubation Findings: Positive EtCO2, Bilateral breath sounds, Atraumatic/Condition of teeth unchanged;  Depth of Insertion: 22; Securment: Lips; Complications: None; Breath Sounds: Equal Bilateral; Insertion Attempts: 1; Removal Date: 05/31/17;  Removal Time: 1220        Patient Active Problem List   Diagnosis    Essential hypertension    Tobacco abuse    CAD - nonobstructive    Chest pain    History of nephrolithiasis    Aortic mural thrombus    Hyperglycemia    Acute on chronic renal failure    S/P ascending aortic replacement    Hypercholesteremia    Old myocardial infarction    CKD (chronic kidney disease) stage 3, GFR 30-59 ml/min    Ulnar neuropathy at elbow of left upper extremity    Closed fracture of one rib of  left side    Chronic obstructive pulmonary disease    Pleural effusion    Mesothelioma    Tachycardia    Atrial flutter       Review of patient's allergies indicates:   Allergen Reactions    Iodine and iodide containing products Nausea And Vomiting    Lipitor [atorvastatin] Other (See Comments)     Leg cramps    Cephalexin Nausea And Vomiting        Current Facility-Administered Medications on File Prior to Visit   Medication Dose Route Frequency Provider Last Rate Last Dose    0.9%  NaCl infusion   Intravenous Continuous Chari Condon, FNP   1,000 mL at 07/28/17 1130     Current Outpatient Prescriptions on File Prior to Visit   Medication Sig Dispense Refill    apixaban 5 mg Tab Take 1 tablet (5 mg total) by mouth 2 (two) times daily. 60 tablet 2    aspirin (ECOTRIN) 81 MG EC tablet Take 1 tablet (81 mg total) by mouth once daily.  0    cyproheptadine (PERIACTIN) 4 mg tablet Take 1 tablet (4 mg total) by mouth 4 (four) times daily. 120 tablet 3    DOCUSATE SODIUM (COLACE ORAL) Take 1 capsule by mouth once daily.       dronedarone (MULTAQ) 400 mg Tab Take 1 tablet (400 mg total) by mouth 2 (two) times daily with meals. 30 tablet 2    folic acid (FOLVITE) 1 MG tablet Take 1 tablet (1 mg total) by mouth once daily. Start today 60 tablet 6    metoprolol succinate (TOPROL-XL) 25 MG 24 hr tablet Take 1 tablet (25 mg total) by mouth once daily. 60 tablet 2    morphine (MS CONTIN) 15 MG 12 hr tablet Take 1 tablet (15 mg total) by mouth 2 (two) times daily. 60 tablet 0    multivitamin (THERAGRAN) per tablet Take 1 tablet by mouth once daily.      omeprazole (PRILOSEC) 20 MG capsule Take 1 capsule (20 mg total) by mouth once daily. (Patient taking differently: Take 20 mg by mouth every morning. ) 30 capsule 11    ondansetron (ZOFRAN) 8 MG tablet Take 1 tablet (8 mg total) by mouth 4 (four) times daily as needed for Nausea. 60 tablet 2    oxycodone-acetaminophen (PERCOCET) 5-325 mg per tablet Take 1  tablet by mouth every 4 (four) hours as needed for Pain. 120 tablet 0    ranitidine (ZANTAC) 150 MG tablet Take 1 tablet (150 mg total) by mouth 2 (two) times daily. 60 tablet 1    rosuvastatin (CRESTOR) 20 MG tablet Take 1 tablet (20 mg total) by mouth once daily. (Patient taking differently: Take 20 mg by mouth every evening. ) 30 tablet 11       Past Surgical History:   Procedure Laterality Date    APPENDECTOMY      CARDIAC CATHETERIZATION      EYE SURGERY      KIDNEY STONE SURGERY      4 surgeries    TONSILLECTOMY      VASCULAR SURGERY         Social History     Social History    Marital status:      Spouse name: N/A    Number of children: 2    Years of education: N/A     Occupational History    AC contractor       Social History Main Topics    Smoking status: Current Every Day Smoker     Packs/day: 0.25     Years: 55.00     Types: Cigarettes    Smokeless tobacco: Never Used      Comment: has Chantix at home    Alcohol use No    Drug use: No    Sexual activity: Yes     Partners: Female     Other Topics Concern    Not on file     Social History Narrative    No narrative on file         Vital Signs Range (Last 24H):  Temp:  [36.2 °C (97.1 °F)]   Pulse:  [142]   Resp:  [18]   BP: (107)/(63)   SpO2:  [94 %]       CBC:   No results for input(s): WBC, RBC, HGB, HCT, PLT, MCV, MCH, MCHC in the last 72 hours.    CMP:   No results for input(s): NA, K, CL, CO2, BUN, CREATININE, GLU, MG, PHOS, CALCIUM, ALBUMIN, PROT, ALKPHOS, ALT, AST, BILITOT in the last 72 hours.    INR  Recent Labs      07/27/17   1024   INR  1.4*   APTT  32.9*           Diagnostic Studies:      EKG 7/11/17:  Atrial flutter with 2:1 A-V conduction  Marked ST abnormality, possible inferior subendocardial injury  Abnormal ECG  When compared with ECG of 11-JUL-2017 18:00,  No significant change was found    2D Echo:  CONCLUSIONS     1 - Normal left ventricular systolic function (EF 55-60%).     2 - Normal right ventricular  systolic function .     3 - Normal left ventricular diastolic function.     4 - The estimated PA systolic pressure is 26 mmHg.     5- The aortic root at the sinus of valsalva is dilated and measures 4.2 cm.     6- The dissection flap/ Thickening in the aortic root seen on the prior study, not seen on this study (although this study was not done for a dignosis of dissection hence dedicated images of the root with color flow were not performed).     This document has been electronically    SIGNED BY: Sherly Sewell MD On: 05/23/2016 17:01        Pre-op Assessment    I have reviewed the Patient Summary Reports.     I have reviewed the Nursing Notes.   I have reviewed the Medications.     Review of Systems  Anesthesia Hx:  History of prior surgery of interest to airway management or planning: Denies Family Hx of Anesthesia complications.   Denies Personal Hx of Anesthesia complications.   Social:  Non-Smoker, No Alcohol Use    Hematology/Oncology:  Hematology Normal      Oncology: mesothelioma.   EENT/Dental:EENT/Dental Normal   Cardiovascular:   Exercise tolerance: good Hypertension, well controlled Past MI (remote, asymp) CAD (good function on recent TTE, excellent METs) asymptomatic  Open type A dissection repair   Pulmonary:   COPD, mild Recurrent L pleural effusion, last pleurex drain two days ago. On home O2 at night PRN, mild SOB   Renal/:   Chronic Renal Disease, CRI    Hepatic/GI:   Denies GERD. Dysphasia to solids and liquids   Musculoskeletal:  Musculoskeletal Normal    Neurological:  Neurology Normal    Dermatological:  Skin Normal    Psych:  Psychiatric Normal           Physical Exam  General:  Well nourished    Airway/Jaw/Neck:  Airway Findings: Mouth Opening: Small, but > 3cm Tongue: Large  General Airway Assessment: Adult, Possible difficult intubation  Mallampati: III  TM Distance: 4 - 6 cm  Jaw/Neck Findings:  Neck ROM: Normal ROM     Eyes/Ears/Nose:  EYES/EARS/NOSE FINDINGS: Normal    Dental:  Dental Findings: In tact   Chest/Lungs:  Chest/Lungs Findings: Normal Respiratory Rate, Decreased Breathe Sounds Left, Rhonchi     Heart/Vascular:  Heart Findings: Rate: Normal  Rhythm: Regular Rhythm  Sounds: Normal  Heart murmur: negative       Mental Status:  Mental Status Findings:  Cooperative, Alert and Oriented         Anesthesia Plan  Type of Anesthesia, risks & benefits discussed:  Anesthesia Type:  general, MAC  Patient's Preference:   Intra-op Monitoring Plan:   Intra-op Monitoring Plan Comments:   Post Op Pain Control Plan:   Post Op Pain Control Plan Comments:   Induction:   IV  Beta Blocker:  Patient is on a Beta-Blocker and has received one dose within the past 24 hours (No further documentation required).       Informed Consent: Patient understands risks and agrees with Anesthesia plan.  Questions answered. Anesthesia consent signed with patient.  ASA Score: 3     Day of Surgery Review of History & Physical:    H&P update referred to the surgeon.         Ready For Surgery From Anesthesia Perspective.

## 2017-07-28 NOTE — ANESTHESIA POSTPROCEDURE EVALUATION
"Anesthesia Post Evaluation    Patient: Clint Brown    Procedure(s) Performed: Procedure(s) (LRB):  ABLATION (N/A)    Final Anesthesia Type: general  Patient location during evaluation: PACU  Patient participation: Yes- Able to Participate  Level of consciousness: awake and alert  Post-procedure vital signs: reviewed and stable  Pain management: adequate  Airway patency: patent  PONV status at discharge: No PONV  Anesthetic complications: no      Cardiovascular status: blood pressure returned to baseline  Respiratory status: unassisted  Hydration status: euvolemic  Follow-up not needed.        Visit Vitals  /67   Pulse 94   Temp 36.2 °C (97.2 °F) (Temporal)   Resp 18   Ht 5' 10" (1.778 m)   Wt 74.8 kg (165 lb)   SpO2 95%   BMI 23.68 kg/m²       Pain/Bernice Score: Pain Assessment Performed: Yes (7/28/2017  5:12 PM)  Presence of Pain: denies (7/28/2017  5:12 PM)  Bernice Score: 8 (7/28/2017  5:12 PM)      "

## 2017-07-28 NOTE — H&P
TRANSESOPHAGEAL ECHOCARDIOGRAPHY   PRE-PROCEDURE NOTE    07/28/2017    HPI:     Clint Brown is a 71 y.o. man with PMHx  metastatic mesothelioma, Type A aortic dissection s/p repair in April 2016, atrial flutter, CAD, CKD, and HTN. Pt with multiple  previous episodes of atrial flutter, one in April 2016 for which he was cardioverted, then on 07/12/17 >  required a PADMINI/ cardioversion and  was started on eliquis and multaq,  Pt presented back  to the ED on 07/22/17 for  2:1 flutter, but converted back to NSR spontaneously.   Pt planned for outpatient RFA for atrial flutter. PADMINI requested prior to RFA ablation.   Patient reported dysphagia for the past week but denied reguritation, denied vomiting blood.     Dysphagia or odynophagia:  Yes for one week  Liver Disease, esophageal disease, or known varices:  No  Upper GI Bleeding: No  Snoring:  No  Sleep Apnea:  No  Prior neck surgery or radiation:  No  History of anesthetic difficulties:  No  Family history of anesthetic difficulties:  No  Last oral intake:  12 hours ago  Able to move neck in all directions:  Yes      Meds:     Scheduled Meds:   PRN Meds:  Continuous Infusions:   sodium chloride 0.9%         Physical Exam:     Vitals:  Temp:  [97.1 °F (36.2 °C)]   Pulse:  [142]   Resp:  [18]   BP: (107)/(63)   SpO2:  [94 %]        Constitutional: NAD, conversant  HEENT:   Sclera anicteric, Uvula midline, EOMI, OP clear  Neck:               No JVD, moves to all direction without any limitations  CV:               RRR, no murmurs / rubs / gallops, normal S1/S2  Pulm:               CTAB, no wheezes, rales, or ronchi  GI:               Abdomen soft, NTND, +BS  Extremities:              No LE edema, warm with palpable pulses  Neuro:   AAOX3, no focal motor deficits    Mallampati:3  ASA:3      Labs:     Recent Results (from the past 336 hour(s))   CBC auto differential    Collection Time: 07/17/17  2:17 PM   Result Value Ref Range    WBC 13.24 (H) 3.90 - 12.70 K/uL     Hemoglobin 11.6 (L) 14.0 - 18.0 g/dL    Hematocrit 37.4 (L) 40.0 - 54.0 %    Platelets 330 150 - 350 K/uL       Recent Results (from the past 336 hour(s))   Basic metabolic panel    Collection Time: 17  2:17 PM   Result Value Ref Range    Sodium 138 136 - 145 mmol/L    Potassium 4.3 3.5 - 5.1 mmol/L    Chloride 103 95 - 110 mmol/L    CO2 22 (L) 23 - 29 mmol/L    BUN, Bld 22 8 - 23 mg/dL    Creatinine 1.3 0.5 - 1.4 mg/dL    Calcium 9.5 8.7 - 10.5 mg/dL    Anion Gap 13 8 - 16 mmol/L       CrCl cannot be calculated (Patient's most recent sCr result is older than the maximum 7 days allowed.).    INR: N/A    Imaging:  PADMINI    71 year old male  with a history of metastatic mesothelioma, Type A aortic dissection s/p repair in 2016, atrial flutter, CAD, CKD, and HTN. Pt with multiple  previous episodes of atrial flutter, one in 2016 for which he was cardioverted, then on 17 >  required a PADMINI/ cardioversion and  was started on eliquis and multaq,  Pt presented back  to the ED on 17 for  2:1 flutter, but converted back to NSR spontaneously.   Pt planned for outpatient RFA for atrial flutter.     EK2017  Aflutter          Assessment & Plan:     PLAN:  1. PADMINI for evaluation of JABARI thrombus prior to RFA ablation.     -The risks, benefits & alternatives of the procedure were explained to the patient.    -The risks of transesophageal echo include but are not limited to:  Dental trauma, esophageal trauma/perforation, bleeding, laryngospasm/brochospasm, aspiration, sore throat/hoarseness, & dislodgement of the endotracheal tube/nasogastric tube (where applicable).    -The risks of moderate sedation include hypotension, respiratory depression, arrhythmias, bronchospasm, & death.    -Informed consent was obtained & the patient is agreeable to proceed with the procedure.    I will discuss with the attending physician. Attending addendum is to follow.    Isidro Knight MD  Cardiology Fellow

## 2017-07-28 NOTE — H&P
[facility]  Cardiology Electrophysiology  History and Physical     Patient Name: Clint Brown  MRN: [id number]  Admission Date: 7/28/2017  Attending Provider: Javier Blackwood MD  Principal Problem: Atrial flutter    Patient information was obtained from patient and daughter Bonnie   Subjective:     Chief Complaint:  Atrial flutter with RVR     HPI:  71 year old male  with a history of metastatic mesothelioma, Type A aortic dissection s/p repair in April 2016, atrial flutter, CAD, CKD, and HTN. Pt with multiple  previous episodes of atrial flutter, one in April 2016 for which he was cardioverted, then on 07/12/17 >  required a PADMINI/ cardioversion and  was started on eliquis and multaq,  Pt presented back  to the ED on 07/22/17 for  2:1 flutter, but converted back to NSR spontaneously.   Pt planned for outpatient RFA for atrial flutter.     He was recently diagnosed with metastatic mesothelioma and is due to start palliative chemo soon.       Past Medical History:   Diagnosis Date    Anticoagulant long-term use     Atrial flutter     cardioversion 4/12/16    Cancer     Chronic kidney disease     COPD (chronic obstructive pulmonary disease)     Coronary artery disease     [facility] 4/[facility]/16: 50% proximal RAMUS    Hypertension     Kidney stones     Old 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/20/2016    3/16    Thoracic aortic dissection     Type A dissection s/p repair 4/13/16       Past Surgical History:   Procedure Laterality Date    APPENDECTOMY      CARDIAC CATHETERIZATION      EYE SURGERY      KIDNEY STONE SURGERY      4 surgeries    TONSILLECTOMY      VASCULAR SURGERY            Review of patient's allergies indicates:   Allergen Reactions    Lipitor [atorvastatin] Other (See Comments)     Leg cramps    Cephalexin Nausea And Vomiting        No current facility-administered medications on file prior to encounter.      Current Outpatient Prescriptions on File Prior to Encounter   Medication Sig Dispense  Refill    apixaban 5 mg Tab Take 1 tablet (5 mg total) by mouth 2 (two) times daily. 60 tablet 2    aspirin (ECOTRIN) 81 MG EC tablet Take 1 tablet (81 mg total) by mouth once daily.  0    DOCUSATE SODIUM (COLACE ORAL) Take 1 capsule by mouth once daily.       dronedarone (MULTAQ) 400 mg Tab Take 1 tablet (400 mg total) by mouth 2 (two) times daily with meals. 30 tablet 2    folic acid (FOLVITE) 1 MG tablet Take 1 tablet (1 mg total) by mouth once daily. Start today 60 tablet 6    metoprolol succinate (TOPROL-XL) 25 MG 24 hr tablet Take 1 tablet (25 mg total) by mouth once daily. 60 tablet 2    multivitamin (THERAGRAN) per tablet Take 1 tablet by mouth once daily.      omeprazole (PRILOSEC) 20 MG capsule Take 1 capsule (20 mg total) by mouth once daily. (Patient taking differently: Take 20 mg by mouth every morning. ) 30 capsule 11    ondansetron (ZOFRAN) 8 MG tablet Take 1 tablet (8 mg total) by mouth 4 (four) times daily as needed for Nausea. 60 tablet 2    ranitidine (ZANTAC) 150 MG tablet Take 1 tablet (150 mg total) by mouth 2 (two) times daily. 60 tablet 1    rosuvastatin (CRESTOR) 20 MG tablet Take 1 tablet (20 mg total) by mouth once daily. (Patient taking differently: Take 20 mg by mouth every evening. ) 30 tablet 11    cyproheptadine (PERIACTIN) 4 mg tablet Take 1 tablet (4 mg total) by mouth 4 (four) times daily. 120 tablet 3       History reviewed. No pertinent family history.    Social History   Substance Use Topics    Smoking status: Current Every Day Smoker     Packs/day: 0.25     Years: 55.00     Types: Cigarettes    Smokeless tobacco: Never Used      Comment: has Chantix at home    Alcohol use No         Review of Systems   Constitution: Negative for fevers/chills.   Positive for easily gets    weak and has malaise/fatigue.   HENT: Negative for ear pain and tinnitus.   Eyes: positive  for blurred vision ( reports as chronic)  Cardiovascular: Positive for palpitations.   near-syncope,  and syncope.  Pt reports chronic left wall chest type pressure.   Respiratory:  Positive for shortness of breath worse on exertion   Endocrine:  Negative for polyuria.   Hematologic/Lymphatic: positive  for bruise/bleed easily.   Skin:  Negative for rash.   Musculoskeletal: Negative for joint pain and muscle weakness.   Extremity: Negative for swelling in the lower extremities.   Gastrointestinal: positive for constipation .   Genitourinary: Negative for frequency.   Neurological:  Positive  for occasional dizziness.   Psychiatric/Behavioral:  Negative for depression, anxiety     Objective:     Temp: 97.1 °F (36.2 °C) (07/28/17 1102)  Pulse: (!) 142 (07/28/17 1102)  Resp: 18 (07/28/17 1102)  BP: 107/63 (07/28/17 1102)  SpO2: (!) 94 % (07/28/17 1102)    Vital Signs (24h Range):  Temp:  [97.1 °F (36.2 °C)]   Pulse:  [142]   Resp:  [18]   BP: (107)/(63)   SpO2:  [94 %]     PE:   General: frail elderly gentleman in NAD on RA   HEENT: Head is normocephalic, atraumatic;  Lungs: Diminished at the bases   No wheezing. Normal respiratory effort. L pleurex drain in place> last drain 2 days ago.   Chest: L port in place   Cardiovascular: tachycardic  Irregular rhythm, no murmurs   Extremities: No LE edema.   Abdomen: Abdomen is soft, non-tender non-distended with normal bowel sounds.  Skin: Skin color, texture, turgor normal. No rashes.  Musculoskeletal: normal range of motion   Neurologic: Normal strength and tone. No focal numbness or weakness.   Psychiatric: normal mood and affect.  behavior is normal. Alert and oriented X 3.      Significant Labs:   Lab Results   Component Value Date    WBC 9.07 07/19/2017    HGB 10.8 (L) 07/19/2017    HCT 35.1 (L) 07/19/2017    MCV 88 07/19/2017     07/19/2017     BMP  Lab Results   Component Value Date     07/19/2017    K 4.5 07/19/2017     07/19/2017    CO2 24 07/19/2017    BUN 22 07/19/2017    CREATININE 1.2 07/19/2017    CALCIUM 9.3 07/19/2017    ANIONGAP 11  2017    ESTGFRAFRICA >60.0 2017    EGFRNONAA >60.0 2017      Lab Results   Component Value Date    INR 1.4 (H) 2017    INR 1.4 (H) 2017    INR 1.3 (H) 2017       Significant Imaging: reviewed   ECHO   CONCLUSIONS     1 - Normal left ventricular systolic function (EF 60-65%).     2 - Normal right ventricular systolic function .     3 - Indeterminate LV diastolic function- can not assess as patient just underwent cardioversion.     4 - Mild tricuspid regurgitation.     5 - The estimated PA systolic pressure is greater than 25 mmHg.       This document has been electronically    SIGNED BY: María Abdullahi MD On: 2017 15:30    EK2017 atrial flutter     Assessment and Plan:     71M with mesothelioma and recurrent symptomatic  AFL s/p DCCVs,  refractory to Multaq. apixaban was held prior to procedure > last dose on 17      Atrial Flutter RFA   PADMINI prior to rule out thrombus.  Anesthesia for Sedation     Prior to procedure, extensive discussion with patient regarding risks and benefits of  ablation, and patient  would like to proceed.  Risks of procedure include but are not limited to the risk of infection, bleeding, stroke, paralysis,  MI, death, embolism, perforation requiring emergency draining or surgery, AV block, possibility for need for  pacemaker implantation.  The patient and daughter voices understanding and all questions have been answered. No further questions/concerns voiced at this time.      Signed:  JANA He-BC  Cardiology Electrophysiology  NP [facility]    Attending: MD Javier Blackwood

## 2017-07-28 NOTE — TRANSFER OF CARE
"Anesthesia Transfer of Care Note    Patient: Clint Brown    Procedure(s) Performed: Procedure(s) (LRB):  ABLATION (N/A)    Patient location: PACU    Anesthesia Type: general    Transport from OR: Transported from OR on 6-10 L/min O2 by face mask with adequate spontaneous ventilation    Post pain: adequate analgesia    Post assessment: tolerated procedure well and no apparent anesthetic complications    Post vital signs: stable    Level of consciousness: sedated    Nausea/Vomiting: no nausea/vomiting    Complications: none    Transfer of care protocol was followed      Last vitals:   Visit Vitals  /64 (BP Location: Right arm, Patient Position: Lying, BP Method: Automatic)   Pulse 94   Temp 36.2 °C (97.1 °F) (Oral)   Resp 20   Ht 5' 10" (1.778 m)   Wt 74.8 kg (165 lb)   SpO2 100%   BMI 23.68 kg/m²     "

## 2017-07-28 NOTE — NURSING TRANSFER
Nursing Transfer Note      7/28/2017     Transfer To: 309    Transfer via stretcher    Transfer with (telemetry box delivered to room 309 )    Transported by pct    Medicines sent: cyproheptadine    Chart send with patient: Yes    Notified: spouse    Patient reassessed at: 7/28/17

## 2017-07-29 VITALS
RESPIRATION RATE: 18 BRPM | HEART RATE: 91 BPM | SYSTOLIC BLOOD PRESSURE: 108 MMHG | WEIGHT: 165 LBS | HEIGHT: 70 IN | DIASTOLIC BLOOD PRESSURE: 61 MMHG | BODY MASS INDEX: 23.62 KG/M2 | TEMPERATURE: 98 F | OXYGEN SATURATION: 95 %

## 2017-07-29 LAB
BASOPHILS # BLD AUTO: 0.01 K/UL
BASOPHILS NFR BLD: 0.1 %
DIFFERENTIAL METHOD: ABNORMAL
EOSINOPHIL # BLD AUTO: 0.1 K/UL
EOSINOPHIL NFR BLD: 0.4 %
ERYTHROCYTE [DISTWIDTH] IN BLOOD BY AUTOMATED COUNT: 15.1 %
HCT VFR BLD AUTO: 34.3 %
HGB BLD-MCNC: 10.6 G/DL
LYMPHOCYTES # BLD AUTO: 0.8 K/UL
LYMPHOCYTES NFR BLD: 6 %
MCH RBC QN AUTO: 27.1 PG
MCHC RBC AUTO-ENTMCNC: 30.9 G/DL
MCV RBC AUTO: 88 FL
MONOCYTES # BLD AUTO: 1.8 K/UL
MONOCYTES NFR BLD: 14.5 %
NEUTROPHILS # BLD AUTO: 9.8 K/UL
NEUTROPHILS NFR BLD: 78.4 %
PLATELET # BLD AUTO: 374 K/UL
PMV BLD AUTO: 10 FL
RBC # BLD AUTO: 3.91 M/UL
WBC # BLD AUTO: 12.43 K/UL

## 2017-07-29 PROCEDURE — 85025 COMPLETE CBC W/AUTO DIFF WBC: CPT

## 2017-07-29 PROCEDURE — 36415 COLL VENOUS BLD VENIPUNCTURE: CPT

## 2017-07-29 PROCEDURE — 25000003 PHARM REV CODE 250: Performed by: INTERNAL MEDICINE

## 2017-07-29 RX ADMIN — CYPROHEPTADINE HYDROCHLORIDE 4 MG: 4 TABLET ORAL at 06:07

## 2017-07-29 RX ADMIN — CYPROHEPTADINE HYDROCHLORIDE 4 MG: 4 TABLET ORAL at 12:07

## 2017-07-29 NOTE — PLAN OF CARE
Problem: Patient Care Overview  Goal: Plan of Care Review  Outcome: Ongoing (interventions implemented as appropriate)  Plan of care discussed with patient, no new questions at this time. VSS, denies SOB, no complaint of pain. Bilateral Groin dressings are clean, dry, and intact. No signs of hematoma. Safety precautions taken, no falls/trauma during the shift. Will continue to monitor.

## 2017-07-29 NOTE — DISCHARGE SUMMARY
[facility]  Cardiology  Discharge Summary      Patient Name: Clint Brown  MRN: [id number]  Admission Date: 7/28/2017  Hospital Length of Stay: 0 days  Discharge Date and Time:  07/29/2017 6:46 AM  Attending Physician: Javier Blackwood MD    Discharging Provider: Chad Chaidez MD  Primary Care Physician: Jean Claude Griffith DO    HPI:   71 year old male  with a history of metastatic mesothelioma, Type A aortic dissection s/p repair in April 2016, atrial flutter, CAD, CKD, and HTN. Pt with multiple  previous episodes of atrial flutter, one in April 2016 for which he was cardioverted, then on 07/12/17 >  required a PADMINI/ cardioversion and  was started on eliquis and multaq,  Pt presented back  to the ED on 07/22/17 for  2:1 flutter, but converted back to NSR spontaneously.   Pt planned for outpatient RFA for atrial flutter.      He was recently diagnosed with metastatic mesothelioma and is due to start palliative chemo soon.    Procedure(s) (LRB):  ABLATION (N/A)     Indwelling Lines/Drains at time of discharge:  Lines/Drains/Airways     Central Venous Catheter Line                 Port A Cath Single Lumen 06/28/17 0954 left subclavian 30 days          Drain                 Drain/Device  05/31/17 0000 Left chest other (see comments) 59 days         Chest Tube 05/31/17 1153 Left Pleural 15.5 Fr. 58 days                Hospital Course:  Underwent successful RFA for atrial flutter 7/28 with Dr. Blackwood. Groins assessed this AM without hematoma, bruit, thrills. Will be discharged home off multaq to see Dr. Blackwood in 6 weeks    Consults:     Significant Diagnostic Studies: Labs: CMP No results for input(s): NA, K, CL, CO2, GLU, BUN, CREATININE, CALCIUM, PROT, ALBUMIN, BILITOT, ALKPHOS, AST, ALT, ANIONGAP, ESTGFRAFRICA, EGFRNONAA in the last 48 hours., CBC No results for input(s): WBC, HGB, HCT, PLT in the last 48 hours. and All labs within the past 24 hours have been reviewed    Pending Diagnostic  Studies:     Procedure Component Value Units Date/Time    CBC auto differential [[id number]]     Order Status:  Sent Lab Status:  No result     Specimen:  Blood from Blood           Final Active Diagnoses:    Diagnosis Date Noted POA    PRINCIPAL PROBLEM:  Atrial flutter [I48.92] 07/12/2017 Yes      Problems Resolved During this Admission:    Diagnosis Date Noted Date Resolved POA     No new Assessment & Plan notes have been filed under this hospital service since the last note was generated.  Service: Cardiology      Discharged Condition: good    Disposition:     Follow Up:  Follow-up Information     Javier Blackwood MD In 6 weeks.    Specialties:  Electrophysiology, Cardiovascular Disease  Contact information:  [address]  [facility] [address]  [phone]                 Patient Instructions:   No discharge procedures on file.  Medications:  Reconciled Home Medications:   Current Discharge Medication List      CONTINUE these medications which have NOT CHANGED    Details   apixaban 5 mg Tab Take 1 tablet (5 mg total) by mouth 2 (two) times daily.  Qty: 60 tablet, Refills: 2      aspirin (ECOTRIN) 81 MG EC tablet Take 1 tablet (81 mg total) by mouth once daily.  Refills: 0      DOCUSATE SODIUM (COLACE ORAL) Take 1 capsule by mouth once daily.       folic acid (FOLVITE) 1 MG tablet Take 1 tablet (1 mg total) by mouth once daily. Start today  Qty: 60 tablet, Refills: 6    Associated Diagnoses: Nausea      metoprolol succinate (TOPROL-XL) 25 MG 24 hr tablet Take 1 tablet (25 mg total) by mouth once daily.  Qty: 60 tablet, Refills: 2      morphine (MS CONTIN) 15 MG 12 hr tablet Take 1 tablet (15 mg total) by mouth 2 (two) times daily.  Qty: 60 tablet, Refills: 0    Associated Diagnoses: Neoplasm related pain      multivitamin (THERAGRAN) per tablet Take 1 tablet by mouth once daily.      omeprazole (PRILOSEC) 20 MG capsule Take 1 capsule (20 mg total) by mouth once daily.  Qty: 30 capsule, Refills: 11       ondansetron (ZOFRAN) 8 MG tablet Take 1 tablet (8 mg total) by mouth 4 (four) times daily as needed for Nausea.  Qty: 60 tablet, Refills: 2    Associated Diagnoses: Nausea      oxycodone-acetaminophen (PERCOCET) 5-325 mg per tablet Take 1 tablet by mouth every 4 (four) hours as needed for Pain.  Qty: 120 tablet, Refills: 0    Associated Diagnoses: Neoplasm related pain      ranitidine (ZANTAC) 150 MG tablet Take 1 tablet (150 mg total) by mouth 2 (two) times daily.  Qty: 60 tablet, Refills: 1    Associated Diagnoses: Gastroesophageal reflux disease, esophagitis presence not specified      rosuvastatin (CRESTOR) 20 MG tablet Take 1 tablet (20 mg total) by mouth once daily.  Qty: 30 tablet, Refills: 11      cyproheptadine (PERIACTIN) 4 mg tablet Take 1 tablet (4 mg total) by mouth 4 (four) times daily.  Qty: 120 tablet, Refills: 3    Associated Diagnoses: Anorexia         STOP taking these medications       dronedarone (MULTAQ) 400 mg Tab Comments:   Reason for Stopping:               Time spent on the discharge of patient: 30 minutes    Chad Chaidez MD  Cardiology  [facility]

## 2017-07-29 NOTE — HPI
71 year old male  with a history of metastatic mesothelioma, Type A aortic dissection s/p repair in April 2016, atrial flutter, CAD, CKD, and HTN. Pt with multiple  previous episodes of atrial flutter, one in April 2016 for which he was cardioverted, then on 07/12/17 >  required a PADMINI/ cardioversion and  was started on eliquis and multaq,  Pt presented back  to the ED on 07/22/17 for  2:1 flutter, but converted back to NSR spontaneously.   Pt planned for outpatient RFA for atrial flutter.      He was recently diagnosed with metastatic mesothelioma and is due to start palliative chemo soon.

## 2017-07-29 NOTE — PLAN OF CARE
Problem: Patient Care Overview  Goal: Individualization & Mutuality  Plan of care discussed with patient. Patient is free of fall/trauma/injury. Denies CP, SOB, or pain/discomfort. VS stable. AAOx4. Bilateral groin sites soft, no bruising, no signs of hematoma. H/H stable. All questions addressed. Will continue to monitor

## 2017-07-29 NOTE — HOSPITAL COURSE
Underwent successful RFA for atrial flutter 7/28 with Dr. Blackwood. Groins assessed this AM without hematoma, bruit, thrills. Will be discharged home off multaq to see Dr. Blackwood in 6 weeks

## 2017-07-29 NOTE — PROGRESS NOTES
Patient is ready for discharge. Patient stable alert and oriented. IVs removed. No complaints of pain or signs of distress. VS stable. AAOx4. Discussed discharge plan. Reviewed medications and side effects, appointments, and answered questions with patient and family.

## 2017-07-29 NOTE — PROGRESS NOTES
Pt arrived to floor at shift change. Pt AOx4, VSS, denies any distress at this time. Bilateral groin sites look good. Dressing is clean, dry, and intact, no hematoma present. Pt instructed to be on bedrest until 2115. Pt verbalized understanding. Will continue to monitor.

## 2017-07-31 ENCOUNTER — TELEPHONE (OUTPATIENT)
Dept: INTERNAL MEDICINE | Facility: CLINIC | Age: 72
End: 2017-07-31

## 2017-07-31 DIAGNOSIS — N39.0 URINARY TRACT INFECTION WITHOUT HEMATURIA, SITE UNSPECIFIED: Primary | ICD-10-CM

## 2017-07-31 NOTE — TELEPHONE ENCOUNTER
----- Message from Jo Blanca sent at 7/31/2017  3:41 PM CDT -----  Contact: Maria Esther [phone]  Pt is having confusion and nurse is trying to rule out a uti. She is asking for an order to obtain a urine specimen to check . Please call asap. Nurse will try to wait at patient's home.

## 2017-07-31 NOTE — TELEPHONE ENCOUNTER
Home health nurse says his vitals are fine but he is not really drinking fluids like usual. His  Urine is darker than normal.   He is only slightly confused.    I gave ok to do urine for urinalysis and culture .   She will drop off urine at our lab in am.    zafar

## 2017-08-01 ENCOUNTER — TELEPHONE (OUTPATIENT)
Dept: HEMATOLOGY/ONCOLOGY | Facility: CLINIC | Age: 72
End: 2017-08-01

## 2017-08-01 ENCOUNTER — LAB VISIT (OUTPATIENT)
Dept: LAB | Facility: HOSPITAL | Age: 72
End: 2017-08-01
Attending: INTERNAL MEDICINE
Payer: MEDICARE

## 2017-08-01 DIAGNOSIS — N39.0 URINARY TRACT INFECTION, SITE NOT SPECIFIED: Primary | ICD-10-CM

## 2017-08-01 LAB
BACTERIA #/AREA URNS AUTO: ABNORMAL /HPF
BILIRUB UR QL STRIP: NEGATIVE
CLARITY UR REFRACT.AUTO: CLEAR
COLOR UR AUTO: ABNORMAL
GLUCOSE UR QL STRIP: NEGATIVE
HGB UR QL STRIP: ABNORMAL
KETONES UR QL STRIP: NEGATIVE
LEUKOCYTE ESTERASE UR QL STRIP: NEGATIVE
MICROSCOPIC COMMENT: ABNORMAL
NITRITE UR QL STRIP: NEGATIVE
PH UR STRIP: 5 [PH] (ref 5–8)
PROT UR QL STRIP: NEGATIVE
RBC #/AREA URNS AUTO: 23 /HPF (ref 0–4)
SP GR UR STRIP: 1.02 (ref 1–1.03)
SQUAMOUS #/AREA URNS AUTO: 0 /HPF
URN SPEC COLLECT METH UR: ABNORMAL
UROBILINOGEN UR STRIP-ACNC: NEGATIVE EU/DL
WBC #/AREA URNS AUTO: 2 /HPF (ref 0–5)

## 2017-08-01 PROCEDURE — 87086 URINE CULTURE/COLONY COUNT: CPT

## 2017-08-01 PROCEDURE — 81001 URINALYSIS AUTO W/SCOPE: CPT

## 2017-08-01 NOTE — TELEPHONE ENCOUNTER
Returned call to patient's HH nurse, who states patient has declined drastically since her last visit with him.  Nurse informed  nurse she would advise her to contact her PCP--the MD who wrote the  orders.  Per  nurse, patient is in no acute distress. If patient is confused, lethargic, nurse advised  nurse to have patient go to ED.   nurse voiced understanding.

## 2017-08-01 NOTE — TELEPHONE ENCOUNTER
----- Message from Toshia Mart sent at 8/1/2017  4:17 PM CDT -----  Contact: Maria Esther Pimentel    Home Health worker Maria Esther states patient has only been eating or drinking minimally since Friday of last week, reporting problems swallowing and appears to be in total decline.  Maria Esther reports that pt will not get out of bed nor sit up.    Maria Esther can be reached at [phone].    Thank you.    LC

## 2017-08-02 LAB
ACID FAST MOD KINY STN SPEC: NORMAL
BACTERIA UR CULT: NO GROWTH
MYCOBACTERIUM SPEC QL CULT: NORMAL

## 2017-08-03 ENCOUNTER — HOSPITAL ENCOUNTER (INPATIENT)
Facility: HOSPITAL | Age: 72
LOS: 11 days | Discharge: HOSPICE | DRG: 054 | End: 2017-08-14
Attending: EMERGENCY MEDICINE | Admitting: EMERGENCY MEDICINE
Payer: MEDICARE

## 2017-08-03 ENCOUNTER — TELEPHONE (OUTPATIENT)
Dept: INTERNAL MEDICINE | Facility: CLINIC | Age: 72
End: 2017-08-03

## 2017-08-03 DIAGNOSIS — Q27.8 ABERRANT RIGHT SUBCLAVIAN ARTERY: ICD-10-CM

## 2017-08-03 DIAGNOSIS — I25.10 CORONARY ARTERY DISEASE INVOLVING NATIVE CORONARY ARTERY OF NATIVE HEART WITHOUT ANGINA PECTORIS: ICD-10-CM

## 2017-08-03 DIAGNOSIS — R13.10 DYSPHAGIA: ICD-10-CM

## 2017-08-03 DIAGNOSIS — C45.7 MESOTHELIOMA OF LEFT LUNG: Chronic | ICD-10-CM

## 2017-08-03 DIAGNOSIS — R13.10 DYSPHAGIA, UNSPECIFIED TYPE: ICD-10-CM

## 2017-08-03 DIAGNOSIS — Z51.5 PALLIATIVE CARE ENCOUNTER: ICD-10-CM

## 2017-08-03 DIAGNOSIS — R68.2 DRY MOUTH, UNSPECIFIED: ICD-10-CM

## 2017-08-03 DIAGNOSIS — I48.3 TYPICAL ATRIAL FLUTTER: ICD-10-CM

## 2017-08-03 DIAGNOSIS — J44.9 CHRONIC OBSTRUCTIVE PULMONARY DISEASE, UNSPECIFIED COPD TYPE: ICD-10-CM

## 2017-08-03 DIAGNOSIS — Q27.8 DYSPHAGIA LUSORIA SYNDROME: ICD-10-CM

## 2017-08-03 DIAGNOSIS — R23.4: ICD-10-CM

## 2017-08-03 DIAGNOSIS — R13.13 PHARYNGEAL DYSPHAGIA: ICD-10-CM

## 2017-08-03 DIAGNOSIS — R13.12 OROPHARYNGEAL DYSPHAGIA: ICD-10-CM

## 2017-08-03 DIAGNOSIS — K59.01 SLOW TRANSIT CONSTIPATION: ICD-10-CM

## 2017-08-03 DIAGNOSIS — C79.51 METASTATIC CANCER TO SPINE: ICD-10-CM

## 2017-08-03 DIAGNOSIS — E83.42 HYPOMAGNESEMIA: ICD-10-CM

## 2017-08-03 DIAGNOSIS — G93.40 ENCEPHALOPATHY: ICD-10-CM

## 2017-08-03 DIAGNOSIS — I48.91 AF (ATRIAL FIBRILLATION): ICD-10-CM

## 2017-08-03 DIAGNOSIS — E83.51 HYPOCALCEMIA: ICD-10-CM

## 2017-08-03 DIAGNOSIS — Z78.9 ON TOTAL PARENTERAL NUTRITION (TPN): ICD-10-CM

## 2017-08-03 DIAGNOSIS — I10 ESSENTIAL HYPERTENSION: ICD-10-CM

## 2017-08-03 DIAGNOSIS — D72.829 LEUKOCYTOSIS, UNSPECIFIED TYPE: ICD-10-CM

## 2017-08-03 DIAGNOSIS — I48.91 RAPID ATRIAL FIBRILLATION: ICD-10-CM

## 2017-08-03 DIAGNOSIS — Z71.89 GOALS OF CARE, COUNSELING/DISCUSSION: ICD-10-CM

## 2017-08-03 DIAGNOSIS — E86.0 DEHYDRATION: Primary | ICD-10-CM

## 2017-08-03 DIAGNOSIS — E78.00 HYPERCHOLESTEREMIA: ICD-10-CM

## 2017-08-03 DIAGNOSIS — R07.9 CHEST PAIN: ICD-10-CM

## 2017-08-03 DIAGNOSIS — K59.00 CONSTIPATION, UNSPECIFIED CONSTIPATION TYPE: ICD-10-CM

## 2017-08-03 DIAGNOSIS — Z95.828 S/P ASCENDING AORTIC REPLACEMENT: ICD-10-CM

## 2017-08-03 DIAGNOSIS — N18.30 CKD (CHRONIC KIDNEY DISEASE) STAGE 3, GFR 30-59 ML/MIN: ICD-10-CM

## 2017-08-03 LAB
ALBUMIN SERPL BCP-MCNC: 1.1 G/DL
ALP SERPL-CCNC: 155 U/L
ALT SERPL W/O P-5'-P-CCNC: 21 U/L
ANION GAP SERPL CALC-SCNC: 10 MMOL/L
AST SERPL-CCNC: 22 U/L
BASOPHILS # BLD AUTO: 0.01 K/UL
BASOPHILS NFR BLD: 0.1 %
BILIRUB SERPL-MCNC: 0.8 MG/DL
BILIRUB UR QL STRIP: NEGATIVE
BNP SERPL-MCNC: 221 PG/ML
BUN SERPL-MCNC: 24 MG/DL
CALCIUM SERPL-MCNC: 7.9 MG/DL
CHLORIDE SERPL-SCNC: 112 MMOL/L
CLARITY UR REFRACT.AUTO: ABNORMAL
CO2 SERPL-SCNC: 20 MMOL/L
COLOR UR AUTO: YELLOW
CREAT SERPL-MCNC: 0.7 MG/DL
DIFFERENTIAL METHOD: ABNORMAL
EOSINOPHIL # BLD AUTO: 0 K/UL
EOSINOPHIL NFR BLD: 0.1 %
ERYTHROCYTE [DISTWIDTH] IN BLOOD BY AUTOMATED COUNT: 16.6 %
EST. GFR  (AFRICAN AMERICAN): >60 ML/MIN/1.73 M^2
EST. GFR  (NON AFRICAN AMERICAN): >60 ML/MIN/1.73 M^2
GLUCOSE SERPL-MCNC: 84 MG/DL
GLUCOSE UR QL STRIP: NEGATIVE
HCT VFR BLD AUTO: 38.7 %
HGB BLD-MCNC: 12.4 G/DL
HGB UR QL STRIP: NEGATIVE
KETONES UR QL STRIP: ABNORMAL
LACTATE SERPL-SCNC: 2.5 MMOL/L
LEUKOCYTE ESTERASE UR QL STRIP: NEGATIVE
LYMPHOCYTES # BLD AUTO: 0.8 K/UL
LYMPHOCYTES NFR BLD: 5.5 %
MAGNESIUM SERPL-MCNC: 1.2 MG/DL
MCH RBC QN AUTO: 27.3 PG
MCHC RBC AUTO-ENTMCNC: 32 G/DL
MCV RBC AUTO: 85 FL
MONOCYTES # BLD AUTO: 1.7 K/UL
MONOCYTES NFR BLD: 12.6 %
NEUTROPHILS # BLD AUTO: 11.1 K/UL
NEUTROPHILS NFR BLD: 81 %
NITRITE UR QL STRIP: NEGATIVE
PH UR STRIP: 5 [PH] (ref 5–8)
PHOSPHATE SERPL-MCNC: 3.4 MG/DL
PLATELET # BLD AUTO: 284 K/UL
PMV BLD AUTO: 10.2 FL
POTASSIUM SERPL-SCNC: 4 MMOL/L
PROT SERPL-MCNC: 5.3 G/DL
PROT UR QL STRIP: NEGATIVE
RBC # BLD AUTO: 4.54 M/UL
SODIUM SERPL-SCNC: 142 MMOL/L
SP GR UR STRIP: 1.02 (ref 1–1.03)
TROPONIN I SERPL DL<=0.01 NG/ML-MCNC: 0.05 NG/ML
TSH SERPL DL<=0.005 MIU/L-ACNC: 2.06 UIU/ML
URN SPEC COLLECT METH UR: ABNORMAL
UROBILINOGEN UR STRIP-ACNC: NEGATIVE EU/DL
WBC # BLD AUTO: 13.72 K/UL

## 2017-08-03 PROCEDURE — 80053 COMPREHEN METABOLIC PANEL: CPT

## 2017-08-03 PROCEDURE — 20600001 HC STEP DOWN PRIVATE ROOM

## 2017-08-03 PROCEDURE — 99284 EMERGENCY DEPT VISIT MOD MDM: CPT | Mod: ,,, | Performed by: EMERGENCY MEDICINE

## 2017-08-03 PROCEDURE — 99285 EMERGENCY DEPT VISIT HI MDM: CPT | Mod: 25

## 2017-08-03 PROCEDURE — 84443 ASSAY THYROID STIM HORMONE: CPT

## 2017-08-03 PROCEDURE — 84100 ASSAY OF PHOSPHORUS: CPT

## 2017-08-03 PROCEDURE — 81003 URINALYSIS AUTO W/O SCOPE: CPT

## 2017-08-03 PROCEDURE — 94761 N-INVAS EAR/PLS OXIMETRY MLT: CPT

## 2017-08-03 PROCEDURE — 12000002 HC ACUTE/MED SURGE SEMI-PRIVATE ROOM

## 2017-08-03 PROCEDURE — 84145 PROCALCITONIN (PCT): CPT

## 2017-08-03 PROCEDURE — 93005 ELECTROCARDIOGRAM TRACING: CPT

## 2017-08-03 PROCEDURE — 96361 HYDRATE IV INFUSION ADD-ON: CPT

## 2017-08-03 PROCEDURE — 83880 ASSAY OF NATRIURETIC PEPTIDE: CPT

## 2017-08-03 PROCEDURE — 25000003 PHARM REV CODE 250: Performed by: EMERGENCY MEDICINE

## 2017-08-03 PROCEDURE — 83605 ASSAY OF LACTIC ACID: CPT

## 2017-08-03 PROCEDURE — 85025 COMPLETE CBC W/AUTO DIFF WBC: CPT

## 2017-08-03 PROCEDURE — 84484 ASSAY OF TROPONIN QUANT: CPT

## 2017-08-03 PROCEDURE — 93010 ELECTROCARDIOGRAM REPORT: CPT | Mod: ,,, | Performed by: INTERNAL MEDICINE

## 2017-08-03 PROCEDURE — 96360 HYDRATION IV INFUSION INIT: CPT

## 2017-08-03 PROCEDURE — 83735 ASSAY OF MAGNESIUM: CPT

## 2017-08-03 PROCEDURE — 25000003 PHARM REV CODE 250: Performed by: STUDENT IN AN ORGANIZED HEALTH CARE EDUCATION/TRAINING PROGRAM

## 2017-08-03 RX ORDER — ONDANSETRON 8 MG/1
8 TABLET, ORALLY DISINTEGRATING ORAL EVERY 8 HOURS PRN
Status: DISCONTINUED | OUTPATIENT
Start: 2017-08-03 | End: 2017-08-11

## 2017-08-03 RX ORDER — ASPIRIN 325 MG
325 TABLET ORAL
Status: DISCONTINUED | OUTPATIENT
Start: 2017-08-03 | End: 2017-08-03

## 2017-08-03 RX ORDER — ACETAMINOPHEN 500 MG
500 TABLET ORAL EVERY 6 HOURS PRN
Status: DISCONTINUED | OUTPATIENT
Start: 2017-08-03 | End: 2017-08-10

## 2017-08-03 RX ORDER — SODIUM CHLORIDE 9 MG/ML
INJECTION, SOLUTION INTRAVENOUS CONTINUOUS
Status: DISCONTINUED | OUTPATIENT
Start: 2017-08-03 | End: 2017-08-04

## 2017-08-03 RX ORDER — SODIUM CHLORIDE 0.9 % (FLUSH) 0.9 %
3 SYRINGE (ML) INJECTION EVERY 8 HOURS
Status: DISCONTINUED | OUTPATIENT
Start: 2017-08-03 | End: 2017-08-14

## 2017-08-03 RX ORDER — AMOXICILLIN 250 MG
1 CAPSULE ORAL DAILY PRN
Status: DISCONTINUED | OUTPATIENT
Start: 2017-08-03 | End: 2017-08-09

## 2017-08-03 RX ADMIN — SODIUM CHLORIDE 1000 ML: 0.9 INJECTION, SOLUTION INTRAVENOUS at 08:08

## 2017-08-03 RX ADMIN — SODIUM CHLORIDE: 0.9 INJECTION, SOLUTION INTRAVENOUS at 11:08

## 2017-08-03 NOTE — TELEPHONE ENCOUNTER
I spoke to kelly.    Urine test was normal. Nurse says he is very weak and not eating and barely drinking. Looks pale.  He is refusing to go to hospital.    I spoke to dr huynh about him and he said needs to go to hospital for eval.  I spoke to wife and urged her to call 911 for ambulance transportation.

## 2017-08-03 NOTE — TELEPHONE ENCOUNTER
----- Message from Carolyn Maciel sent at 8/3/2017  4:22 PM CDT -----  Contact: Maria Esther, with [facility] [phone]  Pt is not eating or drinking, weak and pale looking.  Pt may be dehydrated. Vitals are ok.

## 2017-08-04 ENCOUNTER — TELEPHONE (OUTPATIENT)
Dept: INTERNAL MEDICINE | Facility: CLINIC | Age: 72
End: 2017-08-04

## 2017-08-04 PROBLEM — E83.51 HYPOCALCEMIA: Status: ACTIVE | Noted: 2017-08-04

## 2017-08-04 PROBLEM — S22.32XA CLOSED FRACTURE OF ONE RIB OF LEFT SIDE: Status: RESOLVED | Noted: 2017-03-21 | Resolved: 2017-08-04

## 2017-08-04 PROBLEM — E83.42 HYPOMAGNESEMIA: Status: ACTIVE | Noted: 2017-08-04

## 2017-08-04 PROBLEM — D72.829 LEUKOCYTOSIS: Status: RESOLVED | Noted: 2017-08-04 | Resolved: 2017-08-04

## 2017-08-04 PROBLEM — C45.7 MESOTHELIOMA OF LEFT LUNG: Chronic | Status: ACTIVE | Noted: 2017-06-23

## 2017-08-04 PROBLEM — K59.00 CONSTIPATION: Status: ACTIVE | Noted: 2017-08-04

## 2017-08-04 PROBLEM — R00.0 TACHYCARDIA: Status: RESOLVED | Noted: 2017-07-11 | Resolved: 2017-08-04

## 2017-08-04 PROBLEM — D72.829 LEUKOCYTOSIS: Status: ACTIVE | Noted: 2017-08-04

## 2017-08-04 PROBLEM — C45.7 MESOTHELIOMA OF LEFT LUNG: Status: ACTIVE | Noted: 2017-06-23

## 2017-08-04 LAB
ANION GAP SERPL CALC-SCNC: 11 MMOL/L
BASOPHILS # BLD AUTO: 0.01 K/UL
BASOPHILS NFR BLD: 0.1 %
BUN SERPL-MCNC: 24 MG/DL
CALCIUM SERPL-MCNC: 8.6 MG/DL
CHLORIDE SERPL-SCNC: 110 MMOL/L
CO2 SERPL-SCNC: 20 MMOL/L
CREAT SERPL-MCNC: 0.7 MG/DL
DIFFERENTIAL METHOD: ABNORMAL
EOSINOPHIL # BLD AUTO: 0 K/UL
EOSINOPHIL NFR BLD: 0.3 %
ERYTHROCYTE [DISTWIDTH] IN BLOOD BY AUTOMATED COUNT: 15.4 %
EST. GFR  (AFRICAN AMERICAN): >60 ML/MIN/1.73 M^2
EST. GFR  (NON AFRICAN AMERICAN): >60 ML/MIN/1.73 M^2
GLUCOSE SERPL-MCNC: 76 MG/DL
HCT VFR BLD AUTO: 34.1 %
HGB BLD-MCNC: 10.7 G/DL
LACTATE SERPL-SCNC: 1.9 MMOL/L
LYMPHOCYTES # BLD AUTO: 1 K/UL
LYMPHOCYTES NFR BLD: 8 %
MAGNESIUM SERPL-MCNC: 1.9 MG/DL
MCH RBC QN AUTO: 27.4 PG
MCHC RBC AUTO-ENTMCNC: 31.4 G/DL
MCV RBC AUTO: 87 FL
MONOCYTES # BLD AUTO: 1.6 K/UL
MONOCYTES NFR BLD: 13.3 %
NEUTROPHILS # BLD AUTO: 9.4 K/UL
NEUTROPHILS NFR BLD: 77.6 %
PLATELET # BLD AUTO: 259 K/UL
PMV BLD AUTO: 9.6 FL
POTASSIUM SERPL-SCNC: 4.3 MMOL/L
PROCALCITONIN SERPL IA-MCNC: 0.27 NG/ML
RBC # BLD AUTO: 3.9 M/UL
SODIUM SERPL-SCNC: 141 MMOL/L
WBC # BLD AUTO: 12.05 K/UL

## 2017-08-04 PROCEDURE — 36415 COLL VENOUS BLD VENIPUNCTURE: CPT

## 2017-08-04 PROCEDURE — 20600001 HC STEP DOWN PRIVATE ROOM

## 2017-08-04 PROCEDURE — 83605 ASSAY OF LACTIC ACID: CPT

## 2017-08-04 PROCEDURE — 63600175 PHARM REV CODE 636 W HCPCS: Performed by: STUDENT IN AN ORGANIZED HEALTH CARE EDUCATION/TRAINING PROGRAM

## 2017-08-04 PROCEDURE — 92610 EVALUATE SWALLOWING FUNCTION: CPT

## 2017-08-04 PROCEDURE — A4216 STERILE WATER/SALINE, 10 ML: HCPCS | Performed by: STUDENT IN AN ORGANIZED HEALTH CARE EDUCATION/TRAINING PROGRAM

## 2017-08-04 PROCEDURE — 85025 COMPLETE CBC W/AUTO DIFF WBC: CPT

## 2017-08-04 PROCEDURE — 80048 BASIC METABOLIC PNL TOTAL CA: CPT

## 2017-08-04 PROCEDURE — G8996 SWALLOW CURRENT STATUS: HCPCS | Mod: CN

## 2017-08-04 PROCEDURE — 83735 ASSAY OF MAGNESIUM: CPT

## 2017-08-04 PROCEDURE — G8997 SWALLOW GOAL STATUS: HCPCS | Mod: CM

## 2017-08-04 PROCEDURE — 25000003 PHARM REV CODE 250: Performed by: STUDENT IN AN ORGANIZED HEALTH CARE EDUCATION/TRAINING PROGRAM

## 2017-08-04 PROCEDURE — 27000221 HC OXYGEN, UP TO 24 HOURS

## 2017-08-04 PROCEDURE — 92611 MOTION FLUOROSCOPY/SWALLOW: CPT

## 2017-08-04 PROCEDURE — 25500020 PHARM REV CODE 255: Performed by: INTERNAL MEDICINE

## 2017-08-04 PROCEDURE — 99223 1ST HOSP IP/OBS HIGH 75: CPT | Mod: GC,,, | Performed by: INTERNAL MEDICINE

## 2017-08-04 PROCEDURE — 87040 BLOOD CULTURE FOR BACTERIA: CPT

## 2017-08-04 RX ORDER — MAGNESIUM SULFATE HEPTAHYDRATE 40 MG/ML
2 INJECTION, SOLUTION INTRAVENOUS
Status: COMPLETED | OUTPATIENT
Start: 2017-08-04 | End: 2017-08-04

## 2017-08-04 RX ORDER — MORPHINE SULFATE 15 MG/1
15 TABLET, FILM COATED, EXTENDED RELEASE ORAL EVERY 12 HOURS
Status: DISCONTINUED | OUTPATIENT
Start: 2017-08-04 | End: 2017-08-08

## 2017-08-04 RX ORDER — MORPHINE SULFATE 15 MG/1
15 TABLET, FILM COATED, EXTENDED RELEASE ORAL 2 TIMES DAILY PRN
Status: DISCONTINUED | OUTPATIENT
Start: 2017-08-04 | End: 2017-08-04

## 2017-08-04 RX ORDER — POLYETHYLENE GLYCOL 3350 17 G/17G
17 POWDER, FOR SOLUTION ORAL DAILY
Status: DISCONTINUED | OUTPATIENT
Start: 2017-08-04 | End: 2017-08-09

## 2017-08-04 RX ORDER — METOPROLOL SUCCINATE 25 MG/1
25 TABLET, EXTENDED RELEASE ORAL DAILY
Status: DISCONTINUED | OUTPATIENT
Start: 2017-08-04 | End: 2017-08-08

## 2017-08-04 RX ORDER — MAGNESIUM SULFATE HEPTAHYDRATE 40 MG/ML
2 INJECTION, SOLUTION INTRAVENOUS ONCE
Status: COMPLETED | OUTPATIENT
Start: 2017-08-04 | End: 2017-08-04

## 2017-08-04 RX ORDER — ROSUVASTATIN CALCIUM 20 MG/1
20 TABLET, COATED ORAL NIGHTLY
Status: DISCONTINUED | OUTPATIENT
Start: 2017-08-04 | End: 2017-08-07

## 2017-08-04 RX ORDER — CYPROHEPTADINE HYDROCHLORIDE 4 MG/1
4 TABLET ORAL 4 TIMES DAILY
Status: DISCONTINUED | OUTPATIENT
Start: 2017-08-04 | End: 2017-08-04

## 2017-08-04 RX ORDER — ASPIRIN 81 MG/1
81 TABLET ORAL DAILY
Status: DISCONTINUED | OUTPATIENT
Start: 2017-08-04 | End: 2017-08-06

## 2017-08-04 RX ORDER — OXYCODONE AND ACETAMINOPHEN 5; 325 MG/1; MG/1
1 TABLET ORAL EVERY 4 HOURS PRN
Status: DISCONTINUED | OUTPATIENT
Start: 2017-08-04 | End: 2017-08-09

## 2017-08-04 RX ORDER — SODIUM CHLORIDE 9 MG/ML
INJECTION, SOLUTION INTRAVENOUS CONTINUOUS
Status: DISCONTINUED | OUTPATIENT
Start: 2017-08-04 | End: 2017-08-05

## 2017-08-04 RX ORDER — MORPHINE SULFATE 15 MG/1
15 TABLET, FILM COATED, EXTENDED RELEASE ORAL 2 TIMES DAILY
Status: DISCONTINUED | OUTPATIENT
Start: 2017-08-04 | End: 2017-08-04

## 2017-08-04 RX ORDER — FOLIC ACID 1 MG/1
1 TABLET ORAL DAILY
Status: DISCONTINUED | OUTPATIENT
Start: 2017-08-04 | End: 2017-08-08

## 2017-08-04 RX ORDER — FAMOTIDINE 20 MG/1
20 TABLET, FILM COATED ORAL 2 TIMES DAILY
Status: DISCONTINUED | OUTPATIENT
Start: 2017-08-04 | End: 2017-08-08

## 2017-08-04 RX ADMIN — MAGNESIUM SULFATE IN WATER 2 G: 40 INJECTION, SOLUTION INTRAVENOUS at 01:08

## 2017-08-04 RX ADMIN — IOHEXOL 75 ML: 350 INJECTION, SOLUTION INTRAVENOUS at 08:08

## 2017-08-04 RX ADMIN — SODIUM CHLORIDE: 0.9 INJECTION, SOLUTION INTRAVENOUS at 12:08

## 2017-08-04 RX ADMIN — MORPHINE SULFATE 15 MG: 15 TABLET, EXTENDED RELEASE ORAL at 08:08

## 2017-08-04 RX ADMIN — Medication 3 ML: at 06:08

## 2017-08-04 RX ADMIN — FOLIC ACID 1 MG: 1 TABLET ORAL at 08:08

## 2017-08-04 RX ADMIN — ASPIRIN 81 MG: 81 TABLET, COATED ORAL at 08:08

## 2017-08-04 RX ADMIN — FAMOTIDINE 20 MG: 20 TABLET, FILM COATED ORAL at 08:08

## 2017-08-04 RX ADMIN — ROSUVASTATIN CALCIUM 20 MG: 20 TABLET, FILM COATED ORAL at 01:08

## 2017-08-04 RX ADMIN — METOPROLOL SUCCINATE 25 MG: 25 TABLET, EXTENDED RELEASE ORAL at 08:08

## 2017-08-04 RX ADMIN — OXYCODONE HYDROCHLORIDE AND ACETAMINOPHEN 1 TABLET: 5; 325 TABLET ORAL at 02:08

## 2017-08-04 RX ADMIN — POLYETHYLENE GLYCOL 3350 17 G: 17 POWDER, FOR SOLUTION ORAL at 08:08

## 2017-08-04 RX ADMIN — SODIUM CHLORIDE: 0.9 INJECTION, SOLUTION INTRAVENOUS at 06:08

## 2017-08-04 RX ADMIN — Medication 3 ML: at 01:08

## 2017-08-04 RX ADMIN — SODIUM CHLORIDE: 0.9 INJECTION, SOLUTION INTRAVENOUS at 08:08

## 2017-08-04 RX ADMIN — APIXABAN 5 MG: 5 TABLET, FILM COATED ORAL at 08:08

## 2017-08-04 RX ADMIN — MAGNESIUM SULFATE IN WATER 2 G: 40 INJECTION, SOLUTION INTRAVENOUS at 03:08

## 2017-08-04 NOTE — PLAN OF CARE
Problem: Patient Care Overview  Goal: Plan of Care Review  Outcome: Ongoing (interventions implemented as appropriate)  Pt remains free from falls and injury. Plan of care reviewed with pt. Pt understands plan. Pt denies pain, VSS. Plans for speech therapy consult was ordered. US of kidneys were discussed to be done this afternoon. Pt remains on NS drip at 125 ml/hr. NPO remains until consulted with speech. Oral care given due to dry mucosa.  Will continue to monitor.

## 2017-08-04 NOTE — PLAN OF CARE
Problem: SLP Goal  Goal: SLP Goal  SLP goal expected to be met by 8/11:  1. Pt will participate in MBSS when deemed medically appropriate in order to determine safest, least restrictive diet.   2. Pt will participate in ongoing assessment of clinical evaluation of swallow in order to determine safest, least restrictive diet.   Outcome: Ongoing (interventions implemented as appropriate)  Recommend NPO at this time. Consider alternative means nutrition, hydration, medication.     AALIYAH Concepcion, CCC-SLP  [phone]  8/4/2017

## 2017-08-04 NOTE — ASSESSMENT & PLAN NOTE
- may be secondary to infection vs. inflammation  - no symptoms or fevers so avoiding antibiotics at this time   - to r/o infection: procalcitonin, blood culture x2 ordered. UA does not indicate UTI so will not order urine cultures. Pt has a chronic left sided pleural effusion which may act as nidus for infection but pt denies any coughing. He also has a port-a-cath in place since Jun '17 which is non-tender to palpation and does not appear erythematous at this time.   - will continue to monitor; if spikes a fever, then will add antibitics

## 2017-08-04 NOTE — ASSESSMENT & PLAN NOTE
Hypotensive this admission most recently 110s/50s  -Patient has had decreased oral intake over the past week, will continue to monitor for improvement

## 2017-08-04 NOTE — PLAN OF CARE
Problem: SLP Goal  Goal: SLP Goal  SLP goal expected to be met by 8/11:  1. Pt will participate in MBSS when deemed medically appropriate in order to determine safest, least restrictive diet.   2. Pt will participate in ongoing assessment of clinical evaluation of swallow in order to determine safest, least restrictive diet.    Outcome: Ongoing (interventions implemented as appropriate)  MBSS completed. Patient with severe pharyngeal dysphagia characterized by silent aspiration of residuals of thin liquids, honey-thickened liquids and puree textures. Patient with deep, silent penetration thin liquids. Patient not safe for PO intake at this time. See report for full details. REC: NPO with alternative means nutrition/hydration/medication and ST to continue to follow. Findings reviewed with Patient, nurse and MD team. Thank you.    FERNANDO Ruelas., [facility]-SLP  Speech-Language Pathology  Pager: [phone]  8/4/2017

## 2017-08-04 NOTE — ASSESSMENT & PLAN NOTE
S/p RFA ablation on 7/28 and on apixaban 5mg for anticoagulation  -Continue TOPROL-XL 25mg po daily for rate control  -No further runs of palpitations or documented tachycardia/afib RVR since he converted spontaneously in the ED

## 2017-08-04 NOTE — SUBJECTIVE & OBJECTIVE
Interval History: NAEON. Patient was comfortable this morning on exam, but was complaining of dry mouth and states that he would like to eat. Speech assessed and recommended MBS which is currently taking place. He has not complained of any additional CP or SOB since admission and has had no additional runs of afib with RVR.     Review of Systems   Constitutional: Positive for appetite change (decreased). Negative for activity change, chills and fever.   HENT: Positive for trouble swallowing and voice change (slurred speech).    Respiratory: Negative for chest tightness and shortness of breath.    Cardiovascular: Negative for chest pain, palpitations and leg swelling.   Gastrointestinal: Negative for abdominal distention, abdominal pain, constipation, diarrhea, nausea and vomiting.   Endocrine: Negative for cold intolerance and heat intolerance.   Skin: Negative for color change.   Neurological: Negative for weakness, light-headedness and headaches.   Hematological: Negative for adenopathy. Does not bruise/bleed easily.   Psychiatric/Behavioral: Negative for agitation and behavioral problems.     Objective:     Vital Signs (Most Recent):  Temp: 98.2 °F (36.8 °C) (08/04/17 1100)  Pulse: 89 (08/04/17 1500)  Resp: 18 (08/04/17 1100)  BP: (!) 102/55 (08/04/17 1100)  SpO2: 95 % (08/04/17 1100) Vital Signs (24h Range):  Temp:  [97.6 °F (36.4 °C)-98.2 °F (36.8 °C)] 98.2 °F (36.8 °C)  Pulse:  [] 89  Resp:  [16-27] 18  SpO2:  [95 %-98 %] 95 %  BP: (102-134)/(55-75) 102/55     Weight: 68 kg (150 lb)  Body mass index is 21.52 kg/m².    Intake/Output Summary (Last 24 hours) at 08/04/17 1545  Last data filed at 08/04/17 1320   Gross per 24 hour   Intake                0 ml   Output              275 ml   Net             -275 ml      Physical Exam   Constitutional: He is oriented to person, place, and time. He appears well-developed and well-nourished.   HENT:   Head: Normocephalic and atraumatic.   Mouth/Throat: Mucous  membranes are dry.   Eyes: Conjunctivae and EOM are normal.   Neck: No JVD present. No tracheal deviation present.   Cardiovascular: Normal rate, regular rhythm and normal heart sounds.    Pulmonary/Chest: Effort normal. He has decreased breath sounds in the left middle field.   Abdominal: Soft. Bowel sounds are normal.   Musculoskeletal: He exhibits no edema or deformity.   Neurological: He is alert and oriented to person, place, and time.   Skin: Skin is warm and dry.   Psychiatric: He has a normal mood and affect. His behavior is normal.       Significant Labs:   Recent Results (from the past 24 hour(s))   CBC auto differential    Collection Time: 08/03/17  8:18 PM   Result Value Ref Range    WBC 13.72 (H) 3.90 - 12.70 K/uL    RBC 4.54 (L) 4.60 - 6.20 M/uL    Hemoglobin 12.4 (L) 14.0 - 18.0 g/dL    Hematocrit 38.7 (L) 40.0 - 54.0 %    MCV 85 82 - 98 fL    MCH 27.3 27.0 - 31.0 pg    MCHC 32.0 32.0 - 36.0 g/dL    RDW 16.6 (H) 11.5 - 14.5 %    Platelets 284 150 - 350 K/uL    MPV 10.2 9.2 - 12.9 fL    Gran # 11.1 (H) 1.8 - 7.7 K/uL    Lymph # 0.8 (L) 1.0 - 4.8 K/uL    Mono # 1.7 (H) 0.3 - 1.0 K/uL    Eos # 0.0 0.0 - 0.5 K/uL    Baso # 0.01 0.00 - 0.20 K/uL    Gran% 81.0 (H) 38.0 - 73.0 %    Lymph% 5.5 (L) 18.0 - 48.0 %    Mono% 12.6 4.0 - 15.0 %    Eosinophil% 0.1 0.0 - 8.0 %    Basophil% 0.1 0.0 - 1.9 %    Differential Method Automated    B-Type natriuretic peptide (BNP)    Collection Time: 08/03/17  8:18 PM   Result Value Ref Range     (H) 0 - 99 pg/mL   Troponin I    Collection Time: 08/03/17  8:18 PM   Result Value Ref Range    Troponin I 0.047 (H) 0.000 - 0.026 ng/mL   TSH    Collection Time: 08/03/17  8:18 PM   Result Value Ref Range    TSH 2.062 0.400 - 4.000 uIU/mL   Lactic acid, plasma    Collection Time: 08/03/17  8:39 PM   Result Value Ref Range    Lactate (Lactic Acid) 2.5 (H) 0.5 - 2.2 mmol/L   Comprehensive metabolic panel    Collection Time: 08/03/17  9:38 PM   Result Value Ref Range    Sodium  142 136 - 145 mmol/L    Potassium 4.0 3.5 - 5.1 mmol/L    Chloride 112 (H) 95 - 110 mmol/L    CO2 20 (L) 23 - 29 mmol/L    Glucose 84 70 - 110 mg/dL    BUN, Bld 24 (H) 8 - 23 mg/dL    Creatinine 0.7 0.5 - 1.4 mg/dL    Calcium 7.9 (L) 8.7 - 10.5 mg/dL    Total Protein 5.3 (L) 6.0 - 8.4 g/dL    Albumin 1.1 (L) 3.5 - 5.2 g/dL    Total Bilirubin 0.8 0.1 - 1.0 mg/dL    Alkaline Phosphatase 155 (H) 55 - 135 U/L    AST 22 10 - 40 U/L    ALT 21 10 - 44 U/L    Anion Gap 10 8 - 16 mmol/L    eGFR if African American >60.0 >60 mL/min/1.73 m^2    eGFR if non African American >60.0 >60 mL/min/1.73 m^2   Magnesium    Collection Time: 08/03/17  9:38 PM   Result Value Ref Range    Magnesium 1.2 (L) 1.6 - 2.6 mg/dL   Phosphorus    Collection Time: 08/03/17  9:38 PM   Result Value Ref Range    Phosphorus 3.4 2.7 - 4.5 mg/dL   Procalcitonin    Collection Time: 08/03/17  9:38 PM   Result Value Ref Range    Procalcitonin 0.27 (H) <0.25 ng/mL   Urinalysis Only    Collection Time: 08/03/17 10:41 PM   Result Value Ref Range    Specimen UA Urine, Clean Catch     Color, UA Yellow Yellow, Straw, Iram    Appearance, UA Hazy (A) Clear    pH, UA 5.0 5.0 - 8.0    Specific Gravity, UA 1.020 1.005 - 1.030    Protein, UA Negative Negative    Glucose, UA Negative Negative    Ketones, UA Trace (A) Negative    Bilirubin (UA) Negative Negative    Occult Blood UA Negative Negative    Nitrite, UA Negative Negative    Urobilinogen, UA Negative <2.0 EU/dL    Leukocytes, UA Negative Negative   Basic metabolic panel    Collection Time: 08/04/17  4:43 AM   Result Value Ref Range    Sodium 141 136 - 145 mmol/L    Potassium 4.3 3.5 - 5.1 mmol/L    Chloride 110 95 - 110 mmol/L    CO2 20 (L) 23 - 29 mmol/L    Glucose 76 70 - 110 mg/dL    BUN, Bld 24 (H) 8 - 23 mg/dL    Creatinine 0.7 0.5 - 1.4 mg/dL    Calcium 8.6 (L) 8.7 - 10.5 mg/dL    Anion Gap 11 8 - 16 mmol/L    eGFR if African American >60.0 >60 mL/min/1.73 m^2    eGFR if non African American >60.0 >60  mL/min/1.73 m^2   CBC auto differential    Collection Time: 08/04/17  4:43 AM   Result Value Ref Range    WBC 12.05 3.90 - 12.70 K/uL    RBC 3.90 (L) 4.60 - 6.20 M/uL    Hemoglobin 10.7 (L) 14.0 - 18.0 g/dL    Hematocrit 34.1 (L) 40.0 - 54.0 %    MCV 87 82 - 98 fL    MCH 27.4 27.0 - 31.0 pg    MCHC 31.4 (L) 32.0 - 36.0 g/dL    RDW 15.4 (H) 11.5 - 14.5 %    Platelets 259 150 - 350 K/uL    MPV 9.6 9.2 - 12.9 fL    Gran # 9.4 (H) 1.8 - 7.7 K/uL    Lymph # 1.0 1.0 - 4.8 K/uL    Mono # 1.6 (H) 0.3 - 1.0 K/uL    Eos # 0.0 0.0 - 0.5 K/uL    Baso # 0.01 0.00 - 0.20 K/uL    Gran% 77.6 (H) 38.0 - 73.0 %    Lymph% 8.0 (L) 18.0 - 48.0 %    Mono% 13.3 4.0 - 15.0 %    Eosinophil% 0.3 0.0 - 8.0 %    Basophil% 0.1 0.0 - 1.9 %    Differential Method Automated    Blood culture    Collection Time: 08/04/17  4:43 AM   Result Value Ref Range    Blood Culture, Routine No Growth to date    Magnesium    Collection Time: 08/04/17  4:43 AM   Result Value Ref Range    Magnesium 1.9 1.6 - 2.6 mg/dL   Blood culture    Collection Time: 08/04/17  4:46 AM   Result Value Ref Range    Blood Culture, Routine No Growth to date    Lactic acid, plasma    Collection Time: 08/04/17  7:50 AM   Result Value Ref Range    Lactate (Lactic Acid) 1.9 0.5 - 2.2 mmol/L       Significant Imaging:   Modified Wilber Metcalf 08/04/2017: IP

## 2017-08-04 NOTE — ASSESSMENT & PLAN NOTE
Onset prior to PADMINI on 7/28 but has worsened since the procedure. Patient stated improvement in his symptoms this morning.   -SLP evaluated patient and are recommending he remain NPO  -Currently in flouro getting MBS, will f/u results when they become available   -Will keep NPO, oral sips with meds + aspiration precautions  -Will advance diet as tolerated

## 2017-08-04 NOTE — ED NOTES
Pt presents to ed via ems c/o dehydration and  decressed po intake for 7 days since cardiac ablation at [facility].  He reports difficulty swallowing and c/o hoarse voice

## 2017-08-04 NOTE — ASSESSMENT & PLAN NOTE
-Presented with Mg of 1.2; replaced 4g in the ED and was given an additional 2g this morning  -Will follow up with AM labs

## 2017-08-04 NOTE — PLAN OF CARE
"CM to bedside - pt, spouse & dgtr present; spouse provided assessment info. Pt independent but weak w/ only home o2 in place, lives w/ spouse & adult dgtr. Pt is active w/ [facility] and wishes to resume at d/c; pt w/ nursing only and spouse stated that pt didn't want therapy but CM stated that if indicated, speech therapy could be ordered for h/h and an option for pt at d/c. Pt/family also requesting a rollator at d/c.     CM provided patient anticipated GAVIN which will be update by nursing staff. Patient provided a Blue "My Health Packet" for d/c planning and health tool. Patient verbalized understanding.       08/04/17 1140   Discharge Assessment   Assessment Type Discharge Planning Assessment   Confirmed/corrected address and phone number on facesheet? Yes   Assessment information obtained from? Patient;Caregiver   Expected Length of Stay (days) 3   Communicated expected length of stay with patient/caregiver yes   Prior to hospitilization cognitive status: Alert/Oriented   Prior to hospitalization functional status: Independent   Current cognitive status: Alert/Oriented   Current Functional Status: Independent   Arrived From home health   Lives With spouse;child(hui), adult   Able to Return to Prior Arrangements yes   Is patient able to care for self after discharge? Unable to determine at this time (comments)   Does the patient have family/friends to help with healtcare needs after discharge? yes   How many people do you have in your home that can help with your care after discharge? 1   Who are your caregiver(s) and their phone number(s)? spouse - Vanessa [phone]; dgtr - Yue [phone]   Patient's perception of discharge disposition home health   Readmission Within The Last 30 Days current reason for admission unrelated to previous admission   Patient currently being followed by outpatient case management? No   Patient currently receives home health services? Yes   Patient previously received home health " services and would like to resume services if necessary? Yes   If yes, name of home health provider: [facility]   Does the patient currently use HME? Yes   Name and contact number for HME provider: unknown   Patient currently receives private duty nursing? No   Patient currently receives any other outside agency services? No   Equipment Currently Used at Home oxygen   Do you have any problems affording any of your prescribed medications? No   Is the patient taking medications as prescribed? yes   Do you have any financial concerns preventing you from receiving the healthcare you need? No   Does the patient have transportation to healthcare appointments? Yes   Transportation Available family or friend will provide   On Dialysis? No   Does the patient receive services at the [facility] Clinic? No   Are there any open cases? No   Discharge Plan A Home with family;Home Health   Discharge Plan B Home with family   Patient/Family In Agreement With Plan yes

## 2017-08-04 NOTE — ED PROVIDER NOTES
Encounter Date: 8/3/2017       History     Chief Complaint   Patient presents with    Dehydration     has not eaten in 7 days      HPI  Review of patient's allergies indicates:   Allergen Reactions    Lipitor [atorvastatin] Other (See Comments)     Leg cramps    Cephalexin Nausea And Vomiting     Past Medical History:   Diagnosis Date    Anticoagulant long-term use     Atrial flutter     cardioversion 4/12/16    Cancer     Chronic kidney disease     COPD (chronic obstructive pulmonary disease)     Coronary artery disease     [facility] 4/1/16: 50% proximal RAMUS    Hypertension     Kidney stones     Old 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/20/2016    3/16    Thoracic aortic dissection     Type A dissection s/p repair 4/13/16     Past Surgical History:   Procedure Laterality Date    APPENDECTOMY      CARDIAC CATHETERIZATION      EYE SURGERY      KIDNEY STONE SURGERY      4 surgeries    TONSILLECTOMY      VASCULAR SURGERY       History reviewed. No pertinent family history.  Social History   Substance Use Topics    Smoking status: Current Every Day Smoker     Packs/day: 0.25     Years: 55.00     Types: Cigarettes    Smokeless tobacco: Never Used      Comment: has Chantix at home    Alcohol use No     Review of Systems    Physical Exam     Initial Vitals [08/03/17 1954]   BP Pulse Resp Temp SpO2   110/70 100 16 97.9 °F (36.6 °C) 95 %      MAP       83.33         Physical Exam    ED Course   Procedures  Labs Reviewed   CBC W/ AUTO DIFFERENTIAL - Abnormal; Notable for the following:        Result Value    WBC 13.72 (*)     RBC 4.54 (*)     Hemoglobin 12.4 (*)     Hematocrit 38.7 (*)     RDW 16.6 (*)     Gran # 11.1 (*)     Lymph # 0.8 (*)     Mono # 1.7 (*)     Gran% 81.0 (*)     Lymph% 5.5 (*)     All other components within normal limits   B-TYPE NATRIURETIC PEPTIDE - Abnormal; Notable for the following:      (*)     All other components within normal limits   TROPONIN I - Abnormal; Notable for the following:      Troponin I 0.047 (*)     All other components within normal limits   LACTIC ACID, PLASMA - Abnormal; Notable for the following:     Lactate (Lactic Acid) 2.5 (*)     All other components within normal limits   COMPREHENSIVE METABOLIC PANEL - Abnormal; Notable for the following:     Chloride 112 (*)     CO2 20 (*)     BUN, Bld 24 (*)     Calcium 7.9 (*)     Total Protein 5.3 (*)     Albumin 1.1 (*)     Alkaline Phosphatase 155 (*)     All other components within normal limits   MAGNESIUM - Abnormal; Notable for the following:     Magnesium 1.2 (*)     All other components within normal limits   TSH   MAGNESIUM   PHOSPHORUS   PHOSPHORUS   URINALYSIS                               ED Course     Clinical Impression:   The primary encounter diagnosis was Dehydration. Diagnoses of Chest pain and Dysphagia were also pertinent to this visit.

## 2017-08-04 NOTE — NURSING
Spoke with Dr. Valles at bedside and MD was ask if pt is to remain NPO; MD stated NPO except meds. Will continue to monitor.

## 2017-08-04 NOTE — H&P
[facility] Hospital Medicine  History & Physical    Patient Name: Clint Brown  MRN: [id number]  Admission Date: 8/3/2017  Attending Physician: Traci Stephens MD   Primary Care Provider: [facility] Medicine Team: [facility] 4 Jeimy Valles MD     Patient information was obtained from patient and past medical records.     Subjective:     Principal Problem:Dysphagia    Chief Complaint   Patient presents with    Dehydration     has not eaten in 7 days         HPI:   Mr. Brown is a 72 y/o M with a history of metastatic mesothelioma, Type A aortic dissection (s/p repair in April 2016), persistent atrial flutter (s/p RFA ablation on 7/28), CAD, CKD, and HTN who presents with dry mouth and slurred speech. He reports that he hasn't eaten in 7 days due to to difficulty swallowing and now has consequential dry mouth and difficulty speaking. He has difficulty swallowing solid foods but is able to tolerate liquids. He does not think the dysphagia has progressively worsened; he just anticipated it to get better and it hasn't. Pt denies any fevers, chills, n/v, cough, confusion, hemiparesis or gait problems. Just reports feeling weak and fatigued. He feels thirsty and is requesting a Coke.     He has had a 5lb weight loss in the last 7 days. His decreased appetite is not new and previously he was started on periactin but continues to lose weight. The difference is that previously he didn't have appetite, and now he feels like he physically can't eat. Pt has constipation 2/2 decreased oral intake and daily opioid use. Pt unable to recall last BM.     Pt recently underwent a PADMINI + RFA ablation on 7/28 for Aflutter with RVR.  He has had difficulty swallowing since that hospitalization, although prior notes indicate he may have had anorexia and dysphagia in the past that was attributed to malignant mesothelioma. Since procedure, he denies any palpitation, chest pain, SOB.      He  was diagnosed with mesothelioma on 4/17 when he presented mike left sided chest pain and heaviness, was found to have a left-sided pleural effusion and underwent thoracentesis, cultures from which revealed this malignancy. Since then he has undergone a VATS procedure in 5/17 to drain the fluids. He has Stage IV mesothelioma secondary to mets to upper abdomen and left illiac bones. He has seen Dr. Nichols (Heme-Onc) in clinic, chemo-port placed 6/22 but was deferred chemo until his Aflutter was taken care of. He takes MS contin 15mg BID and percocet 1/day for pain on left and was started on periactin for poor appetite.    Past Medical History:   Diagnosis Date    Anticoagulant long-term use     Atrial flutter     cardioversion 4/12/16    Cancer     Chronic kidney disease     COPD (chronic obstructive pulmonary disease)     Coronary artery disease     [facility] 4/1/16: 50% proximal RAMUS    Hypertension     Kidney stones     Old 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/20/2016    3/16    Thoracic aortic dissection     Type A dissection s/p repair 4/13/16       Past Surgical History:   Procedure Laterality Date    APPENDECTOMY      CARDIAC CATHETERIZATION      EYE SURGERY      KIDNEY STONE SURGERY      4 surgeries    TONSILLECTOMY      VASCULAR SURGERY         Review of patient's allergies indicates:   Allergen Reactions    Lipitor [atorvastatin] Other (See Comments)     Leg cramps    Cephalexin Nausea And Vomiting       No current facility-administered medications on file prior to encounter.      Current Outpatient Prescriptions on File Prior to Encounter   Medication Sig    apixaban 5 mg Tab Take 1 tablet (5 mg total) by mouth 2 (two) times daily.    aspirin (ECOTRIN) 81 MG EC tablet Take 1 tablet (81 mg total) by mouth once daily.    cyproheptadine (PERIACTIN) 4 mg tablet Take 1 tablet (4 mg total) by mouth 4 (four) times daily.    DOCUSATE SODIUM (COLACE ORAL) Take 1 capsule by mouth once daily.     folic acid  (FOLVITE) 1 MG tablet Take 1 tablet (1 mg total) by mouth once daily. Start today    metoprolol succinate (TOPROL-XL) 25 MG 24 hr tablet Take 1 tablet (25 mg total) by mouth once daily.    morphine (MS CONTIN) 15 MG 12 hr tablet Take 1 tablet (15 mg total) by mouth 2 (two) times daily.    multivitamin (THERAGRAN) per tablet Take 1 tablet by mouth once daily.    omeprazole (PRILOSEC) 20 MG capsule Take 1 capsule (20 mg total) by mouth once daily. (Patient taking differently: Take 20 mg by mouth every morning. )    ondansetron (ZOFRAN) 8 MG tablet Take 1 tablet (8 mg total) by mouth 4 (four) times daily as needed for Nausea.    oxycodone-acetaminophen (PERCOCET) 5-325 mg per tablet Take 1 tablet by mouth every 4 (four) hours as needed for Pain.    ranitidine (ZANTAC) 150 MG tablet Take 1 tablet (150 mg total) by mouth 2 (two) times daily.    rosuvastatin (CRESTOR) 20 MG tablet Take 1 tablet (20 mg total) by mouth once daily. (Patient taking differently: Take 20 mg by mouth every evening. )     Family History     None        Social History Main Topics    Smoking status: Current Every Day Smoker     Packs/day: 0.25     Years: 55.00     Types: Cigarettes    Smokeless tobacco: Never Used      Comment: has Chantix at home    Alcohol use No    Drug use: No    Sexual activity: Yes     Partners: Female     Review of Systems   Constitutional: Positive for appetite change (decreased). Negative for activity change, chills and fever.   HENT: Positive for trouble swallowing and voice change (slurred speech).    Respiratory: Negative for chest tightness and shortness of breath.    Cardiovascular: Negative for chest pain, palpitations and leg swelling.   Gastrointestinal: Negative for abdominal distention, abdominal pain, constipation, diarrhea, nausea and vomiting.   Skin: Positive for pallor. Negative for color change.   Neurological: Negative for weakness, light-headedness and headaches.     Objective:     Vital  Signs (Most Recent):  Temp: 98.1 °F (36.7 °C) (08/04/17 0005)  Pulse: 97 (08/04/17 0005)  Resp: 16 (08/04/17 0005)  BP: 118/60 (08/04/17 0005)  SpO2: 97 % (08/04/17 0005) Vital Signs (24h Range):  Temp:  [97.9 °F (36.6 °C)-98.1 °F (36.7 °C)] 98.1 °F (36.7 °C)  Pulse:  [] 97  Resp:  [16-27] 16  SpO2:  [95 %-98 %] 97 %  BP: (102-134)/(55-75) 118/60     Weight: 68 kg (150 lb)  Body mass index is 21.52 kg/m².    Physical Exam   Constitutional: He is oriented to person, place, and time. He appears well-developed and well-nourished.   HENT:   Head: Normocephalic and atraumatic.   Dry oral cavity & tongue. Parched lips   Eyes: Pupils are equal, round, and reactive to light.   Neck: No JVD present.   Cardiovascular: irregular rate and rhythm, normal heart sounds and intact distal pulses.    No murmur heard. Chemo-port in place  Pulmonary/Chest: Effort normal and breath sounds normal. No respiratory distress.   Abdominal: Soft. Bowel sounds are normal. He exhibits no distension. There is no tenderness.   Musculoskeletal: He exhibits no edema.   Neurological: He is alert and oriented to person, place, and time.   Skin: Skin is warm and dry. Capillary refill takes less than 2 seconds.     Significant Labs:   CBC:   Recent Labs  Lab 08/03/17  2018   WBC 13.72*   HGB 12.4*   HCT 38.7*        CMP:   Recent Labs  Lab 08/03/17  2138      K 4.0   *   CO2 20*   GLU 84   BUN 24*   CREATININE 0.7   CALCIUM 7.9*   PROT 5.3*   ALBUMIN 1.1*   BILITOT 0.8   ALKPHOS 155*   AST 22   ALT 21   ANIONGAP 10   EGFRNONAA >60.0     Significant Imaging: I have reviewed and interpreted all pertinent imaging results/findings within the past 24 hours.    ECG (8/3): Afib with RVR    CT head (8/3): no acute intracranial pathology    CXR (8/3): stable left sided pleural effusion    Assessment/Plan:   In summary, Mr. Brown is a 72 y/o M with a history of metastatic mesothelioma, Type A aortic dissection (s/p repair in April  2016), persistent atrial flutter (s/p RFA ablation on 7/28), CAD, CKD, and HTN who presents with dry mouth and slurred speech, which is likely due to severe dehydration from lack of oral intake for the last few days. Although decreased appetite is not a new problem for this pt and he had continued to lose weight on cyproheptadine, the difference is the complaint that he physically has difficulty swallowing anything more than liquids. Dysphagia is likely due to external compression of esophagus from the tumor vs nerve compression from PADMINI procedure.     In the ER, he was given 1L IVF. HR in 170s temporary and ECG recorded Afib with RVR. Episode resolved spontaneously w/o any meds. He was admitted for further eval of dysphagia in the setting of severe dehydration.    Dysphagia - to solids  - onset prior to PADMINI on 7/28 but has worsened since the procedure. Suspect external compression of esophagus from the tumor vs nerve compression from PADMINI procedure. Less likely, but alternative differentials include stricture vs. (pill, eosinophilic) esophagitis  - SLP consulted for eval. May need EGD vs. Barium swallow based on eval  - will keep NPO until further eval & meantime oral sips with meds + aspiration precautions    Dehydration   Hypoalbuminemia (albumin 1.1)  - due to decreased oral intake for the last week. Has lost approx 5lbs in last 7 days.  - s/p1L IVF bolus in ER + continuous fluids @ 125cc/hr. Consider adding Dextrose to fluids if pts fails swallow test in AM.  - oral swabs  - continue to monitor    Leukocytosis  - may be secondary to infection vs. inflammation  - no symptoms or fevers so avoiding antibiotics at this time   - to r/o infection: procalcitonin, blood culture x2 ordered. UA does not indicate UTI so will not order urine cultures. Pt has a chronic left sided pleural effusion which may act as nidus for infection but pt denies any coughing. He also has a port-a-cath in place since Jun '17 which is  non-tender to palpation and does not appear erythematous at this time.   - will continue to monitor; if spikes a fever, then will add antibitics    Hypomagnesemia  - presented with Mg of 1.2; replaced 4g  - will follow up with AM labs; will likely need addition 2-3g     Hypocalcemia  - when corrected for low-albumin, Ca is 10.2 (wnl)    Elevated alk phos  - elevated but decreased c/w previous labs.     Atrial flutter  - s/p RFA ablation on  and on apixaban 5mg for anticoagulation  - currently rate controlled on metoprolol 25mg   - pt asymptomatic of palpitations  - has been in and out of Afib this evening    Hyperlipidemia  - chronic; last lipid panel ): TC: 104, HDL: 31, LDL: 58.6, T  - will continue home rosuvastatin 20mg     Mesothelioma - Stage IV  - has mets to upper abdomen and left illiac bones.  - has seen Dr. Nichols in clinic; chemo therapy until aflutter controlled  - for pain: MS Contin 15mg BID & percocet  as needed  - periactin for decreased appetite    Constipation - pt unable to recall last BM  - due to decreased oral intake and daily opioid use  - will schedule daily miralax & senna as needed     VTE Risk Mitigation         Ordered     apixaban tablet 5 mg  2 times daily     Route:  Oral        17 [id number]     Medium Risk of VTE  Once      [id number]        Jeimy Valles MD  Department of Hospital Medicine   [facility]

## 2017-08-04 NOTE — HPI
Mr. Brown is a 70 y/o M with a history of metastatic mesothelioma, Type A aortic dissection (s/p repair in April 2016), persistent atrial flutter (s/p RFA ablation on 7/28), CAD, CKD, and HTN who presents with dry mouth and slurred speech. He reports that he hasn't eaten in 7 days due to to difficulty swallowing and now has consequential dry mouth and difficulty speaking. He has difficulty swallowing solid foods but is able to tolerate liquids. He does not think the dysphagia has progressively worsened; he just anticipated it to get better and it hasn't. Pt denies any fevers, chills, n/v, cough, confusion, hemiparesis or gait problems. Just reports feeling weak and fatigued. He feels thirsty and is requesting a Coke.      He has had a 5lb weight loss in the last 7 days. His decreased appetite is not new and previously he was started on periactin but continues to lose weight. The difference is that previously he didn't have appetite, and now he feels like he physically can't eat. Pt has constipation 2/2 decreased oral intake and daily opioid use. Pt unable to recall last BM.      Pt recently underwent a PADMINI + RFA ablation on 7/28 for Aflutter with RVR.  He has had difficulty swallowing since that hospitalization, although prior notes indicate he may have had anorexia and dysphagia in the past that was attributed to malignant mesothelioma. Since procedure, he denies any palpitation, chest pain, SOB.       He was diagnosed with mesothelioma on 4/17 when he presented mike left sided chest pain and heaviness, was found to have a left-sided pleural effusion and underwent thoracentesis, cultures from which revealed this malignancy. Since then he has undergone a VATS procedure in 5/17 to drain the fluids. He has Stage IV mesothelioma secondary to mets to upper abdomen and left illiac bones. He has seen Dr. Nichols (Heme-Onc) in clinic, chemo-port placed 6/22 but was deferred chemo until his Aflutter was taken care of. He  takes MS contin 15mg BID and percocet 1/day for pain on left and was started on periactin for poor appetite.

## 2017-08-04 NOTE — ASSESSMENT & PLAN NOTE
Patient has not had recent BM, though less concerning given decreased po intake for 1 weeks duration as well as chronic opioid use due to pain from mesothelioma   -Miralax & senna scheduled daily PRN

## 2017-08-04 NOTE — PROGRESS NOTES
[facility] Hospital Medicine  Progress Note    Patient Name: Clint Brown  MRN: [id number]  Patient Class: IP- Inpatient   Admission Date: 8/3/2017  Length of Stay: 1 days  Attending Physician: Traci Stephens MD  Primary Care Provider: Jean Claude Griffith DO    [facility] Medicine Team: [facility] MED 4 Jose R Lynn MD    Subjective:     Principal Problem:Dysphagia    HPI:  Mr. Brown is a 70 y/o M with a history of metastatic mesothelioma, Type A aortic dissection (s/p repair in April 2016), persistent atrial flutter (s/p RFA ablation on 7/28), CAD, CKD, and HTN who presents with dry mouth and slurred speech. He reports that he hasn't eaten in 7 days due to to difficulty swallowing and now has consequential dry mouth and difficulty speaking. He has difficulty swallowing solid foods but is able to tolerate liquids. He does not think the dysphagia has progressively worsened; he just anticipated it to get better and it hasn't. Pt denies any fevers, chills, n/v, cough, confusion, hemiparesis or gait problems. Just reports feeling weak and fatigued. He feels thirsty and is requesting a Coke.      He has had a 5lb weight loss in the last 7 days. His decreased appetite is not new and previously he was started on periactin but continues to lose weight. The difference is that previously he didn't have appetite, and now he feels like he physically can't eat. Pt has constipation 2/2 decreased oral intake and daily opioid use. Pt unable to recall last BM.      Pt recently underwent a PADMINI + RFA ablation on 7/28 for Aflutter with RVR.  He has had difficulty swallowing since that hospitalization, although prior notes indicate he may have had anorexia and dysphagia in the past that was attributed to malignant mesothelioma. Since procedure, he denies any palpitation, chest pain, SOB.       He was diagnosed with mesothelioma on 4/17 when he presented mike left sided chest pain and heaviness, was found to  have a left-sided pleural effusion and underwent thoracentesis, cultures from which revealed this malignancy. Since then he has undergone a VATS procedure in 5/17 to drain the fluids. He has Stage IV mesothelioma secondary to mets to upper abdomen and left illiac bones. He has seen Dr. Nichols (Heme-Onc) in clinic, chemo-port placed 6/22 but was deferred chemo until his Aflutter was taken care of. He takes MS contin 15mg BID and percocet 1/day for pain on left and was started on periactin for poor appetite.    Hospital Course:  No notes on file    Interval History: NAEON. Patient was comfortable this morning on exam, but was complaining of dry mouth and states that he would like to eat. Speech assessed and recommended MBS which is currently taking place. He has not complained of any additional CP or SOB since admission and has had no additional runs of afib with RVR.     Review of Systems   Constitutional: Positive for appetite change (decreased). Negative for activity change, chills and fever.   HENT: Positive for trouble swallowing and voice change (slurred speech).    Respiratory: Negative for chest tightness and shortness of breath.    Cardiovascular: Negative for chest pain, palpitations and leg swelling.   Gastrointestinal: Negative for abdominal distention, abdominal pain, constipation, diarrhea, nausea and vomiting.   Endocrine: Negative for cold intolerance and heat intolerance.   Skin: Negative for color change.   Neurological: Negative for weakness, light-headedness and headaches.   Hematological: Negative for adenopathy. Does not bruise/bleed easily.   Psychiatric/Behavioral: Negative for agitation and behavioral problems.     Objective:     Vital Signs (Most Recent):  Temp: 98.2 °F (36.8 °C) (08/04/17 1100)  Pulse: 89 (08/04/17 1500)  Resp: 18 (08/04/17 1100)  BP: (!) 102/55 (08/04/17 1100)  SpO2: 95 % (08/04/17 1100) Vital Signs (24h Range):  Temp:  [97.6 °F (36.4 °C)-98.2 °F (36.8 °C)] 98.2 °F (36.8  °C)  Pulse:  [] 89  Resp:  [16-27] 18  SpO2:  [95 %-98 %] 95 %  BP: (102-134)/(55-75) 102/55     Weight: 68 kg (150 lb)  Body mass index is 21.52 kg/m².    Intake/Output Summary (Last 24 hours) at 08/04/17 1545  Last data filed at 08/04/17 1320   Gross per 24 hour   Intake                0 ml   Output              275 ml   Net             -275 ml      Physical Exam   Constitutional: He is oriented to person, place, and time. He appears well-developed and well-nourished.   HENT:   Head: Normocephalic and atraumatic.   Mouth/Throat: Mucous membranes are dry.   Eyes: Conjunctivae and EOM are normal.   Neck: No JVD present. No tracheal deviation present.   Cardiovascular: Normal rate, regular rhythm and normal heart sounds.    Pulmonary/Chest: Effort normal. He has decreased breath sounds in the left middle field.   Abdominal: Soft. Bowel sounds are normal.   Musculoskeletal: He exhibits no edema or deformity.   Neurological: He is alert and oriented to person, place, and time.   Skin: Skin is warm and dry.   Psychiatric: He has a normal mood and affect. His behavior is normal.       Significant Labs:   Recent Results (from the past 24 hour(s))   CBC auto differential    Collection Time: 08/03/17  8:18 PM   Result Value Ref Range    WBC 13.72 (H) 3.90 - 12.70 K/uL    RBC 4.54 (L) 4.60 - 6.20 M/uL    Hemoglobin 12.4 (L) 14.0 - 18.0 g/dL    Hematocrit 38.7 (L) 40.0 - 54.0 %    MCV 85 82 - 98 fL    MCH 27.3 27.0 - 31.0 pg    MCHC 32.0 32.0 - 36.0 g/dL    RDW 16.6 (H) 11.5 - 14.5 %    Platelets 284 150 - 350 K/uL    MPV 10.2 9.2 - 12.9 fL    Gran # 11.1 (H) 1.8 - 7.7 K/uL    Lymph # 0.8 (L) 1.0 - 4.8 K/uL    Mono # 1.7 (H) 0.3 - 1.0 K/uL    Eos # 0.0 0.0 - 0.5 K/uL    Baso # 0.01 0.00 - 0.20 K/uL    Gran% 81.0 (H) 38.0 - 73.0 %    Lymph% 5.5 (L) 18.0 - 48.0 %    Mono% 12.6 4.0 - 15.0 %    Eosinophil% 0.1 0.0 - 8.0 %    Basophil% 0.1 0.0 - 1.9 %    Differential Method Automated    B-Type natriuretic peptide (BNP)     Collection Time: 08/03/17  8:18 PM   Result Value Ref Range     (H) 0 - 99 pg/mL   Troponin I    Collection Time: 08/03/17  8:18 PM   Result Value Ref Range    Troponin I 0.047 (H) 0.000 - 0.026 ng/mL   TSH    Collection Time: 08/03/17  8:18 PM   Result Value Ref Range    TSH 2.062 0.400 - 4.000 uIU/mL   Lactic acid, plasma    Collection Time: 08/03/17  8:39 PM   Result Value Ref Range    Lactate (Lactic Acid) 2.5 (H) 0.5 - 2.2 mmol/L   Comprehensive metabolic panel    Collection Time: 08/03/17  9:38 PM   Result Value Ref Range    Sodium 142 136 - 145 mmol/L    Potassium 4.0 3.5 - 5.1 mmol/L    Chloride 112 (H) 95 - 110 mmol/L    CO2 20 (L) 23 - 29 mmol/L    Glucose 84 70 - 110 mg/dL    BUN, Bld 24 (H) 8 - 23 mg/dL    Creatinine 0.7 0.5 - 1.4 mg/dL    Calcium 7.9 (L) 8.7 - 10.5 mg/dL    Total Protein 5.3 (L) 6.0 - 8.4 g/dL    Albumin 1.1 (L) 3.5 - 5.2 g/dL    Total Bilirubin 0.8 0.1 - 1.0 mg/dL    Alkaline Phosphatase 155 (H) 55 - 135 U/L    AST 22 10 - 40 U/L    ALT 21 10 - 44 U/L    Anion Gap 10 8 - 16 mmol/L    eGFR if African American >60.0 >60 mL/min/1.73 m^2    eGFR if non African American >60.0 >60 mL/min/1.73 m^2   Magnesium    Collection Time: 08/03/17  9:38 PM   Result Value Ref Range    Magnesium 1.2 (L) 1.6 - 2.6 mg/dL   Phosphorus    Collection Time: 08/03/17  9:38 PM   Result Value Ref Range    Phosphorus 3.4 2.7 - 4.5 mg/dL   Procalcitonin    Collection Time: 08/03/17  9:38 PM   Result Value Ref Range    Procalcitonin 0.27 (H) <0.25 ng/mL   Urinalysis Only    Collection Time: 08/03/17 10:41 PM   Result Value Ref Range    Specimen UA Urine, Clean Catch     Color, UA Yellow Yellow, Straw, Iram    Appearance, UA Hazy (A) Clear    pH, UA 5.0 5.0 - 8.0    Specific Gravity, UA 1.020 1.005 - 1.030    Protein, UA Negative Negative    Glucose, UA Negative Negative    Ketones, UA Trace (A) Negative    Bilirubin (UA) Negative Negative    Occult Blood UA Negative Negative    Nitrite, UA Negative Negative     Urobilinogen, UA Negative <2.0 EU/dL    Leukocytes, UA Negative Negative   Basic metabolic panel    Collection Time: 08/04/17  4:43 AM   Result Value Ref Range    Sodium 141 136 - 145 mmol/L    Potassium 4.3 3.5 - 5.1 mmol/L    Chloride 110 95 - 110 mmol/L    CO2 20 (L) 23 - 29 mmol/L    Glucose 76 70 - 110 mg/dL    BUN, Bld 24 (H) 8 - 23 mg/dL    Creatinine 0.7 0.5 - 1.4 mg/dL    Calcium 8.6 (L) 8.7 - 10.5 mg/dL    Anion Gap 11 8 - 16 mmol/L    eGFR if African American >60.0 >60 mL/min/1.73 m^2    eGFR if non African American >60.0 >60 mL/min/1.73 m^2   CBC auto differential    Collection Time: 08/04/17  4:43 AM   Result Value Ref Range    WBC 12.05 3.90 - 12.70 K/uL    RBC 3.90 (L) 4.60 - 6.20 M/uL    Hemoglobin 10.7 (L) 14.0 - 18.0 g/dL    Hematocrit 34.1 (L) 40.0 - 54.0 %    MCV 87 82 - 98 fL    MCH 27.4 27.0 - 31.0 pg    MCHC 31.4 (L) 32.0 - 36.0 g/dL    RDW 15.4 (H) 11.5 - 14.5 %    Platelets 259 150 - 350 K/uL    MPV 9.6 9.2 - 12.9 fL    Gran # 9.4 (H) 1.8 - 7.7 K/uL    Lymph # 1.0 1.0 - 4.8 K/uL    Mono # 1.6 (H) 0.3 - 1.0 K/uL    Eos # 0.0 0.0 - 0.5 K/uL    Baso # 0.01 0.00 - 0.20 K/uL    Gran% 77.6 (H) 38.0 - 73.0 %    Lymph% 8.0 (L) 18.0 - 48.0 %    Mono% 13.3 4.0 - 15.0 %    Eosinophil% 0.3 0.0 - 8.0 %    Basophil% 0.1 0.0 - 1.9 %    Differential Method Automated    Blood culture    Collection Time: 08/04/17  4:43 AM   Result Value Ref Range    Blood Culture, Routine No Growth to date    Magnesium    Collection Time: 08/04/17  4:43 AM   Result Value Ref Range    Magnesium 1.9 1.6 - 2.6 mg/dL   Blood culture    Collection Time: 08/04/17  4:46 AM   Result Value Ref Range    Blood Culture, Routine No Growth to date    Lactic acid, plasma    Collection Time: 08/04/17  7:50 AM   Result Value Ref Range    Lactate (Lactic Acid) 1.9 0.5 - 2.2 mmol/L       Significant Imaging:   Modified Wilber Metcalf 08/04/2017: IP    Assessment/Plan:      Mesothelioma of left lung with metastatic disease to bone and upper  abdomen    has mets to upper abdomen and left illiac bones.  - has seen Dr. Nichols in clinic; chemo therapy until aflutter controlled  - for pain: MS Contin 15mg BID & percocet  as needed  - periactin for decreased appetite      * Dysphagia    Onset prior to PADMINI on 7/28 but has worsened since the procedure. Patient stated improvement in his symptoms this morning.   -SLP evaluated patient and are recommending he remain NPO  [facility] will [facility]u results when they become available   -Will keep NPO, oral sips with meds + aspiration precautions  -Will advance diet as tolerated      Atrial flutter    S/p RFA ablation on 7/28 and on apixaban 5mg for anticoagulation  -Continue TOPROL-XL 25mg po daily for rate control  -No further runs of palpitations or documented tachycardia/afib RVR since he converted spontaneously in the ED      Dehydration    Decreased PO intake with 5 pund weight loss over the past week  -s/p 2L IVF   -Will consider adding Dextrose to fluids, or starting TPN, depending on MBS results and recs  -Oral swabs  -Continue to monitor for improvement      Coronary artery disease involving native coronary artery of native heart without angina pectoris    -[place]  -[facility] (4/1/2016) LM patent; LAD patent; Ramus non obstructive; LCX non obstructive      CKD (chronic kidney disease) stage 3, GFR 30-59 ml/min    Stable from prior labs  BUN/Cr 24 and 0.7  -Will continue to follow      Hypomagnesemia    -Presented with Mg of 1.2; replaced 4g in the ED and was given an additional 2g this morning  -Will follow up with AM labs      Hypocalcemia    - when corrected for low-albumin, Ca is 10.2 (wnl)      Constipation    Patient has not had recent BM, though less concerning given decreased po intake for 1 weeks duration as well as chronic opioid use due to pain from mesothelioma   -Miralax & senna scheduled daily PRN      Chronic obstructive pulmonary disease    Stable this admission   -Satting 95% on  RA  -Will continue to monitor for signs of respiratory distress      Hypercholesteremia    - chronic; last lipid panel ): TC: 104, HDL: 31, LDL: 58.6, T  - will continue home rosuvastatin 20mg       Essential hypertension    Hypotensive this admission most recently 110s/50s  -Patient has had decreased oral intake over the past week, will continue to monitor for improvement        VTE Risk Mitigation         Ordered     apixaban tablet 5 mg  2 times daily     Route:  Oral        17 0009     Medium Risk of VTE  Once      17 1661        Jose R Lynn MD  Department of Hospital Medicine   [facility]

## 2017-08-04 NOTE — PROVIDER PROGRESS NOTES - EMERGENCY DEPT.
Encounter Date: 8/3/2017    ED Physician Progress Notes       SCRIBE NOTE: I, Bi Cobb, am scribing for, and in the presence of,  Dr. Nixon.  Physician Statement: I, Dr. Nixon, personally performed the services described in this documentation as scribed by Bi Cobb in my presence, and it is both accurate and complete.      EKG - STEMI Decision  Initial Reading: No STEMI present.

## 2017-08-04 NOTE — ED PROVIDER NOTES
Encounter Date: 8/3/2017       History     Chief Complaint   Patient presents with    Dehydration     has not eaten in 7 days      71M with a history of metastatic mesothelioma, Type A aortic dissection s/p repair in April 2016, atrial flutter, CAD, CKD, and HTN. Pt with multiple  previous episodes of atrial flutter, one in April 2016 for which he was cardioverted, then on 07/12/17 >  required a PADMINI/ cardioversion and  was started on eliquis and multaq,  Pt presented back  to the ED on 07/22/17 for  2:1 flutter, but converted back to NSR spontaneously, presents today with severe dehydration.     Pt was cardioverted from          Review of patient's allergies indicates:   Allergen Reactions    Lipitor [atorvastatin] Other (See Comments)     Leg cramps    Cephalexin Nausea And Vomiting     Past Medical History:   Diagnosis Date    Anticoagulant long-term use     Atrial flutter     cardioversion 4/12/16    Cancer     Chronic kidney disease     COPD (chronic obstructive pulmonary disease)     Coronary artery disease     LHC 4/1/16: 50% proximal RAMUS    Hypertension     Kidney stones     Old 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/20/2016    3/16    Thoracic aortic dissection     Type A dissection s/p repair 4/13/16     Past Surgical History:   Procedure Laterality Date    APPENDECTOMY      CARDIAC CATHETERIZATION      EYE SURGERY      KIDNEY STONE SURGERY      4 surgeries    TONSILLECTOMY      VASCULAR SURGERY       History reviewed. No pertinent family history.  Social History   Substance Use Topics    Smoking status: Current Every Day Smoker     Packs/day: 0.25     Years: 55.00     Types: Cigarettes    Smokeless tobacco: Never Used      Comment: has Chantix at home    Alcohol use No     Review of Systems   Constitutional: Positive for activity change and appetite change. Negative for fever.   HENT: Positive for trouble swallowing.    Eyes: Negative for visual disturbance.   Respiratory: Positive for shortness  of breath.    Cardiovascular: Positive for palpitations. Negative for chest pain and leg swelling.   Gastrointestinal: Negative for abdominal pain, diarrhea and vomiting.   Genitourinary: Negative for difficulty urinating.   Musculoskeletal: Positive for back pain.   Neurological: Negative for seizures.   Psychiatric/Behavioral: Negative for confusion.       Physical Exam     Initial Vitals [08/03/17 1954]   BP Pulse Resp Temp SpO2   110/70 100 16 97.9 °F (36.6 °C) 95 %      MAP       83.33         Physical Exam    Vitals reviewed.  Constitutional: He appears well-developed.   HENT:   Head: Normocephalic and atraumatic.   Neck: No thyromegaly present. No JVD present.   Cardiovascular:   No murmur heard.  Irregular rate   Pulmonary/Chest: Breath sounds normal. No respiratory distress.   Abdominal: Soft. He exhibits no distension. There is no tenderness.   Neurological: He is alert and oriented to person, place, and time.   Speech slower.   Patient searching for words, but ultimately finding them.    Skin: Skin is dry.   +Skin Turgor   +Dry mucous membranes  +Tongue severely dry     Psychiatric: He has a normal mood and affect. Thought content normal.         ED Course   Procedures  Labs Reviewed   CBC W/ AUTO DIFFERENTIAL - Abnormal; Notable for the following:        Result Value    WBC 13.72 (*)     RBC 4.54 (*)     Hemoglobin 12.4 (*)     Hematocrit 38.7 (*)     RDW 16.6 (*)     Gran # 11.1 (*)     Lymph # 0.8 (*)     Mono # 1.7 (*)     Gran% 81.0 (*)     Lymph% 5.5 (*)     All other components within normal limits   B-TYPE NATRIURETIC PEPTIDE - Abnormal; Notable for the following:      (*)     All other components within normal limits   TROPONIN I - Abnormal; Notable for the following:     Troponin I 0.047 (*)     All other components within normal limits   LACTIC ACID, PLASMA - Abnormal; Notable for the following:     Lactate (Lactic Acid) 2.5 (*)     All other components within normal limits   TSH    MAGNESIUM   PHOSPHORUS   URINALYSIS   COMPREHENSIVE METABOLIC PANEL   MAGNESIUM   PHOSPHORUS             Medical Decision Making:   Initial Assessment:   Dehydration  Differential Diagnosis:   PNA  CVA  Afib  Stroke/TIA  Sepsis  UTI  ED Management:  Hydrate patient   Investigate sources of possible infection  Monitor               Attending Attestation:   Physician Attestation Statement for Resident:  As the supervising MD   Physician Attestation Statement: I have personally seen and examined this patient.   I agree with the above history. -: 70 yo m, h/o metastatic mesothelioma, recurrent a fib, s/p ablation 6 days ago, now presenting with severe dehydration, inability to tolerate PO 2/2 dyshpagia and anorexia.  Dysphagia worsened s/p ablation but reports that actually started days prior to procedure.  On exam, pt appears dry, abd soft/nt.nd.  OP diffusely erythematous but no lesions.      Unclear etiology of dysphagia - ? Metastatic lesion with esophageal obstruction vs CNS process like CVA.    Will admit to medicine for IVF, labs, CT head   As the supervising MD I agree with the above PE.    As the supervising MD I agree with the above treatment, course, plan, and disposition.  I have reviewed and agree with the residents interpretation of the following: lab data, x-rays, CT scans and EKG.  I have reviewed the following: old records at this facility.                    ED Course     Clinical Impression:   Patient is severely dehydrated: skin turgor, dry tongue and mouth, AMS.                          Patti Nixon MD  08/03/17 [id number]

## 2017-08-04 NOTE — PROCEDURES
Modifed Barium Swallow Study  Speech Start Time: 1625  Speech Stop Time: 1650  Speech Total (min): 25 min    SLP Treatment Date: 08/04/17    Reason for Referral  Patient was referred for a Modified Barium Swallow Study to assess the efficiency of his/her swallow function, rule out aspiration and make recommendations regarding safe dietary consistencies, effective compensatory strategies, and safe eating environment.     Diagnosis   Dysphagia  The primary encounter diagnosis was Dehydration. Diagnoses of Chest pain, Dysphagia, Oropharyngeal dysphagia, Leukocytosis, unspecified type, Hypomagnesemia, Hypocalcemia, Typical atrial flutter, Coronary artery disease involving native coronary artery of native heart without angina pectoris, Hypercholesteremia, Mesothelioma of left lung, and Constipation, unspecified constipation type were also pertinent to this visit.    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     Atrial flutter     cardioversion 4/12/16    Cancer     Chronic kidney disease     Chronic obstructive pulmonary disease     CKD (chronic kidney disease) stage 3, GFR 30-59 ml/min 6/27/2016    Closed fracture of one rib of left side 3/21/2017    Discovered 11/16    COPD (chronic obstructive pulmonary disease)     Coronary artery disease     [facility] 4/1/16: 50% proximal RAMUS    Coronary artery disease involving native coronary artery of native heart without angina pectoris 4/1/2016    [place] [facility] (4/1/2016) LM patent; LAD patent; Ramus non obstructive; LCX non obstructive      Hypertension     Kidney stones     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    General Precautions: aspiration, fall  Current Respiratory Status: nasal cannula    Recommendations  · NPO   · Consideration of alternative means nutrition/hydration/medication  · Dysphagia therapy     Oral Peripheral Examination  Oral Musculature Evaluation  Oral Musculature: general weakness  Dentition: present and adequate  Mucosal Quality: dry  Oral Labial Strength and Mobility:  (generalized weakness)  Lingual Strength and Mobility: impaired strength, functional lateral movement  Buccal Strength and Mobility: other (see comments) (generalized weakness)  Volitional Cough: reduced  Volitional Swallow: elicited  Voice Prior to PO Intake: hoarse    Procedure:      Visualization:  · Pt was seen in the lateral view  · Pt was pleasant , cooperative and willingly accepted all trials    Consistencies Assessed  · Pt was offered 10 mL of thin liquid via tsp sip and single sip via cup edge   · Pt was offered 5 mL of puree via teaspoon    · Pt was offered 5 mL of honey-thickened liquids via  teaspoon    Oral Preparation / Oral Phase  Patient with functional oral phase of swallow with timely A-P transit and good oral containment. No presence of naso-pharyngeal reflux noted.    Pharyngeal Phase  Patient with severely reduced epiglottic inversion pattern and decreased pharyngeal wall contraction with deep penetration of thin liquids and severe vallecular stasis of thin, honey-thickened and pureed trials. Trials of thin, puree and honey-thickened liquids not able to be cleared from valleculae despite attempts for use of compensatory strategies and noted to coat base of epiglottis and pool into airway resulting in trace aspiration of residuals of puree,  thin, and honey-thickened liquids.     Thin Liquids   · Deep penetration of tsp  sip thin liquids without cough response during the swallow  · Trace aspiration of residuals of thin liquids in pyriforms without cough response following the swallow, not able to clear with cues to cough  · Pt with severely reduced epiglottic inversion   · Pt with premature spillage to the level of the pyriforms  · Pt with decreased pharyngeal wall constriction/shortening   · There was presence of valleculae and pyriform sinus residue   · Use of compensatory strategies ineffective in reducing residue (chin tuck, cough, supraglottic swallow maneuver, multiple swallows (6+))    · Patient deemed at high risk of aspiration/penetration with subsequent bites 2/2 residuals in valleculae and pyriforms    Puree  · Trace aspiration of residuals in Valleculae following the swallow, no cough response  · Pt with severely reduced epiglottic inversion   · Pt with premature spillage to the valleculae  · Pt with decreased pharyngeal wall constriction/shortening There was presence of residue in valleculae and scattered along posterior pharyngeal wall    · Use of compensatory strategies ineffective in reducing residue (chin tuck, cough, supraglottic swallow maneuver, multiple swallows (6+))    · Patient deemed at high risk of aspiration/penetration with subsequent bites/sips 2/2 residuals in valleculae      Honey-thickened liquids   · Trace aspiration of residuals in Valleculae following the swallow, no cough response   · Pt with severely reduced epiglottic inversion   · Pt with premature spillage to the level of the pyriforms  · Pt with decreased pharyngeal wall constriction/shortening   · There was presence of residue in valleculae and scattered along posterior pharyngeal wall    · Use of compensatory strategies ineffective in reducing residue (chin tuck, cough, supraglottic swallow maneuver, multiple swallows (6+))    · Patient deemed at high risk of aspiration/penetration with subsequent bites/sips 2/2 residuals in valleculae      Cervical Esophageal Phase  Patient with moderate stasis above the level of UES and slow emptying into UES. No retrograde movement observed.     Impressions  Patient with a severe pharyngeal phase dysphagia as characterized by weakness and decreased epiglottic inversion with penetration of thin liquids and severe residue with thin liquid, honey-thickened liquid and puree trials. Compensatory strategies attempted were not effective in eliminating or reducing residue or occurrence of penetration/aspiration. Residuals noted to line the base of eppiglottis and pool into airway, placing Patient at high likelihood for eventual aspiration of residuals of thin, puree and honey-thickened liquids.  Additional presentations held due to severity of residue and risk of aspiration with subsequent bites/sips.  Patient at severe risk of aspiration of PO intake secondary to severity of residue and would benefit from consideration of alternative means nutrition/hydration/medication. ST to continue to follow.     Prognosis/Plan/Education  Felt to be fair provided severity of decreased eppiglottic inversion and medical status.  ST to intiate trial therapy and f/u at the bedside 5x/week to provide skilled intervention and education. Patient educated on findings and recommendations. Nurse and MD team notified of findings and recommendations following assessment and verbalize understanding.     Care Plan    SLP Goals        Problem: SLP Goal    Goal Priority Disciplines Outcome   SLP Goal     SLP Ongoing (interventions implemented as appropriate)   Description:  SLP goal expected to be met by 8/11:  1. Pt will participate in MBSS when deemed medically appropriate in order to determine safest, least restrictive diet. MET 8/4  2. Pt will participate in ongoing assessment of clinical evaluation of swallow in order to determine safest, least restrictive diet.  MET 8/4  3. Pt will complete pharyngeal swallow exercises (shaker, mendelsohn,  etc) with good effort 90% of attempts, MIN A, to increase strength/coordination of swallow fx                    G-Codes  Functional Assessment Tool Used: NOMS  Score: 1  Functional Limitations: Swallowing  Swallow Current Status (): CN  Swallow Goal Status (): RHONDA Carter, CCC-SLP  Speech-Language Pathology  Pager: [phone]  8/4/2017

## 2017-08-04 NOTE — ASSESSMENT & PLAN NOTE
has mets to upper abdomen and left illiac bones.  - has seen Dr. Nichols in clinic; chemo therapy until aflutter controlled  - for pain: MS Contin 15mg BID & percocet  as needed  - periactin for decreased appetite

## 2017-08-04 NOTE — ASSESSMENT & PLAN NOTE
- chronic; last lipid panel ): TC: 104, HDL: 31, LDL: 58.6, T  - will continue home rosuvastatin 20mg

## 2017-08-04 NOTE — ASSESSMENT & PLAN NOTE
Stable this admission   -Satting 95% on RA  -Will continue to monitor for signs of respiratory distress

## 2017-08-04 NOTE — ASSESSMENT & PLAN NOTE
Decreased PO intake with 5 pund weight loss over the past week  -s/p 2L IVF   -Will consider adding Dextrose to fluids, or starting TPN, depending on MBS results and recs  -Oral swabs  -Continue to monitor for improvement

## 2017-08-04 NOTE — PT/OT/SLP EVAL
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     Atrial flutter     cardioversion 4/12/16    Cancer     Chronic kidney disease     Chronic obstructive pulmonary disease     CKD (chronic kidney disease) stage 3, GFR 30-59 ml/min 6/27/2016    Closed fracture of one rib of left side 3/21/2017    Discovered 11/16    COPD (chronic obstructive pulmonary disease)     Coronary artery disease     [facility] 4/1/16: 50% proximal RAMUS    Coronary artery disease involving native coronary artery of native heart without angina pectoris 4/1/2016    [place] [place]1/2016[place] LM patent; LAD patent; Ramus non obstructive; LCX non obstructive      Hypertension     Kidney stones     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 : Yes  Keep patient NPO?: Yes   General Precautions: Standard, aspiration, fall    Current Respiratory Status: nasal cannula     Prior diet: Per pt report, prior to onset of dysphagia 7 days ago, eating regular consistency diet with thin  "liquids.     Subjective:  "When's it gonna stop?" Pt frustrated with inability of various service providers to provide definite answer as to his dysphagia, therefore warranting ongoing assessments.     Pain/Comfort  Pain Rating 1: 0/10  Pain Rating Post-Intervention 1: 0/10    Objective:      Oral Musculature Evaluation  Oral Musculature: general weakness  Dentition: present and adequate  Mucosal Quality: dry  Oral Labial Strength and Mobility: other (see comments) (generalized weakness)  Lingual Strength and Mobility: other (see comments) (generalized weakness)  Buccal Strength and Mobility: other (see comments) (generalized weakness)  Volitional Cough: Weak  Volitional Swallow: Elicited  Voice Prior to PO Intake: Dry, clear     Bedside Swallow Eval:  Consistencies Assessed: tsp thin water x2, cup sips thin water x~4, cup sips nectar thick water x2, 1/2 tsp pureed apple sauce x1, bite eva cracker x1  Oral Phase: WFL  Pharyngeal Phase: multiple spontaneous swallows across all PO trials, cough elicited following final cup sip thin water, voice became increasingly hoarse as subsequent PO trials were administered.     Additional Treatment:    Pt awake and alert upon entry with wife and daughter at b/s. Pt expressing frustrating with recommendation to remain NPO as he reports extreme hunger and thirst. SLP recommended if able to spit out ALL of thin water bolus, to swish and spit out. Also recommended use of lemon glycerin swabs in order to aid oral cavity dryness, however squeeze excess rinse from sponge before providing. Swabs left at b/s. Pt and family educated re: SLP POC. White board updated. No further questions. Results discussed with RN and MD.       Assessment:  Clint Brown is a 71 y.o. male with a medical diagnosis of Dysphagia and presents with pharyngeal dysphagia.           Discharge recommendations: Discharge Facility/Level Of Care Needs: other (see comments) (Pending progress)     Goals:    SLP " Goals        Problem: SLP Goal    Goal Priority Disciplines Outcome   SLP Goal     SLP Ongoing (interventions implemented as appropriate)   Description:  SLP goal expected to be met by 8/11:  1. Pt will participate in MBSS when deemed medically appropriate in order to determine safest, least restrictive diet.   2. Pt will participate in ongoing assessment of clinical evaluation of swallow in order to determine safest, least restrictive diet.                      Plan:   Patient to be seen Therapy Frequency: 5 x/week   Plan of Care expires: 09/02/17  Plan of Care reviewed with: patient, spouse, daughter  SLP Follow-up?: Yes         AALIYAH Concepcion, CCC-SLP  [phone]  8/4/2017

## 2017-08-04 NOTE — TELEPHONE ENCOUNTER
----- Message from Patrizia DONOVAN RamosAugusto sent at 8/3/2017  3:30 PM CDT -----  Contact: [facility]  This is a request for signature on home health orders. Please note the home health orders for this patient has been scanned into the MEDIA section of the patients chart under home health orders outside correspondence dated 8-3-17. Please have Dr. Griffith sign orders and have orders faxed or emailed to the [facility] at FAX: [phone] or email to: [email or web address] as soon as possible to assist the home care agency to stay within state compliance. If you have any questions regarding home care orders, please call Raissa Biggs or Aminata Sotelo [phone].

## 2017-08-05 PROBLEM — I48.3 TYPICAL ATRIAL FLUTTER: Status: ACTIVE | Noted: 2017-07-12

## 2017-08-05 PROBLEM — R13.13 PHARYNGEAL DYSPHAGIA: Status: ACTIVE | Noted: 2017-08-03

## 2017-08-05 PROBLEM — E83.42 HYPOMAGNESEMIA: Status: RESOLVED | Noted: 2017-08-04 | Resolved: 2017-08-05

## 2017-08-05 PROBLEM — E83.51 HYPOCALCEMIA: Status: RESOLVED | Noted: 2017-08-04 | Resolved: 2017-08-05

## 2017-08-05 LAB
ANION GAP SERPL CALC-SCNC: 14 MMOL/L
BASOPHILS # BLD AUTO: 0.02 K/UL
BASOPHILS NFR BLD: 0.2 %
BUN SERPL-MCNC: 21 MG/DL
CALCIUM SERPL-MCNC: 8.5 MG/DL
CHLORIDE SERPL-SCNC: 110 MMOL/L
CO2 SERPL-SCNC: 17 MMOL/L
CREAT SERPL-MCNC: 0.8 MG/DL
DIFFERENTIAL METHOD: ABNORMAL
EOSINOPHIL # BLD AUTO: 0 K/UL
EOSINOPHIL NFR BLD: 0.2 %
ERYTHROCYTE [DISTWIDTH] IN BLOOD BY AUTOMATED COUNT: 15.6 %
EST. GFR  (AFRICAN AMERICAN): >60 ML/MIN/1.73 M^2
EST. GFR  (NON AFRICAN AMERICAN): >60 ML/MIN/1.73 M^2
GLUCOSE SERPL-MCNC: 64 MG/DL
HCT VFR BLD AUTO: 32.8 %
HGB BLD-MCNC: 10.2 G/DL
LYMPHOCYTES # BLD AUTO: 1 K/UL
LYMPHOCYTES NFR BLD: 8.1 %
MCH RBC QN AUTO: 26.9 PG
MCHC RBC AUTO-ENTMCNC: 31.1 G/DL
MCV RBC AUTO: 87 FL
MONOCYTES # BLD AUTO: 1.2 K/UL
MONOCYTES NFR BLD: 10.1 %
NEUTROPHILS # BLD AUTO: 9.5 K/UL
NEUTROPHILS NFR BLD: 81.4 %
PLATELET # BLD AUTO: 260 K/UL
PMV BLD AUTO: 9.5 FL
POCT GLUCOSE: 130 MG/DL (ref 70–110)
POCT GLUCOSE: 72 MG/DL (ref 70–110)
POTASSIUM SERPL-SCNC: 4.5 MMOL/L
RBC # BLD AUTO: 3.79 M/UL
SODIUM SERPL-SCNC: 141 MMOL/L
WBC # BLD AUTO: 11.79 K/UL

## 2017-08-05 PROCEDURE — 80048 BASIC METABOLIC PNL TOTAL CA: CPT

## 2017-08-05 PROCEDURE — 25000003 PHARM REV CODE 250: Performed by: STUDENT IN AN ORGANIZED HEALTH CARE EDUCATION/TRAINING PROGRAM

## 2017-08-05 PROCEDURE — 99232 SBSQ HOSP IP/OBS MODERATE 35: CPT | Mod: ,,, | Performed by: OTOLARYNGOLOGY

## 2017-08-05 PROCEDURE — A4216 STERILE WATER/SALINE, 10 ML: HCPCS | Performed by: STUDENT IN AN ORGANIZED HEALTH CARE EDUCATION/TRAINING PROGRAM

## 2017-08-05 PROCEDURE — 20600001 HC STEP DOWN PRIVATE ROOM

## 2017-08-05 PROCEDURE — 99232 SBSQ HOSP IP/OBS MODERATE 35: CPT | Mod: GC,,, | Performed by: INTERNAL MEDICINE

## 2017-08-05 PROCEDURE — 85025 COMPLETE CBC W/AUTO DIFF WBC: CPT

## 2017-08-05 PROCEDURE — 36415 COLL VENOUS BLD VENIPUNCTURE: CPT

## 2017-08-05 RX ORDER — METOPROLOL TARTRATE 1 MG/ML
5 INJECTION, SOLUTION INTRAVENOUS EVERY 5 MIN PRN
Status: DISCONTINUED | OUTPATIENT
Start: 2017-08-05 | End: 2017-08-14 | Stop reason: HOSPADM

## 2017-08-05 RX ORDER — DEXTROSE MONOHYDRATE AND SODIUM CHLORIDE 5; .9 G/100ML; G/100ML
INJECTION, SOLUTION INTRAVENOUS CONTINUOUS
Status: DISCONTINUED | OUTPATIENT
Start: 2017-08-05 | End: 2017-08-08

## 2017-08-05 RX ORDER — METOPROLOL TARTRATE 1 MG/ML
5 INJECTION, SOLUTION INTRAVENOUS ONCE
Status: DISCONTINUED | OUTPATIENT
Start: 2017-08-05 | End: 2017-08-14 | Stop reason: HOSPADM

## 2017-08-05 RX ORDER — METOPROLOL TARTRATE 1 MG/ML
INJECTION, SOLUTION INTRAVENOUS
Status: COMPLETED
Start: 2017-08-05 | End: 2017-08-05

## 2017-08-05 RX ORDER — HYDROMORPHONE HYDROCHLORIDE 1 MG/ML
1 INJECTION, SOLUTION INTRAMUSCULAR; INTRAVENOUS; SUBCUTANEOUS EVERY 6 HOURS PRN
Status: DISCONTINUED | OUTPATIENT
Start: 2017-08-05 | End: 2017-08-08

## 2017-08-05 RX ADMIN — DEXTROSE MONOHYDRATE AND SODIUM CHLORIDE: 5; .9 INJECTION, SOLUTION INTRAVENOUS at 08:08

## 2017-08-05 RX ADMIN — Medication 3 ML: at 10:08

## 2017-08-05 RX ADMIN — HYDROMORPHONE HYDROCHLORIDE 1 MG: 1 INJECTION, SOLUTION INTRAMUSCULAR; INTRAVENOUS; SUBCUTANEOUS at 09:08

## 2017-08-05 RX ADMIN — DEXTROSE MONOHYDRATE AND SODIUM CHLORIDE: 5; .9 INJECTION, SOLUTION INTRAVENOUS at 10:08

## 2017-08-05 RX ADMIN — HYDROMORPHONE HYDROCHLORIDE 1 MG: 1 INJECTION, SOLUTION INTRAMUSCULAR; INTRAVENOUS; SUBCUTANEOUS at 02:08

## 2017-08-05 NOTE — SIGNIFICANT EVENT
Pt heart rate up to 160's sustained. MD on call for IM 4 notified. Orders to give Metoprolol 5 mg IV x1 for rate control. Will continue to monitor.

## 2017-08-05 NOTE — NURSING
On call MD paged for IM 4. Pt has has oral meds scheduled for tonight but has strict NPO orders after failing bedside swallow study and Barium swallow study with speech therapy today. MD paged to clarify orders. Awaiting response. Will continue to monitor.

## 2017-08-05 NOTE — SUBJECTIVE & OBJECTIVE
Review of Systems   Constitutional: Positive for appetite change (decreased). Negative for activity change, chills and fever.   HENT: Positive for trouble swallowing and voice change (slurred speech).    Respiratory: Negative for chest tightness and shortness of breath.    Cardiovascular: Negative for chest pain, palpitations and leg swelling.   Gastrointestinal: Negative for abdominal distention, abdominal pain, constipation, diarrhea, nausea and vomiting.   Endocrine: Negative for cold intolerance and heat intolerance.   Skin: Negative for color change.   Neurological: Negative for weakness, light-headedness and headaches.   Hematological: Negative for adenopathy. Does not bruise/bleed easily.   Psychiatric/Behavioral: Negative for agitation and behavioral problems.     Objective:     Vital Signs (Most Recent):  Temp: 97.7 °F (36.5 °C) (08/05/17 1200)  Pulse: 86 (08/05/17 1200)  Resp: 18 (08/05/17 1200)  BP: (!) 122/59 (08/05/17 1200)  SpO2: 95 % (08/05/17 1200) Vital Signs (24h Range):  Temp:  [97.7 °F (36.5 °C)-98.9 °F (37.2 °C)] 97.7 °F (36.5 °C)  Pulse:  [] 86  Resp:  [16-20] 18  SpO2:  [93 %-99 %] 95 %  BP: (108-128)/(56-67) 122/59     Weight: 68 kg (150 lb)  Body mass index is 21.52 kg/m².    Intake/Output Summary (Last 24 hours) at 08/05/17 1420  Last data filed at 08/05/17 1300   Gross per 24 hour   Intake                0 ml   Output              300 ml   Net             -300 ml      Physical Exam   Constitutional: He is oriented to person, place, and time. He appears well-developed and well-nourished.   HENT:   Head: Normocephalic and atraumatic.   Mouth/Throat: Mucous membranes are dry.   Eyes: Conjunctivae and EOM are normal.   Neck: No JVD present. No tracheal deviation present.   Cardiovascular: Normal rate, regular rhythm and normal heart sounds.    Pulmonary/Chest: Effort normal. He has decreased breath sounds in the left middle field.   Abdominal: Soft. Bowel sounds are normal.    Musculoskeletal: He exhibits no edema or deformity.   Neurological: He is alert and oriented to person, place, and time.   Skin: Skin is warm and dry.   Psychiatric: He has a normal mood and affect. His behavior is normal.       Significant Labs: All pertinent labs within the past 24 hours have been reviewed.

## 2017-08-05 NOTE — SIGNIFICANT EVENT
Spoke with Dr. Singer re: pt heart rate 150-170's sustained. Pt is in A flutter. Pt is currently strict NPO after failing swallow study and barium study with speech therapy. Orders to give one time dose 5 mg Metoprolol IV now for rate control.

## 2017-08-05 NOTE — CONSULTS
Otolaryngology - Head and Neck Surgery  Consultation Report    Consultation From: Medicine    Chief Complaint: dysphagia    History of Present Illness: 71 y.o. year old male with metastatic mesothelioma, Type A aortic dissection s/p repair, CAD, CKD, HTN, and aflutter s/p PADMINI and RFA ablation 7/28 admitted for dysphagia and decreased po intake. Patient notes he has lost weight and has had difficulty swallowing since his PADMINI/RFA. Notes he has not eaten anything for 7 days. Evaluated by speech therapy and failed MBSS. Had CT neck performed demonstrating retroesophageal subclavian artery.    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/2016); Atrial flutter; Cancer; Chronic kidney disease; Chronic obstructive pulmonary disease; CKD (chronic kidney disease) stage 3, GFR 30-59 ml/min (6/27/2016); Closed fracture of one rib of left side (3/21/2017); COPD (chronic obstructive pulmonary disease); Coronary artery disease; Coronary artery disease involving native coronary artery of native heart without angina pectoris (4/1/2016); Hypertension; Kidney stones; 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 (6/23/2017); Old myocardial infarction (05/20/2016); S/P ascending aortic replacement (4/18/2016); and Thoracic aortic dissection.    Past Surgical History: Patient has a past surgical history that includes Kidney stone surgery; Appendectomy; Cardiac catheterization; Eye surgery; Tonsillectomy; and Vascular surgery.    Social History: Patient reports that he has been smoking Cigarettes.  He has a 13.75 pack-year smoking history. He has never used smokeless tobacco. He reports that he does not drink alcohol or use drugs.    Family History: family history is not on file.    Medications:    apixaban  5 mg Oral BID    aspirin  81 mg Oral  Daily    duke's soln (benadryl 30 mL, mylanta 30 mL, lidocane 30 mL, nystatin 30 mL) 120 mL  10 mL Oral QID    famotidine  20 mg Oral BID    folic acid  1 mg Oral Daily    metoprolol  5 mg Intravenous Once    metoprolol succinate  25 mg Oral Daily    morphine  15 mg Oral Q12H    polyethylene glycol  17 g Oral Daily    rosuvastatin  20 mg Oral QHS    sodium chloride 0.9%  3 mL Intravenous Q8H       Allergies: Patient is allergic to lipitor [atorvastatin] and cephalexin.    Physical Exam:  Temp:  [97.7 °F (36.5 °C)-98.9 °F (37.2 °C)] 97.7 °F (36.5 °C)  Pulse:  [] 86  Resp:  [16-20] 18  SpO2:  [93 %-99 %] 95 %  BP: (108-128)/(56-67) 122/59        Constitutional: Communicating.  NAD.  Eyes: EOM I Bilaterally  Head/Face: Normocephalic.  Negative paranasal sinus pressure/tenderness.  Salivary glands WNL.  House Brackmann I Bilaterally.  Right Ear: External Auditory Canal WNL,  Auricle WNL.  Left Ear: External Auditory Canal WNL, Auricle WNL.  Nose: Mild right inferior septal spur. Inferior Turbinates 2+ bilaterally. No septal perforation. No masses/lesions. External nasal skin without masses/lesions.  Oral Cavity: Gingiva/lips WNL.  FOM Soft, no masses palpated. Oral Tongue mobile. Hard Palate WNL.   Oropharynx: BOT WNL. No masses/lesions noted. Tonsillar fossa/pharyngeal wall without lesions. Posterior oropharynx WNL.  Posterior placed soft palate. Midline uvula.   Neck/Lymphatic: No LAD I-VI bilaterally.  No thyromegaly.  No masses noted on exam.  Neuro/Psychiatric: AOx3.  Normal mood and affect.   Cardiovascular: Normal carotid pulses bilaterally, no increasing jugular venous distention noted at cervical region bilaterally.    Respiratory: Normal respiratory effort, no stridor, no retractions noted.      Flexible Laryngoscopy: Laryngoscopy is indicated for assessment of upper aerodigestive structure and function. This was carried out transnasally with a flexible fiberoptic laryngoscope. After verbal  consent was obtained, the endoscope was passed through the right nasal cavity and positioned to image the nasopharynx, larynx, and hypopharynx in detail. The following featured were examined: nasopharyngeal, laryngeal, hypopharyngeal masses; velopharyngeal strength, closure, and symmetry of motion; vocal fold range and symmetry of motion; laryngeal mucosal edema, erythema, inflammation, and hydration; salivary pooling; and gross laryngeal sensation. The equipment was removed. The patient tolerated the procedure well without complication. There were no abnormalities seen on exam.    CBC    Recent Labs  Lab 08/05/17 0446   WBC 11.79   HGB 10.2*   HCT 32.8*   MCV 87        BMP    Recent Labs  Lab 08/05/17 0446   GLU 64*      K 4.5      CO2 17*   BUN 21   CREATININE 0.8   CALCIUM 8.5*       Imaging Results          CT Soft Tissue Neck With Contrast (Final result)  Result time 08/05/17 06:26:37    Final result by Lobo Higginbotham MD (08/05/17 06:26:37)                 Impression:        1. 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 While this often represents an asymptomatic finding, this may contribute to the patient's reported history of dysphagia.    2. Visualized portions of the upper thorax demonstrate abnormal left-sided irregular pleural thickening in this patient with known pleural malignancy. The degree of pleural thickening appears to be increased from prior PET/CT study of 6/13/2017 with associated increased left-sided pleural effusion. Additionally, there are multiple partially visualized enlarged mediastinal lymph nodes better seen on prior dedicated chest imaging.      Electronically signed by: LOBO HIGGINBOTHAM  Date:     08/05/17  Time:    06:26              Narrative:    Indication: Dysphasia.    Procedure comments: 2.5 mm axial images were obtained through the neck following administration of 75 cc Omni 350 IV  contrast.    Comparison: Head CT 6/13/2017    Findings:    The visualized structures of the base of the brain demonstrate no gross abnormality. The right vertebral artery is dominant. The visualized paranasal sinuses and mastoid air cells appear well-aerated.      There is atherosclerotic change of the aortic arch. There is an aberrant right subclavian artery which courses posterior to the esophagus with associated mild mass effect. Bilateral carotid arteries appear patent.      There is dental amalgam which results in streak artifact obscuring the oropharynx. The aerodigestive apical surface appears within normal limits, allowing for limitations of residual barium material from prior modified examination. The vocal cords appear relatively symmetric.  The thyroid gland is normal in size and configuration.  There are bilateral cervical chain lymph nodes, none of which appear enlarged by CT criteria.  The submandibular and parotid glands are unremarkable.  The remaining soft tissues of the neck are unremarkable without focal fluid collection.    Limited views the upper thorax demonstrate abnormal left-sided irregular pleural thickening in this patient with known pleural malignancy. This measures up to 1.6 cm in maximal thickness and may be increased in overall thickness compared to prior PET/CT examination of 6/13/2017. There is a left-sided pleural effusion. There is partial visualization of multiple enlarged mediastinal lymph nodes. There is a left-sided MediPort central venous catheter in place.      There are degenerative changes of the cervical spine.                             Fl Modified Barium Swallow Speech (Preliminary result)  Result time 08/04/17 16:51:28    Preliminary result by Wendy Milner MD (08/04/17 16:51:28)                 Impression:     Penetration with thin liquids.     Please see speech pathology report for further details.  ______________________________________     Electronically signed by  resident: YOSHI MEDINA MD  Date:     08/04/17  Time:    16:51            As the supervising and teaching physician, I personally reviewed the images and resident's interpretation and I agree with the findings.               Narrative:    EXAMINATION: Modified Barium Swallow     COMPARISON: None    REASON FOR EXAM: Dysphasia    TECHNIQUE: Video fluoroscopic swallowing examination was performed in conjunction with the Speech Language Pathology Department.  Both thin and variably thickened liquid barium products as well as solid food and thin/thick pureed food substances were used to assess swallowing.     Fluoroscopy time: 2.4 min    FINDINGS:    Oral Phase: Adequate.    Pharyngeal Phase: Adequate.          - Penetration: With thin liquids          - Aspiration: None          - Residual/static material: Stasis of thin, pureed and honey thickened liquids in the vallecula.          - Strategies trialed: Chin tuck    Esophageal Phase (if visualized): N/A    Anatomic Observations: None                             CT Head Without Contrast (Final result)  Result time 08/03/17 22:41:04    Final result by Marcelino Leal MD (08/03/17 22:41:04)                 Impression:         No acute intracranial abnormality.    Chronic microvascular ischemic change.    Remote left occipital lobe infarct.      ______________________________________     Electronically signed by resident: ALFREDO YOUNGER MD  Date:     08/03/17  Time:    22:25            As the supervising and teaching physician, I personally reviewed the images and resident's interpretation and I agree with the findings.          Electronically signed by: MARCELINO LEAL MD  Date:     08/03/17  Time:    22:41              Narrative:    CT head without contrast    08/03/17 22:06:20    Accession# [id number]    CLINICAL INDICATION: 71 year old M with  dysphagia, unspecified.    TECHNIQUE: Axial CT images obtained throughout the region of the head without the use of intravenous  contrast. Axial, sagittal and coronal reconstructions were performed.    COMPARISON: MRI brain 6/5/2017.    FINDINGS:    The ventricles are stable in size without evidence of hydrocephalus.    Patchy supratentorial white matter hypoattenuation suggests chronic microvascular ischemic change. Cystic encephalomalacia are reidentified in the left occipital lobe, representing sequela of remote infarct. No evidence of acute major vascular distribution infarct or intracranial hemorrhage.    No new extra-axial blood or fluid collections.    The cranium appears intact.     Mastoid air cells and paranasal sinuses are essentially clear.                             X-Ray Chest AP Portable (Final result)  Result time 08/03/17 20:47:17    Final result by Marcelino Leal MD (08/03/17 20:47:17)                 Narrative:    CHEST XRAY, ONE VIEW, AP    INDICATION:  Chest pain.    COMPARISON:  Chest radiograph 7/17/2017.    FINDINGS:  Monitoring wires project over the chest wall. There is a left chest port with catheter tip projected over the left brachiocephalic vein. Sternotomy wires noted, well aligned and without evidence of fracture. Left Pleurx catheter unchanged. The cardiomediastinal silhouette is enlarged but stable. Trachea is midline. The lung fields appear unchanged. No pneumothorax. Left pleural effusion is stable. Osseous structures and soft tissues are without significant abnormality.    IMPRESSION  Stable left pleural effusion.    Cardiomegaly.    Devices as above.  ______________________________________     Electronically signed by resident: ALFREDO YOUNGER MD  Date:     08/03/17  Time:    20:44            As the supervising and teaching physician, I personally reviewed the images and resident's interpretation and I agree with the findings.          Electronically signed by: MARCELINO LEAL MD  Date:     08/03/17  Time:    20:47                                Assessment:  71 y.o. year old male with malignant  mesothelioma metastatic to abdomen, bone, Type A aortic dissection, CAD, CKD, HTN, and aflutter s/p PADMINI and RFA ablation 7/28 admitted for dysphagia. Patient states onset of dysphagia occurred following PADMINI/RFA. FFL without abnormalities, no masses or lesions, vocal cords fully mobile bilaterally.       Plan:   -recommend GI consult, esophagram  -continue speech therapy  -if retroesophageal subclavian thought to be causing dysphagia after further workup, would need CT surgery evaluation  -no ENT intervention needed at this time  -please call with questions/concerns  -d/w staff Dr. Huizar

## 2017-08-05 NOTE — ASSESSMENT & PLAN NOTE
Onset prior to PADMINI on 7/28 but has worsened since the procedure. Patient stated improvement in his symptoms this morning.   - failed MBS '' Patient with severe pharyngeal dysphagia characterized by silent aspiration of residuals of thin liquids, honey-thickened liquids and puree textures. Patient with deep, silent penetration thin liquids. 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 While this often represents an asymptomatic finding, this may contribute to the patient's reported history of dysphagia '' ENT were consulted and examined the oropharynx shows '' FFL without abnormalities, no masses or lesions, vocal cords fully mobile bilaterally ''. Still in mild pain.   - keep NPO   - NG tube for feeding and meds  - GI consult

## 2017-08-05 NOTE — NURSING
NG placement was unsuccessful x2. Resistance met in upper GI tract possibly due to mass found in CT previously. Pt started bleeding from nose. Charge Andreas at bedside. Alex TURNER-4 notified. Plans to have midline placement for tomorrow was discussed. Gee placement will try and be placed with pts approval later today.

## 2017-08-05 NOTE — NURSING
Spoke with Dr. Valles with IM 4 re: pt did not receive pm meds because they were all PO. Pt is currently strict NPO after failing swallow study and barium study with speech therapy. Pt remains on NaCl @ 125 ml/hr. MD orders to continue IV fluids and plans to address nutrition and medication with primary care team per MD. Will continue to monitor.

## 2017-08-05 NOTE — CONSULTS
Carloz, RN notified that patient will not be seen today for line placement due to high consult volume.  Please reconsult if unable to obtain PIV for venous access.

## 2017-08-05 NOTE — HOSPITAL COURSE
Mr. Brown is a 72 y/o M with a history of metastatic mesothelioma, Type A aortic dissection (s/p repair in April 2016), persistent atrial flutter (s/p RFA ablation on 7/28), CAD, CKD, and HTN who presents with dry mouth and slurred speech. MBS was ordered which patient failed. 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 Vascular consulted for the aberrant right subclavian artery, recs against interventions as he is high risk for procedures. Vascular and GI didn't think this was causing such a severe, persistent dysphagia. PPN started on HD#5.     On HD#7, patient was scheduled to undergo an esophagram which was held as radiology believed, based on his prior studies, that he was at high risk of aspiration. Speech continued to follow and he failed to show improvement in his oropharyngeal dysphagia. He became increasingly more disoriented on HD#8-9 and was started on ciprofloxacin in the event that he had a UTI. Evidence of UTI did not emerge, although patient appeared to slightly improve with antibiotics and delirium precautions. MRI Brain Neck w/wo contrast was ordered on HD#10 per neuro recs as it was believed at that time that this considerable dysphagia could be related to malignant extension of mesothelioma into the CNS. MRI C-spine revealed a spinal lesion that looks like a met, awaiting results of MRI Brain and Neuro to do an Lp. F/u the results of the CSF studies. Patients family has opted against PEG tube and is amenable to hospice placement.

## 2017-08-05 NOTE — PLAN OF CARE
Problem: Patient Care Overview  Goal: Plan of Care Review  Outcome: Ongoing (interventions implemented as appropriate)  Pt failed bedside study and barium swallow study with speech therapy. Silent aspiration was noted with thin liquids such as water, honey thick liquids and puree. Pt is to remain strict NPO per Speech therapy and speech recs alt nutrition and medication regimen since pt cannot tolerate po without aspiration. NPO maintained and aspiration precautions in place. Pt remains on NaCl @ 125 ml/hr. Will continue to monitor.

## 2017-08-05 NOTE — ASSESSMENT & PLAN NOTE
has mets to upper abdomen and left illiac bones.  - has seen Dr. Nichols in clinic; chemo therapy until aflutter controlled  - for pain: MS Contin 15mg BID & percocet  as needed  - periactin for decreased appetite  - pleurx in left thoracic, will drain 150 cc every other day for comfort.

## 2017-08-05 NOTE — PROGRESS NOTES
[facility] Hospital Medicine  Progress Note    Patient Name: Clint Brown  MRN: [id number]  Patient Class: IP- Inpatient   Admission Date: 8/3/2017  Length of Stay: 2 days  Attending Physician: Traci Stephens MD  Primary Care Provider: Jean Claude Griffith DO    [facility] Medicine Team: [facility] 4 JULIO Lanza    Subjective:     Principal Problem:Dysphagia    HPI:  Mr. Brown is a 70 y/o M with a history of metastatic mesothelioma, Type A aortic dissection (s/p repair in April 2016), persistent atrial flutter (s/p RFA ablation on 7/28), CAD, CKD, and HTN who presents with dry mouth and slurred speech. He reports that he hasn't eaten in 7 days due to to difficulty swallowing and now has consequential dry mouth and difficulty speaking. He has difficulty swallowing solid foods but is able to tolerate liquids. He does not think the dysphagia has progressively worsened; he just anticipated it to get better and it hasn't. Pt denies any fevers, chills, n/v, cough, confusion, hemiparesis or gait problems. Just reports feeling weak and fatigued. He feels thirsty and is requesting a Coke.      He has had a 5lb weight loss in the last 7 days. His decreased appetite is not new and previously he was started on periactin but continues to lose weight. The difference is that previously he didn't have appetite, and now he feels like he physically can't eat. Pt has constipation 2/2 decreased oral intake and daily opioid use. Pt unable to recall last BM.      Pt recently underwent a PADMINI + RFA ablation on 7/28 for Aflutter with RVR.  He has had difficulty swallowing since that hospitalization, although prior notes indicate he may have had anorexia and dysphagia in the past that was attributed to malignant mesothelioma. Since procedure, he denies any palpitation, chest pain, SOB.       He was diagnosed with mesothelioma on 4/17 when he presented mike left sided chest pain and heaviness, was found  to have a left-sided pleural effusion and underwent thoracentesis, cultures from which revealed this malignancy. Since then he has undergone a VATS procedure in 5/17 to drain the fluids. He has Stage IV mesothelioma secondary to mets to upper abdomen and left illiac bones. He has seen Dr. Nichols ([facility]c) in clinic, chemo-port placed 6/22 but was deferred chemo until his Aflutter was taken care of. He takes MS contin 15mg BID and percocet 1/day for pain on left and was started on periactin for poor appetite.    Hospital Course:  8/5: failed MBS '' Patient with severe pharyngeal dysphagia characterized by silent aspiration of residuals of thin liquids, honey-thickened liquids and puree textures. Patient with deep, silent penetration thin liquids. 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 While this often represents an asymptomatic finding, this may contribute to the patient's reported history of dysphagia '' ENT were consulted and examined the oropharynx shows '' FFL without abnormalities, no masses or lesions, vocal cords fully mobile bilaterally ''. Still in mild pain, ordered pluerx drain and IV pain meds. Will insert NG tube.          Review of Systems   Constitutional: Positive for appetite change (decreased). Negative for activity change, chills and fever.   HENT: Positive for trouble swallowing and voice change (slurred speech).    Respiratory: Negative for chest tightness and shortness of breath.    Cardiovascular: Negative for chest pain, palpitations and leg swelling.   Gastrointestinal: Negative for abdominal distention, abdominal pain, constipation, diarrhea, nausea and vomiting.   Endocrine: Negative for cold intolerance and heat intolerance.   Skin: Negative for color change.   Neurological: Negative for weakness, light-headedness and headaches.   Hematological:  Negative for adenopathy. Does not bruise/bleed easily.   Psychiatric/Behavioral: Negative for agitation and behavioral problems.     Objective:     Vital Signs (Most Recent):  Temp: 97.7 °F (36.5 °C) (08/05/17 1200)  Pulse: 86 (08/05/17 1200)  Resp: 18 (08/05/17 1200)  BP: (!) 122/59 (08/05/17 1200)  SpO2: 95 % (08/05/17 1200) Vital Signs (24h Range):  Temp:  [97.7 °F (36.5 °C)-98.9 °F (37.2 °C)] 97.7 °F (36.5 °C)  Pulse:  [] 86  Resp:  [16-20] 18  SpO2:  [93 %-99 %] 95 %  BP: (108-128)/(56-67) 122/59     Weight: 68 kg (150 lb)  Body mass index is 21.52 kg/m².    Intake/Output Summary (Last 24 hours) at 08/05/17 1420  Last data filed at 08/05/17 1300   Gross per 24 hour   Intake                0 ml   Output              300 ml   Net             -300 ml      Physical Exam   Constitutional: He is oriented to person, place, and time. He appears well-developed and well-nourished.   HENT:   Head: Normocephalic and atraumatic.   Mouth/Throat: Mucous membranes are dry.   Eyes: Conjunctivae and EOM are normal.   Neck: No JVD present. No tracheal deviation present.   Cardiovascular: Normal rate, regular rhythm and normal heart sounds.    Pulmonary/Chest: Effort normal. He has decreased breath sounds in the left middle field.   Abdominal: Soft. Bowel sounds are normal.   Musculoskeletal: He exhibits no edema or deformity.   Neurological: He is alert and oriented to person, place, and time.   Skin: Skin is warm and dry.   Psychiatric: He has a normal mood and affect. His behavior is normal.       Significant Labs: All pertinent labs within the past 24 hours have been reviewed.        Assessment/Plan:      Hypomagnesemia    -Presented with Mg of 1.2; replaced 4g in the ED and was given an additional 2g this morning  -Will follow up with AM labs             Hypocalcemia    - when corrected for low-albumin, Ca is 10.2 (wnl)          Constipation    Patient has not had recent BM, though less concerning given decreased po  intake for 1 weeks duration as well as chronic opioid use due to pain from mesothelioma   -Miralax & senna scheduled daily PRN             Dehydration    Decreased PO intake with 5 pund weight loss over the past week  -s/p 2L IVF   -Will consider adding Dextrose to fluids, or starting TPN, depending on MBS results and recs  -Oral swabs  -Continue to monitor for improvement          Atrial flutter    S/p RFA ablation on  and on apixaban 5mg for anticoagulation.   -Continue TOPROL-XL 25mg po daily for rate control  -No further runs of palpitations or documented tachycardia/afib RVR since he converted spontaneously in the ED            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    has mets to upper abdomen and left illiac bones.  - has seen Dr. Nichols in clinic; chemo therapy until aflutter controlled  - for pain: MS Contin 15mg BID & percocet  as needed  - periactin for decreased appetite  - pleurx in left thoracic, will drain 150 cc every other day for comfort.           Chronic obstructive pulmonary disease    Stable this admission   -Satting 95% on RA  -Will continue to monitor for signs of respiratory distress        CKD (chronic kidney disease) stage 3, GFR 30-59 ml/min    Stable from prior labs  BUN/Cr 24 and 0.7  -Will continue to follow        Hypercholesteremia    - chronic; last lipid panel ): TC: 104, HDL: 31, LDL: 58.6, T  - will continue home rosuvastatin 20mg              Coronary artery disease involving native coronary artery of native heart without angina pectoris    -[place]  [facility] (2016) LM patent; LAD patent; Ramus non obstructive; LCX non obstructive          Essential hypertension    Hypotensive this admission most recently 110s/50s  -Patient has had decreased oral intake over the past week, will continue to monitor for improvement          * Dysphagia    Onset prior to PADMINI on  but has worsened since the procedure. Patient stated  improvement in his symptoms this morning.   - failed MBS '' Patient with severe pharyngeal dysphagia characterized by silent aspiration of residuals of thin liquids, honey-thickened liquids and puree textures. Patient with deep, silent penetration thin liquids. 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 While this often represents an asymptomatic finding, this may contribute to the patient's reported history of dysphagia '' ENT were consulted and examined the oropharynx shows '' FFL without abnormalities, no masses or lesions, vocal cords fully mobile bilaterally ''. Still in mild pain.   - keep NPO   - NG tube for feeding and meds  - GI consult           VTE Risk Mitigation         Ordered     apixaban tablet 5 mg  2 times daily     Route:  Oral        08/04/17 0009     Medium Risk of VTE  Once      08/03/17 4398              JULIO Lanza  Department of Hospital Medicine   [facility]

## 2017-08-05 NOTE — ASSESSMENT & PLAN NOTE
S/p RFA ablation on 7/28 and on apixaban 5mg for anticoagulation.   -Continue TOPROL-XL 25mg po daily for rate control  -No further runs of palpitations or documented tachycardia/afib RVR since he converted spontaneously in the ED

## 2017-08-05 NOTE — PLAN OF CARE
Problem: Patient Care Overview  Goal: Plan of Care Review  Outcome: Ongoing (interventions implemented as appropriate)  Pt remains free from falls and injury. Plan of care reviewed with pt. Pt disoriented to time and place. Pt c/o pain 7/10, VSS. IV medication management given. Pt remains NPO due to swallow study. NG placement unsuccessful x2. Will try again later today. Plans for midline placement tomorrow for TPN was discussed. No medications PO to be given per MD. Pleural drainage of 150 ml discussed from left chest site. Will continue to monitor.

## 2017-08-05 NOTE — PROGRESS NOTES
Pleural chest tube drained at the bedside per PleurX system.  550cc cloudy yellow fluid drained.  Site CDI without redness or swelling; site cleansed and redressed.  Pt tolerated procedure without complications.  Will continue to monitor.

## 2017-08-06 PROBLEM — R13.19 ESOPHAGEAL DYSPHAGIA: Status: ACTIVE | Noted: 2017-08-06

## 2017-08-06 LAB
ANION GAP SERPL CALC-SCNC: 9 MMOL/L
BASOPHILS # BLD AUTO: 0.01 K/UL
BASOPHILS NFR BLD: 0.1 %
BUN SERPL-MCNC: 19 MG/DL
CALCIUM SERPL-MCNC: 8.3 MG/DL
CHLORIDE SERPL-SCNC: 114 MMOL/L
CO2 SERPL-SCNC: 19 MMOL/L
CREAT SERPL-MCNC: 0.7 MG/DL
DIFFERENTIAL METHOD: ABNORMAL
EOSINOPHIL # BLD AUTO: 0 K/UL
EOSINOPHIL NFR BLD: 0.2 %
ERYTHROCYTE [DISTWIDTH] IN BLOOD BY AUTOMATED COUNT: 15.7 %
EST. GFR  (AFRICAN AMERICAN): >60 ML/MIN/1.73 M^2
EST. GFR  (NON AFRICAN AMERICAN): >60 ML/MIN/1.73 M^2
GLUCOSE SERPL-MCNC: 123 MG/DL
HCT VFR BLD AUTO: 32.2 %
HGB BLD-MCNC: 10.1 G/DL
LYMPHOCYTES # BLD AUTO: 0.6 K/UL
LYMPHOCYTES NFR BLD: 5.4 %
MCH RBC QN AUTO: 27 PG
MCHC RBC AUTO-ENTMCNC: 31.4 G/DL
MCV RBC AUTO: 86 FL
MONOCYTES # BLD AUTO: 1.7 K/UL
MONOCYTES NFR BLD: 15.5 %
NEUTROPHILS # BLD AUTO: 8.7 K/UL
NEUTROPHILS NFR BLD: 78.8 %
PLATELET # BLD AUTO: 262 K/UL
PMV BLD AUTO: 10 FL
POTASSIUM SERPL-SCNC: 3.8 MMOL/L
RBC # BLD AUTO: 3.74 M/UL
SODIUM SERPL-SCNC: 142 MMOL/L
WBC # BLD AUTO: 11.18 K/UL

## 2017-08-06 PROCEDURE — 85025 COMPLETE CBC W/AUTO DIFF WBC: CPT

## 2017-08-06 PROCEDURE — 99232 SBSQ HOSP IP/OBS MODERATE 35: CPT | Mod: GC,,, | Performed by: INTERNAL MEDICINE

## 2017-08-06 PROCEDURE — 20600001 HC STEP DOWN PRIVATE ROOM

## 2017-08-06 PROCEDURE — 80048 BASIC METABOLIC PNL TOTAL CA: CPT

## 2017-08-06 PROCEDURE — 36415 COLL VENOUS BLD VENIPUNCTURE: CPT

## 2017-08-06 PROCEDURE — 25000003 PHARM REV CODE 250: Performed by: STUDENT IN AN ORGANIZED HEALTH CARE EDUCATION/TRAINING PROGRAM

## 2017-08-06 PROCEDURE — A4216 STERILE WATER/SALINE, 10 ML: HCPCS | Performed by: STUDENT IN AN ORGANIZED HEALTH CARE EDUCATION/TRAINING PROGRAM

## 2017-08-06 PROCEDURE — 0CJS8ZZ INSPECTION OF LARYNX, VIA NATURAL OR ARTIFICIAL OPENING ENDOSCOPIC: ICD-10-PCS | Performed by: OTOLARYNGOLOGY

## 2017-08-06 RX ADMIN — HYDROMORPHONE HYDROCHLORIDE 1 MG: 1 INJECTION, SOLUTION INTRAMUSCULAR; INTRAVENOUS; SUBCUTANEOUS at 04:08

## 2017-08-06 RX ADMIN — Medication 3 ML: at 01:08

## 2017-08-06 RX ADMIN — HYDROMORPHONE HYDROCHLORIDE 1 MG: 1 INJECTION, SOLUTION INTRAMUSCULAR; INTRAVENOUS; SUBCUTANEOUS at 08:08

## 2017-08-06 RX ADMIN — HYDROMORPHONE HYDROCHLORIDE 1 MG: 1 INJECTION, SOLUTION INTRAMUSCULAR; INTRAVENOUS; SUBCUTANEOUS at 10:08

## 2017-08-06 RX ADMIN — DEXTROSE MONOHYDRATE AND SODIUM CHLORIDE: 5; .9 INJECTION, SOLUTION INTRAVENOUS at 07:08

## 2017-08-06 RX ADMIN — METOPROLOL TARTRATE 5 MG: 5 INJECTION INTRAVENOUS at 01:08

## 2017-08-06 RX ADMIN — METOPROLOL TARTRATE 5 MG: 5 INJECTION INTRAVENOUS at 03:08

## 2017-08-06 NOTE — ASSESSMENT & PLAN NOTE
Decreased PO intake with 5 pund weight loss over the past week  -Currently receiving D5W at 100ml/hr, appears to be well hydrated with good UOP and renal function at basline  -Will consider starting patient on TPN  -Oral swabs  -Continue to monitor for improvement

## 2017-08-06 NOTE — NURSING
Pt HR maintained in 170's over 2 minutes PRN metoprolol given. HR currently 70-80's. Pt asymtomatic. Will continue to monitor.

## 2017-08-06 NOTE — SUBJECTIVE & OBJECTIVE
Interval History: NAEON. Patient is comfortable and generally improved from prior assessment. He states that his odynophagia has remitted to some degree from prior assessment and continues to improve. He is able to keep his mouth moist with ice chips and says that he would like to be considered for TPN and is agreeable for a PEG tube if that becomes necessary for his care. Will continue to monitor; patient to undergo EGD tomorrow morning.     Review of Systems   Constitutional: Positive for appetite change (decreased). Negative for activity change, chills and fever.   HENT: Positive for trouble swallowing and voice change (slurred speech).    Respiratory: Negative for chest tightness and shortness of breath.    Cardiovascular: Negative for chest pain, palpitations and leg swelling.   Gastrointestinal: Negative for abdominal distention, abdominal pain, constipation, diarrhea, nausea and vomiting.   Endocrine: Negative for cold intolerance and heat intolerance.   Skin: Negative for color change.   Neurological: Negative for weakness, light-headedness and headaches.   Hematological: Negative for adenopathy. Does not bruise/bleed easily.   Psychiatric/Behavioral: Negative for agitation and behavioral problems.     Objective:     Vital Signs (Most Recent):  Temp: 98.1 °F (36.7 °C) (08/06/17 0734)  Pulse: 97 (08/06/17 0734)  Resp: 20 (08/06/17 0734)  BP: (!) 147/70 (08/06/17 0734)  SpO2: (!) 92 % (08/06/17 0734) Vital Signs (24h Range):  Temp:  [97.6 °F (36.4 °C)-98.6 °F (37 °C)] 98.1 °F (36.7 °C)  Pulse:  [] 97  Resp:  [18-20] 20  SpO2:  [92 %-98 %] 92 %  BP: (114-147)/(58-87) 147/70     Weight: 68 kg (150 lb)  Body mass index is 21.52 kg/m².    Intake/Output Summary (Last 24 hours) at 08/06/17 0902  Last data filed at 08/06/17 0500   Gross per 24 hour   Intake                0 ml   Output             1150 ml   Net            -1150 ml      Physical Exam   Constitutional: He is oriented to person, place, and time.  He appears well-developed and well-nourished.   HENT:   Head: Normocephalic and atraumatic.   Mouth/Throat: Oropharynx is clear and moist and mucous membranes are normal.   Eyes: Conjunctivae and EOM are normal.   Neck: No JVD present. No tracheal deviation present.   Cardiovascular: Normal rate, regular rhythm and normal heart sounds.    Pulmonary/Chest: Effort normal. He has decreased breath sounds in the left middle field.   Abdominal: Soft. Bowel sounds are normal.   Musculoskeletal: He exhibits no edema or deformity.   Neurological: He is alert and oriented to person, place, and time.   Skin: Skin is warm and dry.   Psychiatric: He has a normal mood and affect. His behavior is normal.   Vitals reviewed.      Significant Labs:   Recent Results (from the past 24 hour(s))   POCT glucose    Collection Time: 08/05/17 11:02 PM   Result Value Ref Range    POCT Glucose 130 (H) 70 - 110 mg/dL   Basic metabolic panel    Collection Time: 08/06/17  4:09 AM   Result Value Ref Range    Sodium 142 136 - 145 mmol/L    Potassium 3.8 3.5 - 5.1 mmol/L    Chloride 114 (H) 95 - 110 mmol/L    CO2 19 (L) 23 - 29 mmol/L    Glucose 123 (H) 70 - 110 mg/dL    BUN, Bld 19 8 - 23 mg/dL    Creatinine 0.7 0.5 - 1.4 mg/dL    Calcium 8.3 (L) 8.7 - 10.5 mg/dL    Anion Gap 9 8 - 16 mmol/L    eGFR if African American >60.0 >60 mL/min/1.73 m^2    eGFR if non African American >60.0 >60 mL/min/1.73 m^2   CBC auto differential    Collection Time: 08/06/17  4:09 AM   Result Value Ref Range    WBC 11.18 3.90 - 12.70 K/uL    RBC 3.74 (L) 4.60 - 6.20 M/uL    Hemoglobin 10.1 (L) 14.0 - 18.0 g/dL    Hematocrit 32.2 (L) 40.0 - 54.0 %    MCV 86 82 - 98 fL    MCH 27.0 27.0 - 31.0 pg    MCHC 31.4 (L) 32.0 - 36.0 g/dL    RDW 15.7 (H) 11.5 - 14.5 %    Platelets 262 150 - 350 K/uL    MPV 10.0 9.2 - 12.9 fL    Gran # 8.7 (H) 1.8 - 7.7 K/uL    Lymph # 0.6 (L) 1.0 - 4.8 K/uL    Mono # 1.7 (H) 0.3 - 1.0 K/uL    Eos # 0.0 0.0 - 0.5 K/uL    Baso # 0.01 0.00 - 0.20 K/uL     Gran% 78.8 (H) 38.0 - 73.0 %    Lymph% 5.4 (L) 18.0 - 48.0 %    Mono% 15.5 (H) 4.0 - 15.0 %    Eosinophil% 0.2 0.0 - 8.0 %    Basophil% 0.1 0.0 - 1.9 %    Differential Method Automated      Significant Imaging:  CT Soft Tissue Neck w/ 08/04/2017:  1. 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 While this often represents an asymptomatic finding, this may contribute to the patient's reported history of dysphagia.  2. Visualized portions of the upper thorax demonstrate abnormal left-sided irregular pleural thickening in this patient with known pleural malignancy. The degree of pleural thickening appears to be increased from prior PET/CT study of 6/13/2017 with associated increased left-sided pleural effusion. Additionally, there are multiple partially visualized enlarged mediastinal lymph nodes better seen on prior dedicated chest imaging.    MBS 08/04/2017:  Penetration with thin liquids.  Recommendation is NPO    CT Head w/o 08/03/2017:  No acute intracranial abnormality.  Chronic microvascular ischemic change.  Remote left occipital lobe infarct.

## 2017-08-06 NOTE — HPI
"71 year old male with a history of CHF, CAD, HTN, COPD, Aflutter s/p cardioversion on 7/28, Type A Aortic dissection s/p repair in April 2016, and metastatic mesothelioma diagnosed in February of this year. GI is being consulted for esophageal dysphagia.    History provided my patient was well as chart review.    Patient states that he has been having trouble swallowing solid foods for "a long time". More recently after his PADMINI on 7/28 he felt his dysphagia was a bit more pronounced. He stated that he had trouble with solid food and pills. He has no trouble with water or his secretions. Due to his trouble swallowing he has lost ~ 5lbs in the past week. Overall he feels that his issue is not truly worsening but just not improving as he would hope. Of note patients dysphagia, anorexia, and weight loss do not seem to be new, as there is mention of these symptoms in prior notes (etiology thought to be secondary to malignancy).    He does have a long history of smoking (quit once he was diagnosed with cancer) but denies any history of long standing GERD. No prio EGD/colonoscopy on file.  "

## 2017-08-06 NOTE — PLAN OF CARE
Problem: Patient Care Overview  Goal: Plan of Care Review  Outcome: Ongoing (interventions implemented as appropriate)  Plan of care discussed with pt. Patient disoriented to place and time.  Fluids infusing continuously. NPO. Oral swabs used to moisten mouth.  Pain management for L Chestwall incision per PRN IV. Dressing CDI-no drainage noted.  Skin assessed-remains intact. Assist with frequent weight changes. SCD in place throughout shift. Plan for TPN nutrition via midline. EGD on Monday. HR 90-100s. Camera in room, bed alarm placed on. Call light nearby. VSS & NADN. Pt denies CP, SOB, or pain/discomfort at this time.Pt free of injuries this shift.  All questions addressed.  Will continue to monitor.

## 2017-08-06 NOTE — ASSESSMENT & PLAN NOTE
71 year old male with metastatic mesothelioma who presents with ongoing esophageal dysphagia to solids. At this point it could be related to his malignancy as well as possible trauma from PADMINI. In the setting of AC will first proceed with a barium study to evaluate the esophagus before proceeding with EGD for possible intervention.  -Recommend clear liquid diet (patient able to tolerate secretions as well as liquids)  -Recommend Protonix 40 mg PO BID   -Recommend barium esophogram and pending results will determine need for EGD with intervention

## 2017-08-06 NOTE — ASSESSMENT & PLAN NOTE
Noted on modified barium swallow with aspiration of all consistencies.  Recommend NPO, continued speech.  Consider MRI brain

## 2017-08-06 NOTE — ASSESSMENT & PLAN NOTE
Onset prior to PADMINI on 7/28 but has worsened since the procedure. Patient stated improvement in his symptoms this morning.   CT soft tissue neck revealed aberrant R subclavian coursing behind the esophagus, likely unrelated as his dysphagia is new; L pleural thickening in the setting of mesothelioma but no overt compression   -Failed MBS on 08/04/2017  - ENT evaluated the patient and will not intervene at this time; recommended GI consult for EGD on 08/07/2017  - Will remain NPO at this time. Patient allowed to have ice chips for comfort which he appreciates and says are helping him  - NG tube for feeding and meds

## 2017-08-06 NOTE — PROGRESS NOTES
[facility] Hospital Medicine  Progress Note    Patient Name: Clint Brown  MRN: [id number]  Patient Class: IP- Inpatient   Admission Date: 8/3/2017  Length of Stay: 3 days  Attending Physician: Traci Stephens MD  Primary Care Provider: Jean Claude Griffith DO    [facility] Medicine Team: [facility] HOSP MED 4 Jose R Lynn MD    Subjective:     Principal Problem:Pharyngeal dysphagia    HPI:  Mr. Brown is a 72 y/o M with a history of metastatic mesothelioma, Type A aortic dissection (s/p repair in April 2016), persistent atrial flutter (s/p RFA ablation on 7/28), CAD, CKD, and HTN who presents with dry mouth and slurred speech. He reports that he hasn't eaten in 7 days due to to difficulty swallowing and now has consequential dry mouth and difficulty speaking. He has difficulty swallowing solid foods but is able to tolerate liquids. He does not think the dysphagia has progressively worsened; he just anticipated it to get better and it hasn't. Pt denies any fevers, chills, n/v, cough, confusion, hemiparesis or gait problems. Just reports feeling weak and fatigued. He feels thirsty and is requesting a Coke.      He has had a 5lb weight loss in the last 7 days. His decreased appetite is not new and previously he was started on periactin but continues to lose weight. The difference is that previously he didn't have appetite, and now he feels like he physically can't eat. Pt has constipation 2/2 decreased oral intake and daily opioid use. Pt unable to recall last BM.      Pt recently underwent a PADMINI + RFA ablation on 7/28 for Aflutter with RVR.  He has had difficulty swallowing since that hospitalization, although prior notes indicate he may have had anorexia and dysphagia in the past that was attributed to malignant mesothelioma. Since procedure, he denies any palpitation, chest pain, SOB.       He was diagnosed with mesothelioma on 4/17 when he presented mike left sided chest pain and heaviness, was  found to have a left-sided pleural effusion and underwent thoracentesis, cultures from which revealed this malignancy. Since then he has undergone a VATS procedure in 5/17 to drain the fluids. He has Stage IV mesothelioma secondary to mets to upper abdomen and left illiac bones. He has seen Dr. Nichols (Heme-Onc) in clinic, chemo-port placed 6/22 but was deferred chemo until his Aflutter was taken care of. He takes MS contin 15mg BID and percocet 1/day for pain on left and was started on periactin for poor appetite.    Hospital Course:  8/5: failed MBS '' Patient with severe pharyngeal dysphagia characterized by silent aspiration of residuals of thin liquids, honey-thickened liquids and puree textures. Patient with deep, silent penetration thin liquids. 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 While this often represents an asymptomatic finding, this may contribute to the patient's reported history of dysphagia '' ENT were consulted and examined the oropharynx shows '' FFL without abnormalities, no masses or lesions, vocal cords fully mobile bilaterally ''. Still in mild pain, ordered pluerx drain and IV pain meds. Will insert NG tube.      Interval History: NAEON. Patient is comfortable and generally improved from prior assessment. He states that his odynophagia has remitted to some degree from prior assessment and continues to improve. He is able to keep his mouth moist with ice chips and says that he would like to be considered for TPN and is agreeable for a PEG tube if that becomes necessary for his care. Will continue to monitor; patient to undergo EGD tomorrow morning.     Review of Systems   Constitutional: Positive for appetite change (decreased). Negative for activity change, chills and fever.   HENT: Positive for trouble swallowing and voice change (slurred speech).     Respiratory: Negative for chest tightness and shortness of breath.    Cardiovascular: Negative for chest pain, palpitations and leg swelling.   Gastrointestinal: Negative for abdominal distention, abdominal pain, constipation, diarrhea, nausea and vomiting.   Endocrine: Negative for cold intolerance and heat intolerance.   Skin: Negative for color change.   Neurological: Negative for weakness, light-headedness and headaches.   Hematological: Negative for adenopathy. Does not bruise/bleed easily.   Psychiatric/Behavioral: Negative for agitation and behavioral problems.     Objective:     Vital Signs (Most Recent):  Temp: 98.1 °F (36.7 °C) (08/06/17 0734)  Pulse: 97 (08/06/17 0734)  Resp: 20 (08/06/17 0734)  BP: (!) 147/70 (08/06/17 0734)  SpO2: (!) 92 % (08/06/17 0734) Vital Signs (24h Range):  Temp:  [97.6 °F (36.4 °C)-98.6 °F (37 °C)] 98.1 °F (36.7 °C)  Pulse:  [] 97  Resp:  [18-20] 20  SpO2:  [92 %-98 %] 92 %  BP: (114-147)/(58-87) 147/70     Weight: 68 kg (150 lb)  Body mass index is 21.52 kg/m².    Intake/Output Summary (Last 24 hours) at 08/06/17 0902  Last data filed at 08/06/17 0500   Gross per 24 hour   Intake                0 ml   Output             1150 ml   Net            -1150 ml      Physical Exam   Constitutional: He is oriented to person, place, and time. He appears well-developed and well-nourished.   HENT:   Head: Normocephalic and atraumatic.   Mouth/Throat: Oropharynx is clear and moist and mucous membranes are normal.   Eyes: Conjunctivae and EOM are normal.   Neck: No JVD present. No tracheal deviation present.   Cardiovascular: Normal rate, regular rhythm and normal heart sounds.    Pulmonary/Chest: Effort normal. He has decreased breath sounds in the left middle field.   Abdominal: Soft. Bowel sounds are normal.   Musculoskeletal: He exhibits no edema or deformity.   Neurological: He is alert and oriented to person, place, and time.   Skin: Skin is warm and dry.   Psychiatric: He has a  normal mood and affect. His behavior is normal.   Vitals reviewed.      Significant Labs:   Recent Results (from the past 24 hour(s))   POCT glucose    Collection Time: 08/05/17 11:02 PM   Result Value Ref Range    POCT Glucose 130 (H) 70 - 110 mg/dL   Basic metabolic panel    Collection Time: 08/06/17  4:09 AM   Result Value Ref Range    Sodium 142 136 - 145 mmol/L    Potassium 3.8 3.5 - 5.1 mmol/L    Chloride 114 (H) 95 - 110 mmol/L    CO2 19 (L) 23 - 29 mmol/L    Glucose 123 (H) 70 - 110 mg/dL    BUN, Bld 19 8 - 23 mg/dL    Creatinine 0.7 0.5 - 1.4 mg/dL    Calcium 8.3 (L) 8.7 - 10.5 mg/dL    Anion Gap 9 8 - 16 mmol/L    eGFR if African American >60.0 >60 mL/min/1.73 m^2    eGFR if non African American >60.0 >60 mL/min/1.73 m^2   CBC auto differential    Collection Time: 08/06/17  4:09 AM   Result Value Ref Range    WBC 11.18 3.90 - 12.70 K/uL    RBC 3.74 (L) 4.60 - 6.20 M/uL    Hemoglobin 10.1 (L) 14.0 - 18.0 g/dL    Hematocrit 32.2 (L) 40.0 - 54.0 %    MCV 86 82 - 98 fL    MCH 27.0 27.0 - 31.0 pg    MCHC 31.4 (L) 32.0 - 36.0 g/dL    RDW 15.7 (H) 11.5 - 14.5 %    Platelets 262 150 - 350 K/uL    MPV 10.0 9.2 - 12.9 fL    Gran # 8.7 (H) 1.8 - 7.7 K/uL    Lymph # 0.6 (L) 1.0 - 4.8 K/uL    Mono # 1.7 (H) 0.3 - 1.0 K/uL    Eos # 0.0 0.0 - 0.5 K/uL    Baso # 0.01 0.00 - 0.20 K/uL    Gran% 78.8 (H) 38.0 - 73.0 %    Lymph% 5.4 (L) 18.0 - 48.0 %    Mono% 15.5 (H) 4.0 - 15.0 %    Eosinophil% 0.2 0.0 - 8.0 %    Basophil% 0.1 0.0 - 1.9 %    Differential Method Automated      Significant Imaging:  CT Soft Tissue Neck w/ 08/04/2017:  1. 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 While this often represents an asymptomatic finding, this may contribute to the patient's reported history of dysphagia.  2. Visualized portions of the upper thorax demonstrate abnormal left-sided irregular pleural thickening in this patient with known pleural malignancy. The degree of  pleural thickening appears to be increased from prior PET/CT study of 6/13/2017 with associated increased left-sided pleural effusion. Additionally, there are multiple partially visualized enlarged mediastinal lymph nodes better seen on prior dedicated chest imaging.    MBS 08/04/2017:  Penetration with thin liquids.  Recommendation is NPO    CT Head w/o 08/03/2017:  No acute intracranial abnormality.  Chronic microvascular ischemic change.  Remote left occipital lobe infarct.    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  - has seen Dr. Nichols in clinic; chemo therapy until aflutter controlled  - for pain: MS Contin 15mg BID & percocet  as needed  - periactin for decreased appetite  - pleurx in left thoracic, will drain 150 cc every other day for comfort.       * Pharyngeal dysphagia    Onset prior to PADMINI on 7/28 but has worsened since the procedure. Patient stated improvement in his symptoms this morning.   CT soft tissue neck revealed aberrant R subclavian coursing behind the esophagus, likely unrelated as his dysphagia is new; L pleural thickening in the setting of mesothelioma but no overt compression   -Failed MBS on 08/04/2017  - ENT evaluated the patient and will not intervene at this time; recommended GI consult for EGD on 08/07/2017  - Will remain NPO at this time. Patient allowed to have ice chips for comfort which he appreciates and says are helping him  - NG tube for feeding and meds      Typical atrial flutter    S/p RFA ablation on 7/28 and on apixaban 5mg for anticoagulation.   -Continue TOPROL-XL 25mg po daily for rate control  -No further runs of palpitations or documented tachycardia/afib RVR since he converted spontaneously in the ED      Dehydration    Decreased PO intake with 5 pund weight loss over the past week  -Currently receiving D5W at 100ml/hr, appears to be well hydrated with  good UOP and renal function at [facility]  -Will consider starting patient on TPN  -Oral swabs  -Continue to monitor for improvement      Coronary artery disease involving native coronary artery of native heart without angina pectoris    [place]  -[facility] (2016) LM patent; LAD patent; Ramus non obstructive; LCX non obstructive      CKD (chronic kidney disease) stage 3, GFR 30-59 ml/min    Stable from prior labs  BUN/Cr 19 and 0.7  -Will continue to follow      Constipation    -Miralax & senna scheduled daily PRN      Chronic obstructive pulmonary disease    Stable this admission   -Satting 92% on RA  -Will continue to monitor for signs of respiratory distress      Hypercholesteremia    - chronic; last lipid panel ): TC: 104, HDL: 31, LDL: 58.6, T  - will continue home rosuvastatin 20mg       Essential hypertension    Hypotensive this admission most recently 130s/60s  -Patient has remained low hypertensive to normotensive over the past 24 hours  -Will continue with current treatment, monitor for changes and intervene as appropriate         VTE Risk Mitigation         Ordered     apixaban tablet 5 mg  2 times daily     Route:  Oral        17 0009     Medium Risk of VTE  Once      17 7219        Jose R Lynn MD  Department of Hospital Medicine   [facility]

## 2017-08-06 NOTE — SUBJECTIVE & OBJECTIVE
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     Atrial flutter     cardioversion 4/12/16    Cancer     Chronic kidney disease     Chronic obstructive pulmonary disease     CKD (chronic kidney disease) stage 3, GFR 30-59 ml/min 6/27/2016    Closed fracture of one rib of left side 3/21/2017    Discovered 11/16    COPD (chronic obstructive pulmonary disease)     Coronary artery disease     [facility] 4/1/16: 50% proximal RAMUS    Coronary artery disease involving native coronary artery of native heart without angina pectoris 4/1/2016    [place] (4/1/2016) LM patent; LAD patent; Ramus non obstructive; LCX non obstructive      Hypertension     Kidney stones     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 6/23/2017    Old 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/20/2016    3/16    S/P ascending aortic replacement 4/18/2016    Thoracic aortic dissection     Type A dissection s/p repair 4/13/16       Past Surgical History:   Procedure Laterality Date    APPENDECTOMY      CARDIAC CATHETERIZATION      EYE SURGERY      KIDNEY STONE SURGERY      4 surgeries    TONSILLECTOMY      VASCULAR SURGERY         Review of patient's allergies indicates:   Allergen Reactions    Lipitor [atorvastatin] Other (See Comments)     Leg cramps    Cephalexin Nausea And Vomiting     Family History     None        Social History Main Topics    Smoking status: Current Every Day Smoker     Packs/day: 0.25     Years: 55.00     Types: Cigarettes    Smokeless tobacco: Never Used      Comment: has Chantix at home    Alcohol use No    Drug use: No    Sexual activity: Yes     Partners: Female     Review of Systems   Constitutional: Positive for activity change, appetite change and fatigue.   HENT: Negative.    Eyes: Negative.    Respiratory: Negative.    Cardiovascular: Negative.    Gastrointestinal:        Dysphagia   Endocrine:  Negative.    Genitourinary: Negative.    Musculoskeletal: Negative.    Skin: Negative.    Neurological: Negative.    Hematological: Negative.      Objective:     Vital Signs (Most Recent):  Temp: 98.1 °F (36.7 °C) (08/06/17 0734)  Pulse: 97 (08/06/17 0734)  Resp: 20 (08/06/17 0734)  BP: (!) 147/70 (08/06/17 0734)  SpO2: (!) 92 % (08/06/17 0734) Vital Signs (24h Range):  Temp:  [97.6 °F (36.4 °C)-98.6 °F (37 °C)] 98.1 °F (36.7 °C)  Pulse:  [] 97  Resp:  [18-20] 20  SpO2:  [92 %-98 %] 92 %  BP: (114-147)/(58-87) 147/70     Weight: 68 kg (150 lb) (08/03/17 1954)  Body mass index is 21.52 kg/m².      Intake/Output Summary (Last 24 hours) at 08/06/17 1149  Last data filed at 08/06/17 0500   Gross per 24 hour   Intake                0 ml   Output             1150 ml   Net            -1150 ml       Lines/Drains/Airways     Central Venous Catheter Line                 Port A Cath Single Lumen 06/28/17 0954 left subclavian 39 days          Drain                 Chest Tube Left Pleural 08329 days         Drain/Device  05/31/17 0000 Left chest other (see comments) 67 days          Peripheral Intravenous Line                 Peripheral IV - Single Lumen 08/05/17 1000 Right Forearm 1 day                Physical Exam   Constitutional: He is oriented to person, place, and time. No distress.   HENT:   Head: Normocephalic.   Eyes: Pupils are equal, round, and reactive to light.   Neck: Normal range of motion.   Cardiovascular:   ?regular rate and rhythm    Pulmonary/Chest: Effort normal and breath sounds normal.   Abdominal: Soft. Bowel sounds are normal. He exhibits no distension. There is no tenderness. There is no rebound and no guarding.   Musculoskeletal: Normal range of motion.   Neurological: He is alert and oriented to person, place, and time.   Skin: Skin is warm.       Significant Labs:  CBC:   Recent Labs  Lab 08/05/17  0446 08/06/17  0409   WBC 11.79 11.18   HGB 10.2* 10.1*   HCT 32.8* 32.2*    262     CMP:    Recent Labs  Lab 08/06/17  0409   *   CALCIUM 8.3*      K 3.8   CO2 19*   *   BUN 19   CREATININE 0.7       Significant Imaging:  Imaging results within the past 24 hours have been reviewed.

## 2017-08-06 NOTE — PROGRESS NOTES
08/06/17 0300   Vital Signs   Pulse (!) 164   Heart Rate Source Monitor   Resp 20   SpO2 97 %   O2 Device (Oxygen Therapy) room air   BP (!) 142/87   MAP (mmHg) 108   BP Location Right arm   BP Method Automatic   Patient Position Lying   HR sustained 160s for 3-4mins. Pt sleeping.  Metoprolol 5mg given PRN Monitored BP and HR Pre and post administration  Post admin HR 80s /70(87)

## 2017-08-06 NOTE — CONSULTS
"[facility]  Gastroenterology  Consult Note    Patient Name: Clint Brown  MRN: [id number]  Admission Date: 8/3/2017  Hospital Length of Stay: 3 days  Code Status: Full Code   Attending Provider: Traci Stephens MD   Consulting Provider: Marcus Palmer MD  Primary Care Physician: Jean Claude Griffith DO  Principal Problem:Pharyngeal dysphagia    Inpatient consult to Gastroenterology  Consult performed by: MARCUS PALMER  Consult ordered by: ESME ELLIS        Subjective:     HPI:  71 year old male with a history of CHF, CAD, HTN, COPD, Aflutter s/p cardioversion on 7/28, Type A Aortic dissection s/p repair in April 2016, and metastatic mesothelioma diagnosed in February of this year. GI is being consulted for esophageal dysphagia.    History provided my patient was well as chart review.    Patient states that he has been having trouble swallowing solid foods for "a long time". More recently after his PADMINI on 7/28 he felt his dysphagia was a bit more pronounced. He stated that he had trouble with solid food and pills. He has no trouble with water or his secretions. Due to his trouble swallowing he has lost ~ 5lbs in the past week. Overall he feels that his issue is not truly worsening but just not improving as he would hope. Of note patients dysphagia, anorexia, and weight loss do not seem to be new, as there is mention of these symptoms in prior notes (etiology thought to be secondary to malignancy).    He does have a long history of smoking (quit once he was diagnosed with cancer) but denies any history of long standing GERD. No prio EGD/colonoscopy on file.    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     Atrial flutter     cardioversion 4/12/16    Cancer     Chronic kidney disease     Chronic obstructive pulmonary disease     CKD (chronic kidney disease) stage 3, GFR 30-59 ml/min 6/27/2016 "    Closed fracture of one rib of left side 3/21/2017    Discovered 11/16    COPD (chronic obstructive pulmonary disease)     Coronary artery disease     [facility] 4/1/16: 50% proximal RAMUS    Coronary artery disease involving native coronary artery of native heart without angina pectoris 4/1/2016    [place] (4/1/2016) LM patent; LAD patent; Ramus non obstructive; LCX non obstructive      Hypertension     Kidney stones     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 6/23/2017    Old 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/20/2016    3/16    S/P ascending aortic replacement 4/18/2016    Thoracic aortic dissection     Type A dissection s/p repair 4/13/16       Past Surgical History:   Procedure Laterality Date    APPENDECTOMY      CARDIAC CATHETERIZATION      EYE SURGERY      KIDNEY STONE SURGERY      4 surgeries    TONSILLECTOMY      VASCULAR SURGERY         Review of patient's allergies indicates:   Allergen Reactions    Lipitor [atorvastatin] Other (See Comments)     Leg cramps    Cephalexin Nausea And Vomiting     Family History     None        Social History Main Topics    Smoking status: Current Every Day Smoker     Packs/day: 0.25     Years: 55.00     Types: Cigarettes    Smokeless tobacco: Never Used      Comment: has Chantix at home    Alcohol use No    Drug use: No    Sexual activity: Yes     Partners: Female     Review of Systems   Constitutional: Positive for activity change, appetite change and fatigue.   HENT: Negative.    Eyes: Negative.    Respiratory: Negative.    Cardiovascular: Negative.    Gastrointestinal:        Dysphagia   Endocrine: Negative.    Genitourinary: Negative.    Musculoskeletal: Negative.    Skin: Negative.    Neurological: Negative.    Hematological: Negative.      Objective:     Vital Signs (Most Recent):  Temp: 98.1 °F (36.7 °C) (08/06/17 0734)  Pulse: 97 (08/06/17 0734)  Resp: 20 (08/06/17 0734)  BP: (!) 147/70 (08/06/17 0734)  SpO2: (!) 92 %  (08/06/17 0734) Vital Signs (24h Range):  Temp:  [97.6 °F (36.4 °C)-98.6 °F (37 °C)] 98.1 °F (36.7 °C)  Pulse:  [] 97  Resp:  [18-20] 20  SpO2:  [92 %-98 %] 92 %  BP: (114-147)/(58-87) 147/70     Weight: 68 kg (150 lb) (08/03/17 1954)  Body mass index is 21.52 kg/m².      Intake/Output Summary (Last 24 hours) at 08/06/17 1149  Last data filed at 08/06/17 0500   Gross per 24 hour   Intake                0 ml   Output             1150 ml   Net            -1150 ml       Lines/Drains/Airways     Central Venous Catheter Line                 Port A Cath Single Lumen 06/28/17 0954 left subclavian 39 days          Drain                 Chest Tube Left Pleural 31792 days         Drain/Device  05/31/17 0000 Left chest other (see comments) 67 days          Peripheral Intravenous Line                 Peripheral IV - Single Lumen 08/05/17 1000 Right Forearm 1 day                Physical Exam   Constitutional: He is oriented to person, place, and time. No distress.   HENT:   Head: Normocephalic.   Eyes: Pupils are equal, round, and reactive to light.   Neck: Normal range of motion.   Cardiovascular:   ?regular rate and rhythm    Pulmonary/Chest: Effort normal and breath sounds normal.   Abdominal: Soft. Bowel sounds are normal. He exhibits no distension. There is no tenderness. There is no rebound and no guarding.   Musculoskeletal: Normal range of motion.   Neurological: He is alert and oriented to person, place, and time.   Skin: Skin is warm.       Significant Labs:  CBC:   Recent Labs  Lab 08/05/17  0446 08/06/17  0409   WBC 11.79 11.18   HGB 10.2* 10.1*   HCT 32.8* 32.2*    262     CMP:   Recent Labs  Lab 08/06/17  0409   *   CALCIUM 8.3*      K 3.8   CO2 19*   *   BUN 19   CREATININE 0.7       Significant Imaging:  Imaging results within the past 24 hours have been reviewed.    Assessment/Plan:     Esophageal dysphagia    71 year old male with metastatic mesothelioma who presents with ongoing  esophageal dysphagia to solids. At this point it could be related to his malignancy as well as possible trauma from PADMINI. In the setting of AC will first proceed with a barium study to evaluate the esophagus before proceeding with EGD for possible intervention.  -Recommend clear liquid diet (patient able to tolerate secretions as well as liquids)  -Recommend Protonix 40 mg PO BID   -Recommend barium esophogram and pending results will determine need for EGD with intervention              Thank you for your consult. I will follow-up with patient. Please contact us if you have any additional questions.    Trent Little M.D.  Gastroenterology Fellow, PGY-IV  Pager: [phone]  [facility]

## 2017-08-06 NOTE — ASSESSMENT & PLAN NOTE
Stable this admission   -Satting 92% on RA  -Will continue to monitor for signs of respiratory distress

## 2017-08-06 NOTE — NURSING
Pt family concerned about pt nutrition status. IV nutrition to be consulted tomorrow. Pt remains on dextrose solution at 75 ml/hr without peripheral nutrition.

## 2017-08-07 LAB
ANION GAP SERPL CALC-SCNC: 10 MMOL/L
BASOPHILS # BLD AUTO: 0.02 K/UL
BASOPHILS NFR BLD: 0.2 %
BUN SERPL-MCNC: 16 MG/DL
CALCIUM SERPL-MCNC: 8.8 MG/DL
CHLORIDE SERPL-SCNC: 116 MMOL/L
CO2 SERPL-SCNC: 18 MMOL/L
CREAT SERPL-MCNC: 0.7 MG/DL
DIFFERENTIAL METHOD: ABNORMAL
EOSINOPHIL # BLD AUTO: 0 K/UL
EOSINOPHIL NFR BLD: 0.3 %
ERYTHROCYTE [DISTWIDTH] IN BLOOD BY AUTOMATED COUNT: 15.7 %
EST. GFR  (AFRICAN AMERICAN): >60 ML/MIN/1.73 M^2
EST. GFR  (NON AFRICAN AMERICAN): >60 ML/MIN/1.73 M^2
GLUCOSE SERPL-MCNC: 102 MG/DL
HCT VFR BLD AUTO: 34.4 %
HGB BLD-MCNC: 10.2 G/DL
LYMPHOCYTES # BLD AUTO: 0.8 K/UL
LYMPHOCYTES NFR BLD: 6.9 %
MCH RBC QN AUTO: 26.4 PG
MCHC RBC AUTO-ENTMCNC: 29.7 G/DL
MCV RBC AUTO: 89 FL
MONOCYTES # BLD AUTO: 1.6 K/UL
MONOCYTES NFR BLD: 14.4 %
NEUTROPHILS # BLD AUTO: 8.8 K/UL
NEUTROPHILS NFR BLD: 77.5 %
PLATELET # BLD AUTO: 240 K/UL
PMV BLD AUTO: 10.1 FL
POTASSIUM SERPL-SCNC: 3.8 MMOL/L
RBC # BLD AUTO: 3.86 M/UL
SODIUM SERPL-SCNC: 144 MMOL/L
WBC # BLD AUTO: 11.29 K/UL

## 2017-08-07 PROCEDURE — 85025 COMPLETE CBC W/AUTO DIFF WBC: CPT

## 2017-08-07 PROCEDURE — 25000003 PHARM REV CODE 250: Performed by: STUDENT IN AN ORGANIZED HEALTH CARE EDUCATION/TRAINING PROGRAM

## 2017-08-07 PROCEDURE — 93005 ELECTROCARDIOGRAM TRACING: CPT

## 2017-08-07 PROCEDURE — 80048 BASIC METABOLIC PNL TOTAL CA: CPT

## 2017-08-07 PROCEDURE — 99232 SBSQ HOSP IP/OBS MODERATE 35: CPT | Mod: GC,,, | Performed by: INTERNAL MEDICINE

## 2017-08-07 PROCEDURE — 36415 COLL VENOUS BLD VENIPUNCTURE: CPT

## 2017-08-07 PROCEDURE — 92526 ORAL FUNCTION THERAPY: CPT

## 2017-08-07 PROCEDURE — 97802 MEDICAL NUTRITION INDIV IN: CPT | Performed by: DIETITIAN, REGISTERED

## 2017-08-07 PROCEDURE — A4216 STERILE WATER/SALINE, 10 ML: HCPCS | Performed by: STUDENT IN AN ORGANIZED HEALTH CARE EDUCATION/TRAINING PROGRAM

## 2017-08-07 PROCEDURE — 97535 SELF CARE MNGMENT TRAINING: CPT

## 2017-08-07 PROCEDURE — 20600001 HC STEP DOWN PRIVATE ROOM

## 2017-08-07 PROCEDURE — 93010 ELECTROCARDIOGRAM REPORT: CPT | Mod: ,,, | Performed by: INTERNAL MEDICINE

## 2017-08-07 RX ORDER — METOPROLOL TARTRATE 1 MG/ML
5 INJECTION, SOLUTION INTRAVENOUS EVERY 4 HOURS
Status: DISCONTINUED | OUTPATIENT
Start: 2017-08-07 | End: 2017-08-14 | Stop reason: HOSPADM

## 2017-08-07 RX ADMIN — METOPROLOL TARTRATE 5 MG: 5 INJECTION INTRAVENOUS at 10:08

## 2017-08-07 RX ADMIN — DEXTROSE MONOHYDRATE AND SODIUM CHLORIDE: 5; .9 INJECTION, SOLUTION INTRAVENOUS at 01:08

## 2017-08-07 RX ADMIN — DEXTROSE MONOHYDRATE AND SODIUM CHLORIDE: 5; .9 INJECTION, SOLUTION INTRAVENOUS at 06:08

## 2017-08-07 RX ADMIN — HYDROMORPHONE HYDROCHLORIDE 1 MG: 1 INJECTION, SOLUTION INTRAMUSCULAR; INTRAVENOUS; SUBCUTANEOUS at 04:08

## 2017-08-07 RX ADMIN — ASCORBIC ACID, VITAMIN A PALMITATE, CHOLECALCIFEROL, THIAMINE HYDROCHLORIDE, RIBOFLAVIN-5 PHOSPHATE SODIUM, PYRIDOXINE HYDROCHLORIDE, NIACINAMIDE, DEXPANTHENOL, ALPHA-TOCOPHEROL ACETATE, VITAMIN K1, FOLIC ACID, BIOTIN, CYANOCOBALAMIN: 200; 3300; 200; 6; 3.6; 6; 40; 15; 10; 150; 600; 60; 5 INJECTION, SOLUTION INTRAVENOUS at 10:08

## 2017-08-07 RX ADMIN — METOPROLOL TARTRATE 5 MG: 5 INJECTION INTRAVENOUS at 05:08

## 2017-08-07 RX ADMIN — HYDROMORPHONE HYDROCHLORIDE 1 MG: 1 INJECTION, SOLUTION INTRAMUSCULAR; INTRAVENOUS; SUBCUTANEOUS at 07:08

## 2017-08-07 RX ADMIN — METOPROLOL TARTRATE 5 MG: 5 INJECTION INTRAVENOUS at 07:08

## 2017-08-07 RX ADMIN — Medication 3 ML: at 08:08

## 2017-08-07 RX ADMIN — ASCORBIC ACID, VITAMIN A PALMITATE, CHOLECALCIFEROL, THIAMINE HYDROCHLORIDE, RIBOFLAVIN-5 PHOSPHATE SODIUM, PYRIDOXINE HYDROCHLORIDE, NIACINAMIDE, DEXPANTHENOL, ALPHA-TOCOPHEROL ACETATE, VITAMIN K1, FOLIC ACID, BIOTIN, CYANOCOBALAMIN: 200; 3300; 200; 6; 3.6; 6; 40; 15; 10; 150; 600; 60; 5 INJECTION, SOLUTION INTRAVENOUS at 08:08

## 2017-08-07 RX ADMIN — METOPROLOL TARTRATE 5 MG: 5 INJECTION INTRAVENOUS at 02:08

## 2017-08-07 RX ADMIN — ASCORBIC ACID, VITAMIN A PALMITATE, CHOLECALCIFEROL, THIAMINE HYDROCHLORIDE, RIBOFLAVIN-5 PHOSPHATE SODIUM, PYRIDOXINE HYDROCHLORIDE, NIACINAMIDE, DEXPANTHENOL, ALPHA-TOCOPHEROL ACETATE, VITAMIN K1, FOLIC ACID, BIOTIN, CYANOCOBALAMIN: 200; 3300; 200; 6; 3.6; 6; 40; 15; 10; 150; 600; 60; 5 INJECTION, SOLUTION INTRAVENOUS at 11:08

## 2017-08-07 RX ADMIN — HYDROMORPHONE HYDROCHLORIDE 1 MG: 1 INJECTION, SOLUTION INTRAMUSCULAR; INTRAVENOUS; SUBCUTANEOUS at 11:08

## 2017-08-07 RX ADMIN — Medication 3 ML: at 02:08

## 2017-08-07 RX ADMIN — DEXTROSE MONOHYDRATE AND SODIUM CHLORIDE: 5; .9 INJECTION, SOLUTION INTRAVENOUS at 12:08

## 2017-08-07 NOTE — ASSESSMENT & PLAN NOTE
Decreased PO intake with 5 pund weight loss over the past week  -Currently receiving D5W at 100ml/hr, appears to be well hydrated with good UOP and renal function at [facility]  -Will consider starting patient on TPN today   -Continue to monitor for improvement

## 2017-08-07 NOTE — CONSULTS
[facility]  Adult Nutrition  Consult Note    SUMMARY     Recommendations    Recommendation/Intervention:   1. If oral diet not advanced within 72 hours, recommend considering PEG placement and initiating enteral nutrition. Consult RD for formula and rate recommendations.   2. Once central line placed, initiate TPN - Clinimix 5/20 @ 75 mL/hr + IV lipids. This will meet patient's energy and protein needs.  Start at 10mL/hr and increase as tolerated.    -This will provide 2084 kcal and 90 g protein  3. If unable to place central line, initiate PPN 2.75/10 @ 100 mL/hr. Initiate at 10 mL/hr and advance as tolerated to goal of 100 mL. This provides 50% of patient's estimated energy needs and 75% estimated protein needs.     4. As medically able, advance oral diet to regular, texture per SLP 5. RD following    Goals: Pt to receive > 75% estimated energy and protein needs within 72 hours  Nutrition Goal Status: new  Communication of RD Recs: reviewed with RN    Reason for Assessment    Reason for Assessment: physician consult, other (see comments) (TPN vs EN reccs)  Diagnosis: cancer diagnosis/related complications, other (see comments) (stage IV metastatic mesothelioma, severe dysphagia)  Relevent Medical History: HTN, CKDIII, CAD, HTN, afib   Interdisciplinary Rounds: did not attend     General Information Comments: Central line placement today, per RN note. Pt has not eaten x 7 days PTA 2' dry mouth/lack of appetite, per MD note unable to place NGT and to start TPN today. SLP in room at visit talking with family, failed MBSS, pt sleeping    Nutrition Discharge Planning: unable to determine at this time    Nutrition Prescription Ordered    Current Diet Order: NPO      Evaluation of Received Nutrients/Fluid Intake    Energy Calories Required: not meeting needs     Protein Required: not meeting needs     Fluid Required: not meeting needs        % Intake of Estimated Energy Needs: 0 - 25 %  % Meal  "Intake: NPO     Nutrition Risk Screen     Nutrition Risk Screen: dysphagia or difficulty swallowing    Nutrition/Diet History    Patient Reported Diet/Restrictions/Preferences:  (HOWIE)              Factors Affecting Nutritional Intake: NPO, sore mouth                Labs/Tests/Procedures/Meds    Diagnostic Test/Procedure Review: reviewed, pertinent  Pertinent Labs Reviewed: reviewed, pertinent     Pertinent Medications Reviewed: reviewed, pertinent  Pertinent Medications Comments: Duke's solution, folic acid, statin    Physical Findings    Overall Physical Appearance: nourished     Oral/Mouth Cavity:  (HOWIE)  Skin: intact    Anthropometrics    Temp: 97.6 °F (36.4 °C)     Height: 5' 10" (177.8 cm)     Weight: 68 kg (150 lb)  Ideal Body Weight (IBW), Male: 166 lb     % Ideal Body Weight, Male (lb): 90.36 lb     BMI (Calculated): 21.6     Weight Loss: unintentional  Usual Body Weight (UBW), k.45 kg     % Usual Body Weight: 96.78  % Weight Change From Usual Weight: 3.22 %             Estimated/Assessed Needs    Weight Used For Calorie Calculations: 68 kg (149 lb 14.6 oz)   Height (cm): 177.8 cm  Energy Calorie Requirements (kcal): 8229-1367 kcal/day (30-35 kcal/kg)  Energy Need Method: Kcal/kg        RMR ([place]-St. Jeor Equation): 1441.25        Weight Used For Protein Calculations: 68 kg (149 lb 14.6 oz)  Protein Requirements: 89-102g/day (1.3-1.5 g/kg)  Fluid Requirements (mL): 1511-7659  Fluid Need Method: other (see comments) (1mL/kcal)           RDA Method (mL):         Assessment and Plan    Esophageal dysphagia    Nutrition Diagnosis:   Inadequate energy intake    Related to (etiology):   NPO with no alternate means of nutrition    Signs and Symptoms (as evidenced by):   Pt receiving 0% estimated energy and protein needs at this time    Interventions/Recommendations (treatment strategy):  Initiate TPN, per recommendations. Advance to regular oral diet, texture per SLP, as medically able.     Nutrition " Diagnosis Status:   New              Monitor and Evaluation    Food and Nutrient Intake: parenteral nutrition intake, energy intake  Food and Nutrient Adminstration: enteral and parenteral nutrition administration, diet order        Anthropometric Measurements: weight, weight change, body mass index  Biochemical Data, Medical Tests and Procedures: glucose/endocrine profile, gastrointestinal profile, lipid profile, inflammatory profile, electrolyte and renal panel       Nutrition Risk    Level of Risk:  (2x/week)    Nutrition Follow-Up

## 2017-08-07 NOTE — PLAN OF CARE
Problem: Patient Care Overview  Goal: Plan of Care Review  Outcome: Ongoing (interventions implemented as appropriate)  Pt remains free from falls/ injury/trauma. No complaints of CP, SOB, or discomfort. PRN pain medication given for left incisional pain. Patient remains NPO through the night. IVF infusing. Plan of Care reviewed with patient. VSS and NADN. Will continue to monitor.

## 2017-08-07 NOTE — PLAN OF CARE
Problem: Patient Care Overview  Goal: Plan of Care Review  Recommendations     Recommendation/Intervention:   1. If oral diet not advanced within 72 hours, recommend considering PEG placement and initiating enteral nutrition. Consult RD for formula and rate recommendations.   2. Once central line placed, initiate TPN - Clinimix 5/20 @ 75 mL/hr + IV lipids. This will meet patient's energy and protein needs.  Start at 10mL/hr and increase as tolerated.                         -This will provide 2084 kcal and 90 g protein  3. If unable to place central line, initiate PPN 2.75/10 @ 100 mL/hr. Initiate at 10 mL/hr and advance as tolerated to goal of 100 mL. This provides 50% of patient's estimated energy needs and 75% estimated protein needs.     4. As medically able, advance oral diet to regular, texture per SLP 5. RD following

## 2017-08-07 NOTE — PLAN OF CARE
Problem: SLP Goal  Goal: SLP Goal  SLP goal expected to be met by 8/11:  1. Pt will participate in MBSS when deemed medically appropriate in order to determine safest, least restrictive diet. MET 8/4  2. Pt will participate in ongoing assessment of clinical evaluation of swallow in order to determine safest, least restrictive diet.  MET 8/4  3. Pt will complete pharyngeal swallow exercises (shaker, mendelsohn, etc) with good effort 90% of attempts, MIN A, to increase strength/coordination of swallow fx    Outcome: Ongoing (interventions implemented as appropriate)  Pt ongoing with dysphagia exercises. Patient with increased fatigue and c/o pain this service day. Patient,spouse and daughter educated on SLP role, MBSS findings, aspiration precautions, Dysphagia exercises, and ongoing SLP POC.

## 2017-08-07 NOTE — ASSESSMENT & PLAN NOTE
Hypotensive this admission most recently 130s/60s  -Patient has remained low hypertensive to normotensive over the past 24 hours  -Will continue with current treatment, monitor for changes and intervene as appropriate

## 2017-08-07 NOTE — PT/OT/SLP PROGRESS
"Speech Language Pathology  Treatment    Clint Brown   MRN: [id number]   Admitting Diagnosis: Pharyngeal dysphagia    Diet recommendations: Solid Diet Level: NPO  Liquid Diet Level: NPO   Strict oral care advised  Patient would benefit from consideration of alternative means nutrition/hydration/medication.    SLP Treatment Date: 08/07/17  Speech Start Time: 1147/15:40     Speech Stop Time: 1202/15:59  Speech Total (min): 15 min /19 minutes    TREATMENT BILLABLE MINUTES:  Treatment Swallowing Dysfunction 19 and Seld Care/Home Management Training 15    Has the patient been evaluated by SLP for swallowing? : Yes  Keep patient NPO?: Yes   General Precautions: Standard, aspiration, fall  Current Respiratory Status: nasal cannula       Subjective:  SLP discussed Patient with nurse pre/post session  Patient presents fatigued upon first attempt, daughter and spouse at bedside  Upon second attempt, Patient presents fatigued and cooperative  Patient asks, "How long will it take to get it [swallow] back"  Patient reports ongoing, persistent lower back pain   Pain/Comfort  Pain Rating 1: 5/10  Location - Orientation 1: lower  Location 1: back  Pain Addressed 1: Distraction, Reposition, Nurse notified  Pain Rating Post-Intervention 1: 5/10    Objective:   Patient found with: peripheral IV. Pt found asleep in bed upon first attempt. Pts spouse and daughter at bedside and ask SLP questions re: SLP POC. SLP educates Spouse and daughter on MBSS findings, Dysphagia, aspiration precautions, Dysphagia exercises, and ongoing SLP POC. Whiteboard updated and no further questions noted form spouse or daughter. Patient slow to arouse and SLP reattempts later service day to complete Dysphagia exercises. Upon second attempt later service day, Patient presents awake and cooperative. He is educated on SLP role, Dysphagia, Swallowing anatomy, MBSS findings, Dysphagia exercises and ongoing aspiration precautions. He completes pharyngeal swallow " exercises including mendelsohn and effortful swallow exercises x8 ea with good effort provided MAX A. He completes shaker x1 with MAX A. Patient accepts ice chipsx3 with effortful swallow and mendelsohn exercises. Patient requires multiple swallows to clear wet vocal quality. Patient with increased fatigue/pain and asking SLP for a break. Session ended and handouts of exercises left at bedside for reference. No further questions noted. Whiteboard current.     FIM:  Social Interaction: 5 Supervision--The patient requires supervision (e.g., monitoring, verbal control, cueing, or coaxing) only under stressful or unfamiliar conditions, but less than 10% of the time.  The patient may require encouragement to initiate participation.     Assessment:  Clint Brown is a 71 y.o. male with a medical diagnosis of Pharyngeal dysphagia and presents with Dysphagia. Increased fatigue and c/o back pain as session progresses noted to limit efficacy of Dysphagia exercises this service day.  ST to continue to follow.     Discharge recommendations: Discharge Facility/Level Of Care Needs: outpatient speech therapy     Goals:    SLP Goals        Problem: SLP Goal    Goal Priority Disciplines Outcome   SLP Goal     SLP Ongoing (interventions implemented as appropriate)   Description:  SLP goal expected to be met by 8/11:  1. Pt will participate in MBSS when deemed medically appropriate in order to determine safest, least restrictive diet. MET 8/4  2. Pt will participate in ongoing assessment of clinical evaluation of swallow in order to determine safest, least restrictive diet.  MET 8/4  3. Pt will complete pharyngeal swallow exercises (shaker, mendelsohn, etc) with good effort 90% of attempts, MIN A, to increase strength/coordination of swallow fx                      Plan:   Patient to be seen Therapy Frequency: 5 x/week   Plan of Care expires: 09/02/17  Plan of Care reviewed with: patient  SLP Follow-up?: Yes         Sola  RHONDA Suarez, [facility]-SLP  Speech-Language Pathology  Pager: [phone]  8/7/2017

## 2017-08-07 NOTE — PLAN OF CARE
Problem: Patient Care Overview  Goal: Plan of Care Review  Outcome: Ongoing (interventions implemented as appropriate)  Pt remains free from falls and injury. Plan of care reviewed with pt. Pt understands plan. Pt c/o L incisonal pain, VSS. Plans to drain pleural site was discussed. Pt went for EGD this morning. Results pending. Midline placement consulted. Will start peripheral nutrition today when midline is placed. Will continue to monitor.

## 2017-08-07 NOTE — NURSING
Pt became tachycardiac around 0705. Pt asymptomatic. 5mg metoprolol given. Pt remained with -180s. Second dose metoprolol given 5 minutes after. MD Lynn notified. EKG ordered. MD at bedside. 3rd dose metoprolol given. Pt rhythm became NSR. Pt currently remains NSR in the 70s. Pt was alert during episode. Will continue to monitor.

## 2017-08-07 NOTE — SUBJECTIVE & OBJECTIVE
Interval History: Patient reamained stable overnight without issue. This morning around 0730, patient went into afib with RVR, metoprolol 5mg pushes x3 were required for rate control. Patient remained stable throughout the event. Denies CP/SOB or any new symptoms at this time.     Review of Systems   Constitutional: Positive for appetite change (decreased). Negative for activity change, chills and fever.   HENT: Positive for trouble swallowing and voice change (slurred speech).    Respiratory: Negative for chest tightness and shortness of breath.    Cardiovascular: Negative for chest pain, palpitations and leg swelling.   Gastrointestinal: Negative for abdominal distention, abdominal pain, constipation, diarrhea, nausea and vomiting.   Endocrine: Negative for cold intolerance and heat intolerance.   Skin: Negative for color change.   Neurological: Negative for weakness, light-headedness and headaches.   Hematological: Negative for adenopathy. Does not bruise/bleed easily.   Psychiatric/Behavioral: Negative for agitation and behavioral problems.     Objective:     Vital Signs (Most Recent):  Temp: 97.3 °F (36.3 °C) (08/07/17 0400)  Pulse: 98 (08/07/17 0600)  Resp: 18 (08/07/17 0400)  BP: 119/61 (08/07/17 0400)  SpO2: 97 % (08/07/17 0400) Vital Signs (24h Range):  Temp:  [97.3 °F (36.3 °C)-99.8 °F (37.7 °C)] 97.3 °F (36.3 °C)  Pulse:  [] 98  Resp:  [16-20] 18  SpO2:  [92 %-97 %] 97 %  BP: (119-147)/(59-70) 119/61     Weight: 68 kg (150 lb)  Body mass index is 21.52 kg/m².    Intake/Output Summary (Last 24 hours) at 08/07/17 0721  Last data filed at 08/07/17 0600   Gross per 24 hour   Intake              360 ml   Output              625 ml   Net             -265 ml      Physical Exam   Constitutional: He is oriented to person, place, and time. He appears well-developed and well-nourished.   HENT:   Head: Normocephalic and atraumatic.   Mouth/Throat: Oropharynx is clear and moist and mucous membranes are normal.    Eyes: Conjunctivae and EOM are normal.   Neck: No JVD present. No tracheal deviation present.   Cardiovascular: Normal rate, regular rhythm and normal heart sounds.    Pulmonary/Chest: Effort normal. He has decreased breath sounds in the left middle field.   Abdominal: Soft. Bowel sounds are normal.   Musculoskeletal: He exhibits no edema or deformity.   Neurological: He is alert and oriented to person, place, and time.   Skin: Skin is warm and dry.   Psychiatric: He has a normal mood and affect. His behavior is normal.   Vitals reviewed.      Significant Labs:   Recent Results (from the past 24 hour(s))   Basic metabolic panel    Collection Time: 08/07/17  4:31 AM   Result Value Ref Range    Sodium 144 136 - 145 mmol/L    Potassium 3.8 3.5 - 5.1 mmol/L    Chloride 116 (H) 95 - 110 mmol/L    CO2 18 (L) 23 - 29 mmol/L    Glucose 102 70 - 110 mg/dL    BUN, Bld 16 8 - 23 mg/dL    Creatinine 0.7 0.5 - 1.4 mg/dL    Calcium 8.8 8.7 - 10.5 mg/dL    Anion Gap 10 8 - 16 mmol/L    eGFR if African American >60.0 >60 mL/min/1.73 m^2    eGFR if non African American >60.0 >60 mL/min/1.73 m^2   CBC auto differential    Collection Time: 08/07/17  4:31 AM   Result Value Ref Range    WBC 11.29 3.90 - 12.70 K/uL    RBC 3.86 (L) 4.60 - 6.20 M/uL    Hemoglobin 10.2 (L) 14.0 - 18.0 g/dL    Hematocrit 34.4 (L) 40.0 - 54.0 %    MCV 89 82 - 98 fL    MCH 26.4 (L) 27.0 - 31.0 pg    MCHC 29.7 (L) 32.0 - 36.0 g/dL    RDW 15.7 (H) 11.5 - 14.5 %    Platelets 240 150 - 350 K/uL    MPV 10.1 9.2 - 12.9 fL    Gran # 8.8 (H) 1.8 - 7.7 K/uL    Lymph # 0.8 (L) 1.0 - 4.8 K/uL    Mono # 1.6 (H) 0.3 - 1.0 K/uL    Eos # 0.0 0.0 - 0.5 K/uL    Baso # 0.02 0.00 - 0.20 K/uL    Gran% 77.5 (H) 38.0 - 73.0 %    Lymph% 6.9 (L) 18.0 - 48.0 %    Mono% 14.4 4.0 - 15.0 %    Eosinophil% 0.3 0.0 - 8.0 %    Basophil% 0.2 0.0 - 1.9 %    Differential Method Automated      Significant Imaging:   F/u FL Esophagram today

## 2017-08-07 NOTE — PROGRESS NOTES
[facility] Hospital Medicine  Progress Note    Patient Name: Clint Brown  MRN: [id number]  Patient Class: IP- Inpatient   Admission Date: 8/3/2017  Length of Stay: 4 days  Attending Physician: Traci Stephens MD  Primary Care Provider: Jean Claude Griffith DO    [facility] Medicine Team: [facility] MED 4 Jose R Lynn MD    Subjective:     Principal Problem:Pharyngeal dysphagia    HPI:  Mr. Brown is a 70 y/o M with a history of metastatic mesothelioma, Type A aortic dissection (s/p repair in April 2016), persistent atrial flutter (s/p RFA ablation on 7/28), CAD, CKD, and HTN who presents with dry mouth and slurred speech. He reports that he hasn't eaten in 7 days due to to difficulty swallowing and now has consequential dry mouth and difficulty speaking. He has difficulty swallowing solid foods but is able to tolerate liquids. He does not think the dysphagia has progressively worsened; he just anticipated it to get better and it hasn't. Pt denies any fevers, chills, n/v, cough, confusion, hemiparesis or gait problems. Just reports feeling weak and fatigued. He feels thirsty and is requesting a Coke.      He has had a 5lb weight loss in the last 7 days. His decreased appetite is not new and previously he was started on periactin but continues to lose weight. The difference is that previously he didn't have appetite, and now he feels like he physically can't eat. Pt has constipation 2/2 decreased oral intake and daily opioid use. Pt unable to recall last BM.      Pt recently underwent a PADMINI + RFA ablation on 7/28 for Aflutter with RVR.  He has had difficulty swallowing since that hospitalization, although prior notes indicate he may have had anorexia and dysphagia in the past that was attributed to malignant mesothelioma. Since procedure, he denies any palpitation, chest pain, SOB.       He was diagnosed with mesothelioma on 4/17 when he presented mike left sided chest pain and heaviness, was  found to have a left-sided pleural effusion and underwent thoracentesis, cultures from which revealed this malignancy. Since then he has undergone a VATS procedure in 5/17 to drain the fluids. He has Stage IV mesothelioma secondary to mets to upper abdomen and left illiac bones. He has seen Dr. Nichols (Heme-Onc) in clinic, chemo-port placed 6/22 but was deferred chemo until his Aflutter was taken care of. He takes MS contin 15mg BID and percocet 1/day for pain on left and was started on periactin for poor appetite.    Hospital Course:  8/5: failed MBS '' Patient with severe pharyngeal dysphagia characterized by silent aspiration of residuals of thin liquids, honey-thickened liquids and puree textures. Patient with deep, silent penetration thin liquids. 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 While this often represents an asymptomatic finding, this may contribute to the patient's reported history of dysphagia '' ENT were consulted and examined the oropharynx shows '' FFL without abnormalities, no masses or lesions, vocal cords fully mobile bilaterally ''. Still in mild pain, ordered pluerx drain and IV pain meds. Will insert NG tube.      Interval History: Patient reamained stable overnight without issue. This morning around 0730, patient went into afib with RVR, metoprolol 5mg pushes x3 were required for rate control. Patient remained stable throughout the event. Denies CP/SOB or any new symptoms at this time.     Review of Systems   Constitutional: Positive for appetite change (decreased). Negative for activity change, chills and fever.   HENT: Positive for trouble swallowing and voice change (slurred speech).    Respiratory: Negative for chest tightness and shortness of breath.    Cardiovascular: Negative for chest pain, palpitations and leg swelling.   Gastrointestinal:  Negative for abdominal distention, abdominal pain, constipation, diarrhea, nausea and vomiting.   Endocrine: Negative for cold intolerance and heat intolerance.   Skin: Negative for color change.   Neurological: Negative for weakness, light-headedness and headaches.   Hematological: Negative for adenopathy. Does not bruise/bleed easily.   Psychiatric/Behavioral: Negative for agitation and behavioral problems.     Objective:     Vital Signs (Most Recent):  Temp: 97.3 °F (36.3 °C) (08/07/17 0400)  Pulse: 98 (08/07/17 0600)  Resp: 18 (08/07/17 0400)  BP: 119/61 (08/07/17 0400)  SpO2: 97 % (08/07/17 0400) Vital Signs (24h Range):  Temp:  [97.3 °F (36.3 °C)-99.8 °F (37.7 °C)] 97.3 °F (36.3 °C)  Pulse:  [] 98  Resp:  [16-20] 18  SpO2:  [92 %-97 %] 97 %  BP: (119-147)/(59-70) 119/61     Weight: 68 kg (150 lb)  Body mass index is 21.52 kg/m².    Intake/Output Summary (Last 24 hours) at 08/07/17 0721  Last data filed at 08/07/17 0600   Gross per 24 hour   Intake              360 ml   Output              625 ml   Net             -265 ml      Physical Exam   Constitutional: He is oriented to person, place, and time. He appears well-developed and well-nourished.   HENT:   Head: Normocephalic and atraumatic.   Mouth/Throat: Oropharynx is clear and moist and mucous membranes are normal.   Eyes: Conjunctivae and EOM are normal.   Neck: No JVD present. No tracheal deviation present.   Cardiovascular: Normal rate, regular rhythm and normal heart sounds.    Pulmonary/Chest: Effort normal. He has decreased breath sounds in the left middle field.   Abdominal: Soft. Bowel sounds are normal.   Musculoskeletal: He exhibits no edema or deformity.   Neurological: He is alert and oriented to person, place, and time.   Skin: Skin is warm and dry.   Psychiatric: He has a normal mood and affect. His behavior is normal.   Vitals reviewed.      Significant Labs:   Recent Results (from the past 24 hour(s))   Basic metabolic panel     Collection Time: 08/07/17  4:31 AM   Result Value Ref Range    Sodium 144 136 - 145 mmol/L    Potassium 3.8 3.5 - 5.1 mmol/L    Chloride 116 (H) 95 - 110 mmol/L    CO2 18 (L) 23 - 29 mmol/L    Glucose 102 70 - 110 mg/dL    BUN, Bld 16 8 - 23 mg/dL    Creatinine 0.7 0.5 - 1.4 mg/dL    Calcium 8.8 8.7 - 10.5 mg/dL    Anion Gap 10 8 - 16 mmol/L    eGFR if African American >60.0 >60 mL/min/1.73 m^2    eGFR if non African American >60.0 >60 mL/min/1.73 m^2   CBC auto differential    Collection Time: 08/07/17  4:31 AM   Result Value Ref Range    WBC 11.29 3.90 - 12.70 K/uL    RBC 3.86 (L) 4.60 - 6.20 M/uL    Hemoglobin 10.2 (L) 14.0 - 18.0 g/dL    Hematocrit 34.4 (L) 40.0 - 54.0 %    MCV 89 82 - 98 fL    MCH 26.4 (L) 27.0 - 31.0 pg    MCHC 29.7 (L) 32.0 - 36.0 g/dL    RDW 15.7 (H) 11.5 - 14.5 %    Platelets 240 150 - 350 K/uL    MPV 10.1 9.2 - 12.9 fL    Gran # 8.8 (H) 1.8 - 7.7 K/uL    Lymph # 0.8 (L) 1.0 - 4.8 K/uL    Mono # 1.6 (H) 0.3 - 1.0 K/uL    Eos # 0.0 0.0 - 0.5 K/uL    Baso # 0.02 0.00 - 0.20 K/uL    Gran% 77.5 (H) 38.0 - 73.0 %    Lymph% 6.9 (L) 18.0 - 48.0 %    Mono% 14.4 4.0 - 15.0 %    Eosinophil% 0.3 0.0 - 8.0 %    Basophil% 0.2 0.0 - 1.9 %    Differential Method Automated      Significant Imaging:   F/u FL Esophagram today    Assessment/Plan:      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    has mets to upper abdomen and left illiac bones.  - has seen Dr. Nichols in clinic; chemo therapy until aflutter controlled  - for pain: MS Contin 15mg BID & percocet  as needed  - periactin for decreased appetite  - pleurx in left thoracic, will drain 150 cc every other day for comfort.       * Pharyngeal dysphagia    Onset prior to PADMINI on 7/28 but has worsened since the procedure. Patient stated improvement in his symptoms this morning.   CT soft tissue neck revealed aberrant R subclavian coursing behind the esophagus, likely unrelated as his dysphagia is new; L pleural  thickening in the setting of mesothelioma but no overt compression   -Failed MBS on 2017  - ENT evaluated the patient and will not intervene at this time; recommended GI consult for EGD on 2017  - Will remain NPO at this time. Patient allowed to have ice chips for comfort which he appreciates and says are helping him  - NG tube for feeding and meds      Typical atrial flutter    S/p RFA ablation on  and on apixaban 5mg for anticoagulation.   -Continue TOPROL-XL 25mg po daily for rate control  -No further runs of palpitations or documented tachycardia/afib RVR since he converted spontaneously in the ED  -Patient was in Afib RVR today which was rate controlled S/p 3 doses of 5mg metoprolol IV pushes. Started on Lopressor 5mg IV Q4h until patient is able to take po medications      Dehydration    Decreased PO intake with 5 pund weight loss over the past week  -Currently receiving D5W at 100ml/hr, appears to be well hydrated with good UOP and renal function at [facility]  -Will consider starting patient on TPN today   -Continue to monitor for improvement      Coronary artery disease involving native coronary artery of native heart without angina pectoris    [place]  [facility] (2016) LM patent; LAD patent; Ramus non obstructive; LCX non obstructive      CKD (chronic kidney disease) stage 3, GFR 30-59 ml/min    Stable from prior labs  BUN/Cr 19 and 0.7  -Will continue to follow      Esophageal dysphagia    -Stable from prior, assessment with flouro esophagram today  -will f/u results       Constipation    -Miralax & senna scheduled daily PRN      Chronic obstructive pulmonary disease    Stable this admission   -Satting 92% on RA  -Will continue to monitor for signs of respiratory distress      Hypercholesteremia    - chronic; last lipid panel ): TC: 104, HDL: 31, LDL: 58.6, T  - will continue home rosuvastatin 20mg       Essential hypertension    Hypotensive this admission most recently  130s/60s  -Patient has remained low hypertensive to normotensive over the past 24 hours  -Will continue with current treatment, monitor for changes and intervene as appropriate         VTE Risk Mitigation         Ordered     Medium Risk of VTE  Once      08/03/17 7440        Jose R Lynn MD  Department of Hospital Medicine   [facility]

## 2017-08-08 ENCOUNTER — TELEPHONE (OUTPATIENT)
Dept: ADMINISTRATIVE | Facility: CLINIC | Age: 72
End: 2017-08-08

## 2017-08-08 PROBLEM — Q27.8: Status: ACTIVE | Noted: 2017-08-08

## 2017-08-08 PROBLEM — Q27.8 ABERRANT RIGHT SUBCLAVIAN ARTERY: Status: ACTIVE | Noted: 2017-08-08

## 2017-08-08 LAB
ANION GAP SERPL CALC-SCNC: 10 MMOL/L
BASOPHILS # BLD AUTO: 0.02 K/UL
BASOPHILS NFR BLD: 0.2 %
BUN SERPL-MCNC: 18 MG/DL
CALCIUM SERPL-MCNC: 8.4 MG/DL
CHLORIDE SERPL-SCNC: 116 MMOL/L
CO2 SERPL-SCNC: 17 MMOL/L
CREAT SERPL-MCNC: 0.7 MG/DL
DIFFERENTIAL METHOD: ABNORMAL
EOSINOPHIL # BLD AUTO: 0 K/UL
EOSINOPHIL NFR BLD: 0.3 %
ERYTHROCYTE [DISTWIDTH] IN BLOOD BY AUTOMATED COUNT: 15.9 %
EST. GFR  (AFRICAN AMERICAN): >60 ML/MIN/1.73 M^2
EST. GFR  (NON AFRICAN AMERICAN): >60 ML/MIN/1.73 M^2
GLUCOSE SERPL-MCNC: 156 MG/DL
HCT VFR BLD AUTO: 31.9 %
HGB BLD-MCNC: 9.6 G/DL
LYMPHOCYTES # BLD AUTO: 0.8 K/UL
LYMPHOCYTES NFR BLD: 7.4 %
MAGNESIUM SERPL-MCNC: 1.3 MG/DL
MCH RBC QN AUTO: 26.2 PG
MCHC RBC AUTO-ENTMCNC: 30.1 G/DL
MCV RBC AUTO: 87 FL
MONOCYTES # BLD AUTO: 1.3 K/UL
MONOCYTES NFR BLD: 12.4 %
NEUTROPHILS # BLD AUTO: 8.3 K/UL
NEUTROPHILS NFR BLD: 79 %
PHOSPHATE SERPL-MCNC: 2.9 MG/DL
PLATELET # BLD AUTO: 221 K/UL
PMV BLD AUTO: 10 FL
POTASSIUM SERPL-SCNC: 3.8 MMOL/L
RBC # BLD AUTO: 3.67 M/UL
SODIUM SERPL-SCNC: 143 MMOL/L
WBC # BLD AUTO: 10.47 K/UL

## 2017-08-08 PROCEDURE — 84100 ASSAY OF PHOSPHORUS: CPT

## 2017-08-08 PROCEDURE — 25000003 PHARM REV CODE 250: Performed by: STUDENT IN AN ORGANIZED HEALTH CARE EDUCATION/TRAINING PROGRAM

## 2017-08-08 PROCEDURE — 20600001 HC STEP DOWN PRIVATE ROOM

## 2017-08-08 PROCEDURE — 83735 ASSAY OF MAGNESIUM: CPT

## 2017-08-08 PROCEDURE — 99233 SBSQ HOSP IP/OBS HIGH 50: CPT | Mod: GC,,, | Performed by: INTERNAL MEDICINE

## 2017-08-08 PROCEDURE — 36415 COLL VENOUS BLD VENIPUNCTURE: CPT

## 2017-08-08 PROCEDURE — A4216 STERILE WATER/SALINE, 10 ML: HCPCS | Performed by: STUDENT IN AN ORGANIZED HEALTH CARE EDUCATION/TRAINING PROGRAM

## 2017-08-08 PROCEDURE — 92526 ORAL FUNCTION THERAPY: CPT

## 2017-08-08 PROCEDURE — 80048 BASIC METABOLIC PNL TOTAL CA: CPT

## 2017-08-08 PROCEDURE — S0028 INJECTION, FAMOTIDINE, 20 MG: HCPCS | Performed by: STUDENT IN AN ORGANIZED HEALTH CARE EDUCATION/TRAINING PROGRAM

## 2017-08-08 PROCEDURE — 85025 COMPLETE CBC W/AUTO DIFF WBC: CPT

## 2017-08-08 PROCEDURE — 63600175 PHARM REV CODE 636 W HCPCS: Performed by: STUDENT IN AN ORGANIZED HEALTH CARE EDUCATION/TRAINING PROGRAM

## 2017-08-08 RX ORDER — METOCLOPRAMIDE HYDROCHLORIDE 5 MG/ML
10 INJECTION INTRAMUSCULAR; INTRAVENOUS EVERY 6 HOURS
Status: DISCONTINUED | OUTPATIENT
Start: 2017-08-08 | End: 2017-08-09

## 2017-08-08 RX ORDER — KETOROLAC TROMETHAMINE 15 MG/ML
15 INJECTION, SOLUTION INTRAMUSCULAR; INTRAVENOUS EVERY 6 HOURS PRN
Status: DISPENSED | OUTPATIENT
Start: 2017-08-08 | End: 2017-08-09

## 2017-08-08 RX ORDER — FAMOTIDINE 10 MG/ML
20 INJECTION INTRAVENOUS 2 TIMES DAILY
Status: DISCONTINUED | OUTPATIENT
Start: 2017-08-08 | End: 2017-08-09

## 2017-08-08 RX ORDER — HYDROMORPHONE HYDROCHLORIDE 1 MG/ML
1 INJECTION, SOLUTION INTRAMUSCULAR; INTRAVENOUS; SUBCUTANEOUS EVERY 4 HOURS PRN
Status: DISCONTINUED | OUTPATIENT
Start: 2017-08-08 | End: 2017-08-08

## 2017-08-08 RX ADMIN — HYDROMORPHONE HYDROCHLORIDE 1 MG: 1 INJECTION, SOLUTION INTRAMUSCULAR; INTRAVENOUS; SUBCUTANEOUS at 12:08

## 2017-08-08 RX ADMIN — Medication 3 ML: at 01:08

## 2017-08-08 RX ADMIN — METOPROLOL TARTRATE 5 MG: 5 INJECTION INTRAVENOUS at 01:08

## 2017-08-08 RX ADMIN — FAMOTIDINE 20 MG: 10 INJECTION, SOLUTION INTRAVENOUS at 11:08

## 2017-08-08 RX ADMIN — KETOROLAC TROMETHAMINE 15 MG: 15 INJECTION, SOLUTION INTRAMUSCULAR; INTRAVENOUS at 05:08

## 2017-08-08 RX ADMIN — METOPROLOL TARTRATE 5 MG: 5 INJECTION INTRAVENOUS at 05:08

## 2017-08-08 RX ADMIN — HYDROMORPHONE HYDROCHLORIDE 1 MG: 1 INJECTION, SOLUTION INTRAMUSCULAR; INTRAVENOUS; SUBCUTANEOUS at 07:08

## 2017-08-08 RX ADMIN — ASCORBIC ACID, VITAMIN A PALMITATE, CHOLECALCIFEROL, THIAMINE HYDROCHLORIDE, RIBOFLAVIN-5 PHOSPHATE SODIUM, PYRIDOXINE HYDROCHLORIDE, NIACINAMIDE, DEXPANTHENOL, ALPHA-TOCOPHEROL ACETATE, VITAMIN K1, FOLIC ACID, BIOTIN, CYANOCOBALAMIN: 200; 3300; 200; 6; 3.6; 6; 40; 15; 10; 150; 600; 60; 5 INJECTION, SOLUTION INTRAVENOUS at 08:08

## 2017-08-08 RX ADMIN — HYDROMORPHONE HYDROCHLORIDE 1 MG: 1 INJECTION, SOLUTION INTRAMUSCULAR; INTRAVENOUS; SUBCUTANEOUS at 01:08

## 2017-08-08 RX ADMIN — METOCLOPRAMIDE 10 MG: 5 INJECTION, SOLUTION INTRAMUSCULAR; INTRAVENOUS at 11:08

## 2017-08-08 RX ADMIN — METOPROLOL TARTRATE 5 MG: 5 INJECTION INTRAVENOUS at 10:08

## 2017-08-08 RX ADMIN — Medication 3 ML: at 10:08

## 2017-08-08 RX ADMIN — METOPROLOL TARTRATE 5 MG: 5 INJECTION INTRAVENOUS at 09:08

## 2017-08-08 RX ADMIN — FAMOTIDINE 20 MG: 10 INJECTION, SOLUTION INTRAVENOUS at 08:08

## 2017-08-08 RX ADMIN — METOCLOPRAMIDE 10 MG: 5 INJECTION, SOLUTION INTRAMUSCULAR; INTRAVENOUS at 05:08

## 2017-08-08 NOTE — ASSESSMENT & PLAN NOTE
Onset prior to PADMINI on 7/28 but has worsened since the procedure.  -CT soft tissue neck revealed aberrant R subclavian coursing behind the esophagus, likely unrelated as his dysphagia is new; L pleural thickening in the setting of mesothelioma but no overt compression   - NG tube unable to be placed thus far after repeated attempts  -Failed MBS on 08/04/2017; FL esophagram failed on 08/07/2017, awaiting GI recs for EGD today  -PPN initiated yesterday

## 2017-08-08 NOTE — ASSESSMENT & PLAN NOTE
Onset prior to PADMINI on 7/28 but has worsened since the procedure. Patient stated improvement in his symptoms this morning.   -CT soft tissue neck revealed aberrant R subclavian coursing behind the esophagus, likely unrelated as his dysphagia is new; L pleural thickening in  the setting of mesothelioma but no overt compression   -Failed MBS on 08/04/2017; failed FL Esophagram 08/07/2017  -PPN currently running. Patient allowed to have ice chips for comfort which he appreciates and says are helping him  - NG tube attempted multiple times but was unsuccessful.  -PO meds discontinued as patient is unable to tolerate  -Vascular surgery was consulted and decided that patient is not an operative candidate, will ask GI for additional options then discuss options with patient

## 2017-08-08 NOTE — PROGRESS NOTES
GI Note    Barium esophagram reviewed: Mild narrowing of the proximal esophagus at the level of the clavicular heads, most likely correlating with external compression from this patient's known aberrant right subclavian artery as seen on prior CT soft tissue neck 8/4/2017.    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     To our knowledge, no approved or effective endoscopic therapies have been described.  Esophageal dilation would not be effective given the mechanics of extrinsic compression.    Would recommend discussing case with Vascular Surgery re: treatment options.     Would also recommend working with SLP as patient appears to have component of oropharyngeal dysphagia.       Please call with any further questions.      Manjinder Wong   Gastroenterology Fellow PGY VI  [phone]

## 2017-08-08 NOTE — PT/OT/SLP PROGRESS
"Speech Language Pathology  Treatment    Clint Brown   MRN: [id number]   Admitting Diagnosis: Dysphagia lusoria syndrome    Diet recommendations: Solid Diet Level: NPO  Liquid Diet Level: NPO     SLP Treatment Date: 08/08/17  Speech Start Time: 1345     Speech Stop Time: 1357     Speech Total (min): 12 min       TREATMENT BILLABLE MINUTES:  Treatment Swallowing Dysfunction 12    Has the patient been evaluated by SLP for swallowing? : Yes  Keep patient NPO?: Yes   General Precautions: Standard, aspiration, fall  Current Respiratory Status: nasal cannula       Subjective:  SLP communicated with nurse prior to session 1st attempt, nurse reports patient ok for TX  SLP communicated with nurse prior to session 2nd attempt, nurse reports patient with episode of increased anxiety and feeling like he was choking earlier   Patient presents fatigued, anxioues, labile  He explains, "I know you are trying, but can you come back later?" upon first attempt  He explains, "I need some quiet time with my wife right now," upon second attempt  He reports 6/10 lower back pain upon first and second attempts, nurse aware,   Pain/Comfort  Pain Rating 1: 6/10  Location - Orientation 1: lower  Location 1: back  Pain Addressed 1: Distraction, Cessation of Activity, Nurse notified  Pain Rating Post-Intervention 1: 6/10    Objective:   Patient found with: peripheral IV, awake in bed, upon second attempt. Patient's spouse at bedside. Patient anxious and saying he had a "scary" event earlier in day when he thought he was choking. Nurse enters room and explains Patient with increased anxiety and shortness of breath earlier in day. Patient seen wearing canula. Patient and spouse educated on SLP role, aspiration precautions and ongoing Dysphagia exercises. As SLP attempts to initiate Dysphagia exercises with patient, patient declines, explaining he needs quiet time with his wife. Patient labile and expressing feeling he feels he is a burden to his " family. Nurse and spouse at bedside with Patient and providing support. No further questions noted. Whiteboard current.     Assessment:  Clint Brown is a 71 y.o. male with a medical diagnosis of Dysphagia lusoria syndrome and presents oropharyngeal Dysphagia. Patient presents with increased anxiety and fatigue today. Patient with decreased acceptance of Dysphagia therapy this service day. ST to continue to follow.     Discharge recommendations: Discharge Facility/Level Of Care Needs: outpatient speech therapy     Goals:    SLP Goals        Problem: SLP Goal    Goal Priority Disciplines Outcome   SLP Goal     SLP Ongoing (interventions implemented as appropriate)   Description:  SLP goal expected to be met by 8/11:  1. Pt will participate in MBSS when deemed medically appropriate in order to determine safest, least restrictive diet. MET 8/4  2. Pt will participate in ongoing assessment of clinical evaluation of swallow in order to determine safest, least restrictive diet.  MET 8/4  3. Pt will complete pharyngeal swallow exercises (shaker, mendelsohn, etc) with good effort 90% of attempts, MIN A, to increase strength/coordination of swallow fx                      Plan:   Patient to be seen Therapy Frequency: 5 x/week   Plan of Care expires: 09/02/17  Plan of Care reviewed with: patient  SLP Follow-up?: Yes       FERNANDO Ruelas., [facility]-SLP  Speech-Language Pathology  Pager: [phone]  8/8/2017

## 2017-08-08 NOTE — ASSESSMENT & PLAN NOTE
S/p RFA ablation on 7/28 and on apixaban 5mg for anticoagulation.   -TOPROL-XL 25mg po daily for rate control unable to be admisinsitered as patient is strict NPO; Lopressor 5mg q4h scheduled for rate control at this time  -Afib RVR in the ED which converted spontaneously; additional episode yesterday AM that converted s/p 3 doses of 5mg IV metoprolol

## 2017-08-08 NOTE — SUBJECTIVE & OBJECTIVE
Prescriptions Prior to Admission   Medication Sig Dispense Refill Last Dose    apixaban 5 mg Tab Take 1 tablet (5 mg total) by mouth 2 (two) times daily. 60 tablet 2 Past Week at Unknown time    aspirin (ECOTRIN) 81 MG EC tablet Take 1 tablet (81 mg total) by mouth once daily.  0 7/27/2017 at 0800    cyproheptadine (PERIACTIN) 4 mg tablet Take 1 tablet (4 mg total) by mouth 4 (four) times daily. 120 tablet 3 Taking    DOCUSATE SODIUM (COLACE ORAL) Take 1 capsule by mouth once daily.    7/27/2017 at 2000    folic acid (FOLVITE) 1 MG tablet Take 1 tablet (1 mg total) by mouth once daily. Start today 60 tablet 6 7/27/2017 at 0800    metoprolol succinate (TOPROL-XL) 25 MG 24 hr tablet Take 1 tablet (25 mg total) by mouth once daily. 60 tablet 2 7/27/2017 at 0800    morphine (MS CONTIN) 15 MG 12 hr tablet Take 1 tablet (15 mg total) by mouth 2 (two) times daily. 60 tablet 0 7/28/2017 at 0800    multivitamin (THERAGRAN) per tablet Take 1 tablet by mouth once daily.   7/27/2017 at 0800    omeprazole (PRILOSEC) 20 MG capsule Take 1 capsule (20 mg total) by mouth once daily. (Patient taking differently: Take 20 mg by mouth every morning. ) 30 capsule 11 7/27/2017 at 2000    ondansetron (ZOFRAN) 8 MG tablet Take 1 tablet (8 mg total) by mouth 4 (four) times daily as needed for Nausea. 60 tablet 2 Past Month at Unknown time    oxycodone-acetaminophen (PERCOCET) 5-325 mg per tablet Take 1 tablet by mouth every 4 (four) hours as needed for Pain. 120 tablet 0 Past Month at Unknown time    ranitidine (ZANTAC) 150 MG tablet Take 1 tablet (150 mg total) by mouth 2 (two) times daily. 60 tablet 1 7/27/2017 at 0800    rosuvastatin (CRESTOR) 20 MG tablet Take 1 tablet (20 mg total) by mouth once daily. (Patient taking differently: Take 20 mg by mouth every evening. 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     Atrial flutter     cardioversion 4/12/16    Cancer     Chronic kidney disease     Chronic obstructive pulmonary disease     CKD (chronic kidney disease) stage 3, GFR 30-59 ml/min 6/27/2016    Closed fracture of one rib of left side 3/21/2017    Discovered 11/16    COPD (chronic obstructive pulmonary disease)     Coronary artery disease     [facility] 4/1/16: 50% proximal RAMUS    Coronary artery disease involving native coronary artery of native heart without angina pectoris 4/1/2016    [place] (4/1/2016) LM patent; LAD patent; Ramus non obstructive; LCX non obstructive      Hypertension     Kidney stones     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 6/23/2017    Old 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/20/2016    3/16    S/P ascending aortic replacement 4/18/2016    Thoracic aortic dissection     Type A dissection s/p repair 4/13/16     Past Surgical History:   Procedure Laterality Date    APPENDECTOMY      CARDIAC CATHETERIZATION      EYE SURGERY      KIDNEY STONE SURGERY      4 surgeries    TONSILLECTOMY      VASCULAR SURGERY       Family History     None        Social History Main Topics    Smoking status: Current Every Day Smoker     Packs/day: 0.25     Years: 55.00     Types: Cigarettes    Smokeless tobacco: Never Used      Comment: has Chantix at home    Alcohol use No    Drug use: No    Sexual activity: Yes     Partners: Female     Review of Systems   Constitutional: Positive for fatigue and unexpected weight change.   HENT: Positive for drooling and trouble swallowing. Negative for mouth sores.    Eyes: Negative for pain, discharge and itching.   Respiratory: Positive for shortness of breath. Negative for apnea and chest tightness.    Cardiovascular: Negative for chest pain and leg  swelling.   Gastrointestinal: Negative for abdominal distention and abdominal pain.   Endocrine: Negative for cold intolerance and heat intolerance.   Genitourinary: Negative for difficulty urinating.   Musculoskeletal: Positive for back pain. Negative for arthralgias.   Allergic/Immunologic: Negative for environmental allergies and food allergies.   Neurological: Negative for dizziness.   Hematological: Negative for adenopathy. Bruises/bleeds easily.   Psychiatric/Behavioral: Negative for agitation and behavioral problems.     Objective:     Vital Signs (Most Recent):  Temp: 97.8 °F (36.6 °C) (08/08/17 1550)  Pulse: 86 (08/08/17 1550)  Resp: 18 (08/08/17 1550)  BP: 125/70 (08/08/17 1550)  SpO2: (!) 93 % (08/08/17 1550) Vital Signs (24h Range):  Temp:  [97.5 °F (36.4 °C)-97.8 °F (36.6 °C)] 97.8 °F (36.6 °C)  Pulse:  [] 86  Resp:  [16-20] 18  SpO2:  [93 %-97 %] 93 %  BP: ()/(53-70) 125/70     Weight: 68 kg (150 lb)  Body mass index is 21.52 kg/m².    Physical Exam   Constitutional: He appears well-developed. He appears listless. He appears cachectic.   HENT:   Head: Normocephalic and atraumatic.   Eyes: Pupils are equal, round, and reactive to light.   Neck: Normal range of motion.   Cardiovascular: An irregularly irregular rhythm present.   Pulses:       Femoral pulses are 2+ on the right side, and 2+ on the left side.  Nonpalpable distal pulses   Pulmonary/Chest: Accessory muscle usage present. He has wheezes.   Abdominal: Soft. He exhibits no distension. There is no tenderness.   Musculoskeletal: Normal range of motion. He exhibits no edema.   Lymphadenopathy:     He has no cervical adenopathy.   Neurological: He appears listless.   Skin: Skin is warm and dry.   Psychiatric: He has a normal mood and affect.       Significant Labs:  CBC:   Recent Labs  Lab 08/08/17  0506   WBC 10.47   RBC 3.67*   HGB 9.6*   HCT 31.9*      MCV 87   MCH 26.2*   MCHC 30.1*     CMP:   Recent Labs  Lab 08/08/17  5977    *   CALCIUM 8.4*      K 3.8   CO2 17*   *   BUN 18   CREATININE 0.7     Coagulation: No results for input(s): LABPROT, INR, APTT in the last 48 hours.    Significant Diagnostics:

## 2017-08-08 NOTE — PROGRESS NOTES
[facility] Hospital Medicine  Progress Note    Patient Name: Clint Brown  MRN: [id number]  Patient Class: IP- Inpatient   Admission Date: 8/3/2017  Length of Stay: 5 days  Attending Physician: Audie Max MD  Primary Care Provider: Jean Claude Griffith DO    [facility] [place] Medicine Team: [facility] MED 4 Jose R Lynn MD    Subjective:     Principal Problem:Dysphagia lusoria syndrome    HPI:  Mr. Brown is a 70 y/o M with a history of metastatic mesothelioma, Type A aortic dissection (s/p repair in April 2016), persistent atrial flutter (s/p RFA ablation on 7/28), CAD, CKD, and HTN who presents with dry mouth and slurred speech. He reports that he hasn't eaten in 7 days due to to difficulty swallowing and now has consequential dry mouth and difficulty speaking. He has difficulty swallowing solid foods but is able to tolerate liquids. He does not think the dysphagia has progressively worsened; he just anticipated it to get better and it hasn't. Pt denies any fevers, chills, n/v, cough, confusion, hemiparesis or gait problems. Just reports feeling weak and fatigued. He feels thirsty and is requesting a Coke.      He has had a 5lb weight loss in the last 7 days. His decreased appetite is not new and previously he was started on periactin but continues to lose weight. The difference is that previously he didn't have appetite, and now he feels like he physically can't eat. Pt has constipation 2/2 decreased oral intake and daily opioid use. Pt unable to recall last BM.      Pt recently underwent a PADMINI + RFA ablation on 7/28 for Aflutter with RVR.  He has had difficulty swallowing since that hospitalization, although prior notes indicate he may have had anorexia and dysphagia in the past that was attributed to malignant mesothelioma. Since procedure, he denies any palpitation, chest pain, SOB.       He was diagnosed with mesothelioma on 4/17 when he presented mike left sided chest pain and heaviness, was  found to have a left-sided pleural effusion and underwent thoracentesis, cultures from which revealed this malignancy. Since then he has undergone a VATS procedure in 5/17 to drain the fluids. He has Stage IV mesothelioma secondary to mets to upper abdomen and left illiac bones. He has seen Dr. Nichols (Heme-Onc) in clinic, chemo-port placed 6/22 but was deferred chemo until his Aflutter was taken care of. He takes MS contin 15mg BID and percocet 1/day for pain on left and was started on periactin for poor appetite.    Hospital Course:  Clint Brown was admitted on 08/03/2017 with dysphagia.  MBS was ordered which patient failed and was, therefore, left NPO. 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 While this often represents an asymptomatic finding, this may contribute to the patient's reported history of dysphagia. ENT were consulted and examined the oropharynx which revealed FFL without abnormalities, no masses or lesions, vocal cords fully mobile bilaterally. Still in mild pain, ordered pluerx drain and IV pain meds. GI consulted and recommended fluoro esophagram with EGD after if indicated based on results. TPN started on 08/07/2017.      Interval History: NAEON. Patient was rate controlled on lopressor 5mg q4h overnight. PPN initiated and patient feels unchanged from prior assessment. No additional complaints at this time. EGD cancelled. Ddiscussed potential need for PEG tube placement in order for patient to get appropriate medications as well as enteral nutrition moving forward. Patient is agreeable in the case that this option becomes necessary. Patient not an operative candidate per vascular surgery.     Review of Systems   Constitutional: Positive for appetite change (decreased). Negative for activity change, chills and fever.   HENT: Positive for trouble swallowing and voice change (slurred speech).     Respiratory: Negative for chest tightness and shortness of breath.    Cardiovascular: Negative for chest pain, palpitations and leg swelling.   Gastrointestinal: Negative for abdominal distention, abdominal pain, constipation, diarrhea, nausea and vomiting.   Endocrine: Negative for cold intolerance and heat intolerance.   Skin: Negative for color change.   Neurological: Negative for weakness, light-headedness and headaches.   Hematological: Negative for adenopathy. Does not bruise/bleed easily.   Psychiatric/Behavioral: Negative for agitation and behavioral problems.     Objective:     Vital Signs (Most Recent):  Temp: 97.7 °F (36.5 °C) (08/08/17 0731)  Pulse: 77 (08/08/17 0731)  Resp: 20 (08/08/17 0731)  BP: (!) 126/56 (08/08/17 0731)  SpO2: 97 % (08/08/17 0731) Vital Signs (24h Range):  Temp:  [97.2 °F (36.2 °C)-97.8 °F (36.6 °C)] 97.7 °F (36.5 °C)  Pulse:  [] 77  Resp:  [16-20] 20  SpO2:  [94 %-97 %] 97 %  BP: ()/(53-71) 126/56     Weight: 68 kg (150 lb)  Body mass index is 21.52 kg/m².    Intake/Output Summary (Last 24 hours) at 08/08/17 0817  Last data filed at 08/08/17 0500   Gross per 24 hour   Intake                0 ml   Output             1075 ml   Net            -1075 ml      Physical Exam   Constitutional: He is oriented to person, place, and time. He appears well-developed and well-nourished.   HENT:   Head: Normocephalic and atraumatic.   Mouth/Throat: Oropharynx is clear and moist and mucous membranes are normal.   Eyes: Conjunctivae and EOM are normal.   Neck: No JVD present. No tracheal deviation present.   Cardiovascular: Normal rate, regular rhythm and normal heart sounds.    Pulmonary/Chest: Effort normal. He has decreased breath sounds in the left middle field.   Abdominal: Soft. Bowel sounds are normal.   Musculoskeletal: He exhibits no edema or deformity.   Neurological: He is alert and oriented to person, place, and time.   Skin: Skin is warm and dry.   Psychiatric: He has a  normal mood and affect. His behavior is normal.   Vitals reviewed.      Significant Labs:   Recent Results (from the past 24 hour(s))   Basic metabolic panel    Collection Time: 08/08/17  5:06 AM   Result Value Ref Range    Sodium 143 136 - 145 mmol/L    Potassium 3.8 3.5 - 5.1 mmol/L    Chloride 116 (H) 95 - 110 mmol/L    CO2 17 (L) 23 - 29 mmol/L    Glucose 156 (H) 70 - 110 mg/dL    BUN, Bld 18 8 - 23 mg/dL    Creatinine 0.7 0.5 - 1.4 mg/dL    Calcium 8.4 (L) 8.7 - 10.5 mg/dL    Anion Gap 10 8 - 16 mmol/L    eGFR if African American >60.0 >60 mL/min/1.73 m^2    eGFR if non African American >60.0 >60 mL/min/1.73 m^2   CBC auto differential    Collection Time: 08/08/17  5:06 AM   Result Value Ref Range    WBC 10.47 3.90 - 12.70 K/uL    RBC 3.67 (L) 4.60 - 6.20 M/uL    Hemoglobin 9.6 (L) 14.0 - 18.0 g/dL    Hematocrit 31.9 (L) 40.0 - 54.0 %    MCV 87 82 - 98 fL    MCH 26.2 (L) 27.0 - 31.0 pg    MCHC 30.1 (L) 32.0 - 36.0 g/dL    RDW 15.9 (H) 11.5 - 14.5 %    Platelets 221 150 - 350 K/uL    MPV 10.0 9.2 - 12.9 fL    Gran # 8.3 (H) 1.8 - 7.7 K/uL    Lymph # 0.8 (L) 1.0 - 4.8 K/uL    Mono # 1.3 (H) 0.3 - 1.0 K/uL    Eos # 0.0 0.0 - 0.5 K/uL    Baso # 0.02 0.00 - 0.20 K/uL    Gran% 79.0 (H) 38.0 - 73.0 %    Lymph% 7.4 (L) 18.0 - 48.0 %    Mono% 12.4 4.0 - 15.0 %    Eosinophil% 0.3 0.0 - 8.0 %    Basophil% 0.2 0.0 - 1.9 %    Differential Method Automated    Magnesium    Collection Time: 08/08/17  5:06 AM   Result Value Ref Range    Magnesium 1.3 (L) 1.6 - 2.6 mg/dL   Phosphorus    Collection Time: 08/08/17  5:06 AM   Result Value Ref Range    Phosphorus 2.9 2.7 - 4.5 mg/dL     Significant Imaging:  Fl Esophagram 08/07/2017:  Mild narrowing of the proximal esophagus at the level of the clavicular heads, most likely correlating with external compression from this patient's known aberrant right subclavian artery as seen on prior CT soft tissue neck 8/4/2017.    Significant laryngeal penetration of liquid barium with possible  subglottic tracheal aspiration, limiting complete examination.     Barium tablet lodged within the vallecula and maneuvers to dislodge the tablet were unsuccessful.     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  - has seen Dr. Nichols in clinic; chemo therapy until aflutter controlled  - for pain: MS Contin 15mg BID & percocet  as needed  - periactin for decreased appetite  - pleurx in left thoracic, will drain 150 cc every other day for comfort.       * Dysphagia lusoria syndrome    Onset prior to PADMINI on 7/28 but has worsened since the procedure. Patient stated improvement in his symptoms this morning.   -CT soft tissue neck revealed aberrant R subclavian coursing behind the esophagus, likely unrelated as his dysphagia is new; L pleural thickening in  the setting of mesothelioma but no overt compression   -Failed MBS on 08/04/2017; failed FL Esophagram 08/07/2017  -PPN currently running. Patient allowed to have ice chips for comfort which he appreciates and says are helping him  - NG tube attempted multiple times but was unsuccessful.  -PO meds discontinued as patient is unable to tolerate  -Vascular surgery was consulted and decided that patient is not an operative candidate, will ask GI for additional options then discuss options with patient       Typical atrial flutter    S/p RFA ablation on 7/28 and on apixaban 5mg for anticoagulation.   -TOPROL-XL 25mg po daily for rate control unable to be admisinsitered as patient is strict NPO; Lopressor 5mg q4h scheduled for rate control at this time  -Afib RVR in the ED which converted spontaneously; additional episode yesterday AM that converted s/p 3 doses of 5mg IV metoprolol      Essential hypertension    Hypotensive this admission most recently 119/61  -Patient has remained low hypertensive to normotensive over the past 24 hours  -Will continue with  current treatment, monitor for changes and intervene as appropriate       CKD (chronic kidney disease) stage 3, GFR 30-59 ml/min    Stable from prior labs  BUN/Cr 19 and 0.7  -Will continue to follow      Chronic obstructive pulmonary disease    Stable this admission   -Satting 95% on RA  -Will continue to monitor for signs of respiratory distress      Dehydration    Resolved  -PPN running currently      Coronary artery disease involving native coronary artery of native heart without angina pectoris    -[place]  [facility] (2016) LM patent; LAD patent; Ramus non obstructive; LCX non obstructive      Constipation    -Miralax & senna scheduled daily PRN      Hypercholesteremia    - chronic; last lipid panel ): TC: 104, HDL: 31, LDL: 58.6, T  - will continue home rosuvastatin 20mg       S/P ascending aortic replacement    Stable at this time        VTE Risk Mitigation         Ordered     Medium Risk of VTE  Once      17 [id number]        Jose R Lynn MD  Department of Hospital Medicine   [facility]

## 2017-08-08 NOTE — PLAN OF CARE
Problem: Patient Care Overview  Goal: Plan of Care Review  Outcome: Ongoing (interventions implemented as appropriate)  Patient remains free of falls or injury. Patient denies pain. Continued on D5 and NS infusion at 75 ml/hr. Started on PPN clinimix-E--now going at goal rate of 100 ml/hr. Speech c/s for swallowing issues; patient remains NPO. EGD completed this admit. Patient remains in NSR this shift; continued on metoprolol IV push q4 hours.  Plan of care reviewed with patient.

## 2017-08-08 NOTE — ASSESSMENT & PLAN NOTE
Hypotensive this admission most recently 119/61  -Patient has remained low hypertensive to normotensive over the past 24 hours  -Will continue with current treatment, monitor for changes and intervene as appropriate

## 2017-08-08 NOTE — PLAN OF CARE
Problem: SLP Goal  Goal: SLP Goal  SLP goal expected to be met by 8/11:  1. Pt will participate in MBSS when deemed medically appropriate in order to determine safest, least restrictive diet. MET 8/4  2. Pt will participate in ongoing assessment of clinical evaluation of swallow in order to determine safest, least restrictive diet.  MET 8/4  3. Pt will complete pharyngeal swallow exercises (shaker, mendelsohn, etc) with good effort 90% of attempts, MIN A, to increase strength/coordination of swallow fx    Outcome: Ongoing (interventions implemented as appropriate)  Pt with minimal participation in tx 2/2 pain and lethargy this service day. ST to continue to follow. Thank you.    FERNANDO Ruelas., [facility]-SLP  Speech-Language Pathology  Pager: [phone]  8/8/2017

## 2017-08-08 NOTE — PROGRESS NOTES
Pt complains of being extremely SOB. Notified Dr. Lynn that pt was in distress and that pt was in a lot of pain. MD verbalized to drain additional fluid from pt's pleural tube and that he would increase the pain medication frequency. Pt' VSS and will continue to monitor.

## 2017-08-08 NOTE — ASSESSMENT & PLAN NOTE
72 yo M with h/o stage IV mestastatic mesothelioma (mets to upper abdomen, malignant pleural effusion s/p L VATS 5/31/17 ),Type A aortic dissection (s/p repair in April 2016), persistent atrial flutter (s/p RFA ablation on 7/28), CAD, CKD, and HTN who presented to hospital on 8/4/17 with dysphagia since RFA ablation on 7/28/17.      Patient with aberrant R SCL artery; does not appear aneurysmal on CT; evidence of extrinsic compression on barium esophogram however this level of compression does not explain vallecula stasis; congi  Even if the aberrant R SCL artery was the cause of dysphagia patient would not tolerate reconstruction; if unable to tolerate po recommend PEG tube placement  May consider palliative care consult for goals of care/pain control

## 2017-08-08 NOTE — PLAN OF CARE
Problem: Patient Care Overview  Goal: Plan of Care Review  Outcome: Ongoing (interventions implemented as appropriate)  Pt free of any falls, trauma or injury within this shift. Will continue to support positional changes at least q2 hours to prevent any skin breakdown. Pt will remain on ppn per and metoprolol q4 hours per MD order. Pain being controlled by PRN pain medication. Pt did experience an extreme episode of SOB where pt stated that he felt as through he couldn't breat; 550ml was drained from pleural tube per MD order. Pt denies any CP, headaches, nausea or dizziness. Plan of care reviewed with pt and pt's wife and both verbalize understanding. Pt's VSS and will continue to monitor.

## 2017-08-08 NOTE — PROGRESS NOTES
Pt is complaining of 6/10 pain in all four abdominal quadrants that worsens with palpation. MD notified at [id number] and he verbalized that he would put some kind of pain medication in for pt. MD notified that pt previously had dilaudid ordered but was d/c bc pt experienced AMS. Pt's VSS and will continue to monitor.

## 2017-08-08 NOTE — HPI
70 yo M with h/o stage IV mestastatic mesothelioma (mets to upper abdomen, malignant pleural effusion s/p L VATS 5/31/17 ),Type A aortic dissection (s/p repair in April 2016), persistent atrial flutter (s/p RFA ablation on 7/28), CAD, CKD, and HTN who presented to hospital on 8/4/17 with dysphagia wince RFA ablation on 7/28/17.  Patient currently unable to identify exact symptoms, but per medical records reported decreased po intake secondary to difficulty swallowing solid foods.  Since hospitalization has undergone modified Barium swallow on 8/4 with stasis at vallecula, piriforms- consistent with laryngeal dysphagia, CT neck performed showing abberrant R SCL artery.  ENT was consulted; performed a flexible DL with no abnormalities seen on exam.  GI was consulted with recommendations for barium esophagram which demonstrated: mild narrowing of the proximal esophagus at the level of the clavicular heads, most likely correlating with external compression from this patient's known aberrant right subclavian artery as seen on prior CT soft tissue neck 8/4/2017.Significant laryngeal penetration of liquid barium with possible subglottic tracheal aspiration, limiting complete examination. Barium tablet lodged within the vallecula and maneuvers to dislodge the tablet were unsuccessful. GI with recommendations for vascular surgery consult for aberrant R SCL artery.     Patient currently in bed, short of breath with talking; is a poor historian for personal history.

## 2017-08-08 NOTE — SUBJECTIVE & OBJECTIVE
Interval History: NAEON. Patient was rate controlled on lopressor 5mg q4h overnight. PPN initiated and patient feels unchanged from prior assessment. No additional complaints at this time. Patient may be sent for EGD this AM, discussed potential need for PEG tube placement in order for patient to get appropriate medications as well as enteral nutrition moving forward. Patient is agreeable in the case that this option becomes necessary.     Review of Systems   Constitutional: Positive for appetite change (decreased). Negative for activity change, chills and fever.   HENT: Positive for trouble swallowing and voice change (slurred speech).    Respiratory: Negative for chest tightness and shortness of breath.    Cardiovascular: Negative for chest pain, palpitations and leg swelling.   Gastrointestinal: Negative for abdominal distention, abdominal pain, constipation, diarrhea, nausea and vomiting.   Endocrine: Negative for cold intolerance and heat intolerance.   Skin: Negative for color change.   Neurological: Negative for weakness, light-headedness and headaches.   Hematological: Negative for adenopathy. Does not bruise/bleed easily.   Psychiatric/Behavioral: Negative for agitation and behavioral problems.     Objective:     Vital Signs (Most Recent):  Temp: 97.7 °F (36.5 °C) (08/08/17 0731)  Pulse: 77 (08/08/17 0731)  Resp: 20 (08/08/17 0731)  BP: (!) 126/56 (08/08/17 0731)  SpO2: 97 % (08/08/17 0731) Vital Signs (24h Range):  Temp:  [97.2 °F (36.2 °C)-97.8 °F (36.6 °C)] 97.7 °F (36.5 °C)  Pulse:  [] 77  Resp:  [16-20] 20  SpO2:  [94 %-97 %] 97 %  BP: ()/(53-71) 126/56     Weight: 68 kg (150 lb)  Body mass index is 21.52 kg/m².    Intake/Output Summary (Last 24 hours) at 08/08/17 0817  Last data filed at 08/08/17 0500   Gross per 24 hour   Intake                0 ml   Output             1075 ml   Net            -1075 ml      Physical Exam   Constitutional: He is oriented to person, place, and time. He  appears well-developed and well-nourished.   HENT:   Head: Normocephalic and atraumatic.   Mouth/Throat: Oropharynx is clear and moist and mucous membranes are normal.   Eyes: Conjunctivae and EOM are normal.   Neck: No JVD present. No tracheal deviation present.   Cardiovascular: Normal rate, regular rhythm and normal heart sounds.    Pulmonary/Chest: Effort normal. He has decreased breath sounds in the left middle field.   Abdominal: Soft. Bowel sounds are normal.   Musculoskeletal: He exhibits no edema or deformity.   Neurological: He is alert and oriented to person, place, and time.   Skin: Skin is warm and dry.   Psychiatric: He has a normal mood and affect. His behavior is normal.   Vitals reviewed.      Significant Labs:   Recent Results (from the past 24 hour(s))   Basic metabolic panel    Collection Time: 08/08/17  5:06 AM   Result Value Ref Range    Sodium 143 136 - 145 mmol/L    Potassium 3.8 3.5 - 5.1 mmol/L    Chloride 116 (H) 95 - 110 mmol/L    CO2 17 (L) 23 - 29 mmol/L    Glucose 156 (H) 70 - 110 mg/dL    BUN, Bld 18 8 - 23 mg/dL    Creatinine 0.7 0.5 - 1.4 mg/dL    Calcium 8.4 (L) 8.7 - 10.5 mg/dL    Anion Gap 10 8 - 16 mmol/L    eGFR if African American >60.0 >60 mL/min/1.73 m^2    eGFR if non African American >60.0 >60 mL/min/1.73 m^2   CBC auto differential    Collection Time: 08/08/17  5:06 AM   Result Value Ref Range    WBC 10.47 3.90 - 12.70 K/uL    RBC 3.67 (L) 4.60 - 6.20 M/uL    Hemoglobin 9.6 (L) 14.0 - 18.0 g/dL    Hematocrit 31.9 (L) 40.0 - 54.0 %    MCV 87 82 - 98 fL    MCH 26.2 (L) 27.0 - 31.0 pg    MCHC 30.1 (L) 32.0 - 36.0 g/dL    RDW 15.9 (H) 11.5 - 14.5 %    Platelets 221 150 - 350 K/uL    MPV 10.0 9.2 - 12.9 fL    Gran # 8.3 (H) 1.8 - 7.7 K/uL    Lymph # 0.8 (L) 1.0 - 4.8 K/uL    Mono # 1.3 (H) 0.3 - 1.0 K/uL    Eos # 0.0 0.0 - 0.5 K/uL    Baso # 0.02 0.00 - 0.20 K/uL    Gran% 79.0 (H) 38.0 - 73.0 %    Lymph% 7.4 (L) 18.0 - 48.0 %    Mono% 12.4 4.0 - 15.0 %    Eosinophil% 0.3 0.0 -  8.0 %    Basophil% 0.2 0.0 - 1.9 %    Differential Method Automated    Magnesium    Collection Time: 08/08/17  5:06 AM   Result Value Ref Range    Magnesium 1.3 (L) 1.6 - 2.6 mg/dL   Phosphorus    Collection Time: 08/08/17  5:06 AM   Result Value Ref Range    Phosphorus 2.9 2.7 - 4.5 mg/dL     Significant Imaging:  Fl Esophagram 08/07/2017:  Mild narrowing of the proximal esophagus at the level of the clavicular heads, most likely correlating with external compression from this patient's known aberrant right subclavian artery as seen on prior CT soft tissue neck 8/4/2017.    Significant laryngeal penetration of liquid barium with possible subglottic tracheal aspiration, limiting complete examination.     Barium tablet lodged within the vallecula and maneuvers to dislodge the tablet were unsuccessful.

## 2017-08-09 PROBLEM — K59.01 SLOW TRANSIT CONSTIPATION: Status: ACTIVE | Noted: 2017-08-04

## 2017-08-09 LAB
ANION GAP SERPL CALC-SCNC: 13 MMOL/L
BACTERIA BLD CULT: NORMAL
BACTERIA BLD CULT: NORMAL
BASOPHILS # BLD AUTO: 0.02 K/UL
BASOPHILS NFR BLD: 0.2 %
BUN SERPL-MCNC: 20 MG/DL
CALCIUM SERPL-MCNC: 8.9 MG/DL
CHLORIDE SERPL-SCNC: 110 MMOL/L
CO2 SERPL-SCNC: 18 MMOL/L
CREAT SERPL-MCNC: 0.7 MG/DL
DIFFERENTIAL METHOD: ABNORMAL
EOSINOPHIL # BLD AUTO: 0 K/UL
EOSINOPHIL NFR BLD: 0.2 %
ERYTHROCYTE [DISTWIDTH] IN BLOOD BY AUTOMATED COUNT: 15.5 %
EST. GFR  (AFRICAN AMERICAN): >60 ML/MIN/1.73 M^2
EST. GFR  (NON AFRICAN AMERICAN): >60 ML/MIN/1.73 M^2
GLUCOSE SERPL-MCNC: 97 MG/DL
HCT VFR BLD AUTO: 31.8 %
HGB BLD-MCNC: 9.7 G/DL
LYMPHOCYTES # BLD AUTO: 0.9 K/UL
LYMPHOCYTES NFR BLD: 6.8 %
MCH RBC QN AUTO: 26.9 PG
MCHC RBC AUTO-ENTMCNC: 30.5 G/DL
MCV RBC AUTO: 88 FL
MONOCYTES # BLD AUTO: 1.4 K/UL
MONOCYTES NFR BLD: 11.1 %
NEUTROPHILS # BLD AUTO: 10.1 K/UL
NEUTROPHILS NFR BLD: 80.9 %
PLATELET # BLD AUTO: 253 K/UL
PMV BLD AUTO: 10.4 FL
POTASSIUM SERPL-SCNC: 3.7 MMOL/L
RBC # BLD AUTO: 3.61 M/UL
SODIUM SERPL-SCNC: 141 MMOL/L
WBC # BLD AUTO: 12.48 K/UL

## 2017-08-09 PROCEDURE — 63600175 PHARM REV CODE 636 W HCPCS: Performed by: INTERNAL MEDICINE

## 2017-08-09 PROCEDURE — 99233 SBSQ HOSP IP/OBS HIGH 50: CPT | Mod: GC,,, | Performed by: INTERNAL MEDICINE

## 2017-08-09 PROCEDURE — 85025 COMPLETE CBC W/AUTO DIFF WBC: CPT

## 2017-08-09 PROCEDURE — 99222 1ST HOSP IP/OBS MODERATE 55: CPT | Mod: GC,,, | Performed by: SURGERY

## 2017-08-09 PROCEDURE — 80048 BASIC METABOLIC PNL TOTAL CA: CPT

## 2017-08-09 PROCEDURE — C9113 INJ PANTOPRAZOLE SODIUM, VIA: HCPCS | Performed by: INTERNAL MEDICINE

## 2017-08-09 PROCEDURE — 20600001 HC STEP DOWN PRIVATE ROOM

## 2017-08-09 PROCEDURE — 97803 MED NUTRITION INDIV SUBSEQ: CPT | Performed by: DIETITIAN, REGISTERED

## 2017-08-09 PROCEDURE — A4216 STERILE WATER/SALINE, 10 ML: HCPCS | Performed by: STUDENT IN AN ORGANIZED HEALTH CARE EDUCATION/TRAINING PROGRAM

## 2017-08-09 PROCEDURE — S0028 INJECTION, FAMOTIDINE, 20 MG: HCPCS | Performed by: STUDENT IN AN ORGANIZED HEALTH CARE EDUCATION/TRAINING PROGRAM

## 2017-08-09 PROCEDURE — 25000003 PHARM REV CODE 250: Performed by: STUDENT IN AN ORGANIZED HEALTH CARE EDUCATION/TRAINING PROGRAM

## 2017-08-09 PROCEDURE — 92526 ORAL FUNCTION THERAPY: CPT

## 2017-08-09 PROCEDURE — 63600175 PHARM REV CODE 636 W HCPCS: Performed by: STUDENT IN AN ORGANIZED HEALTH CARE EDUCATION/TRAINING PROGRAM

## 2017-08-09 RX ORDER — MAGNESIUM SULFATE HEPTAHYDRATE 40 MG/ML
2 INJECTION, SOLUTION INTRAVENOUS EVERY 6 HOURS
Status: COMPLETED | OUTPATIENT
Start: 2017-08-09 | End: 2017-08-10

## 2017-08-09 RX ORDER — PANTOPRAZOLE SODIUM 40 MG/10ML
40 INJECTION, POWDER, LYOPHILIZED, FOR SOLUTION INTRAVENOUS DAILY
Status: DISCONTINUED | OUTPATIENT
Start: 2017-08-09 | End: 2017-08-14

## 2017-08-09 RX ORDER — MAGNESIUM SULFATE HEPTAHYDRATE 40 MG/ML
2 INJECTION, SOLUTION INTRAVENOUS EVERY 6 HOURS
Status: DISCONTINUED | OUTPATIENT
Start: 2017-08-09 | End: 2017-08-09

## 2017-08-09 RX ADMIN — KETOROLAC TROMETHAMINE 15 MG: 15 INJECTION, SOLUTION INTRAMUSCULAR; INTRAVENOUS at 01:08

## 2017-08-09 RX ADMIN — KETOROLAC TROMETHAMINE 15 MG: 15 INJECTION, SOLUTION INTRAMUSCULAR; INTRAVENOUS at 06:08

## 2017-08-09 RX ADMIN — METOCLOPRAMIDE 10 MG: 5 INJECTION, SOLUTION INTRAMUSCULAR; INTRAVENOUS at 05:08

## 2017-08-09 RX ADMIN — METOPROLOL TARTRATE 5 MG: 5 INJECTION INTRAVENOUS at 03:08

## 2017-08-09 RX ADMIN — METOPROLOL TARTRATE 5 MG: 5 INJECTION INTRAVENOUS at 04:08

## 2017-08-09 RX ADMIN — ASCORBIC ACID, VITAMIN A PALMITATE, CHOLECALCIFEROL, THIAMINE HYDROCHLORIDE, RIBOFLAVIN-5 PHOSPHATE SODIUM, PYRIDOXINE HYDROCHLORIDE, NIACINAMIDE, DEXPANTHENOL, ALPHA-TOCOPHEROL ACETATE, VITAMIN K1, FOLIC ACID, BIOTIN, CYANOCOBALAMIN: 200; 3300; 200; 6; 3.6; 6; 40; 15; 10; 150; 600; 60; 5 INJECTION, SOLUTION INTRAVENOUS at 01:08

## 2017-08-09 RX ADMIN — KETOROLAC TROMETHAMINE 15 MG: 15 INJECTION, SOLUTION INTRAMUSCULAR; INTRAVENOUS at 12:08

## 2017-08-09 RX ADMIN — METOPROLOL TARTRATE 5 MG: 5 INJECTION INTRAVENOUS at 05:08

## 2017-08-09 RX ADMIN — METOPROLOL TARTRATE 5 MG: 5 INJECTION INTRAVENOUS at 06:08

## 2017-08-09 RX ADMIN — METOPROLOL TARTRATE 5 MG: 5 INJECTION INTRAVENOUS at 10:08

## 2017-08-09 RX ADMIN — METOCLOPRAMIDE 10 MG: 5 INJECTION, SOLUTION INTRAMUSCULAR; INTRAVENOUS at 12:08

## 2017-08-09 RX ADMIN — METOPROLOL TARTRATE 5 MG: 5 INJECTION INTRAVENOUS at 01:08

## 2017-08-09 RX ADMIN — MAGNESIUM SULFATE IN WATER 2 G: 40 INJECTION, SOLUTION INTRAVENOUS at 06:08

## 2017-08-09 RX ADMIN — PANTOPRAZOLE SODIUM 40 MG: 40 INJECTION, POWDER, FOR SOLUTION INTRAVENOUS at 04:08

## 2017-08-09 RX ADMIN — METOCLOPRAMIDE 10 MG: 5 INJECTION, SOLUTION INTRAMUSCULAR; INTRAVENOUS at 01:08

## 2017-08-09 RX ADMIN — MAGNESIUM SULFATE IN WATER 2 G: 40 INJECTION, SOLUTION INTRAVENOUS at 03:08

## 2017-08-09 RX ADMIN — Medication 3 ML: at 10:08

## 2017-08-09 RX ADMIN — FAMOTIDINE 20 MG: 10 INJECTION, SOLUTION INTRAVENOUS at 10:08

## 2017-08-09 NOTE — SUBJECTIVE & OBJECTIVE
Medications:  Continuous Infusions:   Amino acid 2.75% - dextrose 10% (CLINIMIX-E) solution with additives ( 1L provides 27.5 gm AA, 100 gm CHO (340 kcal/L dextrose), Na 35, K 30, Mg 5, Ca 4.5, Acetate 51, Cl 39, Phos 15) 100 mL/hr at 08/09/17 1311    Amino acid 2.75% - dextrose 10% (CLINIMIX-E) solution with additives ( 1L provides 27.5 gm AA, 100 gm CHO (340 kcal/L dextrose), Na 35, K 30, Mg 5, Ca 4.5, Acetate 51, Cl 39, Phos 15)       Scheduled Meds:   magnesium sulfate IVPB  2 g Intravenous Q6H    metoprolol  5 mg Intravenous Once    metoprolol  5 mg Intravenous Q4H    pantoprazole  40 mg Intravenous Daily    sodium chloride 0.9%  3 mL Intravenous Q8H     PRN Meds:acetaminophen, metoprolol, ondansetron     Objective:     Vital Signs (Most Recent):  Temp: 99.1 °F (37.3 °C) (08/09/17 1500)  Pulse: 93 (08/09/17 1600)  Resp: 17 (08/09/17 1500)  BP: 135/62 (08/09/17 1500)  SpO2: 96 % (08/09/17 1500) Vital Signs (24h Range):  Temp:  [97.5 °F (36.4 °C)-99.3 °F (37.4 °C)] 99.1 °F (37.3 °C)  Pulse:  [] 93  Resp:  [16-20] 17  SpO2:  [93 %-99 %] 96 %  BP: (105-148)/(55-80) 135/62       Date 08/09/17 0700 - 08/10/17 0659   Shift 7275-5514 6524-7172 6167-4357 24 Hour Total   I  N  T  A  K  E   P.O. 540   540    Shift Total  (mL/kg) 540  (7.9)   540  (7.9)   O  U  T  P  U  T   Urine  (mL/kg/hr) 225  (0.4)   225    Shift Total  (mL/kg) 225  (3.3)   225  (3.3)   Weight (kg) 68 68 68 68       Physical Exam   Constitutional: He appears listless.   Cardiovascular: An irregularly irregular rhythm present.   Pulmonary/Chest: Effort normal.   Abdominal: Soft. He exhibits no distension.   Neurological: He appears listless. He is disoriented.   Skin: Skin is warm and dry.       Significant Labs:  CBC:   Recent Labs  Lab 08/09/17  0354   WBC 12.48   RBC 3.61*   HGB 9.7*   HCT 31.8*      MCV 88   MCH 26.9*   MCHC 30.5*     CMP:   Recent Labs  Lab 08/09/17  0354   GLU 97   CALCIUM 8.9      K 3.7   CO2 18*   CL  110   BUN 20   CREATININE 0.7     Coagulation: No results for input(s): LABPROT, INR, APTT in the last 48 hours.

## 2017-08-09 NOTE — PLAN OF CARE
Problem: Patient Care Overview  Goal: Plan of Care Review  Outcome: Ongoing (interventions implemented as appropriate)  Pt free of any falls, trauma or injury within this shift. Will closely monitor pt's LOC as pt's mental status did worsen compared to yesterday. Pt now disoriented to place, time and situation. Will continue to support positional changes at least q2 hours to prevent any skin breakdown. Pt will remain on ppn and metoprolol q4 hours per MD order. Pain being controlled by PRN pain medication. Pt denies any CP, headaches, nausea or dizziness. Plan of care reviewed with pt and pt's wife and both verbalize understanding. Pt's VSS and will continue to monitor.

## 2017-08-09 NOTE — PLAN OF CARE
Pt NPO per speech; speech is following.     08/09/17 4054   Discharge Reassessment   Assessment Type Discharge Planning Reassessment   Can the patient answer the patient profile reliably? Yes, cognitively intact   How does the patient rate their overall health at the present time? Fair   Describe the patient's ability to walk at the present time. Minor restrictions or changes   How often would a person be available to care for the patient? Whenever needed   Number of comorbid conditions (as recorded on the chart) Three   During the past month, has the patient often been bothered by feeling down, depressed or hopeless? Yes   During the past month, has the patient often been bothered by little interest or pleasure in doing things? Yes   Discharge plan remains the same: Yes   Provided patient/caregiver education on the expected discharge date and the discharge plan Yes   Discharge Plan A Home with family;Home Health   Discharge Plan B Skilled Nursing Facility   Change in patient condition or support system No   Patient choice form signed by patient/caregiver N/A

## 2017-08-09 NOTE — PLAN OF CARE
Problem: Patient Care Overview  Goal: Plan of Care Review  Outcome: Ongoing (interventions implemented as appropriate)  Pt remained awake through out entire shift. Pt became disoriented to place and situation throughout shift.  Pt attempted to climb out of bed x2. avasys camera in room and bed alarm on. 2am dose of IV Lopressor was not given due to loss of IV access. RN from oncology was able to access Port. Heart rate increased to 170s, resolved after x2 doses of PRN IV Lopressor. PPN continues to infuse as ordered. VSS and NADN. Will continue to monitor patient.

## 2017-08-09 NOTE — PLAN OF CARE
Problem: SLP Goal  Goal: SLP Goal  SLP goal expected to be met by 8/11:  1. Pt will participate in MBSS when deemed medically appropriate in order to determine safest, least restrictive diet. MET 8/4  2. Pt will participate in ongoing assessment of clinical evaluation of swallow in order to determine safest, least restrictive diet.  MET 8/4  3. Pt will complete pharyngeal swallow exercises (shaker, mendelsohn, etc) with good effort 90% of attempts, MIN A, to increase strength/coordination of swallow fx    Outcome: Ongoing (interventions implemented as appropriate)  Patient with minimal progress with trial Dysphagia therapy secondary to decreased cognitive status, increased pain and increased fatigue. Patient would benefit from consideration of alternative means nutrition/hydration/medication. Nurse notified. ST to continue to monitor. Thank you.    FERNANDO Ruelas., [facility]-SLP  Speech-Language Pathology  Pager: [phone]  8/9/2017

## 2017-08-09 NOTE — PHYSICIAN QUERY
PT Name: Clint Brown  MR #: [id number]    Physician Query Form - Nutrition Clarification     CDS/: Saulo Maciel Jr, RN               Contact information:[phone]     This form is a permanent document in the medical record.     Query Date: 2017    By submitting this query, we are merely seeking further clarification of documentation.. Please utilize your independent clinical judgment when addressing the question(s) below.    The Medical record contains the following:   Indicators  Supporting Clinical Findings Location in Medical Record   x % of Estimated Energy Intake over a time frame from p.o., TF, or TPN Intake of Estimated Energy Needs: 0 - 25 %    Pt has not eaten x 7 days PTA 2' dry mouth/lack of appetite  Nutrition Consult Note     Nutrition Consult Note   x Weight Status over a time frame He has had a 5lb weight loss in the last 7 days 8/ H&P    Subcutaneous Fat and/or Muscle Loss      Fluid Accumulation or Edema      Reduced  Strength     x Wt / BMI / Usual Body Weight Weight: 68 kg (150 lb)  BMI (Calculated): 21.6   Weight Loss: unintentional  Usual Body Weight (UBW), k.45 kg  Nutrition Consult Note    Delayed Wound Healing / Failure to Thrive     x Acute or Chronic Illness 70 y/o male with known malignant mesothelioma of left lung metastatic to abdomen and bone    Nutrition Risk Screen: dysphagia or difficulty swallowing 8/ H&P         Nutrition Consult Note    Medication     x Treatment His decreased appetite is not new and previously he was started on periactin but continues to lose weight. 8/ H&P   x Other Overall Physical Appearance: nourished    Signs and Symptoms (as evidenced by):   Pt receiving 0% estimated energy and protein needs at this time    He appears cachectic.     Nutrition Consult Note     Nutrition Consult Note         Vascular Surgery Consult Note     AND / ASPEN Clinical Characteristics (2011)  A minimum of two  characteristics is recommended for diagnosing either moderate or severe malnutrition   Mild Malnutrition Moderate Malnutrition Severe Malnutrition   Energy Intake from p.o., TF or TPN. < 75% intake of estimated energy needs for less than 7 days < 75% intake of estimated energy needs for greater than 7 days < 50% intake of estimated energy needs for > 5 days   Weight Loss 1-2% in 1 month  5% in 3 months  7.5% in 6 months  10% in 1 year 1-2 % in 1 week  5% in 1 month  7.5% in 3 months  10% in 6 months  20% in 1 year > 2% in 1 week  > 5% in 1 month  > 7.5% in 3 months  > 10% in 6 months  > 20% in 1 year   Physical Findings     None *Mild subcutaneous fat and/or muscle loss  *Mild fluid accumulation  *Stage II decubitus  *Surgical wound or non-healing wound *Mod/severe subcutaneous fat and/or muscle loss  *Mod/severe fluid accumulation  *Stage III or IV decubitus  *Non-healing surgical wound     Provider, please specify diagnosis or diagnoses associated with above clinical findings.    [X] Mild Protein-Calorie Malnutrition  [ ] Moderate Protein-Calorie Malnutrition  [ ] Cachexia  [ ] Other Nutritional Diagnosis (please specify): ____________________________________  [ ] Other: ________________________________  [ ] Clinically Undetermined    Please document in your progress notes daily for the duration of treatment until resolved and include in your discharge summary.

## 2017-08-09 NOTE — ASSESSMENT & PLAN NOTE
-Presented with Mg of 1.2; replaced 4g in the ED and was given an additional 2g this morning

## 2017-08-09 NOTE — PROGRESS NOTES
Pt is considerably less alert compared to yesterday. He is disoriented to place, time and situation. Pt is trying to get out of bed and pull at lines. MD notified at 01981 and verbalized already getting in reaport of pt's AMS and will be rounding with staff shortly. MD did verbalize that he would be rounding soon. Pt's VSS and will continue to monitor.

## 2017-08-09 NOTE — PROGRESS NOTES
[facility]  Adult Nutrition  Progress Note    SUMMARY     Recommendations    Recommendation/Intervention:   1. Advance to regular oral diet as soon as medically able, texture per SLP.  2. No apparent contraindications for enteral nutrition, patient's gut is functioning. If unable to initiate oral diet within 72 hours, please consider PEG or J-tube placement. Initiating EN with Isosource 1.5 @ goal rate of 60 mL/hr. This will provide 2160 kcal, 98 g protein, and 1100 mL free fluid. Flush tube with 175 mL water q 4 hrs to aid in hydration.    3. If parenteral nutrition to be continued, recommend central line placement, if possible, and initiation of Clinimix 5/20 @ goal rate of 75 mL/hr + IV lipids. This will provide 2084 kcal and 90 g protein.    4. RD following    Goals: Pt to receive > 75% estimated energy and protein needs within 72 hours  Nutrition Goal Status: goal not met  Communication of RD Recs: reviewed with RN      Reason for Assessment    Reason for Assessment: RD follow-up  Diagnosis: cancer diagnosis/related complications, other (see comments) (stage IV metastatic mesothelioma, severe dysphagia)  Relevent Medical History: HTN, CKDIII, CAD, HTN, afib   Interdisciplinary Rounds: did not attend     General Information Comments: Spoke with MD Monday, to start PPN. Pt now on PPN running at goal rate of 100 mL. No contraindications for ENTERAL nutrition. Another MBSS scheduled friday, but per SLP not likely to pass.     Nutrition Discharge Planning: unable to determine at this time    Nutrition Prescription Ordered    Current Diet Order: NPO     Current Nutrition Support Formula Ordered: Clinimix 2.75/10              Evaluation of Received Nutrients/Fluid Intake     Energy Calories Required: meeting needs        Protein Required: meeting needs            Fluid Required: meeting needs     Tolerance: tolerating  % Intake of Estimated Energy Needs: 25 - 50 %  % Meal Intake: NPO     Nutrition  "Risk Screen     Nutrition Risk Screen: dysphagia or difficulty swallowing    Nutrition/Diet History    Patient Reported Diet/Restrictions/Preferences:  (HOWIE)     Factors Affecting Nutritional Intake: NPO, sore mouth     Labs/Tests/Procedures/Meds    Diagnostic Test/Procedure Review: reviewed, pertinent  Pertinent Labs Reviewed: reviewed, pertinent     Pertinent Medications Reviewed: reviewed, pertinent  Pertinent Medications Comments: Duke's solution, folic acid, statin    Physical Findings    Overall Physical Appearance: nourished     Oral/Mouth Cavity: WDL  Skin: intact    Anthropometrics    Temp: 97.5 °F (36.4 °C)     Height: 5' 10" (177.8 cm)     Weight: 68 kg (150 lb)  Ideal Body Weight (IBW), Male: 166 lb     % Ideal Body Weight, Male (lb): 90.36 lb     BMI (Calculated): 21.6  BMI Grade: 18.5-24.9 - normal  Weight Loss: unintentional  Usual Body Weight (UBW), k.45 kg     % Usual Body Weight: 96.78  % Weight Change From Usual Weight: 3.22 %       Estimated/Assessed Needs    Weight Used For Calorie Calculations: 68 kg (149 lb 14.6 oz)   Height (cm): 177.8 cm  Energy Calorie Requirements (kcal): 9950-9894 kcal/day (30-35 kcal/kg)  Energy Need Method: Kcal/kg       RMR (Jones-St. Jeor Equation): 1441.25        Weight Used For Protein Calculations: 68 kg (149 lb 14.6 oz)  Protein Requirements: 89-102g/day (1.3-1.5 g/kg)    Fluid Requirements (mL): 4762-6427  Fluid Need Method: other (see comments) (1mL/kcal)        RDA Method (mL):                Assessment and Plan    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    Nutrition Diagnosis:  Inadequate protein/energy intake    Related to (etiology):   No enteral nutrition access or contraindications to enteral nutrition, lung cancer metastasis to upper abdomen    Signs and Symptoms (as evidenced by):   pt NPO with PPN meeting 50% estimated needs, recent unintentional wt loss and cachexia, NPO 7 days prior to PPN " initiation    Interventions/Recommendations (treatment strategy):  Recommend enteral nutrition intervention. See RD progress note dated 8/9 for full assessment and recommendations. Thank you.    Nutrition Diagnosis Status:   New              Monitor and Evaluation    Food and Nutrient Intake: energy intake, enteral nutrition intake, parenteral nutrition intake  Food and Nutrient Adminstration: diet order, enteral and parenteral nutrition administration        Anthropometric Measurements: weight, weight change, body mass index  Biochemical Data, Medical Tests and Procedures: inflammatory profile, glucose/endocrine profile, gastrointestinal profile, electrolyte and renal panel, lipid profile  Nutrition-Focused Physical Findings: overall appearance    Nutrition Risk    Level of Risk:  (2x/week)    Nutrition Follow-Up    RD Follow-up?: Yes

## 2017-08-09 NOTE — PT/OT/SLP PROGRESS
"Speech Language Pathology  Treatment    Clint Brown   MRN: [id number]   Admitting Diagnosis: Dysphagia lusoria syndrome    Diet recommendations: Solid Diet Level: NPO  Liquid Diet Level: NPO   -Pt would benefit from consideration of alternative means nutrition/hydration/medication  - Strict oral care recommended    SLP Treatment Date: 08/09/17  Speech Start Time: 1114     Speech Stop Time: 1130     Speech Total (min): 16 min       TREATMENT BILLABLE MINUTES:  Treatment Swallowing Dysfunction 16    Has the patient been evaluated by SLP for swallowing? : Yes  Keep patient NPO?: Yes   General Precautions: Standard, aspiration, fall  Current Respiratory Status: nasal cannula       Subjective:  SLP reviewed Patient with nurse Nely, nurse reports patient with increased confusion/impulsivity   Patient presents confused, anxious, labile  Patient explains, "There are fans flying over there [points to AC duct], watch out, I can't take this pain much longer, watch out"  Patient reports moderate lower back pain, nurse notified  Pain/Comfort  Pain Rating 1: 6/10  Location - Orientation 1: lower  Location 1: back  Pain Addressed 1: Nurse notified, Reposition, Cessation of Activity  Pain Rating Post-Intervention 1: 6/10    Objective:   Patient found with: peripheral IV, awake in bed attempting to pull self up from bedrails. He presents orientedx1. SLP explains SLP role, attempts to re-orient patient to place/time/situation and educate Patient on Dysphagia. Patient with increased distractibility and occasionally reporting hallucinations t/o session. SLP attempts to redirect Patient to complete Dysphagia exercises; however, patient with increased emotional lability and not able to sustain attention. SLP provides verbal encouragement and support, nurse notified. Patient declining PO trials of ice chips, SLP attempts to initiate oral care, and SLP attempts to initiate Dysphagia exercises despite MAX verbal/visual " encouragement/redirection this service day. No further questions noted. Whiteboard current. Findings reviewed with nurse following session.     Assessment:  Clint Brown is a 71 y.o. male with a medical diagnosis of Dysphagia lusoria syndrome and presents with Pharyngeal Dysphagia. Patient with minimal progress with trial therapy secondary to decreased cognitive status, increased pain and increased fatigue. Patient would benefit from consideration of alternative means nutrition/hydration/medication.     Discharge recommendations: Discharge Facility/Level Of Care Needs: other (see comments), home health speech therapy (pending pt progress)     Goals:    SLP Goals        Problem: SLP Goal    Goal Priority Disciplines Outcome   SLP Goal     SLP Ongoing (interventions implemented as appropriate)   Description:  SLP goal expected to be met by 8/11:  1. Pt will participate in MBSS when deemed medically appropriate in order to determine safest, least restrictive diet. MET 8/4  2. Pt will participate in ongoing assessment of clinical evaluation of swallow in order to determine safest, least restrictive diet.  MET 8/4  3. Pt will complete pharyngeal swallow exercises (shaker, mendelsohn, etc) with good effort 90% of attempts, MIN A, to increase strength/coordination of swallow fx                      Plan:   Patient to be seen Therapy Frequency: 5 x/week   Plan of Care expires: 09/02/17  Plan of Care reviewed with: patient  SLP Follow-up?: Yes       FERNANDO Ruelas., [facility]-SLP  Speech-Language Pathology  Pager: [phone]  8/9/2017

## 2017-08-09 NOTE — SUBJECTIVE & OBJECTIVE
Review of Systems   Constitutional: Positive for appetite change (decreased). Negative for activity change, chills and fever.   HENT: Positive for trouble swallowing and voice change (slurred speech).    Respiratory: Negative for chest tightness and shortness of breath.    Cardiovascular: Negative for chest pain, palpitations and leg swelling.   Gastrointestinal: Negative for abdominal distention, abdominal pain, constipation, diarrhea, nausea and vomiting.   Endocrine: Negative for cold intolerance and heat intolerance.   Skin: Negative for color change.   Neurological: Negative for weakness, light-headedness and headaches.   Hematological: Negative for adenopathy. Does not bruise/bleed easily.   Psychiatric/Behavioral: Negative for agitation and behavioral problems.     Objective:     Vital Signs (Most Recent):  Temp: 99.1 °F (37.3 °C) (08/09/17 1500)  Pulse: 93 (08/09/17 1600)  Resp: 17 (08/09/17 1500)  BP: 135/62 (08/09/17 1500)  SpO2: 96 % (08/09/17 1500) Vital Signs (24h Range):  Temp:  [97.5 °F (36.4 °C)-99.3 °F (37.4 °C)] 99.1 °F (37.3 °C)  Pulse:  [] 93  Resp:  [16-20] 17  SpO2:  [93 %-99 %] 96 %  BP: (105-148)/(55-80) 135/62     Weight: 68 kg (150 lb)  Body mass index is 21.52 kg/m².    Intake/Output Summary (Last 24 hours) at 08/09/17 1741  Last data filed at 08/09/17 1400   Gross per 24 hour   Intake              900 ml   Output              225 ml   Net              675 ml      Physical Exam   Constitutional: He is oriented to person, place, and time. He appears well-developed and well-nourished.   HENT:   Head: Normocephalic and atraumatic.   Mouth/Throat: Oropharynx is clear and moist and mucous membranes are normal.   Eyes: Conjunctivae and EOM are normal.   Neck: No JVD present. No tracheal deviation present.   Cardiovascular: Normal rate, regular rhythm and normal heart sounds.    Pulmonary/Chest: Effort normal. He has decreased breath sounds in the left middle field.   Abdominal: Soft.  Bowel sounds are normal.   Musculoskeletal: He exhibits no edema or deformity.   Neurological: He is alert and oriented to person, place, and time.   Skin: Skin is warm and dry.   Psychiatric: He has a normal mood and affect. His behavior is normal.   Vitals reviewed.      Significant Labs: All pertinent labs within the past 24 hours have been reviewed.    Significant Imaging: I have reviewed and interpreted all pertinent imaging results/findings within the past 24 hours.

## 2017-08-09 NOTE — PROGRESS NOTES
[facility]  Vascular Surgery  Progress Note    Patient Name: Clint Brown  MRN: [id number]  Admission Date: 8/3/2017  Primary Care Provider: Jean Claude Griffith DO    Subjective:     Interval History: patient slightly disoriented this AM    Post-Op Info:  * No surgery found *           Medications:  Continuous Infusions:   Amino acid 2.75% - dextrose 10% (CLINIMIX-E) solution with additives ( 1L provides 27.5 gm AA, 100 gm CHO (340 kcal/L dextrose), Na 35, K 30, Mg 5, Ca 4.5, Acetate 51, Cl 39, Phos 15) 100 mL/hr at 08/09/17 1311    Amino acid 2.75% - dextrose 10% (CLINIMIX-E) solution with additives ( 1L provides 27.5 gm AA, 100 gm CHO (340 kcal/L dextrose), Na 35, K 30, Mg 5, Ca 4.5, Acetate 51, Cl 39, Phos 15)       Scheduled Meds:   magnesium sulfate IVPB  2 g Intravenous Q6H    metoprolol  5 mg Intravenous Once    metoprolol  5 mg Intravenous Q4H    pantoprazole  40 mg Intravenous Daily    sodium chloride 0.9%  3 mL Intravenous Q8H     PRN Meds:acetaminophen, metoprolol, ondansetron     Objective:     Vital Signs (Most Recent):  Temp: 99.1 °F (37.3 °C) (08/09/17 1500)  Pulse: 93 (08/09/17 1600)  Resp: 17 (08/09/17 1500)  BP: 135/62 (08/09/17 1500)  SpO2: 96 % (08/09/17 1500) Vital Signs (24h Range):  Temp:  [97.5 °F (36.4 °C)-99.3 °F (37.4 °C)] 99.1 °F (37.3 °C)  Pulse:  [] 93  Resp:  [16-20] 17  SpO2:  [93 %-99 %] 96 %  BP: (105-148)/(55-80) 135/62       Date 08/09/17 0700 - 08/10/17 0659   Shift 8589-0055 9318-4961 2979-0956 24 Hour Total   I  N  T  A  K  E   P.O. 540   540    Shift Total  (mL/kg) 540  (7.9)   540  (7.9)   O  U  T  P  U  T   Urine  (mL/kg/hr) 225  (0.4)   225    Shift Total  (mL/kg) 225  (3.3)   225  (3.3)   Weight (kg) 68 68 68 68       Physical Exam   Constitutional: He appears listless.   Cardiovascular: An irregularly irregular rhythm present.   Pulmonary/Chest: Effort normal.   Abdominal: Soft. He exhibits no distension.   Neurological: He appears  listless. He is disoriented.   Skin: Skin is warm and dry.       Significant Labs:  CBC:   Recent Labs  Lab 08/09/17  0354   WBC 12.48   RBC 3.61*   HGB 9.7*   HCT 31.8*      MCV 88   MCH 26.9*   MCHC 30.5*     CMP:   Recent Labs  Lab 08/09/17  0354   GLU 97   CALCIUM 8.9      K 3.7   CO2 18*      BUN 20   CREATININE 0.7     Coagulation: No results for input(s): LABPROT, INR, APTT in the last 48 hours.        Assessment/Plan:     Aberrant right subclavian artery    72 yo M with h/o stage IV mestastatic mesothelioma (mets to upper abdomen, malignant pleural effusion s/p L VATS 5/31/17 ),Type A aortic dissection (s/p repair in April 2016), persistent atrial flutter (s/p RFA ablation on 7/28), CAD, CKD, and HTN who presented to hospital on 8/4/17 with dysphagia since RFA ablation on 7/28/17.      Patient with aberrant R SCL artery; does not appear aneurysmal on CT; evidence of extrinsic compression on barium esophogram however this level of compression does not explain vallecula stasis  Patient also with acute onset of dysphagia also not explained by R aberrant SCL  If unable to pass MBBS; can consider PEG tube; continue with speech therapy  Please call with questions or concerns            Tatiana Hernandez MD  Vascular Surgery  [facility]

## 2017-08-09 NOTE — ASSESSMENT & PLAN NOTE
Onset prior to PADMINI on 7/28 but has worsened since the procedure. Patient stated improvement in his symptoms this morning.   -CT soft tissue neck revealed aberrant R subclavian coursing behind the esophagus, likely unrelated as his dysphagia is new; L pleural thickening in  the setting of mesothelioma but no overt compression   -Failed MBS on 08/04/2017; failed FL Esophagram 08/07/2017  -PPN currently running. Patient allowed to have ice chips for comfort which he appreciates and says are helping him  - NG tube attempted multiple times but was unsuccessful.  -PO meds discontinued as patient is unable to tolerate  -Vascular surgery was consulted and decided that patient is not an operative candidate, will ask GI for additional options then discuss options with patient.   - likely he will end up with PEG.

## 2017-08-09 NOTE — ASSESSMENT & PLAN NOTE
72 yo M with h/o stage IV mestastatic mesothelioma (mets to upper abdomen, malignant pleural effusion s/p L VATS 5/31/17 ),Type A aortic dissection (s/p repair in April 2016), persistent atrial flutter (s/p RFA ablation on 7/28), CAD, CKD, and HTN who presented to hospital on 8/4/17 with dysphagia since RFA ablation on 7/28/17.      Patient with aberrant R SCL artery; does not appear aneurysmal on CT; evidence of extrinsic compression on barium esophogram however this level of compression does not explain vallecula stasis  Patient also with acute onset of dysphagia also not explained by R aberrant SCL  If unable to pass MBBS; can consider PEG tube; continue with speech therapy  Please call with questions or concerns

## 2017-08-09 NOTE — ASSESSMENT & PLAN NOTE
Nutrition Diagnosis:  Inadequate protein/energy intake    Related to (etiology):   No enteral nutrition access or contraindications to enteral nutrition    Signs and Symptoms (as evidenced by):   pt NPO with PPN meeting 50% estimated needs    Interventions/Recommendations (treatment strategy):  Recommend enteral nutrition intervention. See RD progress note dated 8/9 for full assessment and recommendations. Thank you.    Nutrition Diagnosis Status:   New

## 2017-08-09 NOTE — ASSESSMENT & PLAN NOTE
- chronic; last lipid panel ): TC: 104, HDL: 31, LDL: 58.6, T  - TG weekly with PPN  - will continue home rosuvastatin 20mg

## 2017-08-09 NOTE — PROGRESS NOTES
[facility] Hospital Medicine  Progress Note    Patient Name: Clint Brown  MRN: [id number]  Patient Class: IP- Inpatient   Admission Date: 8/3/2017  Length of Stay: 6 days  Attending Physician: Audie Max MD  Primary Care Provider: Jean Claude Griffith DO    [facility] Medicine Team: [facility] [place] [facility] JULIO Lanza    Subjective:     Principal Problem:Dysphagia lusoria syndrome    HPI:  Mr. Brown is a 70 y/o M with a history of metastatic mesothelioma, Type A aortic dissection (s/p repair in April 2016), persistent atrial flutter (s/p RFA ablation on 7/28), CAD, CKD, and HTN who presents with dry mouth and slurred speech. He reports that he hasn't eaten in 7 days due to to difficulty swallowing and now has consequential dry mouth and difficulty speaking. He has difficulty swallowing solid foods but is able to tolerate liquids. He does not think the dysphagia has progressively worsened; he just anticipated it to get better and it hasn't. Pt denies any fevers, chills, n/v, cough, confusion, hemiparesis or gait problems. Just reports feeling weak and fatigued. He feels thirsty and is requesting a Coke.      He has had a 5lb weight loss in the last 7 days. His decreased appetite is not new and previously he was started on periactin but continues to lose weight. The difference is that previously he didn't have appetite, and now he feels like he physically can't eat. Pt has constipation 2/2 decreased oral intake and daily opioid use. Pt unable to recall last BM.      Pt recently underwent a PADMINI + RFA ablation on 7/28 for Aflutter with RVR.  He has had difficulty swallowing since that hospitalization, although prior notes indicate he may have had anorexia and dysphagia in the past that was attributed to malignant mesothelioma. Since procedure, he denies any palpitation, chest pain, SOB.       He was diagnosed with mesothelioma on 4/17 when he presented mike left sided chest pain and heaviness,  was found to have a left-sided pleural effusion and underwent thoracentesis, cultures from which revealed this malignancy. Since then he has undergone a VATS procedure in 5/17 to drain the fluids. He has Stage IV mesothelioma secondary to mets to upper abdomen and left illiac bones. He has seen Dr. Nichols ([facility]) in clinic, chemo-port placed 6/22 but was deferred chemo until his Aflutter was taken care of. He takes MS contin 15mg BID and percocet 1/day for pain on left and was started on periactin for poor appetite.    Hospital Course:  Clint Brown was admitted on 08/03/2017 with dysphagia.  MBS was ordered which patient failed and was, therefore, left NPO. 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 While this often represents an asymptomatic finding, this may contribute to the patient's reported history of dysphagia. ENT were consulted and examined the oropharynx which revealed FFL without abnormalities, no masses or lesions, vocal cords fully mobile bilaterally. Still in mild pain, ordered pluerx drain and IV pain meds. GI consulted and recommended fluoro esophagram with EGD after if indicated based on results. TPN started on 08/07/2017. Vascular consulted for the Aberrant right subclavian artery, recs against interventions as he is high risk for procedures.     8/9: radiology called and stated he is gone be high risk aspiration looking at his previous studies. Held esophagram, no change in patient state, will follow speech evalution for the oropharyngeal dysphagia in 8/11. For now will continue PPN, pain control.           Review of Systems   Constitutional: Positive for appetite change (decreased). Negative for activity change, chills and fever.   HENT: Positive for trouble swallowing and voice change (slurred speech).    Respiratory: Negative for chest tightness and shortness of breath.    Cardiovascular: Negative for  chest pain, palpitations and leg swelling.   Gastrointestinal: Negative for abdominal distention, abdominal pain, constipation, diarrhea, nausea and vomiting.   Endocrine: Negative for cold intolerance and heat intolerance.   Skin: Negative for color change.   Neurological: Negative for weakness, light-headedness and headaches.   Hematological: Negative for adenopathy. Does not bruise/bleed easily.   Psychiatric/Behavioral: Negative for agitation and behavioral problems.     Objective:     Vital Signs (Most Recent):  Temp: 99.1 °F (37.3 °C) (08/09/17 1500)  Pulse: 93 (08/09/17 1600)  Resp: 17 (08/09/17 1500)  BP: 135/62 (08/09/17 1500)  SpO2: 96 % (08/09/17 1500) Vital Signs (24h Range):  Temp:  [97.5 °F (36.4 °C)-99.3 °F (37.4 °C)] 99.1 °F (37.3 °C)  Pulse:  [] 93  Resp:  [16-20] 17  SpO2:  [93 %-99 %] 96 %  BP: (105-148)/(55-80) 135/62     Weight: 68 kg (150 lb)  Body mass index is 21.52 kg/m².    Intake/Output Summary (Last 24 hours) at 08/09/17 1741  Last data filed at 08/09/17 1400   Gross per 24 hour   Intake              900 ml   Output              225 ml   Net              675 ml      Physical Exam   Constitutional: He is oriented to person, place, and time. He appears well-developed and well-nourished.   HENT:   Head: Normocephalic and atraumatic.   Mouth/Throat: Oropharynx is clear and moist and mucous membranes are normal.   Eyes: Conjunctivae and EOM are normal.   Neck: No JVD present. No tracheal deviation present.   Cardiovascular: Normal rate, regular rhythm and normal heart sounds.    Pulmonary/Chest: Effort normal. He has decreased breath sounds in the left middle field.   Abdominal: Soft. Bowel sounds are normal.   Musculoskeletal: He exhibits no edema or deformity.   Neurological: He is alert and oriented to person, place, and time.   Skin: Skin is warm and dry.   Psychiatric: He has a normal mood and affect. His behavior is normal.   Vitals reviewed.      Significant Labs: All pertinent  labs within the past 24 hours have been reviewed.    Significant Imaging: I have reviewed and interpreted all pertinent imaging results/findings within the past 24 hours.    Assessment/Plan:      Aberrant right subclavian artery    -- no interventions per vascular, high risk.           Hypomagnesemia    -Presented with Mg of 1.2; replaced 4g in the ED and was given an additional 2g this morning               Constipation    -Miralax & senna scheduled daily PRN             Dehydration    Resolved  -PPN running currently          Typical atrial flutter    S/p RFA ablation on  and on apixaban 5mg for anticoagulation.   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  - has seen Dr. Nichols in clinic; chemo therapy until aflutter controlled  - for pain: MS Contin 15mg BID & percocet  as needed, will hold narcotics for AMS.   - periactin for decreased appetite  - pleurx in left thoracic, will drain 150 cc every other day for comfort.           Chronic obstructive pulmonary disease    Stable this admission   -Satting 95% on RA  -Will continue to monitor for signs of respiratory distress        CKD (chronic kidney disease) stage 3, GFR 30-59 ml/min    Stable from prior labs  BUN/Cr 19 and 0.7  -Will continue to follow        Hypercholesteremia    - chronic; last lipid panel ): TC: 104, HDL: 31, LDL: 58.6, T  - TG weekly with PPN  - will continue home rosuvastatin 20mg              S/P ascending aortic replacement    Stable at this time          Coronary artery disease involving native coronary artery of native heart without angina  pectoris    [place]  [facility] (4/1/2016) LM patent; LAD patent; Ramus non obstructive; LCX non obstructive          Essential hypertension    Hypotensive this admission most recently 119/61  -Patient has remained low hypertensive to normotensive over the past 24 hours  -Will continue with current treatment, monitor for changes and intervene as appropriate           * Dysphagia lusoria syndrome    Onset prior to PADMINI on 7/28 but has worsened since the procedure. Patient stated improvement in his symptoms this morning.   -CT soft tissue neck revealed aberrant R subclavian coursing behind the esophagus, likely unrelated as his dysphagia is new; L pleural thickening in  the setting of mesothelioma but no overt compression   -Failed MBS on 08/04/2017; failed FL Esophagram 08/07/2017  -PPN currently running. Patient allowed to have ice chips for comfort which he appreciates and says are helping him  - NG tube attempted multiple times but was unsuccessful.  -PO meds discontinued as patient is unable to tolerate  -Vascular surgery was consulted and decided that patient is not an operative candidate, will ask GI for additional options then discuss options with patient.   - likely he will end up with PEG.              VTE Risk Mitigation         Ordered     Medium Risk of VTE  Once      08/03/17 1350              JULIO Lanza  Department of Hospital Medicine   [facility]

## 2017-08-09 NOTE — PROGRESS NOTES
Spoke with Dr. Madrigal in reference to reordering pt's Clinimix. Also updated MD that pt has became more tachypenic. Pt's HOB was elevated and this did improve breathing. Pt's VSS and will continue to monitor.

## 2017-08-09 NOTE — CONSULTS
[facility]  Vascular Surgery  Consult Note    Inpatient consult to Vascular Surgery  Consult performed by: SHIRA MILLER  Consult ordered by: DEDE OATES        Subjective:     Chief Complaint/Reason for Admission: dysphagia    History of Present Illness: 72 yo M with h/o stage IV mestastatic mesothelioma (mets to upper abdomen, malignant pleural effusion s/p L VATS 5/31/17 ),Type A aortic dissection (s/p repair in April 2016), persistent atrial flutter (s/p RFA ablation on 7/28), CAD, CKD, and HTN who presented to hospital on 8/4/17 with dysphagia wince RFA ablation on 7/28/17.  Patient currently unable to identify exact symptoms, but per medical records reported decreased po intake secondary to difficulty swallowing solid foods.  Since hospitalization has undergone modified Barium swallow on 8/4 with stasis at vallecula, piriforms- consistent with laryngeal dysphagia, CT neck performed showing abberrant R SCL artery.  ENT was consulted; performed a flexible DL with no abnormalities seen on exam.  GI was consulted with recommendations for barium esophagram which demonstrated: mild narrowing of the proximal esophagus at the level of the clavicular heads, most likely correlating with external compression from this patient's known aberrant right subclavian artery as seen on prior CT soft tissue neck 8/4/2017.Significant laryngeal penetration of liquid barium with possible subglottic tracheal aspiration, limiting complete examination. Barium tablet lodged within the vallecula and maneuvers to dislodge the tablet were unsuccessful. GI with recommendations for vascular surgery consult for aberrant R SCL artery.     Patient currently in bed, short of breath with talking; is a poor historian for personal history.          Prescriptions Prior to Admission   Medication Sig Dispense Refill Last Dose    apixaban 5 mg Tab Take 1 tablet (5 mg total) by mouth 2 (two) times daily. 60 tablet 2  Past Week at Unknown time    aspirin (ECOTRIN) 81 MG EC tablet Take 1 tablet (81 mg total) by mouth once daily.  0 7/27/2017 at 0800    cyproheptadine (PERIACTIN) 4 mg tablet Take 1 tablet (4 mg total) by mouth 4 (four) times daily. 120 tablet 3 Taking    DOCUSATE SODIUM (COLACE ORAL) Take 1 capsule by mouth once daily.    7/27/2017 at 2000    folic acid (FOLVITE) 1 MG tablet Take 1 tablet (1 mg total) by mouth once daily. Start today 60 tablet 6 7/27/2017 at 0800    metoprolol succinate (TOPROL-XL) 25 MG 24 hr tablet Take 1 tablet (25 mg total) by mouth once daily. 60 tablet 2 7/27/2017 at 0800    morphine (MS CONTIN) 15 MG 12 hr tablet Take 1 tablet (15 mg total) by mouth 2 (two) times daily. 60 tablet 0 7/28/2017 at 0800    multivitamin (THERAGRAN) per tablet Take 1 tablet by mouth once daily.   7/27/2017 at 0800    omeprazole (PRILOSEC) 20 MG capsule Take 1 capsule (20 mg total) by mouth once daily. (Patient taking differently: Take 20 mg by mouth every morning. ) 30 capsule 11 7/27/2017 at 2000    ondansetron (ZOFRAN) 8 MG tablet Take 1 tablet (8 mg total) by mouth 4 (four) times daily as needed for Nausea. 60 tablet 2 Past Month at Unknown time    oxycodone-acetaminophen (PERCOCET) 5-325 mg per tablet Take 1 tablet by mouth every 4 (four) hours as needed for Pain. 120 tablet 0 Past Month at Unknown time    ranitidine (ZANTAC) 150 MG tablet Take 1 tablet (150 mg total) by mouth 2 (two) times daily. 60 tablet 1 7/27/2017 at 0800    rosuvastatin (CRESTOR) 20 MG tablet Take 1 tablet (20 mg total) by mouth once daily. (Patient taking differently: Take 20 mg by mouth every evening. 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  thoracic aorta     Atrial flutter     cardioversion 4/12/16    Cancer     Chronic kidney disease     Chronic obstructive pulmonary disease     CKD (chronic kidney disease) stage 3, GFR 30-59 ml/min 6/27/2016    Closed fracture of one rib of left side 3/21/2017    Discovered 11/16    COPD (chronic obstructive pulmonary disease)     Coronary artery disease     [facility] 4/1/16: 50% proximal RAMUS    Coronary artery disease involving native coronary artery of native heart without angina pectoris 4/1/2016    [place] (4/1/2016) LM patent; LAD patent; Ramus non obstructive; LCX non obstructive      Hypertension     Kidney stones     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 6/23/2017    Old 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/20/2016    3/16    S/P ascending aortic replacement 4/18/2016    Thoracic aortic dissection     Type A dissection s/p repair 4/13/16     Past Surgical History:   Procedure Laterality Date    APPENDECTOMY      CARDIAC CATHETERIZATION      EYE SURGERY      KIDNEY STONE SURGERY      4 surgeries    TONSILLECTOMY      VASCULAR SURGERY       Family History     None        Social History Main Topics    Smoking status: Current Every Day Smoker     Packs/day: 0.25     Years: 55.00     Types: Cigarettes    Smokeless tobacco: Never Used      Comment: has Chantix at home    Alcohol use No    Drug use: No    Sexual activity: Yes     Partners: Female     Review of Systems   Constitutional: Positive for fatigue and unexpected weight change.   HENT: Positive for drooling and trouble swallowing. Negative for mouth sores.    Eyes: Negative for pain, discharge and itching.   Respiratory: Positive for shortness of breath. Negative for apnea and chest tightness.    Cardiovascular: Negative for chest pain and leg swelling.   Gastrointestinal: Negative for abdominal distention and abdominal pain.   Endocrine: Negative for cold intolerance and heat intolerance.   Genitourinary:  Negative for difficulty urinating.   Musculoskeletal: Positive for back pain. Negative for arthralgias.   Allergic/Immunologic: Negative for environmental allergies and food allergies.   Neurological: Negative for dizziness.   Hematological: Negative for adenopathy. Bruises/bleeds easily.   Psychiatric/Behavioral: Negative for agitation and behavioral problems.     Objective:     Vital Signs (Most Recent):  Temp: 97.8 °F (36.6 °C) (08/08/17 1550)  Pulse: 86 (08/08/17 1550)  Resp: 18 (08/08/17 1550)  BP: 125/70 (08/08/17 1550)  SpO2: (!) 93 % (08/08/17 1550) Vital Signs (24h Range):  Temp:  [97.5 °F (36.4 °C)-97.8 °F (36.6 °C)] 97.8 °F (36.6 °C)  Pulse:  [] 86  Resp:  [16-20] 18  SpO2:  [93 %-97 %] 93 %  BP: ()/(53-70) 125/70     Weight: 68 kg (150 lb)  Body mass index is 21.52 kg/m².    Physical Exam   Constitutional: He appears well-developed. He appears listless. He appears cachectic.   HENT:   Head: Normocephalic and atraumatic.   Eyes: Pupils are equal, round, and reactive to light.   Neck: Normal range of motion.   Cardiovascular: An irregularly irregular rhythm present.   Pulses:       Femoral pulses are 2+ on the right side, and 2+ on the left side.  Nonpalpable distal pulses   Pulmonary/Chest: Accessory muscle usage present. He has wheezes.   Abdominal: Soft. He exhibits no distension. There is no tenderness.   Musculoskeletal: Normal range of motion. He exhibits no edema.   Lymphadenopathy:     He has no cervical adenopathy.   Neurological: He appears listless.   Skin: Skin is warm and dry.   Psychiatric: He has a normal mood and affect.       Significant Labs:  CBC:   Recent Labs  Lab 08/08/17  0506   WBC 10.47   RBC 3.67*   HGB 9.6*   HCT 31.9*      MCV 87   MCH 26.2*   MCHC 30.1*     CMP:   Recent Labs  Lab 08/08/17  0506   *   CALCIUM 8.4*      K 3.8   CO2 17*   *   BUN 18   CREATININE 0.7     Coagulation: No results for input(s): LABPROT, INR, APTT in the last 48  hours.    Significant Diagnostics:      Assessment/Plan:     Aberrant right subclavian artery    72 yo M with h/o stage IV mestastatic mesothelioma (mets to upper abdomen, malignant pleural effusion s/p L VATS 5/31/17 ),Type A aortic dissection (s/p repair in April 2016), persistent atrial flutter (s/p RFA ablation on 7/28), CAD, CKD, and HTN who presented to hospital on 8/4/17 with dysphagia since RFA ablation on 7/28/17.      Patient with aberrant R SCL artery; does not appear aneurysmal on CT; evidence of extrinsic compression on barium esophogram however this level of compression does not explain vallecula stasis; congi  Even if the aberrant R SCL artery was the cause of dysphagia patient would not tolerate reconstruction; if unable to tolerate po recommend PEG tube placement  May consider palliative care consult for goals of care/pain control            Thank you for your consult.     Tatiana Hernandez MD  Vascular Surgery  [facility]

## 2017-08-09 NOTE — ASSESSMENT & PLAN NOTE
has mets to upper abdomen and left illiac bones.  - has seen Dr. Nichols in clinic; chemo therapy until aflutter controlled  - for pain: MS Contin 15mg BID & percocet  as needed, will hold narcotics for AMS.   - periactin for decreased appetite  - pleurx in left thoracic, will drain 150 cc every other day for comfort.

## 2017-08-10 PROBLEM — G93.40 ENCEPHALOPATHY: Status: ACTIVE | Noted: 2017-08-10

## 2017-08-10 LAB
ANION GAP SERPL CALC-SCNC: 10 MMOL/L
BILIRUB UR QL STRIP: NEGATIVE
BUN SERPL-MCNC: 20 MG/DL
CALCIUM SERPL-MCNC: 8.6 MG/DL
CHLORIDE SERPL-SCNC: 107 MMOL/L
CLARITY UR REFRACT.AUTO: ABNORMAL
CO2 SERPL-SCNC: 20 MMOL/L
COLOR UR AUTO: ABNORMAL
CREAT SERPL-MCNC: 0.7 MG/DL
EST. GFR  (AFRICAN AMERICAN): >60 ML/MIN/1.73 M^2
EST. GFR  (NON AFRICAN AMERICAN): >60 ML/MIN/1.73 M^2
GLUCOSE SERPL-MCNC: 127 MG/DL
GLUCOSE UR QL STRIP: NEGATIVE
HGB UR QL STRIP: ABNORMAL
KETONES UR QL STRIP: NEGATIVE
LEUKOCYTE ESTERASE UR QL STRIP: NEGATIVE
MAGNESIUM SERPL-MCNC: 2.1 MG/DL
MICROSCOPIC COMMENT: ABNORMAL
NITRITE UR QL STRIP: NEGATIVE
PH UR STRIP: 5 [PH] (ref 5–8)
PHOSPHATE SERPL-MCNC: 3.7 MG/DL
POTASSIUM SERPL-SCNC: 4.1 MMOL/L
PROCALCITONIN SERPL IA-MCNC: 0.39 NG/ML
PROT UR QL STRIP: NEGATIVE
RBC #/AREA URNS AUTO: 10 /HPF (ref 0–4)
SODIUM SERPL-SCNC: 137 MMOL/L
SP GR UR STRIP: 1.01 (ref 1–1.03)
SQUAMOUS #/AREA URNS AUTO: 0 /HPF
URN SPEC COLLECT METH UR: ABNORMAL
UROBILINOGEN UR STRIP-ACNC: 4 EU/DL
WBC #/AREA URNS AUTO: 3 /HPF (ref 0–5)

## 2017-08-10 PROCEDURE — 87040 BLOOD CULTURE FOR BACTERIA: CPT

## 2017-08-10 PROCEDURE — 36415 COLL VENOUS BLD VENIPUNCTURE: CPT

## 2017-08-10 PROCEDURE — 99233 SBSQ HOSP IP/OBS HIGH 50: CPT | Mod: GC,,, | Performed by: INTERNAL MEDICINE

## 2017-08-10 PROCEDURE — A4216 STERILE WATER/SALINE, 10 ML: HCPCS | Performed by: STUDENT IN AN ORGANIZED HEALTH CARE EDUCATION/TRAINING PROGRAM

## 2017-08-10 PROCEDURE — 20600001 HC STEP DOWN PRIVATE ROOM

## 2017-08-10 PROCEDURE — 83735 ASSAY OF MAGNESIUM: CPT

## 2017-08-10 PROCEDURE — 84145 PROCALCITONIN (PCT): CPT

## 2017-08-10 PROCEDURE — 81001 URINALYSIS AUTO W/SCOPE: CPT

## 2017-08-10 PROCEDURE — 80048 BASIC METABOLIC PNL TOTAL CA: CPT

## 2017-08-10 PROCEDURE — 84100 ASSAY OF PHOSPHORUS: CPT

## 2017-08-10 PROCEDURE — 63600175 PHARM REV CODE 636 W HCPCS: Performed by: INTERNAL MEDICINE

## 2017-08-10 PROCEDURE — 25000003 PHARM REV CODE 250: Performed by: STUDENT IN AN ORGANIZED HEALTH CARE EDUCATION/TRAINING PROGRAM

## 2017-08-10 PROCEDURE — C9113 INJ PANTOPRAZOLE SODIUM, VIA: HCPCS | Performed by: INTERNAL MEDICINE

## 2017-08-10 PROCEDURE — 99233 SBSQ HOSP IP/OBS HIGH 50: CPT | Mod: GC,,, | Performed by: SURGERY

## 2017-08-10 RX ORDER — ACETAMINOPHEN 650 MG/1
650 SUPPOSITORY RECTAL EVERY 6 HOURS PRN
Status: DISCONTINUED | OUTPATIENT
Start: 2017-08-10 | End: 2017-08-14 | Stop reason: HOSPADM

## 2017-08-10 RX ORDER — CIPROFLOXACIN 2 MG/ML
400 INJECTION, SOLUTION INTRAVENOUS
Status: DISCONTINUED | OUTPATIENT
Start: 2017-08-10 | End: 2017-08-14

## 2017-08-10 RX ADMIN — METOPROLOL TARTRATE 5 MG: 5 INJECTION INTRAVENOUS at 06:08

## 2017-08-10 RX ADMIN — METOPROLOL TARTRATE 5 MG: 5 INJECTION INTRAVENOUS at 01:08

## 2017-08-10 RX ADMIN — MAGNESIUM SULFATE IN WATER 2 G: 40 INJECTION, SOLUTION INTRAVENOUS at 12:08

## 2017-08-10 RX ADMIN — METOPROLOL TARTRATE 5 MG: 5 INJECTION INTRAVENOUS at 05:08

## 2017-08-10 RX ADMIN — ASCORBIC ACID, VITAMIN A PALMITATE, CHOLECALCIFEROL, THIAMINE HYDROCHLORIDE, RIBOFLAVIN-5 PHOSPHATE SODIUM, PYRIDOXINE HYDROCHLORIDE, NIACINAMIDE, DEXPANTHENOL, ALPHA-TOCOPHEROL ACETATE, VITAMIN K1, FOLIC ACID, BIOTIN, CYANOCOBALAMIN: 200; 3300; 200; 6; 3.6; 6; 40; 15; 10; 150; 600; 60; 5 INJECTION, SOLUTION INTRAVENOUS at 09:08

## 2017-08-10 RX ADMIN — METOPROLOL TARTRATE 5 MG: 5 INJECTION INTRAVENOUS at 09:08

## 2017-08-10 RX ADMIN — PANTOPRAZOLE SODIUM 40 MG: 40 INJECTION, POWDER, FOR SOLUTION INTRAVENOUS at 09:08

## 2017-08-10 RX ADMIN — Medication 3 ML: at 10:08

## 2017-08-10 RX ADMIN — METOPROLOL TARTRATE 5 MG: 5 INJECTION INTRAVENOUS at 10:08

## 2017-08-10 RX ADMIN — Medication 3 ML: at 02:08

## 2017-08-10 RX ADMIN — CIPROFLOXACIN 400 MG: 2 INJECTION, SOLUTION INTRAVENOUS at 02:08

## 2017-08-10 RX ADMIN — METOPROLOL TARTRATE 5 MG: 5 INJECTION INTRAVENOUS at 02:08

## 2017-08-10 NOTE — PROGRESS NOTES
[facility] Hospital Medicine  Progress Note    Patient Name: Clint Brown  MRN: [id number]  Patient Class: IP- Inpatient   Admission Date: 8/3/2017  Length of Stay: 7 days  Attending Physician: Audie Max MD  Primary Care Provider: Jean Claude Griffith DO    [facility] Medicine Team: [facility] HOSP MED 4 Jose R Lynn MD    Subjective:     Principal Problem:Dysphagia lusoria syndrome    HPI:  Mr. Brown is a 70 y/o M with a history of metastatic mesothelioma, Type A aortic dissection (s/p repair in April 2016), persistent atrial flutter (s/p RFA ablation on 7/28), CAD, CKD, and HTN who presents with dry mouth and slurred speech. He reports that he hasn't eaten in 7 days due to to difficulty swallowing and now has consequential dry mouth and difficulty speaking. He has difficulty swallowing solid foods but is able to tolerate liquids. He does not think the dysphagia has progressively worsened; he just anticipated it to get better and it hasn't. Pt denies any fevers, chills, n/v, cough, confusion, hemiparesis or gait problems. Just reports feeling weak and fatigued. He feels thirsty and is requesting a Coke.      He has had a 5lb weight loss in the last 7 days. His decreased appetite is not new and previously he was started on periactin but continues to lose weight. The difference is that previously he didn't have appetite, and now he feels like he physically can't eat. Pt has constipation 2/2 decreased oral intake and daily opioid use. Pt unable to recall last BM.      Pt recently underwent a PADMINI + RFA ablation on 7/28 for Aflutter with RVR.  He has had difficulty swallowing since that hospitalization, although prior notes indicate he may have had anorexia and dysphagia in the past that was attributed to malignant mesothelioma. Since procedure, he denies any palpitation, chest pain, SOB.       He was diagnosed with mesothelioma on 4/17 when he presented mike left sided chest pain and heaviness, was  found to have a left-sided pleural effusion and underwent thoracentesis, cultures from which revealed this malignancy. Since then he has undergone a VATS procedure in 5/17 to drain the fluids. He has Stage IV mesothelioma secondary to mets to upper abdomen and left illiac bones. He has seen Dr. Nichols ([facility]) in clinic, chemo-port placed 6/22 but was deferred chemo until his Aflutter was taken care of. He takes MS contin 15mg BID and percocet 1/day for pain on left and was started on periactin for poor appetite.    Hospital Course:  Clint Brown was admitted on 08/03/2017 with dysphagia.  MBS was ordered which patient failed and was, therefore, left NPO. 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 While this often represents an asymptomatic finding, this may contribute to the patient's reported history of dysphagia. ENT were consulted and examined the oropharynx which revealed FFL without abnormalities, no masses or lesions, vocal cords fully mobile bilaterally. Still in mild pain, ordered pluerx drain and IV pain meds. GI consulted and recommended fluoro esophagram with EGD after if indicated based on results. TPN started on 08/07/2017. Vascular consulted for the Aberrant right subclavian artery, recs against interventions as he is high risk for procedures.     8/9: radiology called and stated he is gone be high risk aspiration looking at his previous studies. Held esophagram, no change in patient state, will follow speech evalution for the oropharyngeal dysphagia in 8/11. For now will continue PPN, pain control.       Interval History: VIRGIL. Vascular assessed patient yesterday and agreed that acute onset of dysphagia not explained by R aberrant SCL. Patient remains rate controlled on Lopressor 5mg q4h. He states that he feels unchanged from prior assessment though he does say that he is mildy SOB. 89% on RA with increased  WOB, started on 2.5L NC and sats improved to 92%. L pleural space to be drained this Am. Pain is well-controlled. Denies new complaints. Patient will have additional speech assessment tomorrow, and based on results will wither have additional barium esophagram, EGD, or potentially be assessed for PEG tube placement.      Review of Systems   Constitutional: Positive for appetite change (decreased). Negative for activity change, chills and fever.   HENT: Positive for trouble swallowing and voice change (slurred speech).    Respiratory: Negative for chest tightness and shortness of breath.    Cardiovascular: Negative for chest pain, palpitations and leg swelling.   Gastrointestinal: Negative for abdominal distention, abdominal pain, constipation, diarrhea, nausea and vomiting.   Endocrine: Negative for cold intolerance and heat intolerance.   Skin: Negative for color change.   Neurological: Negative for weakness, light-headedness and headaches.   Hematological: Negative for adenopathy. Does not bruise/bleed easily.   Psychiatric/Behavioral: Negative for agitation and behavioral problems.     Objective:     Vital Signs (Most Recent):  Temp: 97.7 °F (36.5 °C) (08/10/17 0813)  Pulse: 97 (08/10/17 0813)  Resp: 20 (08/10/17 0813)  BP: (!) 142/77 (08/10/17 0813)  SpO2: 97 % (08/10/17 0813) Vital Signs (24h Range):  Temp:  [97.5 °F (36.4 °C)-99.1 °F (37.3 °C)] 97.7 °F (36.5 °C)  Pulse:  [] 97  Resp:  [17-20] 20  SpO2:  [95 %-99 %] 97 %  BP: (135-158)/(62-80) 142/77     Weight: 68 kg (150 lb)  Body mass index is 21.52 kg/m².    Intake/Output Summary (Last 24 hours) at 08/10/17 0912  Last data filed at 08/10/17 0600   Gross per 24 hour   Intake             1880 ml   Output              225 ml   Net             1655 ml      Physical Exam   Constitutional: He is oriented to person, place, and time. He appears well-developed and well-nourished.   HENT:   Head: Normocephalic and atraumatic.   Mouth/Throat: Oropharynx is clear  and moist and mucous membranes are normal.   Eyes: Conjunctivae and EOM are normal.   Neck: No JVD present. No tracheal deviation present.   Cardiovascular: Normal rate, regular rhythm and normal heart sounds.    Pulmonary/Chest: Effort normal. He has decreased breath sounds in the left middle field.   Increased WOB   Abdominal: Soft. Bowel sounds are normal.   Musculoskeletal: He exhibits no edema or deformity.   Neurological: He is alert and oriented to person, place, and time.   Skin: Skin is warm and dry.   Psychiatric: He has a normal mood and affect. His behavior is normal.   Vitals reviewed.      Significant Labs:   Recent Results (from the past 24 hour(s))   Basic metabolic panel    Collection Time: 08/10/17  5:27 AM   Result Value Ref Range    Sodium 137 136 - 145 mmol/L    Potassium 4.1 3.5 - 5.1 mmol/L    Chloride 107 95 - 110 mmol/L    CO2 20 (L) 23 - 29 mmol/L    Glucose 127 (H) 70 - 110 mg/dL    BUN, Bld 20 8 - 23 mg/dL    Creatinine 0.7 0.5 - 1.4 mg/dL    Calcium 8.6 (L) 8.7 - 10.5 mg/dL    Anion Gap 10 8 - 16 mmol/L    eGFR if African American >60.0 >60 mL/min/1.73 m^2    eGFR if non African American >60.0 >60 mL/min/1.73 m^2   Magnesium    Collection Time: 08/10/17  5:27 AM   Result Value Ref Range    Magnesium 2.1 1.6 - 2.6 mg/dL   Phosphorus    Collection Time: 08/10/17  5:27 AM   Result Value Ref Range    Phosphorus 3.7 2.7 - 4.5 mg/dL     Significant Imaging:  Barium Esophargram 08/06/2017:  Mild narrowing of the proximal esophagus at the level of the clavicular heads, most likely correlating with external compression from this patient's known aberrant right subclavian artery as seen on prior CT soft tissue neck 8/4/2017.    Significant laryngeal penetration of liquid barium with possible subglottic tracheal aspiration, limiting complete examination.     Barium tablet lodged within the vallecula and maneuvers to dislodge the tablet were unsuccessful.   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  - has seen Dr. Nichols in clinic; chemo therapy until aflutter controlled  - for pain: MS Contin 15mg BID & percocet  as needed, will hold narcotics for AMS.   - periactin for decreased appetite  - pleurx in left thoracic, will drain 150 cc every other day for comfort.       * Dysphagia lusoria syndrome    Onset prior to PADMINI on 7/28 but has worsened since the procedure. Patient stated improvement in his symptoms this morning.   -CT soft tissue neck revealed aberrant R subclavian coursing behind the esophagus, likely unrelated as his dysphagia is new; L pleural thickening in  the setting of mesothelioma but no overt compression   -Failed MBS on 08/04/2017; failed FL Esophagram 08/07/2017  -PPN currently running. Patient allowed to have ice chips for comfort which he appreciates and says are helping him  - NG tube attempted multiple times but was unsuccessful.  -PO meds discontinued as patient is unable to tolerate  -Vascular surgery was consulted and decided that patient is not an operative candidate, will ask GI for additional options then discuss options with patient.   - likely he will end up with PEG.      Typical atrial flutter    S/p RFA ablation on 7/28 and on apixaban 5mg for anticoagulation.   -TOPROL-XL 25mg po daily for rate control unable to be admisinsitered as patient is strict NPO; Lopressor 5mg q4h scheduled for rate control at this time  -Afib RVR in the ED which converted spontaneously; additional episode yesterday AM that converted s/p 3 doses of 5mg IV metoprolol      Essential hypertension    Hypertensive currently, 158/73 this AM  -Patient has remained low hypertensive to normotensive this admission  -Will continue with current treatment, monitor for changes and intervene as appropriate       CKD (chronic kidney disease) stage 3, GFR 30-59  ml/min    Stable from prior labs  BUN/Cr 20 and 0.7  -Will continue to follow      Chronic obstructive pulmonary disease    Patient exhibiting increased WOB this AM; L pleural space to be drained today   -Satting 92% on 2.5L NC  -Will continue to monitor for signs of respiratory distress      Dehydration    Resolved  -PPN running currently      Coronary artery disease involving native coronary artery of native heart without angina pectoris    -[place]  -[facility] (2016) LM patent; LAD patent; Ramus non obstructive; LCX non obstructive      Aberrant right subclavian artery    -No interventions per vascular, high risk.       Hypomagnesemia    2.1 this AM  -Will continue to monitor and replace PRN      Slow transit constipation    -Miralax & senna scheduled daily PRN      Hypercholesteremia    - chronic; last lipid panel ): TC: 104, HDL: 31, LDL: 58.6, T  - TG weekly with PPN  - will continue home rosuvastatin 20mg       S/P ascending aortic replacement    Stable at this time        VTE Risk Mitigation         Ordered     Medium Risk of VTE  Once      [id number]        Jose R Lynn MD  Department of Hospital Medicine   [facility]

## 2017-08-10 NOTE — PLAN OF CARE
CM to bedside - no family present; pt sleeping but pt easily awaken. Pt being followed by speech and currently NPO and on TPN - consideration for PEG.     08/10/17 1119   Discharge Reassessment   Assessment Type Discharge Planning Reassessment   Can the patient answer the patient profile reliably? Yes, cognitively intact  (MD notes states slightly confused but pt was oriented w/ CM)   How does the patient rate their overall health at the present time? Fair   Describe the patient's ability to walk at the present time. Minor restrictions or changes   How often would a person be available to care for the patient? Whenever needed   Number of comorbid conditions (as recorded on the chart) Three   During the past month, has the patient often been bothered by feeling down, depressed or hopeless? Yes   During the past month, has the patient often been bothered by little interest or pleasure in doing things? Yes   Discharge plan remains the same: Yes   Provided patient/caregiver education on the expected discharge date and the discharge plan Yes   Discharge Plan A Home with family;Home Health   Discharge Plan B Skilled Nursing Facility   Change in patient condition or support system Yes  (currently NPO)   Patient choice form signed by patient/caregiver N/A

## 2017-08-10 NOTE — ASSESSMENT & PLAN NOTE
Patient exhibiting increased WOB this AM; L pleural space to be drained today   -Satting 92% on 2.5L NC  -Will continue to monitor for signs of respiratory distress

## 2017-08-10 NOTE — ASSESSMENT & PLAN NOTE
Hypertensive currently, 158/73 this AM  -Patient has remained low hypertensive to normotensive this admission  -Will continue with current treatment, monitor for changes and intervene as appropriate

## 2017-08-10 NOTE — PT/OT/SLP PROGRESS
Speech Language Pathology      Clint Brown  MRN: [id number]    Patient not seen today secondary to Patient fatigue. SLP attempts to see Patient x2 this afternoon. Patient declining SLP attempts to initiate therapy. He explains he wants to get rest, asks SLP to come back later both attempts despite SLP encouragement and redirection. Findings reviewed with nurse.  Increased fatigue and pain have limited progress with Dysphagia therapy. Findings reviewed with MD team. ST will follow-up next service day.    Thank you,    FERNANDO Ruelas., [facility]-SLP  Speech-Language Pathology  Pager: [phone]  8/10/2017

## 2017-08-10 NOTE — SUBJECTIVE & OBJECTIVE
Interval History: NAEON. Patient remains rate controlled on Lopressor 5mg q4h. He states that he feels unchanged from prior assessment though he does say that he is mildy SOB. 89% on RA with increased WOB, started on 2.5L NC and sats improved to 92%. L pleural space to be drained this Am. Pain is well-controlled. Denies new complaints.     Review of Systems   Constitutional: Positive for appetite change (decreased). Negative for activity change, chills and fever.   HENT: Positive for trouble swallowing and voice change (slurred speech).    Respiratory: Negative for chest tightness and shortness of breath.    Cardiovascular: Negative for chest pain, palpitations and leg swelling.   Gastrointestinal: Negative for abdominal distention, abdominal pain, constipation, diarrhea, nausea and vomiting.   Endocrine: Negative for cold intolerance and heat intolerance.   Skin: Negative for color change.   Neurological: Negative for weakness, light-headedness and headaches.   Hematological: Negative for adenopathy. Does not bruise/bleed easily.   Psychiatric/Behavioral: Negative for agitation and behavioral problems.     Objective:     Vital Signs (Most Recent):  Temp: 97.7 °F (36.5 °C) (08/10/17 0813)  Pulse: 97 (08/10/17 0813)  Resp: 20 (08/10/17 0813)  BP: (!) 142/77 (08/10/17 0813)  SpO2: 97 % (08/10/17 0813) Vital Signs (24h Range):  Temp:  [97.5 °F (36.4 °C)-99.1 °F (37.3 °C)] 97.7 °F (36.5 °C)  Pulse:  [] 97  Resp:  [17-20] 20  SpO2:  [95 %-99 %] 97 %  BP: (135-158)/(62-80) 142/77     Weight: 68 kg (150 lb)  Body mass index is 21.52 kg/m².    Intake/Output Summary (Last 24 hours) at 08/10/17 0912  Last data filed at 08/10/17 0600   Gross per 24 hour   Intake             1880 ml   Output              225 ml   Net             1655 ml      Physical Exam   Constitutional: He is oriented to person, place, and time. He appears well-developed and well-nourished.   HENT:   Head: Normocephalic and atraumatic.   Mouth/Throat:  Oropharynx is clear and moist and mucous membranes are normal.   Eyes: Conjunctivae and EOM are normal.   Neck: No JVD present. No tracheal deviation present.   Cardiovascular: Normal rate, regular rhythm and normal heart sounds.    Pulmonary/Chest: Effort normal. He has decreased breath sounds in the left middle field.   Increased WOB   Abdominal: Soft. Bowel sounds are normal.   Musculoskeletal: He exhibits no edema or deformity.   Neurological: He is alert and oriented to person, place, and time.   Skin: Skin is warm and dry.   Psychiatric: He has a normal mood and affect. His behavior is normal.   Vitals reviewed.      Significant Labs:   Recent Results (from the past 24 hour(s))   Basic metabolic panel    Collection Time: 08/10/17  5:27 AM   Result Value Ref Range    Sodium 137 136 - 145 mmol/L    Potassium 4.1 3.5 - 5.1 mmol/L    Chloride 107 95 - 110 mmol/L    CO2 20 (L) 23 - 29 mmol/L    Glucose 127 (H) 70 - 110 mg/dL    BUN, Bld 20 8 - 23 mg/dL    Creatinine 0.7 0.5 - 1.4 mg/dL    Calcium 8.6 (L) 8.7 - 10.5 mg/dL    Anion Gap 10 8 - 16 mmol/L    eGFR if African American >60.0 >60 mL/min/1.73 m^2    eGFR if non African American >60.0 >60 mL/min/1.73 m^2   Magnesium    Collection Time: 08/10/17  5:27 AM   Result Value Ref Range    Magnesium 2.1 1.6 - 2.6 mg/dL   Phosphorus    Collection Time: 08/10/17  5:27 AM   Result Value Ref Range    Phosphorus 3.7 2.7 - 4.5 mg/dL     Significant Imaging:  Barium Esophargram 08/06/2017:  Mild narrowing of the proximal esophagus at the level of the clavicular heads, most likely correlating with external compression from this patient's known aberrant right subclavian artery as seen on prior CT soft tissue neck 8/4/2017.    Significant laryngeal penetration of liquid barium with possible subglottic tracheal aspiration, limiting complete examination.     Barium tablet lodged within the vallecula and maneuvers to dislodge the tablet were unsuccessful.    ______________________________________

## 2017-08-11 PROBLEM — J90 RECURRENT LEFT PLEURAL EFFUSION: Status: ACTIVE | Noted: 2017-08-11

## 2017-08-11 PROBLEM — R13.10 DYSPHAGIA: Status: ACTIVE | Noted: 2017-08-08

## 2017-08-11 PROBLEM — Z78.9 ON TOTAL PARENTERAL NUTRITION (TPN): Status: ACTIVE | Noted: 2017-08-11

## 2017-08-11 LAB
ANION GAP SERPL CALC-SCNC: 11 MMOL/L
BUN SERPL-MCNC: 17 MG/DL
CALCIUM SERPL-MCNC: 8.5 MG/DL
CHLORIDE SERPL-SCNC: 104 MMOL/L
CO2 SERPL-SCNC: 21 MMOL/L
COMPLEXED PSA SERPL-MCNC: 0.45 NG/ML
CREAT SERPL-MCNC: 0.6 MG/DL
EST. GFR  (AFRICAN AMERICAN): >60 ML/MIN/1.73 M^2
EST. GFR  (NON AFRICAN AMERICAN): >60 ML/MIN/1.73 M^2
GLUCOSE SERPL-MCNC: 117 MG/DL
MAGNESIUM SERPL-MCNC: 1.4 MG/DL
PHOSPHATE SERPL-MCNC: 3.4 MG/DL
POTASSIUM SERPL-SCNC: 4.3 MMOL/L
SODIUM SERPL-SCNC: 136 MMOL/L

## 2017-08-11 PROCEDURE — 84153 ASSAY OF PSA TOTAL: CPT

## 2017-08-11 PROCEDURE — 99233 SBSQ HOSP IP/OBS HIGH 50: CPT | Mod: GC,,, | Performed by: INTERNAL MEDICINE

## 2017-08-11 PROCEDURE — 25000003 PHARM REV CODE 250: Performed by: STUDENT IN AN ORGANIZED HEALTH CARE EDUCATION/TRAINING PROGRAM

## 2017-08-11 PROCEDURE — 63600175 PHARM REV CODE 636 W HCPCS: Performed by: INTERNAL MEDICINE

## 2017-08-11 PROCEDURE — 97803 MED NUTRITION INDIV SUBSEQ: CPT | Performed by: DIETITIAN, REGISTERED

## 2017-08-11 PROCEDURE — A4216 STERILE WATER/SALINE, 10 ML: HCPCS | Performed by: STUDENT IN AN ORGANIZED HEALTH CARE EDUCATION/TRAINING PROGRAM

## 2017-08-11 PROCEDURE — 25000003 PHARM REV CODE 250: Performed by: INTERNAL MEDICINE

## 2017-08-11 PROCEDURE — 63600175 PHARM REV CODE 636 W HCPCS: Performed by: STUDENT IN AN ORGANIZED HEALTH CARE EDUCATION/TRAINING PROGRAM

## 2017-08-11 PROCEDURE — 80048 BASIC METABOLIC PNL TOTAL CA: CPT

## 2017-08-11 PROCEDURE — C9113 INJ PANTOPRAZOLE SODIUM, VIA: HCPCS | Performed by: INTERNAL MEDICINE

## 2017-08-11 PROCEDURE — 84100 ASSAY OF PHOSPHORUS: CPT

## 2017-08-11 PROCEDURE — 83735 ASSAY OF MAGNESIUM: CPT

## 2017-08-11 PROCEDURE — 20600001 HC STEP DOWN PRIVATE ROOM

## 2017-08-11 PROCEDURE — 36415 COLL VENOUS BLD VENIPUNCTURE: CPT

## 2017-08-11 PROCEDURE — 92526 ORAL FUNCTION THERAPY: CPT

## 2017-08-11 PROCEDURE — 99223 1ST HOSP IP/OBS HIGH 75: CPT | Mod: GC,,, | Performed by: PSYCHIATRY & NEUROLOGY

## 2017-08-11 RX ORDER — ONDANSETRON 2 MG/ML
4 INJECTION INTRAMUSCULAR; INTRAVENOUS EVERY 6 HOURS PRN
Status: DISCONTINUED | OUTPATIENT
Start: 2017-08-11 | End: 2017-08-11

## 2017-08-11 RX ORDER — MAGNESIUM SULFATE HEPTAHYDRATE 40 MG/ML
2 INJECTION, SOLUTION INTRAVENOUS
Status: COMPLETED | OUTPATIENT
Start: 2017-08-11 | End: 2017-08-11

## 2017-08-11 RX ORDER — ONDANSETRON 2 MG/ML
8 INJECTION INTRAMUSCULAR; INTRAVENOUS EVERY 6 HOURS PRN
Status: DISCONTINUED | OUTPATIENT
Start: 2017-08-11 | End: 2017-08-14 | Stop reason: HOSPADM

## 2017-08-11 RX ADMIN — Medication 3 ML: at 03:08

## 2017-08-11 RX ADMIN — METOPROLOL TARTRATE 5 MG: 5 INJECTION INTRAVENOUS at 06:08

## 2017-08-11 RX ADMIN — METOPROLOL TARTRATE 5 MG: 5 INJECTION INTRAVENOUS at 10:08

## 2017-08-11 RX ADMIN — Medication 3 ML: at 09:08

## 2017-08-11 RX ADMIN — ONDANSETRON 8 MG: 2 INJECTION INTRAMUSCULAR; INTRAVENOUS at 09:08

## 2017-08-11 RX ADMIN — MAGNESIUM SULFATE IN WATER 2 G: 40 INJECTION, SOLUTION INTRAVENOUS at 09:08

## 2017-08-11 RX ADMIN — METOPROLOL TARTRATE 5 MG: 5 INJECTION INTRAVENOUS at 09:08

## 2017-08-11 RX ADMIN — ASCORBIC ACID, VITAMIN A PALMITATE, CHOLECALCIFEROL, THIAMINE HYDROCHLORIDE, RIBOFLAVIN-5 PHOSPHATE SODIUM, PYRIDOXINE HYDROCHLORIDE, NIACINAMIDE, DEXPANTHENOL, ALPHA-TOCOPHEROL ACETATE, VITAMIN K1, FOLIC ACID, BIOTIN, CYANOCOBALAMIN: 200; 3300; 200; 6; 3.6; 6; 40; 15; 10; 150; 600; 60; 5 INJECTION, SOLUTION INTRAVENOUS at 07:08

## 2017-08-11 RX ADMIN — ACETAMINOPHEN 650 MG: 650 SUPPOSITORY RECTAL at 03:08

## 2017-08-11 RX ADMIN — CIPROFLOXACIN 400 MG: 2 INJECTION, SOLUTION INTRAVENOUS at 02:08

## 2017-08-11 RX ADMIN — METOPROLOL TARTRATE 5 MG: 5 INJECTION INTRAVENOUS at 03:08

## 2017-08-11 RX ADMIN — METOPROLOL TARTRATE 5 MG: 5 INJECTION INTRAVENOUS at 02:08

## 2017-08-11 RX ADMIN — Medication 3 ML: at 08:08

## 2017-08-11 RX ADMIN — PANTOPRAZOLE SODIUM 40 MG: 40 INJECTION, POWDER, FOR SOLUTION INTRAVENOUS at 09:08

## 2017-08-11 RX ADMIN — MAGNESIUM SULFATE IN WATER 2 G: 40 INJECTION, SOLUTION INTRAVENOUS at 06:08

## 2017-08-11 RX ADMIN — CIPROFLOXACIN 400 MG: 2 INJECTION, SOLUTION INTRAVENOUS at 03:08

## 2017-08-11 NOTE — ASSESSMENT & PLAN NOTE
Stable from prior assessment; Patient alert this Am, oriented to place and name, but disoriented to month or year  -Bcx and UA from yesterday are unrevealing thus far for cause of altered mentation  -Will consider LP today to assess for malignant extension into the CNS  -CXR yesterday refused, spoke with patient will reattempt today in order to assess for interval change

## 2017-08-11 NOTE — SUBJECTIVE & OBJECTIVE
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     Atrial flutter     cardioversion 4/12/16    Cancer     Chronic kidney disease     Chronic obstructive pulmonary disease     CKD (chronic kidney disease) stage 3, GFR 30-59 ml/min 6/27/2016    Closed fracture of one rib of left side 3/21/2017    Discovered 11/16    COPD (chronic obstructive pulmonary disease)     Coronary artery disease     [facility] 4/1/16: 50% proximal RAMUS    Coronary artery disease involving native coronary artery of native heart without angina pectoris 4/1/2016    [place] [facility] (4/1/2016) LM patent; LAD patent; Ramus non obstructive; LCX non obstructive      Hypertension     Kidney stones     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 6/23/2017    Old 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/20/2016    3/16    S/P ascending aortic replacement 4/18/2016    Thoracic aortic dissection     Type A dissection s/p repair 4/13/16       Past Surgical History:   Procedure Laterality Date    APPENDECTOMY      CARDIAC CATHETERIZATION      EYE SURGERY      KIDNEY STONE SURGERY      4 surgeries    TONSILLECTOMY      VASCULAR SURGERY         Review of patient's allergies indicates:   Allergen Reactions    Lipitor [atorvastatin] Other (See Comments)     Leg cramps    Cephalexin Nausea And Vomiting       Current Neurological Medications:   Cipro    No current facility-administered medications on file prior to encounter.      Current Outpatient Prescriptions on File Prior to Encounter   Medication Sig    apixaban 5 mg Tab Take 1 tablet (5 mg total) by mouth 2 (two) times daily.    aspirin (ECOTRIN) 81 MG EC tablet Take 1 tablet (81 mg total) by mouth once daily.    cyproheptadine (PERIACTIN) 4 mg tablet Take 1 tablet (4 mg total) by mouth 4 (four) times daily.    DOCUSATE SODIUM (COLACE ORAL) Take 1 capsule by mouth once daily.     folic acid  (FOLVITE) 1 MG tablet Take 1 tablet (1 mg total) by mouth once daily. Start today    metoprolol succinate (TOPROL-XL) 25 MG 24 hr tablet Take 1 tablet (25 mg total) by mouth once daily.    morphine (MS CONTIN) 15 MG 12 hr tablet Take 1 tablet (15 mg total) by mouth 2 (two) times daily.    multivitamin (THERAGRAN) per tablet Take 1 tablet by mouth once daily.    omeprazole (PRILOSEC) 20 MG capsule Take 1 capsule (20 mg total) by mouth once daily. (Patient taking differently: Take 20 mg by mouth every morning. )    ondansetron (ZOFRAN) 8 MG tablet Take 1 tablet (8 mg total) by mouth 4 (four) times daily as needed for Nausea.    oxycodone-acetaminophen (PERCOCET) 5-325 mg per tablet Take 1 tablet by mouth every 4 (four) hours as needed for Pain.    ranitidine (ZANTAC) 150 MG tablet Take 1 tablet (150 mg total) by mouth 2 (two) times daily.    rosuvastatin (CRESTOR) 20 MG tablet Take 1 tablet (20 mg total) by mouth once daily. (Patient taking differently: Take 20 mg by mouth every evening. )     Family History     None        Social History Main Topics    Smoking status: Current Every Day Smoker     Packs/day: 0.25     Years: 55.00     Types: Cigarettes    Smokeless tobacco: Never Used      Comment: has Chantix at home    Alcohol use No    Drug use: No    Sexual activity: Yes     Partners: Female     Review of Systems   Reason unable to perform ROS: Pt has extreme difficulty in answering ROS.     Objective:     Vital Signs (Most Recent):  Temp: 97.1 °F (36.2 °C) (08/11/17 1600)  Pulse: 76 (08/11/17 1600)  Resp: (!) 28 (08/11/17 1600)  BP: 138/68 (08/11/17 1600)  SpO2: 95 % (08/11/17 1600) Vital Signs (24h Range):  Temp:  [97.1 °F (36.2 °C)-97.9 °F (36.6 °C)] 97.1 °F (36.2 °C)  Pulse:  [] 76  Resp:  [18-28] 28  SpO2:  [90 %-98 %] 95 %  BP: (138-164)/(68-80) 138/68     Weight: 68 kg (150 lb)  Body mass index is 21.52 kg/m².    Physical Exam   Constitutional: He appears well-developed. No distress.    HENT:   Head: Normocephalic.   Eyes: EOM are normal. Pupils are equal, round, and reactive to light.   Cardiovascular: Normal rate.  Exam reveals no friction rub.    No murmur heard.  Irregular rhythm   Pulmonary/Chest:   Increased effort, using accessory muscles of respiration.  Decreased breath sounds on L lung fields.   Abdominal: Soft. Bowel sounds are normal. He exhibits no distension. There is tenderness.   Musculoskeletal: He exhibits no edema.   Neurological:   Reflex Scores:       Tricep reflexes are 1+ on the right side and 1+ on the left side.       Bicep reflexes are 1+ on the right side and 1+ on the left side.       Brachioradialis reflexes are 1+ on the right side and 1+ on the left side.       Patellar reflexes are 1+ on the right side and 1+ on the left side.       Achilles reflexes are 1+ on the right side and 1+ on the left side.      NEUROLOGICAL EXAMINATION:     MENTAL STATUS        Pt awakens easily to voice.  Oriented to person, place, month/year. NOT oriented to day.    Follows 2 step commands.  Unable to spell 'world' backwards.  Able to identify common and uncommon objects.     CRANIAL NERVES     CN II   Visual fields full to confrontation.     CN III, IV, VI   Pupils are equal, round, and reactive to light.  Extraocular motions are normal.     CN V   Facial sensation intact.     CN VII   Facial expression full, symmetric.     CN VIII   CN VIII normal.     CN XI   Right sternocleidomastoid strength: normal  Left sternocleidomastoid strength: normal  Right trapezius strength: normal  Left trapezius strength: normal       Tongue protrudes midline, unable to visualize uvula.  Mouth is dry, mild amount of blood present.     MOTOR EXAM   Muscle bulk: decreased  Overall muscle tone: normal    Strength   Right deltoid: 4/5  Left deltoid: 4/5  Right biceps: 4/5  Left biceps: 4/5  Right triceps: 4/5  Left triceps: 4/5  Right interossei: 4/5  Left interossei: 4/5  Right iliopsoas: 3/5  Left  iliopsoas: 3/5  Right quadriceps: 3/5  Left quadriceps: 3/5  Right anterior tibial: 5/5  Left anterior tibial: 5/5  Right posterior tibial: 5/5  Left posterior tibial: 5/5  Right peroneal: 5/5  Left peroneal: 5/5  Right gastroc: 5/5  Left gastroc: 5/5       Strength exam complicated by pt's respiratory status and fatigue.    Pt was able to offer brief exertion of strength, which I graded here, but has trouble maintaining it for any significant period of time and requires a break to catch his breath.     REFLEXES     Reflexes   Right brachioradialis: 1+  Left brachioradialis: 1+  Right biceps: 1+  Left biceps: 1+  Right triceps: 1+  Left triceps: 1+  Right patellar: 1+  Left patellar: 1+  Right achilles: 1+  Left achilles: 1+  Right plantar: normal  Left plantar: normal  Right ankle clonus: absent  Left ankle clonus: absent    SENSORY EXAM   Light touch normal.     GAIT AND COORDINATION        Unable to assess finger/nose and heel/shin secondary to respiratory status       Significant Labs:   Recent Results (from the past 24 hour(s))   Basic metabolic panel    Collection Time: 08/11/17  3:41 AM   Result Value Ref Range    Sodium 136 136 - 145 mmol/L    Potassium 4.3 3.5 - 5.1 mmol/L    Chloride 104 95 - 110 mmol/L    CO2 21 (L) 23 - 29 mmol/L    Glucose 117 (H) 70 - 110 mg/dL    BUN, Bld 17 8 - 23 mg/dL    Creatinine 0.6 0.5 - 1.4 mg/dL    Calcium 8.5 (L) 8.7 - 10.5 mg/dL    Anion Gap 11 8 - 16 mmol/L    eGFR if African American >60.0 >60 mL/min/1.73 m^2    eGFR if non African American >60.0 >60 mL/min/1.73 m^2   Magnesium    Collection Time: 08/11/17  3:41 AM   Result Value Ref Range    Magnesium 1.4 (L) 1.6 - 2.6 mg/dL   Phosphorus    Collection Time: 08/11/17  3:41 AM   Result Value Ref Range    Phosphorus 3.4 2.7 - 4.5 mg/dL   PSA, Screening    Collection Time: 08/11/17  6:18 AM   Result Value Ref Range    PSA, SCREEN 0.45 0.00 - 4.00 ng/mL     Microbiology Results (last 7 days)     Procedure Component Value  Units Date/Time    Gram stain [[id number]]     Order Status:  No result Specimen:  CSF (Spinal Fluid)     CSF culture [[id number]]     Order Status:  No result Specimen:  CSF (Spinal Fluid)     Blood culture [[id number]] Collected:  08/10/17 1606    Order Status:  Completed Specimen:  Blood from Line, Port A Cath Updated:  17 0145     Blood Culture, Routine No Growth to date    Blood culture [[id number]] Collected:  17 0446    Order Status:  Completed Specimen:  Blood Updated:  17 0812     Blood Culture, Routine No growth after 5 days.    Narrative:       Aerobic \T\ anaerobic from site #1    Blood culture [[id number]] Collected:  17 0443    Order Status:  Completed Specimen:  Blood Updated:  17 0812     Blood Culture, Routine No growth after 5 days.    Narrative:       Aerobic \T\ anaerobic from site #2            Significant Imagin17 CXR: Per resident review,  Worsened L-sided pleural effusion.        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          8/3/17 CT Head: Per resident review,  No acute hemorrhage, mass effect.  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        17 MRI Brain w/ and w/o: Per resident review,  Possible pontine enhancement?

## 2017-08-11 NOTE — PT/OT/SLP PROGRESS
"Speech Language Pathology  Treatment    Clint Brown   MRN: [id number]   Admitting Diagnosis: Dysphagia lusoria syndrome    Diet recommendations: Solid Diet Level: NPO  Liquid Diet Level: NPO   -strict oral care advised    SLP Treatment Date: 08/11/17  Speech Start Time: 1049     Speech Stop Time: 1112     Speech Total (min): 23 min       TREATMENT BILLABLE MINUTES:  Treatment Swallowing Dysfunction 23    Has the patient been evaluated by SLP for swallowing? : Yes  Keep patient NPO?: Yes   General Precautions: Standard, aspiration, fall, other (see comments) (Dysarthria)  Current Respiratory Status: nasal cannula       Subjective:  SLP discussed Patient with nurse pre/post session  Patient presents labile, fatigued  He explains, "No more please, I can't take anymore"  He reports moderate lower back pain, nurse assists SLP in repositioning Patient in bed.     Pain/Comfort  Pain Rating 1: 6/10  Location - Orientation 1: lower  Location 1: back  Pain Addressed 1: Reposition, Nurse notified  Pain Rating Post-Intervention 1: 6/10    Objective:   Patient found with: peripheral IV. Pt found awake in bed, orientedx2. Patient repositioned in bed and HOB elevated to 90 degree angle. Patient with thick, brown-colored coating across hard and soft palates, oral care provided via toothette and suction set-up requested. Nurse in room to hook up suction and v/u coated hard and soft palate. Patient seen with trials of ice chip sliversx2. Patient with delayed coughing/choking 1 of 2 trials of ice chips and declines additional PO trials. Patient grimacing across attempts to initiate swallow and explains he feels ice dixon sitting in throat. Patient instructed to use chin tuck, multiple, effortful swallows and throat clears to clear. Pt with decreased command following and increased lethargy and asking SLP to stop. MD team in room for morning rounds during session and SLP reviews findings from PO trials. Patient declines additional " dysphagia therapy. SLP provides encouragement, reviews role of dysphagia therapy, and Patient continues to request SLP to let him rest. No further questions noted and session discontinued. Whiteboard current. Findings reviewed with nurse. Nurse encouraged to page SLP should Patient participation changes and/ or if family has any questions t/o service day and Nurse v/u.     FIM:  Social Interaction: 2 Maximal Direction--The patient interacts appropriately 25 to 49% of the time, but may beed restraint due to socially inappropriate behaviors.     Assessment:  Clint Brown is a 71 y.o. male with a medical diagnosis of Dysphagia lusoria syndrome and presents with Pharyngeal concerns for Dysphagia and decreased speech intelligibility. Findings of Dysarthria reviewed with MD team. ST to continue to monitor.     Discharge recommendations: Discharge Facility/Level Of Care Needs: home health speech therapy, other (see comments) (pending pt status)     Goals:    SLP Goals        Problem: SLP Goal    Goal Priority Disciplines Outcome   SLP Goal     SLP Ongoing (interventions implemented as appropriate)   Description:  SLP goal expected to be met by 8/11:  1. Pt will participate in MBSS when deemed medically appropriate in order to determine safest, least restrictive diet. MET 8/4  2. Pt will participate in ongoing assessment of clinical evaluation of swallow in order to determine safest, least restrictive diet.  MET 8/4  3. Pt will complete pharyngeal swallow exercises (shaker, mendelsohn, etc) with good effort 90% of attempts, MIN A, to increase strength/coordination of swallow fx                      Plan:   Patient to be seen Therapy Frequency: 5 x/week   Plan of Care expires: 09/02/17  Plan of Care reviewed with: patient  SLP Follow-up?: Yes          RHONDA Ruelas, [facility]-SLP  Speech-Language Pathology  Pager: [phone]  8/11/2017

## 2017-08-11 NOTE — ASSESSMENT & PLAN NOTE
Nutrition Diagnosis:  Inadequate protein/energy intake    Related to (etiology):   No enteral nutrition access or contraindications to enteral nutrition    Signs and Symptoms (as evidenced by):   pt NPO with PPN meeting 50% estimated needs    Interventions/Recommendations (treatment strategy):  Recommend enteral nutrition intervention. See RD progress note dated 8/9 for full assessment and recommendations. Thank you.    Nutrition Diagnosis Status:   Continues

## 2017-08-11 NOTE — HPI
Mr. Brown is a 72 yo male w/ PMH significant for recently diagnosed mesothelioma (mets to upper abd and L iliac bone; not on chemotherapy), A flutter (s/p ablation 7/28), aortic dissection (s/p repair 4/2016), CAD, HTN, who presented with dry mouth and dysarthria.    Wife and daughter at bedside.  They report that ~2 weeks ago (after ablation), pt awoke with suddenly worsened dysphagia and had softer speech.  Report that prior to two weeks ago, pt had been having difficulty with swallowing liquids.  Presented to [facility] on 8/4.  Primary team has been working up pt for dysphagia.  Has had MBSS -> NPO.  ST currently trying to work with pt, but concerned for encephalopathy.  Workup to date has included neck imaging, which revealed an aberrant R subclavian artery for which he was evaluated by vascular surgery, who do not believe it is a direct cause of his dysphagia.    Family reports that when pt arrived on 8/4, he was alert and oriented to person, place, time.  State that he could follow commands, ambulate short distances without assistance.  Report that his mental status has since waxed/waned since admission.  Primary team has started ciprofloxacin for possible prostatitis.  Currently on PPN.      Wife reports that pt has no other focal deficits, no complaints of focal numbness/tingling, weakness.  No prior history of stroke (although old L occipital infarct noted on CTH).  Previously on baby ASA 81 Qdaily.  At home, pt was on MS contin 15 BID.  While inpt, pt was briefly on toradol and dilaudid, but these have since been discontinued (last on 8/9).      Last MRI Brain w/ and w/o was 5/31/17.  At that time, MRI Brain was read as negative.  PET scan in 5/2017 revealed mets to abdomen and iliac bone.

## 2017-08-11 NOTE — PROGRESS NOTES
[facility]  Adult Nutrition  Progress Note    SUMMARY     Recommendations    Recommendation/Intervention:   1. If PEG to be placed, recommend Isosource at goal rate of 60 mL/hr to provide 2160 kcal, 98 g protein, and 1100 mL free fluid. Initiate at 10 mL/hr and increase as tolerated to 60 ml. Flush tube with 175 mL H2O q 4 hrs to aid in hydration. This provides 100% estimated energy and protein needs.   2. If oral diet to be started,recommend regular diet, texture per SLP   3. If parenteral nutrition to be continued, recomend Clinimix 5/20 @ goal rate of 75 mL/hr to meet patient's estimated needs. Current PPN is only meeting 50% estimated needs and is only meant for short duration.   4. RD following  Goals: Pt to receive > 75% EEN and EPN  Nutrition Goal Status: goal not met  Communication of RD Recs: reviewed with physician    Reason for Assessment    Reason for Assessment: RD follow-up  Diagnosis: cancer diagnosis/related complications, other (see comments) (stage IV metastatic mesothelioma, severe dysphagia)  Relevent Medical History: HTN, CKDIII, CAD, HTN, afib   Interdisciplinary Rounds: did not attend     General Information Comments: PPN still running at 100mL, despite RD recommendations form last visit. Family and team deciding on PEG placement vs passing swallow study and advancing diet. Per chart, pt refusing speech therapy. No family present at visit, PPN running.     Nutrition Discharge Planning: unable to determine at this time    Nutrition Prescription Ordered    Current Diet Order: NPO     Current Nutrition Support Formula Ordered: Clinimix 2.75/10     Evaluation of Received Nutrients/Fluid Intake    Energy Calories Required: not meeting needs    Protein Required: not meeting needs      Fluid Required: meeting needs     Tolerance: tolerating  % Intake of Estimated Energy Needs: 50 - 75 %  % Meal Intake: NPO     Nutrition Risk Screen     Nutrition Risk Screen: dysphagia or  "difficulty swallowing    Nutrition/Diet History    Patient Reported Diet/Restrictions/Preferences:  (HOWIE)     Factors Affecting Nutritional Intake: NPO, sore mouth     Labs/Tests/Procedures/Meds    Diagnostic Test/Procedure Review: reviewed  Pertinent Labs Reviewed: reviewed  Pertinent Labs Comments: Mg 1.4  Pertinent Medications Reviewed: reviewed  Pertinent Medications Comments: Duke's solution, folic acid, statin    Physical Findings    Overall Physical Appearance: nourished     Oral/Mouth Cavity: WDL  Skin: intact    Anthropometrics    Temp: 97.7 °F (36.5 °C)     Height: 5' 10" (177.8 cm)     Weight: 68 kg (150 lb)  Ideal Body Weight (IBW), Male: 166 lb     % Ideal Body Weight, Male (lb): 90.36 lb     BMI (Calculated): 21.6  BMI Grade: 18.5-24.9 - normal  Weight Loss: unintentional  Usual Body Weight (UBW), k.45 kg     % Usual Body Weight: 96.78  % Weight Change From Usual Weight: 3.22 %             Estimated/Assessed Needs    Weight Used For Calorie Calculations: 68 kg (149 lb 14.6 oz)   Height (cm): 177.8 cm  Energy Calorie Requirements (kcal): 7253-0273 kcal/day (30-35 kcal/kg)  Energy Need Method: Kcal/kg     RMR (Milroy-St. 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 See RD progress note dated  for full assessment and recommendations. Thank " you.    Nutrition Diagnosis Status:   Continues              Monitor and Evaluation    Food and Nutrient Intake: energy intake, parenteral nutrition intake, food and beverage intake, enteral nutrition intake  Food and Nutrient Adminstration: diet order, enteral and parenteral nutrition administration        Anthropometric Measurements: weight, weight change, body mass index  Biochemical Data, Medical Tests and Procedures: inflammatory profile, glucose/endocrine profile, gastrointestinal profile, electrolyte and renal panel, lipid profile  Nutrition-Focused Physical Findings: overall appearance    Nutrition Risk    Level of Risk:  (2x/week)    Nutrition Follow-Up    RD Follow-up?: Yes

## 2017-08-11 NOTE — ASSESSMENT & PLAN NOTE
Hypertensive currently, 158/80 this AM  -Patient has remained low hypertensive to normotensive this admission  -Will continue with current treatment, monitor for changes and intervene as appropriate

## 2017-08-11 NOTE — ASSESSMENT & PLAN NOTE
1.4 this Am, will be replaced with 2g MagSulfate x2  -Will continue to monitor and replace PRN

## 2017-08-11 NOTE — PLAN OF CARE
Problem: Patient Care Overview  Goal: Plan of Care Review  Outcome: Ongoing (interventions implemented as appropriate)  REMAINS NPO.ASPIRATION PRECAUTIONS MAINTAINED. HOB 30 DEGREES, T-MAX 99.9 TELE NSR/ST, HR . FREQUENT REPOSITION WITH WEDGE, SKIN INTACT. WILL CONTINUE TO MONITOR. TREATMENT PLAM DISCUSSED WITH SPOUS AND DAUGHTER. DR OATES CAME TO BEDSIDE.

## 2017-08-11 NOTE — CONSULTS
[facility]  Neurology  Consult Note    Patient Name: Clint Brown  MRN: [id number]  Admission Date: 8/3/2017  Hospital Length of Stay: 8 days  Code Status: Full Code   Attending Provider: Audie Max MD   Consulting Provider: Danilo Medrano MD  Primary Care Physician: Jean Claude Griffith DO  Principal Problem:Dysphagia    Inpatient consult to Neurology  Consult performed by: DANILO MEDRANO  Consult ordered by: DEDE OATES  Reason for consult: dysphagia         Subjective:     Chief Complaint:  Dysphagia     HPI:   Mr. Brown is a 72 yo male w/ PMH significant for recently diagnosed mesothelioma (mets to upper abd and L iliac bone; not on chemotherapy), A flutter (s/p ablation 7/28), aortic dissection (s/p repair 4/2016), CAD, HTN, who presented with dry mouth and dysarthria.    Wife and daughter at bedside.  They report that ~2 weeks ago (after ablation), pt awoke with suddenly worsened dysphagia and had softer speech.  Report that prior to two weeks ago, pt had been having difficulty with swallowing liquids.  Presented to [facility] on 8/4.  Primary team has been working up pt for dysphagia.  Has had MBSS -> NPO.  ST currently trying to work with pt, but concerned for encephalopathy.  Workup to date has included neck imaging, which revealed an aberrant R subclavian artery for which he was evaluated by vascular surgery, who do not believe it is a direct cause of his dysphagia.    Family reports that when pt arrived on 8/4, he was alert and oriented to person, place, time.  State that he could follow commands, ambulate short distances without assistance.  Report that his mental status has since waxed/waned since admission.  Primary team has started ciprofloxacin for possible prostatitis.  Currently on PPN.      Wife reports that pt has no other focal deficits, no complaints of focal numbness/tingling, weakness.  No prior history of stroke (although old L occipital infarct noted on CTH).   Previously on baby ASA 81 Qdaily.  At home, pt was on MS contin 15 BID.  While inpt, pt was briefly on toradol and dilaudid, but these have since been discontinued (last on 8/9).      Last MRI Brain w/ and w/o was 5/31/17.  At that time, MRI Brain was read as negative.  PET scan in 5/2017 revealed mets to abdomen and iliac bone.       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     Atrial flutter     cardioversion 4/12/16    Cancer     Chronic kidney disease     Chronic obstructive pulmonary disease     CKD (chronic kidney disease) stage 3, GFR 30-59 ml/min 6/27/2016    Closed fracture of one rib of left side 3/21/2017    Discovered 11/16    COPD (chronic obstructive pulmonary disease)     Coronary artery disease     [facility] 4/1/16: 50% proximal RAMUS    Coronary artery disease involving native coronary artery of native heart without angina pectoris 4/1/2016    [facility] (4/1/2016) LM patent; LAD patent; Ramus non obstructive; LCX non obstructive      Hypertension     Kidney stones     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 6/23/2017    Old 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/20/2016    3/16    S/P ascending aortic replacement 4/18/2016    Thoracic aortic dissection     Type A dissection s/p repair 4/13/16       Past Surgical History:   Procedure Laterality Date    APPENDECTOMY      CARDIAC CATHETERIZATION      EYE SURGERY      KIDNEY STONE SURGERY      4 surgeries    TONSILLECTOMY      VASCULAR SURGERY         Review of patient's allergies indicates:   Allergen Reactions    Lipitor [atorvastatin] Other (See Comments)     Leg cramps    Cephalexin Nausea And Vomiting       Current Neurological Medications:   Cipro    No current facility-administered medications on file prior to encounter.      Current Outpatient Prescriptions on File Prior to Encounter   Medication Sig    apixaban 5  mg Tab Take 1 tablet (5 mg total) by mouth 2 (two) times daily.    aspirin (ECOTRIN) 81 MG EC tablet Take 1 tablet (81 mg total) by mouth once daily.    cyproheptadine (PERIACTIN) 4 mg tablet Take 1 tablet (4 mg total) by mouth 4 (four) times daily.    DOCUSATE SODIUM (COLACE ORAL) Take 1 capsule by mouth once daily.     folic acid (FOLVITE) 1 MG tablet Take 1 tablet (1 mg total) by mouth once daily. Start today    metoprolol succinate (TOPROL-XL) 25 MG 24 hr tablet Take 1 tablet (25 mg total) by mouth once daily.    morphine (MS CONTIN) 15 MG 12 hr tablet Take 1 tablet (15 mg total) by mouth 2 (two) times daily.    multivitamin (THERAGRAN) per tablet Take 1 tablet by mouth once daily.    omeprazole (PRILOSEC) 20 MG capsule Take 1 capsule (20 mg total) by mouth once daily. (Patient taking differently: Take 20 mg by mouth every morning. )    ondansetron (ZOFRAN) 8 MG tablet Take 1 tablet (8 mg total) by mouth 4 (four) times daily as needed for Nausea.    oxycodone-acetaminophen (PERCOCET) 5-325 mg per tablet Take 1 tablet by mouth every 4 (four) hours as needed for Pain.    ranitidine (ZANTAC) 150 MG tablet Take 1 tablet (150 mg total) by mouth 2 (two) times daily.    rosuvastatin (CRESTOR) 20 MG tablet Take 1 tablet (20 mg total) by mouth once daily. (Patient taking differently: Take 20 mg by mouth every evening. )     Family History     None        Social History Main Topics    Smoking status: Current Every Day Smoker     Packs/day: 0.25     Years: 55.00     Types: Cigarettes    Smokeless tobacco: Never Used      Comment: has Chantix at home    Alcohol use No    Drug use: No    Sexual activity: Yes     Partners: Female     Review of Systems   Reason unable to perform ROS: Pt has extreme difficulty in answering ROS.     Objective:     Vital Signs (Most Recent):  Temp: 97.1 °F (36.2 °C) (08/11/17 1600)  Pulse: 76 (08/11/17 1600)  Resp: (!) 28 (08/11/17 1600)  BP: 138/68 (08/11/17 1600)  SpO2: 95 %  (08/11/17 1600) Vital Signs (24h Range):  Temp:  [97.1 °F (36.2 °C)-97.9 °F (36.6 °C)] 97.1 °F (36.2 °C)  Pulse:  [] 76  Resp:  [18-28] 28  SpO2:  [90 %-98 %] 95 %  BP: (138-164)/(68-80) 138/68     Weight: 68 kg (150 lb)  Body mass index is 21.52 kg/m².    Physical Exam   Constitutional: He appears well-developed. No distress.   HENT:   Head: Normocephalic.   Eyes: EOM are normal. Pupils are equal, round, and reactive to light.   Cardiovascular: Normal rate.  Exam reveals no friction rub.    No murmur heard.  Irregular rhythm   Pulmonary/Chest:   Increased effort, using accessory muscles of respiration.  Decreased breath sounds on L lung fields.   Abdominal: Soft. Bowel sounds are normal. He exhibits no distension. There is tenderness.   Musculoskeletal: He exhibits no edema.   Neurological:   Reflex Scores:       Tricep reflexes are 1+ on the right side and 1+ on the left side.       Bicep reflexes are 1+ on the right side and 1+ on the left side.       Brachioradialis reflexes are 1+ on the right side and 1+ on the left side.       Patellar reflexes are 1+ on the right side and 1+ on the left side.       Achilles reflexes are 1+ on the right side and 1+ on the left side.      NEUROLOGICAL EXAMINATION:     MENTAL STATUS        Pt awakens easily to voice.  Oriented to person, place, month/year. NOT oriented to day.    Follows 2 step commands.  Unable to spell 'world' backwards.  Able to identify common and uncommon objects.     CRANIAL NERVES     CN II   Visual fields full to confrontation.     CN III, IV, VI   Pupils are equal, round, and reactive to light.  Extraocular motions are normal.     CN V   Facial sensation intact.     CN VII   Facial expression full, symmetric.     CN VIII   CN VIII normal.     CN XI   Right sternocleidomastoid strength: normal  Left sternocleidomastoid strength: normal  Right trapezius strength: normal  Left trapezius strength: normal       Tongue protrudes midline, unable to  visualize uvula.  Mouth is dry, mild amount of blood present.   Soft, high-pitched voice.    MOTOR EXAM   Muscle bulk: decreased  Overall muscle tone: normal    Strength   Right deltoid: 4/5  Left deltoid: 4/5  Right biceps: 4/5  Left biceps: 4/5  Right triceps: 4/5  Left triceps: 4/5  Right interossei: 4/5  Left interossei: 4/5  Right iliopsoas: 3/5  Left iliopsoas: 3/5  Right quadriceps: 3/5  Left quadriceps: 3/5  Right anterior tibial: 5/5  Left anterior tibial: 5/5  Right posterior tibial: 5/5  Left posterior tibial: 5/5  Right peroneal: 5/5  Left peroneal: 5/5  Right gastroc: 5/5  Left gastroc: 5/5       Strength exam complicated by pt's respiratory status and fatigue.    Pt was able to offer brief exertion of strength, which I graded here, but has trouble maintaining it for any significant period of time and requires a break to catch his breath.     REFLEXES     Reflexes   Right brachioradialis: 1+  Left brachioradialis: 1+  Right biceps: 1+  Left biceps: 1+  Right triceps: 1+  Left triceps: 1+  Right patellar: 1+  Left patellar: 1+  Right achilles: 1+  Left achilles: 1+  Right plantar: normal  Left plantar: normal  Right ankle clonus: absent  Left ankle clonus: absent    SENSORY EXAM   Light touch normal.     GAIT AND COORDINATION        Unable to assess finger/nose and heel/shin secondary to respiratory status       Significant Labs:   Recent Results (from the past 24 hour(s))   Basic metabolic panel    Collection Time: 08/11/17  3:41 AM   Result Value Ref Range    Sodium 136 136 - 145 mmol/L    Potassium 4.3 3.5 - 5.1 mmol/L    Chloride 104 95 - 110 mmol/L    CO2 21 (L) 23 - 29 mmol/L    Glucose 117 (H) 70 - 110 mg/dL    BUN, Bld 17 8 - 23 mg/dL    Creatinine 0.6 0.5 - 1.4 mg/dL    Calcium 8.5 (L) 8.7 - 10.5 mg/dL    Anion Gap 11 8 - 16 mmol/L    eGFR if African American >60.0 >60 mL/min/1.73 m^2    eGFR if non African American >60.0 >60 mL/min/1.73 m^2   Magnesium    Collection Time: 08/11/17  3:41 AM    Result Value Ref Range    Magnesium 1.4 (L) 1.6 - 2.6 mg/dL   Phosphorus    Collection Time: 17  3:41 AM   Result Value Ref Range    Phosphorus 3.4 2.7 - 4.5 mg/dL   PSA, Screening    Collection Time: 17  6:18 AM   Result Value Ref Range    PSA, SCREEN 0.45 0.00 - 4.00 ng/mL     Microbiology Results (last 7 days)     Procedure Component Value Units Date/Time    Gram stain [[id number]]     Order Status:  No result Specimen:  CSF (Spinal Fluid)     CSF culture [[id number]]     Order Status:  No result Specimen:  CSF (Spinal Fluid)     Blood culture [[id number]] Collected:  08/10/17 1606    Order Status:  Completed Specimen:  Blood from Line, Port A Cath Updated:  17 0145     Blood Culture, Routine No Growth to date    Blood culture [[id number]] Collected:  17 0446    Order Status:  Completed Specimen:  Blood Updated:  17 0812     Blood Culture, Routine No growth after 5 days.    Narrative:       Aerobic \T\ anaerobic from site #1    Blood culture [[id number]] Collected:  17 0443    Order Status:  Completed Specimen:  Blood Updated:  17 0812     Blood Culture, Routine No growth after 5 days.    Narrative:       Aerobic \T\ anaerobic from site #2            Significant Imagin17 CXR: Per resident review,  Worsened L-sided pleural effusion.        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          8/3/17 CT Head: Per resident review,  No acute hemorrhage, mass effect.  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        17 MRI Brain w/ and w/o: Per resident review,  Possible pontine enhancement?        Assessment and Plan:     * Dysphagia    Mr. Brown is a 72 yo male w/ PMH significant for recently diagnosed mesothelioma (mets to upper abd and L iliac bone; not on chemotherapy), A flutter (s/p ablation ), aortic dissection (s/p repair 2016), CAD, HTN, who presented with dry mouth and dysarthria.  Neurology consulted to kimmie  dysphagia.    History is most concerning for malignancy.  Recent A flutter ablation, sudden worsening (per family report, difficulty with liquids prior, but awoke with significantly worsened dysphagia including solids) is concerning for infarction.  Potential for CNS infection.  Could also have severe deconditioning, coupled with AMS that is impairing swallowing.      Other differentials, but less likely, include infection (CNS or peripheral), new onset myasthenia/LE (but fatigue seems to come from respiratory status as opposed to muscular), ALS (but no upper motor neuron signs), anti-GQ1b, paraneoplastic syndrome.    Recommendations  -MRI Brain and Neck, w/ and w/o contrast.  Although pt had MRI in 5/31, at that time (per family), pt had no/very limited dysphagia.  -Would defer LP for now pending imaging above.  -Correct/treat any metabolic/infectious abnormalities  -Delirium precautions as below  -Continue ST eval and treat      Delirium Precautions  -Re-orient patient frequently and keep pt's whiteboard up to date with current day and team member's names  -Keep shades open/room lit during day  -Low light at night (close blinds at night)  -Low stimulation, minimize interruptions at night  -Minimize use of restraints  -Encourage family to be at bedside  -Avoid opiates, benzodiazepines, antihistamines, anticholinergics, hypnotics as much as possible                    VTE Risk Mitigation         Ordered     Medium Risk of VTE  Once      08/03/17 4212          Thank you for your consult. I will follow-up with patient. Please contact us if you have any additional questions.    Danilo Medrano MD  Neurology  [facility]

## 2017-08-11 NOTE — ASSESSMENT & PLAN NOTE
Patient exhibiting increased WOB this Am, but improved from yesterday AM; L pleural space drained yesterday  -Satting 97% on RA this AM  -Will continue to monitor for signs of respiratory distress

## 2017-08-11 NOTE — SUBJECTIVE & OBJECTIVE
Interval History: NAEON. Patient is oriented to person and location. Could not recall the month or year. He appears to be clinically stable from prior assessment. Bcx and UA from yesterday are thus far unrevealing for etiology of his altered mentation and persistent dysphagia. Will f/u speech assessment later on this morning. May get an LP to assess for malignant extension into the CNS accounting for absent swallowing mechanism. He is breathing more comfortably this morning. Denies CP or palpitations. No new complaints at this time.     Review of Systems   Constitutional: Positive for appetite change (decreased). Negative for activity change, chills and fever.   HENT: Positive for trouble swallowing and voice change (slurred speech).    Respiratory: Negative for chest tightness and shortness of breath.    Cardiovascular: Negative for chest pain, palpitations and leg swelling.   Gastrointestinal: Negative for abdominal distention, abdominal pain, constipation, diarrhea, nausea and vomiting.   Endocrine: Negative for cold intolerance and heat intolerance.   Skin: Negative for color change.   Neurological: Negative for weakness, light-headedness and headaches.   Hematological: Negative for adenopathy. Does not bruise/bleed easily.   Psychiatric/Behavioral: Negative for agitation and behavioral problems.     Objective:     Vital Signs (Most Recent):  Temp: 97.5 °F (36.4 °C) (08/11/17 0750)  Pulse: 88 (08/11/17 0750)  Resp: 20 (08/11/17 0750)  BP: (!) 159/77 (08/11/17 0750)  SpO2: 97 % (08/11/17 0750) Vital Signs (24h Range):  Temp:  [97.5 °F (36.4 °C)-99.9 °F (37.7 °C)] 97.5 °F (36.4 °C)  Pulse:  [] 88  Resp:  [18-20] 20  SpO2:  [90 %-99 %] 97 %  BP: (142-168)/(75-80) 159/77     Weight: 68 kg (150 lb)  Body mass index is 21.52 kg/m².    Intake/Output Summary (Last 24 hours) at 08/11/17 6565  Last data filed at 08/11/17 0244   Gross per 24 hour   Intake             1000 ml   Output              250 ml   Net               750 ml      Physical Exam   Constitutional: He is oriented to person, place, and time. He appears well-developed and well-nourished.   HENT:   Head: Normocephalic and atraumatic.   Mouth/Throat: Oropharynx is clear and moist and mucous membranes are normal.   Eyes: Conjunctivae and EOM are normal.   Neck: No JVD present. No tracheal deviation present.   Cardiovascular: Normal rate, regular rhythm and normal heart sounds.    Pulmonary/Chest: Effort normal. He has decreased breath sounds in the left middle field.   Increased WOB   Abdominal: Soft. Bowel sounds are normal.   Musculoskeletal: He exhibits no edema or deformity.   Neurological: He is alert and oriented to person, place, and time.   Skin: Skin is warm and dry.   Psychiatric: He has a normal mood and affect. His behavior is normal.   Vitals reviewed.      Significant Labs:   Recent Results (from the past 24 hour(s))   Procalcitonin    Collection Time: 08/10/17  3:57 PM   Result Value Ref Range    Procalcitonin 0.39 (H) <0.25 ng/mL   Blood culture    Collection Time: 08/10/17  4:06 PM   Result Value Ref Range    Blood Culture, Routine No Growth to date    Urinalysis    Collection Time: 08/10/17  4:14 PM   Result Value Ref Range    Specimen UA Urine, Catheterized     Color, UA Iram Yellow, Straw, Iram    Appearance, UA Hazy (A) Clear    pH, UA 5.0 5.0 - 8.0    Specific Gravity, UA 1.015 1.005 - 1.030    Protein, UA Negative Negative    Glucose, UA Negative Negative    Ketones, UA Negative Negative    Bilirubin (UA) Negative Negative    Occult Blood UA 2+ (A) Negative    Nitrite, UA Negative Negative    Urobilinogen, UA 4.0 <2.0 EU/dL    Leukocytes, UA Negative Negative   Urinalysis Microscopic    Collection Time: 08/10/17  4:14 PM   Result Value Ref Range    RBC, UA 10 (H) 0 - 4 /hpf    WBC, UA 3 0 - 5 /hpf    Squam Epithel, UA 0 /hpf    Microscopic Comment SEE COMMENT    Basic metabolic panel    Collection Time: 08/11/17  3:41 AM   Result Value Ref  Range    Sodium 136 136 - 145 mmol/L    Potassium 4.3 3.5 - 5.1 mmol/L    Chloride 104 95 - 110 mmol/L    CO2 21 (L) 23 - 29 mmol/L    Glucose 117 (H) 70 - 110 mg/dL    BUN, Bld 17 8 - 23 mg/dL    Creatinine 0.6 0.5 - 1.4 mg/dL    Calcium 8.5 (L) 8.7 - 10.5 mg/dL    Anion Gap 11 8 - 16 mmol/L    eGFR if African American >60.0 >60 mL/min/1.73 m^2    eGFR if non African American >60.0 >60 mL/min/1.73 m^2   Magnesium    Collection Time: 08/11/17  3:41 AM   Result Value Ref Range    Magnesium 1.4 (L) 1.6 - 2.6 mg/dL   Phosphorus    Collection Time: 08/11/17  3:41 AM   Result Value Ref Range    Phosphorus 3.4 2.7 - 4.5 mg/dL   PSA, Screening    Collection Time: 08/11/17  6:18 AM   Result Value Ref Range    PSA, SCREEN 0.45 0.00 - 4.00 ng/mL     Significant Imaging:  CXR ordered yesterday Am, patient refused.

## 2017-08-11 NOTE — ASSESSMENT & PLAN NOTE
Mr. Brown is a 72 yo male w/ PMH significant for recently diagnosed mesothelioma (mets to upper abd and L iliac bone; not on chemotherapy), A flutter (s/p ablation 7/28), aortic dissection (s/p repair 4/2016), CAD, HTN, who presented with dry mouth and dysarthria.  Neurology consulted to eval dysphagia.    History is most concerning for malignancy.  Recent A flutter ablation, sudden worsening (per family report, difficulty with liquids prior, but awoke with significantly worsened dysphagia including solids) is concerning for infarction.  Potential for CNS infection.  Could also have severe deconditioning, coupled with AMS that is impairing swallowing.      Other differentials, but less likely, include infection (CNS or peripheral), new onset myasthenia/LE (but fatigue seems to come from respiratory status as opposed to muscular), ALS (but no upper motor neuron signs), anti-GQ1b, paraneoplastic syndrome.    Recommendations  -MRI Brain and Neck, w/ and w/o contrast.  Although pt had MRI in 5/31, at that time (per family), pt had no/very limited dysphagia.  -Would defer LP for now pending imaging above.  -Correct/treat any metabolic/infectious abnormalities  -Delirium precautions as below  -Continue ST eval and treat      Delirium Precautions  -Re-orient patient frequently and keep pt's whiteboard up to date with current day and team member's names  -Keep shades open/room lit during day  -Low light at night (close blinds at night)  -Low stimulation, minimize interruptions at night  -Minimize use of restraints  -Encourage family to be at bedside  -Avoid opiates, benzodiazepines, antihistamines, anticholinergics, hypnotics as much as possible

## 2017-08-11 NOTE — PLAN OF CARE
Problem: SLP Goal  Goal: SLP Goal  SLP goal expected to be met by 8/11:  1. Pt will participate in MBSS when deemed medically appropriate in order to determine safest, least restrictive diet. MET 8/4  2. Pt will participate in ongoing assessment of clinical evaluation of swallow in order to determine safest, least restrictive diet.  MET 8/4  3. Pt will complete pharyngeal swallow exercises (shaker, mendelsohn, etc) with good effort 90% of attempts, MIN A, to increase strength/coordination of swallow fx    Outcome: Ongoing (interventions implemented as appropriate)  Pt with overt S/S aspiration with low-level PO trials of ice chip slivers today. Patient with minimal PO acceptance. Patient with decreased articulatory precision and lingual ROM. ST to continue to monitor. REC: Continue NPO with alternative means nutrition/hydration/medication. Patient not appropriate for re-assessment of swallow fx via MBSS at this time due to minimal progress with Dysphagia tx.  Findings reviewed with MD team and nurse. Thank you.    FERNANDO Ruelas., CCC-SLP  Speech-Language Pathology  Pager: [phone]  8/11/2017

## 2017-08-11 NOTE — PLAN OF CARE
Problem: Patient Care Overview  Goal: Plan of Care Review  Outcome: Ongoing (interventions implemented as appropriate)  POC reviewed with patient. Pt remains free from fall/injury/trauma. VSS and NADN. Will continue to monitor patient.

## 2017-08-11 NOTE — PROGRESS NOTES
[facility] Hospital Medicine  Progress Note    Patient Name: Clint Brown  MRN: [id number]  Patient Class: IP- Inpatient   Admission Date: 8/3/2017  Length of Stay: 8 days  Attending Physician: Audie Max MD  Primary Care Provider: Jean Claude Griffith DO    [facility] Medicine Team: [facility] 4 Jose R Lynn MD    Subjective:     Principal Problem:Dysphagia lusoria syndrome    HPI:  Mr. Brown is a 70 y/o M with a history of metastatic mesothelioma, Type A aortic dissection (s/p repair in April 2016), persistent atrial flutter (s/p RFA ablation on 7/28), CAD, CKD, and HTN who presents with dry mouth and slurred speech. He reports that he hasn't eaten in 7 days due to to difficulty swallowing and now has consequential dry mouth and difficulty speaking. He has difficulty swallowing solid foods but is able to tolerate liquids. He does not think the dysphagia has progressively worsened; he just anticipated it to get better and it hasn't. Pt denies any fevers, chills, n/v, cough, confusion, hemiparesis or gait problems. Just reports feeling weak and fatigued. He feels thirsty and is requesting a Coke.      He has had a 5lb weight loss in the last 7 days. His decreased appetite is not new and previously he was started on periactin but continues to lose weight. The difference is that previously he didn't have appetite, and now he feels like he physically can't eat. Pt has constipation 2/2 decreased oral intake and daily opioid use. Pt unable to recall last BM.      Pt recently underwent a PADMINI + RFA ablation on 7/28 for Aflutter with RVR.  He has had difficulty swallowing since that hospitalization, although prior notes indicate he may have had anorexia and dysphagia in the past that was attributed to malignant mesothelioma. Since procedure, he denies any palpitation, chest pain, SOB.       He was diagnosed with mesothelioma on 4/17 when he presented mike left sided chest pain and heaviness, was  found to have a left-sided pleural effusion and underwent thoracentesis, cultures from which revealed this malignancy. Since then he has undergone a VATS procedure in 5/17 to drain the fluids. He has Stage IV mesothelioma secondary to mets to upper abdomen and left illiac bones. He has seen Dr. Nichols (Heme-Onc) in clinic, chemo-port placed 6/22 but was deferred chemo until his Aflutter was taken care of. He takes MS contin 15mg BID and percocet 1/day for pain on left and was started on periactin for poor appetite.    Hospital Course:  Clint Brown was admitted on 08/03/2017 with dysphagia.  MBS was ordered which patient failed and was, therefore, left NPO. 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 While this often represents an asymptomatic finding, this may contribute to the patient's reported history of dysphagia. ENT were consulted and examined the oropharynx which revealed FFL without abnormalities, no masses or lesions, vocal cords fully mobile bilaterally. Still in mild pain, ordered pluerx drain and IV pain meds. GI consulted and recommended fluoro esophagram with EGD after if indicated based on results. TPN started on 08/07/2017. Vascular consulted for the Aberrant right subclavian artery, recs against interventions as he is high risk for procedures.     8/9: radiology called and stated he is gone be high risk aspiration looking at his previous studies. Held esophagram, no change in patient state, will follow speech evalution for the oropharyngeal dysphagia in 8/11. For now will continue PPN, pain control.       Interval History: NAEON. Patient is oriented to person and location. Could not recall the month or year. He appears to be clinically stable from prior assessment. Bcx and UA from yesterday are thus far unrevealing for etiology of his altered mentation and persistent dysphagia. Will f/u speech assessment later  on this morning. May get an LP to assess for malignant extension into the CNS accounting for absent swallowing mechanism. He is breathing more comfortably this morning. Denies CP or palpitations. No new complaints at this time.     Review of Systems   Constitutional: Positive for appetite change (decreased). Negative for activity change, chills and fever.   HENT: Positive for trouble swallowing and voice change (slurred speech).    Respiratory: Negative for chest tightness and shortness of breath.    Cardiovascular: Negative for chest pain, palpitations and leg swelling.   Gastrointestinal: Negative for abdominal distention, abdominal pain, constipation, diarrhea, nausea and vomiting.   Endocrine: Negative for cold intolerance and heat intolerance.   Skin: Negative for color change.   Neurological: Negative for weakness, light-headedness and headaches.   Hematological: Negative for adenopathy. Does not bruise/bleed easily.   Psychiatric/Behavioral: Negative for agitation and behavioral problems.     Objective:     Vital Signs (Most Recent):  Temp: 97.5 °F (36.4 °C) (08/11/17 0750)  Pulse: 88 (08/11/17 0750)  Resp: 20 (08/11/17 0750)  BP: (!) 159/77 (08/11/17 0750)  SpO2: 97 % (08/11/17 0750) Vital Signs (24h Range):  Temp:  [97.5 °F (36.4 °C)-99.9 °F (37.7 °C)] 97.5 °F (36.4 °C)  Pulse:  [] 88  Resp:  [18-20] 20  SpO2:  [90 %-99 %] 97 %  BP: (142-168)/(75-80) 159/77     Weight: 68 kg (150 lb)  Body mass index is 21.52 kg/m².    Intake/Output Summary (Last 24 hours) at 08/11/17 0755  Last data filed at 08/11/17 0248   Gross per 24 hour   Intake             1000 ml   Output              250 ml   Net              750 ml      Physical Exam   Constitutional: He is oriented to person, place, and time. He appears well-developed and well-nourished.   HENT:   Head: Normocephalic and atraumatic.   Mouth/Throat: Oropharynx is clear and moist and mucous membranes are normal.   Eyes: Conjunctivae and EOM are normal.    Neck: No JVD present. No tracheal deviation present.   Cardiovascular: Normal rate, regular rhythm and normal heart sounds.    Pulmonary/Chest: Effort normal. He has decreased breath sounds in the left middle field.   Increased WOB   Abdominal: Soft. Bowel sounds are normal.   Musculoskeletal: He exhibits no edema or deformity.   Neurological: He is alert and oriented to person, place, and time.   Skin: Skin is warm and dry.   Psychiatric: He has a normal mood and affect. His behavior is normal.   Vitals reviewed.      Significant Labs:   Recent Results (from the past 24 hour(s))   Procalcitonin    Collection Time: 08/10/17  3:57 PM   Result Value Ref Range    Procalcitonin 0.39 (H) <0.25 ng/mL   Blood culture    Collection Time: 08/10/17  4:06 PM   Result Value Ref Range    Blood Culture, Routine No Growth to date    Urinalysis    Collection Time: 08/10/17  4:14 PM   Result Value Ref Range    Specimen UA Urine, Catheterized     Color, UA Iram Yellow, Straw, Iram    Appearance, UA Hazy (A) Clear    pH, UA 5.0 5.0 - 8.0    Specific Gravity, UA 1.015 1.005 - 1.030    Protein, UA Negative Negative    Glucose, UA Negative Negative    Ketones, UA Negative Negative    Bilirubin (UA) Negative Negative    Occult Blood UA 2+ (A) Negative    Nitrite, UA Negative Negative    Urobilinogen, UA 4.0 <2.0 EU/dL    Leukocytes, UA Negative Negative   Urinalysis Microscopic    Collection Time: 08/10/17  4:14 PM   Result Value Ref Range    RBC, UA 10 (H) 0 - 4 /hpf    WBC, UA 3 0 - 5 /hpf    Squam Epithel, UA 0 /hpf    Microscopic Comment SEE COMMENT    Basic metabolic panel    Collection Time: 08/11/17  3:41 AM   Result Value Ref Range    Sodium 136 136 - 145 mmol/L    Potassium 4.3 3.5 - 5.1 mmol/L    Chloride 104 95 - 110 mmol/L    CO2 21 (L) 23 - 29 mmol/L    Glucose 117 (H) 70 - 110 mg/dL    BUN, Bld 17 8 - 23 mg/dL    Creatinine 0.6 0.5 - 1.4 mg/dL    Calcium 8.5 (L) 8.7 - 10.5 mg/dL    Anion Gap 11 8 - 16 mmol/L    eGFR if  African American >60.0 >60 mL/min/1.73 m^2    eGFR if non African American >60.0 >60 mL/min/1.73 m^2   Magnesium    Collection Time: 08/11/17  3:41 AM   Result Value Ref Range    Magnesium 1.4 (L) 1.6 - 2.6 mg/dL   Phosphorus    Collection Time: 08/11/17  3:41 AM   Result Value Ref Range    Phosphorus 3.4 2.7 - 4.5 mg/dL   PSA, Screening    Collection Time: 08/11/17  6:18 AM   Result Value Ref Range    PSA, SCREEN 0.45 0.00 - 4.00 ng/mL     Significant Imaging:  CXR ordered yesterday Am, patient refused.     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  - has seen Dr. Nichols in clinic; chemo therapy until aflutter controlled  - for pain: MS Contin 15mg BID & percocet  as needed, will hold narcotics for AMS.   - periactin for decreased appetite  - pleurx in left thoracic, will drain 150 cc every other day for comfort.       * Dysphagia lusoria syndrome    Onset prior to PADMINI on 7/28 but has worsened since the procedure. Patient stated improvement in his symptoms this morning.   -CT soft tissue neck revealed aberrant R subclavian coursing behind the esophagus, likely unrelated as his dysphagia is new; L pleural thickening in  the setting of mesothelioma but no overt compression   -Failed MBS on 08/04/2017; failed FL Esophagram 08/07/2017  -PPN currently running.   - NG tube attempted multiple times but was unsuccessful.  -PO meds discontinued as patient is unable to tolerate  -Neurology consulted to assess patient. Will f/u their recs  - likely he will end up with PEG.      Encephalopathy    Stable from prior assessment; Patient alert this AM, oriented to place and name, but disoriented to month or year  -Bcx and UA from yesterday are unrevealing thus far for cause of altered mentation  -LP ordered to assess for malignant extension into the CNS  -CXR yesterday refused, spoke with patient will reattempt today in  order to assess for interval change       Typical atrial flutter    S/p RFA ablation on  and on apixaban 5mg for anticoagulation.   -TOPROL-XL 25mg po daily for rate control unable to be admisinsitered as patient is strict NPO; Lopressor 5mg q4h scheduled for rate control at this time  -Afib RVR in the ED which converted spontaneously; additional episode yesterday AM that converted s/p 3 doses of 5mg IV metoprolol      Essential hypertension    Hypertensive currently, 158/80 this AM  -Patient has remained low hypertensive to normotensive this admission  -Will continue with current treatment, monitor for changes and intervene as appropriate       CKD (chronic kidney disease) stage 3, GFR 30-59 ml/min    Stable from prior labs  BUN/Cr 20 and 0.7  -Will continue to follow      Chronic obstructive pulmonary disease    Patient exhibiting increased WOB this Am, but improved from yesterday AM; L pleural space drained yesterday  -Satting 97% on RA this AM  -Will continue to monitor for signs of respiratory distress      Dehydration    Resolved  -PPN running currently      Coronary artery disease involving native coronary artery of native heart without angina pectoris    -[place]  -[facility] (2016) LM patent; LAD patent; Ramus non obstructive; LCX non obstructive      Aberrant right subclavian artery    -No interventions per vascular, high risk.       Hypomagnesemia    1.4 this Am, will be replaced with 2g MagSulfate x2  -Will continue to monitor and replace PRN      Slow transit constipation    -Miralax & senna scheduled daily PRN      Hypercholesteremia    - chronic; last lipid panel ): TC: 104, HDL: 31, LDL: 58.6, T  - TG weekly with PPN  - will continue home rosuvastatin 20mg       S/P ascending aortic replacement    Stable at this time        VTE Risk Mitigation         Ordered     Medium Risk of VTE  Once      17 4034        Jose R Lynn MD  Department of Hospital Medicine   [facility]

## 2017-08-12 PROBLEM — E86.0 DEHYDRATION: Status: RESOLVED | Noted: 2017-08-03 | Resolved: 2017-08-12

## 2017-08-12 PROBLEM — Q27.8: Status: ACTIVE | Noted: 2017-08-12

## 2017-08-12 PROBLEM — R13.12 OROPHARYNGEAL DYSPHAGIA: Status: ACTIVE | Noted: 2017-08-08

## 2017-08-12 LAB
ANION GAP SERPL CALC-SCNC: 8 MMOL/L
BASOPHILS # BLD AUTO: 0.01 K/UL
BASOPHILS NFR BLD: 0.1 %
BUN SERPL-MCNC: 17 MG/DL
CALCIUM SERPL-MCNC: 8.4 MG/DL
CHLORIDE SERPL-SCNC: 101 MMOL/L
CO2 SERPL-SCNC: 23 MMOL/L
CREAT SERPL-MCNC: 0.6 MG/DL
DIFFERENTIAL METHOD: ABNORMAL
EOSINOPHIL # BLD AUTO: 0.1 K/UL
EOSINOPHIL NFR BLD: 0.4 %
ERYTHROCYTE [DISTWIDTH] IN BLOOD BY AUTOMATED COUNT: 15.8 %
EST. GFR  (AFRICAN AMERICAN): >60 ML/MIN/1.73 M^2
EST. GFR  (NON AFRICAN AMERICAN): >60 ML/MIN/1.73 M^2
GLUCOSE SERPL-MCNC: 114 MG/DL
HCT VFR BLD AUTO: 28.7 %
HGB BLD-MCNC: 9.2 G/DL
LYMPHOCYTES # BLD AUTO: 0.6 K/UL
LYMPHOCYTES NFR BLD: 3.9 %
MAGNESIUM SERPL-MCNC: 1.4 MG/DL
MCH RBC QN AUTO: 26.7 PG
MCHC RBC AUTO-ENTMCNC: 32.1 G/DL
MCV RBC AUTO: 83 FL
MONOCYTES # BLD AUTO: 1.8 K/UL
MONOCYTES NFR BLD: 13.1 %
NEUTROPHILS # BLD AUTO: 11.5 K/UL
NEUTROPHILS NFR BLD: 81.9 %
PHOSPHATE SERPL-MCNC: 3.4 MG/DL
PLATELET # BLD AUTO: 258 K/UL
PMV BLD AUTO: 9.7 FL
POTASSIUM SERPL-SCNC: 4.4 MMOL/L
RBC # BLD AUTO: 3.44 M/UL
SODIUM SERPL-SCNC: 132 MMOL/L
WBC # BLD AUTO: 14.01 K/UL

## 2017-08-12 PROCEDURE — 25000003 PHARM REV CODE 250: Performed by: STUDENT IN AN ORGANIZED HEALTH CARE EDUCATION/TRAINING PROGRAM

## 2017-08-12 PROCEDURE — 80048 BASIC METABOLIC PNL TOTAL CA: CPT

## 2017-08-12 PROCEDURE — A4216 STERILE WATER/SALINE, 10 ML: HCPCS | Performed by: STUDENT IN AN ORGANIZED HEALTH CARE EDUCATION/TRAINING PROGRAM

## 2017-08-12 PROCEDURE — 83735 ASSAY OF MAGNESIUM: CPT

## 2017-08-12 PROCEDURE — 85025 COMPLETE CBC W/AUTO DIFF WBC: CPT

## 2017-08-12 PROCEDURE — 84100 ASSAY OF PHOSPHORUS: CPT

## 2017-08-12 PROCEDURE — 63600175 PHARM REV CODE 636 W HCPCS: Performed by: INTERNAL MEDICINE

## 2017-08-12 PROCEDURE — 93010 ELECTROCARDIOGRAM REPORT: CPT | Mod: ,,, | Performed by: INTERNAL MEDICINE

## 2017-08-12 PROCEDURE — 99232 SBSQ HOSP IP/OBS MODERATE 35: CPT | Mod: GC,,, | Performed by: INTERNAL MEDICINE

## 2017-08-12 PROCEDURE — 25000003 PHARM REV CODE 250: Performed by: INTERNAL MEDICINE

## 2017-08-12 PROCEDURE — 63600175 PHARM REV CODE 636 W HCPCS: Performed by: STUDENT IN AN ORGANIZED HEALTH CARE EDUCATION/TRAINING PROGRAM

## 2017-08-12 PROCEDURE — C9113 INJ PANTOPRAZOLE SODIUM, VIA: HCPCS | Performed by: INTERNAL MEDICINE

## 2017-08-12 PROCEDURE — 20600001 HC STEP DOWN PRIVATE ROOM

## 2017-08-12 PROCEDURE — 93005 ELECTROCARDIOGRAM TRACING: CPT

## 2017-08-12 RX ORDER — MAGNESIUM SULFATE HEPTAHYDRATE 40 MG/ML
2 INJECTION, SOLUTION INTRAVENOUS
Status: COMPLETED | OUTPATIENT
Start: 2017-08-12 | End: 2017-08-12

## 2017-08-12 RX ADMIN — CIPROFLOXACIN 400 MG: 2 INJECTION, SOLUTION INTRAVENOUS at 02:08

## 2017-08-12 RX ADMIN — ASCORBIC ACID, VITAMIN A PALMITATE, CHOLECALCIFEROL, THIAMINE HYDROCHLORIDE, RIBOFLAVIN-5 PHOSPHATE SODIUM, PYRIDOXINE HYDROCHLORIDE, NIACINAMIDE, DEXPANTHENOL, ALPHA-TOCOPHEROL ACETATE, VITAMIN K1, FOLIC ACID, BIOTIN, CYANOCOBALAMIN: 200; 3300; 200; 6; 3.6; 6; 40; 15; 10; 150; 600; 60; 5 INJECTION, SOLUTION INTRAVENOUS at 10:08

## 2017-08-12 RX ADMIN — ONDANSETRON 8 MG: 2 INJECTION INTRAMUSCULAR; INTRAVENOUS at 02:08

## 2017-08-12 RX ADMIN — ACETAMINOPHEN 650 MG: 650 SUPPOSITORY RECTAL at 04:08

## 2017-08-12 RX ADMIN — ACETAMINOPHEN 650 MG: 650 SUPPOSITORY RECTAL at 11:08

## 2017-08-12 RX ADMIN — Medication 3 ML: at 02:08

## 2017-08-12 RX ADMIN — Medication 3 ML: at 05:08

## 2017-08-12 RX ADMIN — METOPROLOL TARTRATE 5 MG: 5 INJECTION INTRAVENOUS at 10:08

## 2017-08-12 RX ADMIN — MAGNESIUM SULFATE IN WATER 2 G: 40 INJECTION, SOLUTION INTRAVENOUS at 08:08

## 2017-08-12 RX ADMIN — MAGNESIUM SULFATE IN WATER 2 G: 40 INJECTION, SOLUTION INTRAVENOUS at 10:08

## 2017-08-12 RX ADMIN — METOPROLOL TARTRATE 5 MG: 5 INJECTION INTRAVENOUS at 06:08

## 2017-08-12 RX ADMIN — METOPROLOL TARTRATE 5 MG: 5 INJECTION INTRAVENOUS at 07:08

## 2017-08-12 RX ADMIN — METOPROLOL TARTRATE 5 MG: 5 INJECTION INTRAVENOUS at 09:08

## 2017-08-12 RX ADMIN — PANTOPRAZOLE SODIUM 40 MG: 40 INJECTION, POWDER, FOR SOLUTION INTRAVENOUS at 08:08

## 2017-08-12 RX ADMIN — METOPROLOL TARTRATE 5 MG: 5 INJECTION INTRAVENOUS at 02:08

## 2017-08-12 RX ADMIN — METOPROLOL TARTRATE 5 MG: 5 INJECTION INTRAVENOUS at 01:08

## 2017-08-12 RX ADMIN — METOPROLOL TARTRATE 5 MG: 5 INJECTION INTRAVENOUS at 05:08

## 2017-08-12 RX ADMIN — CIPROFLOXACIN 400 MG: 2 INJECTION, SOLUTION INTRAVENOUS at 01:08

## 2017-08-12 NOTE — PROGRESS NOTES
[facility] Hospital Medicine  Progress Note    Patient Name: Clint Brown  MRN: [id number]  Patient Class: IP- Inpatient   Admission Date: 8/3/2017  Length of Stay: 9 days  Attending Physician: Audie Max MD  Primary Care Provider: Jean Claude Griffith DO    [facility] Medicine Team: [facility] MED 4 Jose R Lynn MD    Subjective:     Principal Problem:Oropharyngeal dysphagia    HPI:  Mr. Brown is a 70 y/o M with a history of metastatic mesothelioma, Type A aortic dissection (s/p repair in April 2016), persistent atrial flutter (s/p RFA ablation on 7/28), CAD, CKD, and HTN who presents with dry mouth and slurred speech. He reports that he hasn't eaten in 7 days due to to difficulty swallowing and now has consequential dry mouth and difficulty speaking. He has difficulty swallowing solid foods but is able to tolerate liquids. He does not think the dysphagia has progressively worsened; he just anticipated it to get better and it hasn't. Pt denies any fevers, chills, n/v, cough, confusion, hemiparesis or gait problems. Just reports feeling weak and fatigued. He feels thirsty and is requesting a Coke.      He has had a 5lb weight loss in the last 7 days. His decreased appetite is not new and previously he was started on periactin but continues to lose weight. The difference is that previously he didn't have appetite, and now he feels like he physically can't eat. Pt has constipation 2/2 decreased oral intake and daily opioid use. Pt unable to recall last BM.      Pt recently underwent a PADMINI + RFA ablation on 7/28 for Aflutter with RVR.  He has had difficulty swallowing since that hospitalization, although prior notes indicate he may have had anorexia and dysphagia in the past that was attributed to malignant mesothelioma. Since procedure, he denies any palpitation, chest pain, SOB.       He was diagnosed with mesothelioma on 4/17 when he presented mike left sided chest pain and heaviness, was  found to have a left-sided pleural effusion and underwent thoracentesis, cultures from which revealed this malignancy. Since then he has undergone a VATS procedure in 5/17 to drain the fluids. He has Stage IV mesothelioma secondary to mets to upper abdomen and left illiac bones. He has seen Dr. Nichols (Heme-Onc) in clinic, chemo-port placed 6/22 but was deferred chemo until his Aflutter was taken care of. He takes MS contin 15mg BID and percocet 1/day for pain on left and was started on periactin for poor appetite.    Hospital Course:  Clint Brown was admitted on 08/03/2017 with dysphagia.  MBS was ordered which patient failed and was, therefore, left NPO. 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 While this often represents an asymptomatic finding, this may contribute to the patient's reported history of dysphagia. ENT were consulted and examined the oropharynx which revealed FFL without abnormalities, no masses or lesions, vocal cords fully mobile bilaterally. Still in mild pain, ordered pluerx drain and IV pain meds. GI consulted and recommended fluoro esophagram with EGD after if indicated based on results. TPN started on 08/07/2017. Vascular consulted for the Aberrant right subclavian artery, recs against interventions as he is high risk for procedures.     8/9: radiology called and stated he is gone be high risk aspiration looking at his previous studies. Held esophagram, no change in patient state, will follow speech evalution for the oropharyngeal dysphagia in 8/11. For now will continue PPN, pain control.       Interval History: Patient slept well. NAEON. This AM received a call from RN staff that patient was back in afib with a rate of 140. EKG ordered. Patient rate controled with one push of 5mg Metoprolol. Will continue to monitor, but he is hemodynamically stable at this time. No objective evidence of infection  thus far, still on Cipro 400mg q12h.      Review of Systems   Constitutional: Positive for appetite change (decreased). Negative for activity change, chills and fever.   HENT: Positive for trouble swallowing and voice change (slurred speech).    Respiratory: Negative for chest tightness and shortness of breath.    Cardiovascular: Negative for chest pain, palpitations and leg swelling.   Gastrointestinal: Negative for abdominal distention, abdominal pain, constipation, diarrhea, nausea and vomiting.   Endocrine: Negative for cold intolerance and heat intolerance.   Skin: Negative for color change.   Neurological: Negative for weakness, light-headedness and headaches.   Hematological: Negative for adenopathy. Does not bruise/bleed easily.   Psychiatric/Behavioral: Negative for agitation and behavioral problems.     Objective:     Vital Signs (Most Recent):  Temp: 97 °F (36.1 °C) (08/12/17 0800)  Pulse: 81 (08/12/17 0800)  Resp: (!) 31 (08/12/17 0800)  BP: 120/60 (08/12/17 0800)  SpO2: (!) 92 % (08/12/17 0800) Vital Signs (24h Range):  Temp:  [97 °F (36.1 °C)-98.2 °F (36.8 °C)] 97 °F (36.1 °C)  Pulse:  [] 81  Resp:  [20-31] 31  SpO2:  [90 %-96 %] 92 %  BP: (120-145)/(60-79) 120/60     Weight: 68 kg (150 lb)  Body mass index is 21.52 kg/m².    Intake/Output Summary (Last 24 hours) at 08/12/17 0830  Last data filed at 08/12/17 0600   Gross per 24 hour   Intake             2900 ml   Output             1050 ml   Net             1850 ml      Physical Exam   Constitutional: He is oriented to person, place, and time. He appears well-developed and well-nourished.   HENT:   Head: Normocephalic and atraumatic.   Mouth/Throat: Oropharynx is clear and moist and mucous membranes are normal.   Eyes: Conjunctivae and EOM are normal.   Neck: No JVD present. No tracheal deviation present.   Cardiovascular: Normal rate, regular rhythm and normal heart sounds.    Pulmonary/Chest: Effort normal. He has decreased breath sounds in the  left middle field.   Increased WOB   Abdominal: Soft. Bowel sounds are normal.   Musculoskeletal: He exhibits no edema or deformity.   Neurological: He is alert and oriented to person, place, and time.   Skin: Skin is warm and dry.   Psychiatric: He has a normal mood and affect. His behavior is normal.   Vitals reviewed.      Significant Labs:   Recent Results (from the past 24 hour(s))   Basic metabolic panel    Collection Time: 08/12/17  5:15 AM   Result Value Ref Range    Sodium 132 (L) 136 - 145 mmol/L    Potassium 4.4 3.5 - 5.1 mmol/L    Chloride 101 95 - 110 mmol/L    CO2 23 23 - 29 mmol/L    Glucose 114 (H) 70 - 110 mg/dL    BUN, Bld 17 8 - 23 mg/dL    Creatinine 0.6 0.5 - 1.4 mg/dL    Calcium 8.4 (L) 8.7 - 10.5 mg/dL    Anion Gap 8 8 - 16 mmol/L    eGFR if African American >60.0 >60 mL/min/1.73 m^2    eGFR if non African American >60.0 >60 mL/min/1.73 m^2   Magnesium    Collection Time: 08/12/17  5:15 AM   Result Value Ref Range    Magnesium 1.4 (L) 1.6 - 2.6 mg/dL   Phosphorus    Collection Time: 08/12/17  5:15 AM   Result Value Ref Range    Phosphorus 3.4 2.7 - 4.5 mg/dL   CBC auto differential    Collection Time: 08/12/17  5:15 AM   Result Value Ref Range    WBC 14.01 (H) 3.90 - 12.70 K/uL    RBC 3.44 (L) 4.60 - 6.20 M/uL    Hemoglobin 9.2 (L) 14.0 - 18.0 g/dL    Hematocrit 28.7 (L) 40.0 - 54.0 %    MCV 83 82 - 98 fL    MCH 26.7 (L) 27.0 - 31.0 pg    MCHC 32.1 32.0 - 36.0 g/dL    RDW 15.8 (H) 11.5 - 14.5 %    Platelets 258 150 - 350 K/uL    MPV 9.7 9.2 - 12.9 fL    Gran # 11.5 (H) 1.8 - 7.7 K/uL    Lymph # 0.6 (L) 1.0 - 4.8 K/uL    Mono # 1.8 (H) 0.3 - 1.0 K/uL    Eos # 0.1 0.0 - 0.5 K/uL    Baso # 0.01 0.00 - 0.20 K/uL    Gran% 81.9 (H) 38.0 - 73.0 %    Lymph% 3.9 (L) 18.0 - 48.0 %    Mono% 13.1 4.0 - 15.0 %    Eosinophil% 0.4 0.0 - 8.0 %    Basophil% 0.1 0.0 - 1.9 %    Differential Method Automated      Significant Imaging:   X-Ray Chest 08/11/2017:   Detrimental interval change in the appearance of the  chest since 8/3/17, relating to a significant increase in the volume of pleural fluid on the left side.    MRI w/wo ordered today per neuro rec, will f/u results    Assessment/Plan:      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    has mets to upper abdomen and left illiac bones.  - has seen Dr. Nichols in clinic; chemo therapy until aflutter controlled  - for pain: MS Contin 15mg BID & percocet  as needed, will hold narcotics for AMS.   - periactin for decreased appetite  - pleurx in left thoracic, will drain 150 cc every day for comfort.       * Oropharyngeal dysphagia    Onset prior to PADMINI on 7/28 but has worsened since the procedure. Patient stated improvement in his symptoms this morning.   -CT soft tissue neck revealed aberrant R subclavian coursing behind the esophagus, likely unrelated as his dysphagia is new; L pleural thickening in  the setting of mesothelioma but no overt compression   -Failed MBS on 08/04/2017; failed FL Esophagram 08/07/2017  -PPN currently running.   - NG tube attempted multiple times but was unsuccessful.  -PO meds discontinued as patient is unable to tolerate  -Neurology recommended repeat MRI w/wo in order to assess for interval change given persistent dysphagia. Will f/u results of LP  - likely he will end up with PEG.      Encephalopathy    Stable from prior assessment; Patient alert this Am, oriented to place and name, but disoriented to month or year  -Bcx and UA from yesterday are unrevealing thus far for cause of altered mentation  -Will consider LP today to assess for malignant extension into the CNS  -CXR repeated and revealed increased pleural fluid, drain changed to daily from q other day as a result.       Typical atrial flutter    S/p RFA ablation on 7/28 and on apixaban 5mg for anticoagulation.   -TOPROL-XL 25mg po daily for rate control unable to be admisinsitered as patient is strict NPO; Lopressor 5mg q4h scheduled for rate  control at this time  -Afib RVR with a rate of 140 this AM that converted s/p 1 doses of 5mg IV metoprolol; EKG NSR after episode, patient currently stable with a rate of 80      Essential hypertension    Normotensive currently, 120/60 after rate control of afib  -Patient has remained low hypertensive to normotensive this admission  -Will continue with current treatment, monitor for changes and intervene as appropriate       CKD (chronic kidney disease) stage 3, GFR 30-59 ml/min    Stable from prior labs  BUN/Cr 17 and 0.6 today  -Will continue to follow      Chronic obstructive pulmonary disease    Patient breathing comfortably this AM, but improved from yesterday AM; L pleural space to be drained daily   -Satting 92% on 2L NC this AM  -Will continue to monitor for signs of respiratory distress      Coronary artery disease involving native coronary artery of native heart without angina pectoris    -[place]  -[facility] (2016) LM patent; LAD patent; Ramus non obstructive; LCX non obstructive      Recurrent left pleural effusion    To be drained daily       On total parenteral nutrition (TPN)    PPN currently, will continue until neurology workup has concluded and cause of dysphagia has been identified.   -Will likely need a PEG, at which time PPN will be D/C'd      Aberrant right subclavian artery    -No interventions per vascular, high risk.       Hypomagnesemia    1.4 this AM, will be replaced with 2g MagSulfate x2  -Will continue to monitor and replace PRN      Slow transit constipation    -Miralax & senna scheduled daily PRN      Hypercholesteremia    - chronic; last lipid panel ): TC: 104, HDL: 31, LDL: 58.6, T  - TG weekly with PPN  - will continue home rosuvastatin 20mg when patient is able to tolerate po or s/p PEG placement      S/P ascending aortic replacement    Stable at this time        VTE Risk Mitigation         Ordered     Medium Risk of VTE  Once      17 1029        Jose R Lynn,  MD  Department of Hospital Medicine   [facility]

## 2017-08-12 NOTE — ASSESSMENT & PLAN NOTE
Patient breathing comfortably this AM, but improved from yesterday AM; L pleural space to be drained daily   -Satting 92% on 2L NC this AM  -Will continue to monitor for signs of respiratory distress

## 2017-08-12 NOTE — SUBJECTIVE & OBJECTIVE
Interval History: Patient slept well. NAEON. This AM received a call from RN staff that patient was back in afib with a rate of 140. EKG ordered. Patient rate controled with one push of 5mg Metoprolol. Will continue to monitor, but he is hemodynamically stable at this time. No objective evidence of infection thus far, still on Cipro 400mg q12h.      Review of Systems   Constitutional: Positive for appetite change (decreased). Negative for activity change, chills and fever.   HENT: Positive for trouble swallowing and voice change (slurred speech).    Respiratory: Negative for chest tightness and shortness of breath.    Cardiovascular: Negative for chest pain, palpitations and leg swelling.   Gastrointestinal: Negative for abdominal distention, abdominal pain, constipation, diarrhea, nausea and vomiting.   Endocrine: Negative for cold intolerance and heat intolerance.   Skin: Negative for color change.   Neurological: Negative for weakness, light-headedness and headaches.   Hematological: Negative for adenopathy. Does not bruise/bleed easily.   Psychiatric/Behavioral: Negative for agitation and behavioral problems.     Objective:     Vital Signs (Most Recent):  Temp: 97 °F (36.1 °C) (08/12/17 0800)  Pulse: 81 (08/12/17 0800)  Resp: (!) 31 (08/12/17 0800)  BP: 120/60 (08/12/17 0800)  SpO2: (!) 92 % (08/12/17 0800) Vital Signs (24h Range):  Temp:  [97 °F (36.1 °C)-98.2 °F (36.8 °C)] 97 °F (36.1 °C)  Pulse:  [] 81  Resp:  [20-31] 31  SpO2:  [90 %-96 %] 92 %  BP: (120-145)/(60-79) 120/60     Weight: 68 kg (150 lb)  Body mass index is 21.52 kg/m².    Intake/Output Summary (Last 24 hours) at 08/12/17 0830  Last data filed at 08/12/17 0600   Gross per 24 hour   Intake             2900 ml   Output             1050 ml   Net             1850 ml      Physical Exam   Constitutional: He is oriented to person, place, and time. He appears well-developed and well-nourished.   HENT:   Head: Normocephalic and atraumatic.    Mouth/Throat: Oropharynx is clear and moist and mucous membranes are normal.   Eyes: Conjunctivae and EOM are normal.   Neck: No JVD present. No tracheal deviation present.   Cardiovascular: Normal rate, regular rhythm and normal heart sounds.    Pulmonary/Chest: Effort normal. He has decreased breath sounds in the left middle field.   Increased WOB   Abdominal: Soft. Bowel sounds are normal.   Musculoskeletal: He exhibits no edema or deformity.   Neurological: He is alert and oriented to person, place, and time.   Skin: Skin is warm and dry.   Psychiatric: He has a normal mood and affect. His behavior is normal.   Vitals reviewed.      Significant Labs:   Recent Results (from the past 24 hour(s))   Basic metabolic panel    Collection Time: 08/12/17  5:15 AM   Result Value Ref Range    Sodium 132 (L) 136 - 145 mmol/L    Potassium 4.4 3.5 - 5.1 mmol/L    Chloride 101 95 - 110 mmol/L    CO2 23 23 - 29 mmol/L    Glucose 114 (H) 70 - 110 mg/dL    BUN, Bld 17 8 - 23 mg/dL    Creatinine 0.6 0.5 - 1.4 mg/dL    Calcium 8.4 (L) 8.7 - 10.5 mg/dL    Anion Gap 8 8 - 16 mmol/L    eGFR if African American >60.0 >60 mL/min/1.73 m^2    eGFR if non African American >60.0 >60 mL/min/1.73 m^2   Magnesium    Collection Time: 08/12/17  5:15 AM   Result Value Ref Range    Magnesium 1.4 (L) 1.6 - 2.6 mg/dL   Phosphorus    Collection Time: 08/12/17  5:15 AM   Result Value Ref Range    Phosphorus 3.4 2.7 - 4.5 mg/dL   CBC auto differential    Collection Time: 08/12/17  5:15 AM   Result Value Ref Range    WBC 14.01 (H) 3.90 - 12.70 K/uL    RBC 3.44 (L) 4.60 - 6.20 M/uL    Hemoglobin 9.2 (L) 14.0 - 18.0 g/dL    Hematocrit 28.7 (L) 40.0 - 54.0 %    MCV 83 82 - 98 fL    MCH 26.7 (L) 27.0 - 31.0 pg    MCHC 32.1 32.0 - 36.0 g/dL    RDW 15.8 (H) 11.5 - 14.5 %    Platelets 258 150 - 350 K/uL    MPV 9.7 9.2 - 12.9 fL    Gran # 11.5 (H) 1.8 - 7.7 K/uL    Lymph # 0.6 (L) 1.0 - 4.8 K/uL    Mono # 1.8 (H) 0.3 - 1.0 K/uL    Eos # 0.1 0.0 - 0.5 K/uL     Baso # 0.01 0.00 - 0.20 K/uL    Gran% 81.9 (H) 38.0 - 73.0 %    Lymph% 3.9 (L) 18.0 - 48.0 %    Mono% 13.1 4.0 - 15.0 %    Eosinophil% 0.4 0.0 - 8.0 %    Basophil% 0.1 0.0 - 1.9 %    Differential Method Automated      Significant Imaging:   X-Ray Chest 08/11/2017:   Detrimental interval change in the appearance of the chest since 8/3/17, relating to a significant increase in the volume of pleural fluid on the left side.    MRI w/wo ordered today per neuro rec, will f/u results

## 2017-08-12 NOTE — PROGRESS NOTES
[id number]: spoke with  on call again about reordering ppn. Charge nurse notified  0530: repaged concerning ppn order  0550:   returned page stated on coming resident will order

## 2017-08-12 NOTE — ASSESSMENT & PLAN NOTE
Onset prior to PADMINI on 7/28 but has worsened since the procedure. Patient stated improvement in his symptoms this morning.   -CT soft tissue neck revealed aberrant R subclavian coursing behind the esophagus, likely unrelated as his dysphagia is new; L pleural thickening in  the setting of mesothelioma but no overt compression   -Failed MBS on 08/04/2017; failed FL Esophagram 08/07/2017  -PPN currently running.   - NG tube attempted multiple times but was unsuccessful.  -PO meds discontinued as patient is unable to tolerate  -Neurology recommended repeat MRI w/wo in order to assess for interval change given persistent dysphagia. Will f/u results of LP  - likely he will end up with PEG.

## 2017-08-12 NOTE — ASSESSMENT & PLAN NOTE
has mets to upper abdomen and left illiac bones.  - has seen Dr. Nichols in clinic; chemo therapy until aflutter controlled  - for pain: MS Contin 15mg BID & percocet  as needed, will hold narcotics for AMS.   - periactin for decreased appetite  - pleurx in left thoracic, will drain 150 cc every day for comfort.

## 2017-08-12 NOTE — ASSESSMENT & PLAN NOTE
- chronic; last lipid panel ): TC: 104, HDL: 31, LDL: 58.6, T  - TG weekly with PPN  - will continue home rosuvastatin 20mg when patient is able to tolerate po or s/p PEG placement

## 2017-08-12 NOTE — PLAN OF CARE
Problem: Patient Care Overview  Goal: Plan of Care Review  Outcome: Ongoing (interventions implemented as appropriate)  Plan of care reviewed with patient and wife. Patient has no complaints at this time. Patient remains on TPN at 100cc/hr and IV antibiotics. MRI of brain and cervical spine ordered on today. Patient does not complain of pain. Patient remains free of falls and injury.

## 2017-08-12 NOTE — ASSESSMENT & PLAN NOTE
Normotensive currently, 120/60 after rate control of afib  -Patient has remained low hypertensive to normotensive this admission  -Will continue with current treatment, monitor for changes and intervene as appropriate

## 2017-08-12 NOTE — ASSESSMENT & PLAN NOTE
S/p RFA ablation on 7/28 and on apixaban 5mg for anticoagulation.   -TOPROL-XL 25mg po daily for rate control unable to be admisinsitered as patient is strict NPO; Lopressor 5mg q4h scheduled for rate control at this time  -Afib RVR with a rate of 140 this AM that converted s/p 1 doses of 5mg IV metoprolol; patient currently stable with a rate of 80; EKG ordered, will f/u results

## 2017-08-12 NOTE — ASSESSMENT & PLAN NOTE
Stable from prior assessment; Patient alert this Am, oriented to place and name, but disoriented to month or year  -Bcx and UA from yesterday are unrevealing thus far for cause of altered mentation  -Will consider LP today to assess for malignant extension into the CNS  -CXR repeated and revealed increased pleural fluid, drain changed to daily from q other day as a result.

## 2017-08-12 NOTE — ASSESSMENT & PLAN NOTE
1.4 this AM, will be replaced with 2g MagSulfate x2  -Will continue to monitor and replace PRN

## 2017-08-12 NOTE — PLAN OF CARE
Problem: Patient Care Overview  Goal: Plan of Care Review  Outcome: Ongoing (interventions implemented as appropriate)  NPO, ASPIRATION PRECAUTIONS MAINTAINED. TELE NSR, HR 81-96, SKIN INTACT, HEELS ELEVATED FREQUENT WT SHIFTS. , AFEBRILE. 02 APPLIED AT 2L/NC, PULSE OX 96-98. C/O PAIN RELIEVED WITH TYLENOL, SOMNOLENT, RESPONDS TO VERBAL STIMULI,  FOLLOWS COMMANDS. NEUROLOGY CONSULTED RE: AMS.

## 2017-08-12 NOTE — ASSESSMENT & PLAN NOTE
PPN currently, will continue until neurology workup has concluded and cause of dysphagia has been identified.   -Will likely need a PEG, at which time PPN will be D/C'd

## 2017-08-13 PROBLEM — C79.51 METASTATIC CANCER TO SPINE: Status: ACTIVE | Noted: 2017-08-13

## 2017-08-13 PROBLEM — E87.1 HYPONATREMIA: Status: ACTIVE | Noted: 2017-08-13

## 2017-08-13 PROBLEM — Z71.89 GOALS OF CARE, COUNSELING/DISCUSSION: Status: ACTIVE | Noted: 2017-08-13

## 2017-08-13 LAB
ANION GAP SERPL CALC-SCNC: 8 MMOL/L
BUN SERPL-MCNC: 17 MG/DL
CALCIUM SERPL-MCNC: 8.5 MG/DL
CHLORIDE SERPL-SCNC: 98 MMOL/L
CO2 SERPL-SCNC: 24 MMOL/L
CREAT SERPL-MCNC: 0.6 MG/DL
EST. GFR  (AFRICAN AMERICAN): >60 ML/MIN/1.73 M^2
EST. GFR  (NON AFRICAN AMERICAN): >60 ML/MIN/1.73 M^2
GLUCOSE SERPL-MCNC: 114 MG/DL
MAGNESIUM SERPL-MCNC: 1.5 MG/DL
PHOSPHATE SERPL-MCNC: 4.1 MG/DL
POTASSIUM SERPL-SCNC: 4.4 MMOL/L
SODIUM SERPL-SCNC: 130 MMOL/L

## 2017-08-13 PROCEDURE — 84100 ASSAY OF PHOSPHORUS: CPT

## 2017-08-13 PROCEDURE — 25000003 PHARM REV CODE 250: Performed by: INTERNAL MEDICINE

## 2017-08-13 PROCEDURE — 25000003 PHARM REV CODE 250: Performed by: STUDENT IN AN ORGANIZED HEALTH CARE EDUCATION/TRAINING PROGRAM

## 2017-08-13 PROCEDURE — A4216 STERILE WATER/SALINE, 10 ML: HCPCS | Performed by: STUDENT IN AN ORGANIZED HEALTH CARE EDUCATION/TRAINING PROGRAM

## 2017-08-13 PROCEDURE — 63600175 PHARM REV CODE 636 W HCPCS: Performed by: STUDENT IN AN ORGANIZED HEALTH CARE EDUCATION/TRAINING PROGRAM

## 2017-08-13 PROCEDURE — 80048 BASIC METABOLIC PNL TOTAL CA: CPT

## 2017-08-13 PROCEDURE — 63600175 PHARM REV CODE 636 W HCPCS: Performed by: INTERNAL MEDICINE

## 2017-08-13 PROCEDURE — C9113 INJ PANTOPRAZOLE SODIUM, VIA: HCPCS | Performed by: INTERNAL MEDICINE

## 2017-08-13 PROCEDURE — 99233 SBSQ HOSP IP/OBS HIGH 50: CPT | Mod: GC,,, | Performed by: INTERNAL MEDICINE

## 2017-08-13 PROCEDURE — 83735 ASSAY OF MAGNESIUM: CPT

## 2017-08-13 PROCEDURE — 20600001 HC STEP DOWN PRIVATE ROOM

## 2017-08-13 RX ORDER — MAGNESIUM SULFATE HEPTAHYDRATE 40 MG/ML
2 INJECTION, SOLUTION INTRAVENOUS
Status: DISCONTINUED | OUTPATIENT
Start: 2017-08-13 | End: 2017-08-13

## 2017-08-13 RX ORDER — MAGNESIUM SULFATE HEPTAHYDRATE 40 MG/ML
2 INJECTION, SOLUTION INTRAVENOUS EVERY 6 HOURS
Status: COMPLETED | OUTPATIENT
Start: 2017-08-13 | End: 2017-08-14

## 2017-08-13 RX ORDER — SODIUM CHLORIDE 9 MG/ML
INJECTION, SOLUTION INTRAVENOUS CONTINUOUS
Status: DISCONTINUED | OUTPATIENT
Start: 2017-08-13 | End: 2017-08-14

## 2017-08-13 RX ADMIN — CIPROFLOXACIN 400 MG: 2 INJECTION, SOLUTION INTRAVENOUS at 01:08

## 2017-08-13 RX ADMIN — CIPROFLOXACIN 400 MG: 2 INJECTION, SOLUTION INTRAVENOUS at 03:08

## 2017-08-13 RX ADMIN — ASCORBIC ACID, VITAMIN A PALMITATE, CHOLECALCIFEROL, THIAMINE HYDROCHLORIDE, RIBOFLAVIN-5 PHOSPHATE SODIUM, PYRIDOXINE HYDROCHLORIDE, NIACINAMIDE, DEXPANTHENOL, ALPHA-TOCOPHEROL ACETATE, VITAMIN K1, FOLIC ACID, BIOTIN, CYANOCOBALAMIN: 200; 3300; 200; 6; 3.6; 6; 40; 15; 10; 150; 600; 60; 5 INJECTION, SOLUTION INTRAVENOUS at 12:08

## 2017-08-13 RX ADMIN — METOPROLOL TARTRATE 5 MG: 5 INJECTION INTRAVENOUS at 05:08

## 2017-08-13 RX ADMIN — METOPROLOL TARTRATE 5 MG: 5 INJECTION INTRAVENOUS at 09:08

## 2017-08-13 RX ADMIN — MAGNESIUM SULFATE IN WATER 2 G: 40 INJECTION, SOLUTION INTRAVENOUS at 12:08

## 2017-08-13 RX ADMIN — SODIUM CHLORIDE: 0.9 INJECTION, SOLUTION INTRAVENOUS at 12:08

## 2017-08-13 RX ADMIN — MAGNESIUM SULFATE IN WATER 2 G: 40 INJECTION, SOLUTION INTRAVENOUS at 08:08

## 2017-08-13 RX ADMIN — METOPROLOL TARTRATE 5 MG: 5 INJECTION INTRAVENOUS at 03:08

## 2017-08-13 RX ADMIN — ACETAMINOPHEN 650 MG: 650 SUPPOSITORY RECTAL at 09:08

## 2017-08-13 RX ADMIN — METOPROLOL TARTRATE 5 MG: 5 INJECTION INTRAVENOUS at 01:08

## 2017-08-13 RX ADMIN — Medication 3 ML: at 03:08

## 2017-08-13 RX ADMIN — METOPROLOL TARTRATE 5 MG: 5 INJECTION INTRAVENOUS at 06:08

## 2017-08-13 RX ADMIN — Medication 3 ML: at 08:08

## 2017-08-13 RX ADMIN — PANTOPRAZOLE SODIUM 40 MG: 40 INJECTION, POWDER, FOR SOLUTION INTRAVENOUS at 08:08

## 2017-08-13 RX ADMIN — MAGNESIUM SULFATE IN WATER 2 G: 40 INJECTION, SOLUTION INTRAVENOUS at 11:08

## 2017-08-13 NOTE — PLAN OF CARE
Problem: Patient Care Overview  Goal: Plan of Care Review  Reviewed poc with pt verbalized understanding  vss  nad

## 2017-08-13 NOTE — PROGRESS NOTES
Pt placed on scanning table, portable telemetry monitor removed prior to entering scanning area, centralized telemetry station notified pt removed from portable monitoring system, pt placed on MRI compatible telemetry monitoring system,  two pt identifiers, allergies reviewed/acknowledged, scan in progress, will continue to monitor throughout scan

## 2017-08-13 NOTE — PROGRESS NOTES
RN notified by MRI department that they were able to perform there MRI without contrast but were unable to perform MRI with contrast due to patient wanting to get out of machine. Dr. Max with IM4 notified of this. Will continue to monitor.

## 2017-08-13 NOTE — PROGRESS NOTES
Pt tolerated scan without any difficulty, pt removed from MRI compatible monitoring system,  pt removed from scanner table and placed back on stretcher without any difficulty, pt placed on portable tele monitor system, central monitoring station notified to resume monitoring, pt awaiting transport to return to room

## 2017-08-13 NOTE — ASSESSMENT & PLAN NOTE
Normotensive currently, 132/64   -Patient has remained low hypertensive to normotensive this admission  -Will continue with current treatment, monitor for changes and intervene as appropriate

## 2017-08-13 NOTE — PROGRESS NOTES
[facility] Hospital Medicine  Progress Note    Patient Name: Clint Brown  MRN: [id number]  Patient Class: IP- Inpatient   Admission Date: 8/3/2017  Length of Stay: 10 days  Attending Physician: Audie Max MD  Primary Care Provider: Jean Claude Griffith DO    [facility] Medicine Team: [facility] MED 4 Jose R Lynn MD    Subjective:     Principal Problem:Oropharyngeal dysphagia    HPI:  Mr. Brown is a 70 y/o M with a history of metastatic mesothelioma, Type A aortic dissection (s/p repair in April 2016), persistent atrial flutter (s/p RFA ablation on 7/28), CAD, CKD, and HTN who presents with dry mouth and slurred speech. He reports that he hasn't eaten in 7 days due to to difficulty swallowing and now has consequential dry mouth and difficulty speaking. He has difficulty swallowing solid foods but is able to tolerate liquids. He does not think the dysphagia has progressively worsened; he just anticipated it to get better and it hasn't. Pt denies any fevers, chills, n/v, cough, confusion, hemiparesis or gait problems. Just reports feeling weak and fatigued. He feels thirsty and is requesting a Coke.      He has had a 5lb weight loss in the last 7 days. His decreased appetite is not new and previously he was started on periactin but continues to lose weight. The difference is that previously he didn't have appetite, and now he feels like he physically can't eat. Pt has constipation 2/2 decreased oral intake and daily opioid use. Pt unable to recall last BM.      Pt recently underwent a PADMINI + RFA ablation on 7/28 for Aflutter with RVR.  He has had difficulty swallowing since that hospitalization, although prior notes indicate he may have had anorexia and dysphagia in the past that was attributed to malignant mesothelioma. Since procedure, he denies any palpitation, chest pain, SOB.       He was diagnosed with mesothelioma on 4/17 when he presented mike left sided chest pain and heaviness, was  found to have a left-sided pleural effusion and underwent thoracentesis, cultures from which revealed this malignancy. Since then he has undergone a VATS procedure in 5/17 to drain the fluids. He has Stage IV mesothelioma secondary to mets to upper abdomen and left illiac bones. He has seen Dr. Nichols ([facility]) in clinic, chemo-port placed 6/22 but was deferred chemo until his Aflutter was taken care of. He takes MS contin 15mg BID and percocet 1/day for pain on left and was started on periactin for poor appetite.    Hospital Course:  Clint Brown was admitted on 08/03/2017 with dysphagia.  MBS was ordered which patient failed and was, therefore, left NPO. 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 While this often represents an asymptomatic finding, this may contribute to the patient's reported history of dysphagia. ENT were consulted and examined the oropharynx which revealed FFL without abnormalities, no masses or lesions, vocal cords fully mobile bilaterally. Still in mild pain, ordered pluerx drain and IV pain meds. GI consulted and recommended fluoro esophagram with EGD after if indicated based on results. PPN started on HD#5. Vascular consulted for the aberrant right subclavian artery, recs against interventions as he is high risk for procedures.     On HD#7, patient was scheduled to undergo an esophagram which was held as radiology believed, based on his prior studies, that he was at high risk of aspiration. Speech continued to follow and he failed to show improvement in his oropharyngeal dysphagia. He became increasingly more disoriented on HD#8-9 and was started on ciprofloxacin in the event that he had a UTI. Evidence of UTI did not emerge, although patient appeared to slightly improve with antibiotics and delirium precautions. MRI Brain Neck w/wo contrast was ordered on HD#10 per neuro recs as it was believed at  that time that this considerable dysphagia could be related to malignant extension of mesothelioma into the CNS. LP was scheduled to f/u these imaging studies.      Interval History: NAEON. Will f/u results of MRI Brain and Neck, then neuro to perform L P. Blood cultures NG3D, his sodium continues to fall daily, will f/u results of Urine sodium and serum osms.     Review of Systems   Constitutional: Positive for appetite change (decreased). Negative for activity change, chills and fever.   HENT: Positive for trouble swallowing and voice change (slurred speech).    Respiratory: Negative for chest tightness and shortness of breath.    Cardiovascular: Negative for chest pain, palpitations and leg swelling.   Gastrointestinal: Negative for abdominal distention, abdominal pain, constipation, diarrhea, nausea and vomiting.   Endocrine: Negative for cold intolerance and heat intolerance.   Skin: Negative for color change.   Neurological: Negative for weakness, light-headedness and headaches.   Hematological: Negative for adenopathy. Does not bruise/bleed easily.   Psychiatric/Behavioral: Negative for agitation and behavioral problems.     Objective:     Vital Signs (Most Recent):  Temp: 98.2 °F (36.8 °C) (08/13/17 0500)  Pulse: 83 (08/13/17 0700)  Resp: 20 (08/12/17 2000)  BP: 132/64 (08/13/17 0500)  SpO2: 96 % (08/13/17 0500) Vital Signs (24h Range):  Temp:  [96.8 °F (36 °C)-98.2 °F (36.8 °C)] 98.2 °F (36.8 °C)  Pulse:  [] 83  Resp:  [20-24] 20  SpO2:  [91 %-96 %] 96 %  BP: (101-132)/(55-82) 132/64     Weight: 68 kg (150 lb)  Body mass index is 21.52 kg/m².    Intake/Output Summary (Last 24 hours) at 08/13/17 0805  Last data filed at 08/13/17 0600   Gross per 24 hour   Intake          2473.33 ml   Output              200 ml   Net          2273.33 ml      Physical Exam   Constitutional: He is oriented to person, place, and time. He appears well-developed and well-nourished.   HENT:   Head: Normocephalic and  atraumatic.   Mouth/Throat: Oropharynx is clear and moist and mucous membranes are normal.   Eyes: Conjunctivae and EOM are normal.   Neck: No JVD present. No tracheal deviation present.   Cardiovascular: Normal rate, regular rhythm and normal heart sounds.    Pulmonary/Chest: Effort normal. He has decreased breath sounds in the left middle field.   Increased WOB   Abdominal: Soft. Bowel sounds are normal.   Musculoskeletal: He exhibits no edema or deformity.   Neurological: He is alert and oriented to person, place, and time.   Skin: Skin is warm and dry.   Psychiatric: He has a normal mood and affect. His behavior is normal.   Vitals reviewed.      Significant Labs:   Recent Results (from the past 24 hour(s))   Basic metabolic panel    Collection Time: 08/13/17  5:46 AM   Result Value Ref Range    Sodium 130 (L) 136 - 145 mmol/L    Potassium 4.4 3.5 - 5.1 mmol/L    Chloride 98 95 - 110 mmol/L    CO2 24 23 - 29 mmol/L    Glucose 114 (H) 70 - 110 mg/dL    BUN, Bld 17 8 - 23 mg/dL    Creatinine 0.6 0.5 - 1.4 mg/dL    Calcium 8.5 (L) 8.7 - 10.5 mg/dL    Anion Gap 8 8 - 16 mmol/L    eGFR if African American >60.0 >60 mL/min/1.73 m^2    eGFR if non African American >60.0 >60 mL/min/1.73 m^2   Magnesium    Collection Time: 08/13/17  5:46 AM   Result Value Ref Range    Magnesium 1.5 (L) 1.6 - 2.6 mg/dL   Phosphorus    Collection Time: 08/13/17  5:46 AM   Result Value Ref Range    Phosphorus 4.1 2.7 - 4.5 mg/dL     Significant Imaging:  MRI Cervical Spine w/o contrast 08/13/2017:  1.  T3 vertebral body lesion worrisome for metastatic disease.  Recommend further evaluation with contrast-enhanced MRI of the thoracic spine.  2.  Mild degenerative changes as above.  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  - has seen Dr. Nichols in clinic; chemo therapy until aflutter controlled  - for  pain: MS Contin 15mg BID & percocet  as needed, will hold narcotics for AMS.   - periactin for decreased appetite  - pleurx in left thoracic, will drain 150 cc every day for comfort.       * Oropharyngeal dysphagia    Onset prior to PADMINI on 7/28 but has worsened since the procedure. Patient stated improvement in his symptoms this morning.   -CT soft tissue neck revealed aberrant R subclavian coursing behind the esophagus, likely unrelated as his dysphagia is new; L pleural thickening in  the setting of mesothelioma but no overt compression   -Failed MBS on 08/04/2017; failed FL Esophagram 08/07/2017  -PPN currently running  - NG tube attempted multiple times but was unsuccessful.  -PO meds discontinued as patient is unable to tolerate  -F/u results of MRI Brain and Neck w/wo contrast; Neuro to perform LP after imaging  - likely he will end up with PEG.      Hyponatremia    Patients sodium has been down trending over the past few day  -Patient appears to be euvolemic, will switch PPN to 50cc hr from 100 and add 50 cc NS to correct hyponatremia  -F/u juan francisco imaging as well      Encephalopathy    Stable from prior assessment; Patient alert this AM, oriented to place and name, but disoriented to month or year  -Bcx and UA are unrevealing thus far for cause of altered mentation  -LP per neuro today to assess for malignant extension into the CNS  -MRI C-spine revealed a T3 lesion concerning for metastatic disease to the CNS; will f/u brain imaging to determine if there is brainstem involvement  -CXR repeated and revealed increased pleural fluid, drain changed to daily from q other day as a result.      Typical atrial flutter    S/p RFA ablation on 7/28 and on apixaban 5mg for anticoagulation.   -TOPROL-XL 25mg po daily for rate control unable to be admisinsitered as patient is strict NPO; Lopressor 5mg q4h scheduled for rate control at this time  -Afib RVR with a rate of 140 yesterday AM that converted s/p 1 doses of  5mg IV metoprolol; EKG NSR after episode, patient currently stable with a rate of 82      Essential hypertension    Normotensive currently, 132/64   -Patient has remained low hypertensive to normotensive this admission  -Will continue with current treatment, monitor for changes and intervene as appropriate       CKD (chronic kidney disease) stage 3, GFR 30-59 ml/min    Stable from prior labs  BUN/Cr 17 and 0.6 today, same as prior  -Will continue to follow      Chronic obstructive pulmonary disease    Patient breathing comfortably this AM, but improved from yesterday AM; L pleural space to be drained daily   -Satting 96% on RA this AM  -Will continue to monitor for signs of respiratory distress      Coronary artery disease involving native coronary artery of native heart without angina pectoris    -[place]  -[facility] (2016) LM patent; LAD patent; Ramus non obstructive; LCX non obstructive      Recurrent left pleural effusion    To be drained daily       On total parenteral nutrition (TPN)    PPN currently, will continue until neurology workup has concluded and cause of dysphagia has been identified.   -Will likely need a PEG, at which time PPN will be D/C'd      Aberrant right subclavian artery    -No interventions per vascular, high risk.       Hypomagnesemia    1.4 this AM, will be replaced with 2g MagSulfate x3  -Patient's Magnesium has been persistently low despite daily replacement  -Will continue to monitor and replace PRN      Slow transit constipation    -Miralax & senna scheduled daily PRN      Hypercholesteremia    - chronic; last lipid panel ): TC: 104, HDL: 31, LDL: 58.6, T  - TG weekly with PPN  - will continue home rosuvastatin 20mg when patient is able to tolerate po or s/p PEG placement      S/P ascending aortic replacement    Stable at this time        VTE Risk Mitigation         Ordered     Medium Risk of VTE  Once      17 [id number]        Jose R Lynn MD  Department of Hospital  Medicine   [facility]

## 2017-08-13 NOTE — ASSESSMENT & PLAN NOTE
Patients sodium has been down trending over the past few day  -Urine sodium and serum osms ordered, will f/u results  -Patient appears to be euvolemic, may be related to same neurologic process responsible for oropharyngeal dysphagia  -F/u juan francisco imaging as well

## 2017-08-13 NOTE — ASSESSMENT & PLAN NOTE
Onset prior to PADMINI on 7/28 but has worsened since the procedure. Patient stated improvement in his symptoms this morning.   -CT soft tissue neck revealed aberrant R subclavian coursing behind the esophagus, likely unrelated as his dysphagia is new; L pleural thickening in  the setting of mesothelioma but no overt compression   -Failed MBS on 08/04/2017; failed FL Esophagram 08/07/2017  -PPN currently running.   - NG tube attempted multiple times but was unsuccessful.  -PO meds discontinued as patient is unable to tolerate  -F/u results of MRI Brain and Neck w/wo contrast; Neuro to perform LP after imaging  - likely he will end up with PEG.

## 2017-08-13 NOTE — PLAN OF CARE
Problem: Patient Care Overview  Goal: Plan of Care Review  Outcome: Ongoing (interventions implemented as appropriate)  Plan of care reviewed with patient and wife. Patient has no complaints at this time. Patient remains on TPN at 100cc/hr and IV antibiotics. MRI of brain and cervical spine done on today without contrast. Patient does not complain of pain. Patient remains free of falls and injury.

## 2017-08-13 NOTE — ASSESSMENT & PLAN NOTE
S/p RFA ablation on 7/28 and on apixaban 5mg for anticoagulation.   -TOPROL-XL 25mg po daily for rate control unable to be admisinsitered as patient is strict NPO; Lopressor 5mg q4h scheduled for rate control at this time  -Afib RVR with a rate of 140 yesterday AM that converted s/p 1 doses of 5mg IV metoprolol; EKG NSR after episode, patient currently stable with a rate of 82

## 2017-08-13 NOTE — SUBJECTIVE & OBJECTIVE
Interval History: NAEON. Will f/u results of MRI Brain and Neck, then neuro to perform L P. Blood cultures NG3D, his sodium continues to fall daily, will f/u results of Urine sodium and serum osms.     Review of Systems   Constitutional: Positive for appetite change (decreased). Negative for activity change, chills and fever.   HENT: Positive for trouble swallowing and voice change (slurred speech).    Respiratory: Negative for chest tightness and shortness of breath.    Cardiovascular: Negative for chest pain, palpitations and leg swelling.   Gastrointestinal: Negative for abdominal distention, abdominal pain, constipation, diarrhea, nausea and vomiting.   Endocrine: Negative for cold intolerance and heat intolerance.   Skin: Negative for color change.   Neurological: Negative for weakness, light-headedness and headaches.   Hematological: Negative for adenopathy. Does not bruise/bleed easily.   Psychiatric/Behavioral: Negative for agitation and behavioral problems.     Objective:     Vital Signs (Most Recent):  Temp: 98.2 °F (36.8 °C) (08/13/17 0500)  Pulse: 83 (08/13/17 0700)  Resp: 20 (08/12/17 2000)  BP: 132/64 (08/13/17 0500)  SpO2: 96 % (08/13/17 0500) Vital Signs (24h Range):  Temp:  [96.8 °F (36 °C)-98.2 °F (36.8 °C)] 98.2 °F (36.8 °C)  Pulse:  [] 83  Resp:  [20-24] 20  SpO2:  [91 %-96 %] 96 %  BP: (101-132)/(55-82) 132/64     Weight: 68 kg (150 lb)  Body mass index is 21.52 kg/m².    Intake/Output Summary (Last 24 hours) at 08/13/17 0805  Last data filed at 08/13/17 0600   Gross per 24 hour   Intake          2473.33 ml   Output              200 ml   Net          2273.33 ml      Physical Exam   Constitutional: He is oriented to person, place, and time. He appears well-developed and well-nourished.   HENT:   Head: Normocephalic and atraumatic.   Mouth/Throat: Oropharynx is clear and moist and mucous membranes are normal.   Eyes: Conjunctivae and EOM are normal.   Neck: No JVD present. No tracheal  deviation present.   Cardiovascular: Normal rate, regular rhythm and normal heart sounds.    Pulmonary/Chest: Effort normal. He has decreased breath sounds in the left middle field.   Increased WOB   Abdominal: Soft. Bowel sounds are normal.   Musculoskeletal: He exhibits no edema or deformity.   Neurological: He is alert and oriented to person, place, and time.   Skin: Skin is warm and dry.   Psychiatric: He has a normal mood and affect. His behavior is normal.   Vitals reviewed.      Significant Labs:   Recent Results (from the past 24 hour(s))   Basic metabolic panel    Collection Time: 08/13/17  5:46 AM   Result Value Ref Range    Sodium 130 (L) 136 - 145 mmol/L    Potassium 4.4 3.5 - 5.1 mmol/L    Chloride 98 95 - 110 mmol/L    CO2 24 23 - 29 mmol/L    Glucose 114 (H) 70 - 110 mg/dL    BUN, Bld 17 8 - 23 mg/dL    Creatinine 0.6 0.5 - 1.4 mg/dL    Calcium 8.5 (L) 8.7 - 10.5 mg/dL    Anion Gap 8 8 - 16 mmol/L    eGFR if African American >60.0 >60 mL/min/1.73 m^2    eGFR if non African American >60.0 >60 mL/min/1.73 m^2   Magnesium    Collection Time: 08/13/17  5:46 AM   Result Value Ref Range    Magnesium 1.5 (L) 1.6 - 2.6 mg/dL   Phosphorus    Collection Time: 08/13/17  5:46 AM   Result Value Ref Range    Phosphorus 4.1 2.7 - 4.5 mg/dL     Significant Imaging:  MRI Brain and Neck w/wo to be performde will f/u results

## 2017-08-13 NOTE — ASSESSMENT & PLAN NOTE
Patient breathing comfortably this AM, but improved from yesterday AM; L pleural space to be drained daily   -Satting 96% on RA this AM  -Will continue to monitor for signs of respiratory distress

## 2017-08-13 NOTE — ASSESSMENT & PLAN NOTE
1.4 this AM, will be replaced with 2g MagSulfate x2  -Patient's Magnesium has been persistently low despite daily replacement  -Will continue to monitor and replace PRN

## 2017-08-14 VITALS
BODY MASS INDEX: 21.47 KG/M2 | HEIGHT: 70 IN | DIASTOLIC BLOOD PRESSURE: 54 MMHG | HEART RATE: 97 BPM | WEIGHT: 150 LBS | SYSTOLIC BLOOD PRESSURE: 85 MMHG | RESPIRATION RATE: 20 BRPM | TEMPERATURE: 99 F | OXYGEN SATURATION: 97 %

## 2017-08-14 PROBLEM — R13.10 DYSPHAGIA: Status: ACTIVE | Noted: 2017-08-12

## 2017-08-14 PROBLEM — Z51.5 PALLIATIVE CARE ENCOUNTER: Status: ACTIVE | Noted: 2017-08-14

## 2017-08-14 PROBLEM — E87.1 HYPONATREMIA: Status: ACTIVE | Noted: 2017-08-14

## 2017-08-14 LAB
ANION GAP SERPL CALC-SCNC: 11 MMOL/L
BUN SERPL-MCNC: 17 MG/DL
CALCIUM SERPL-MCNC: 8.4 MG/DL
CHLORIDE SERPL-SCNC: 99 MMOL/L
CO2 SERPL-SCNC: 20 MMOL/L
CREAT SERPL-MCNC: 0.6 MG/DL
EST. GFR  (AFRICAN AMERICAN): >60 ML/MIN/1.73 M^2
EST. GFR  (NON AFRICAN AMERICAN): >60 ML/MIN/1.73 M^2
GLUCOSE SERPL-MCNC: 114 MG/DL
MAGNESIUM SERPL-MCNC: 1.5 MG/DL
PHOSPHATE SERPL-MCNC: 3.8 MG/DL
POTASSIUM SERPL-SCNC: 4.6 MMOL/L
SODIUM SERPL-SCNC: 130 MMOL/L

## 2017-08-14 PROCEDURE — G8998 SWALLOW D/C STATUS: HCPCS | Mod: CN

## 2017-08-14 PROCEDURE — 80048 BASIC METABOLIC PNL TOTAL CA: CPT

## 2017-08-14 PROCEDURE — C9113 INJ PANTOPRAZOLE SODIUM, VIA: HCPCS | Performed by: INTERNAL MEDICINE

## 2017-08-14 PROCEDURE — 25000003 PHARM REV CODE 250: Performed by: STUDENT IN AN ORGANIZED HEALTH CARE EDUCATION/TRAINING PROGRAM

## 2017-08-14 PROCEDURE — 63600175 PHARM REV CODE 636 W HCPCS: Performed by: STUDENT IN AN ORGANIZED HEALTH CARE EDUCATION/TRAINING PROGRAM

## 2017-08-14 PROCEDURE — 36415 COLL VENOUS BLD VENIPUNCTURE: CPT

## 2017-08-14 PROCEDURE — 84100 ASSAY OF PHOSPHORUS: CPT

## 2017-08-14 PROCEDURE — 99239 HOSP IP/OBS DSCHRG MGMT >30: CPT | Mod: GW,GC,HPC, | Performed by: INTERNAL MEDICINE

## 2017-08-14 PROCEDURE — A4216 STERILE WATER/SALINE, 10 ML: HCPCS | Performed by: STUDENT IN AN ORGANIZED HEALTH CARE EDUCATION/TRAINING PROGRAM

## 2017-08-14 PROCEDURE — 63600175 PHARM REV CODE 636 W HCPCS: Performed by: INTERNAL MEDICINE

## 2017-08-14 PROCEDURE — 99231 SBSQ HOSP IP/OBS SF/LOW 25: CPT | Mod: GW,GC,HPC, | Performed by: PSYCHIATRY & NEUROLOGY

## 2017-08-14 PROCEDURE — 93005 ELECTROCARDIOGRAM TRACING: CPT

## 2017-08-14 PROCEDURE — 83735 ASSAY OF MAGNESIUM: CPT

## 2017-08-14 RX ORDER — LORAZEPAM 2 MG/ML
4 INJECTION INTRAMUSCULAR EVERY 6 HOURS
Status: DISCONTINUED | OUTPATIENT
Start: 2017-08-14 | End: 2017-08-14 | Stop reason: HOSPADM

## 2017-08-14 RX ORDER — MORPHINE SULFATE 2 MG/ML
1 INJECTION, SOLUTION INTRAMUSCULAR; INTRAVENOUS
Status: DISCONTINUED | OUTPATIENT
Start: 2017-08-14 | End: 2017-08-14 | Stop reason: HOSPADM

## 2017-08-14 RX ORDER — LORAZEPAM 2 MG/ML
1 INJECTION INTRAMUSCULAR
Status: DISCONTINUED | OUTPATIENT
Start: 2017-08-14 | End: 2017-08-14 | Stop reason: HOSPADM

## 2017-08-14 RX ORDER — MAGNESIUM SULFATE HEPTAHYDRATE 40 MG/ML
2 INJECTION, SOLUTION INTRAVENOUS ONCE
Status: DISCONTINUED | OUTPATIENT
Start: 2017-08-14 | End: 2017-08-14

## 2017-08-14 RX ORDER — MORPHINE SULFATE 2 MG/ML
4 INJECTION, SOLUTION INTRAMUSCULAR; INTRAVENOUS EVERY 6 HOURS
Status: DISCONTINUED | OUTPATIENT
Start: 2017-08-14 | End: 2017-08-14 | Stop reason: HOSPADM

## 2017-08-14 RX ADMIN — LORAZEPAM 4 MG: 2 INJECTION INTRAMUSCULAR; INTRAVENOUS at 02:08

## 2017-08-14 RX ADMIN — METOPROLOL TARTRATE 5 MG: 5 INJECTION INTRAVENOUS at 02:08

## 2017-08-14 RX ADMIN — METOPROLOL TARTRATE 5 MG: 5 INJECTION INTRAVENOUS at 05:08

## 2017-08-14 RX ADMIN — Medication 3 ML: at 05:08

## 2017-08-14 RX ADMIN — CIPROFLOXACIN 400 MG: 2 INJECTION, SOLUTION INTRAVENOUS at 02:08

## 2017-08-14 RX ADMIN — METOPROLOL TARTRATE 5 MG: 5 INJECTION INTRAVENOUS at 10:08

## 2017-08-14 RX ADMIN — MORPHINE SULFATE 4 MG: 2 INJECTION, SOLUTION INTRAMUSCULAR; INTRAVENOUS at 12:08

## 2017-08-14 NOTE — PROGRESS NOTES
Discussed with wife and daughter.   Patient has terminal mesothelioma, now with evidence of mets to thoracic spine and probably CNS.  Per family, it would be the patient's wish to die peaceful without further invasive procedures.  No PEG tube or artificial feeding. Comfort care measures only.  Will consult inpatient hospice (located in [place]).  Plan transfer when accepted.

## 2017-08-14 NOTE — PT/OT/SLP PROGRESS
Speech Language Pathology  Discharge Summary      Clint Brown   351/351 A  MRN: [id number]    Patient not seen today secondary to Other (Comment) (Discussed with MD prior to session who d/c'd SLP orders 2/2 inpatient hospice consult. ). Please re-consult if changes occur.     AALIYAH Concepcion, CCC-SLP  [phone]  8/14/2017

## 2017-08-14 NOTE — PLAN OF CARE
Problem: Patient Care Overview  Goal: Plan of Care Review  Outcome: Revised  Patient is ready for discharge to Hospice. Report called to Nely MEZA (see previous notes). Patient stable alert and oriented. IVs removed. Port-a-cath accessed. Prn Morphine & Ativan given per order. MD Margy & MD Madeline ordered to d/c pT with Pleurx. MD Madeline stated okay to not drain Pleurx and would like Pleurx to be drained every other day and prn when pT SOB. pT comfortable in bed with relaxing music playing. SCDs removed. EMS transport at bedside. Last BP: 85/54 Map: 65. SUSANA Mckay [facility] updated.

## 2017-08-14 NOTE — PLAN OF CARE
FAROOQ received return voicemail from Davida at Pt's insurace who said all hospice Pts did revert back to traditional Medicare. FAROOQ placed call to Allen at [facility] and informed her of this. Allen said they would process Pt with his Medicare.     Radha Schwartz LCSW

## 2017-08-14 NOTE — PLAN OF CARE
PAULINO received a call from Dr. Max - family w/ decision for inpat hospice for pt and choice for [facility]. Family has declined PEG tube placement. PAULINO relayed info to FAROOQ.

## 2017-08-14 NOTE — PROGRESS NOTES
ADRY Estevez notified pT refusing all medications. Stated okay, POC pT d/c with hospice today. ADRY Estevez notified pT still Full code and Magnesium 1.4. Stated team will take care of orders.

## 2017-08-14 NOTE — PROGRESS NOTES
08/14/17 0811   Hygiene Care   Oral Care oral care provided;oral swabbing   Perineal Care absorbent pad changed;diaper changed;protective ointment applied   Hygiene Assistance per care provider x2   SCD's on pT. Oral care provided. No skin breakdown noted; barrier cream applied to sacrum to further protect skin. pT sitting up in bed. Music playing. No c/o pain

## 2017-08-14 NOTE — PROGRESS NOTES
08/14/17 1036   Vital Signs   Pulse 91   Heart Rate Source Monitor   MD Zenaida IM4 updated. pT no c/o pain. Ordered okay to hold Morphine IVP. DNR form ready for signature when team at bedside.

## 2017-08-14 NOTE — PLAN OF CARE
CM to bedside - spoke to spouse & dgtr to update r/t situation on insurance not being in-network w/ [facility] but typically ins reverts to traditional Medicare; CM explained that SW is reaching out to [facility] CM for clarification. Spouse & dgtr requested to be updated on cell phone if they leave.

## 2017-08-14 NOTE — PROGRESS NOTES
IM4 called to get DNR signed before administering palliative care medications: scheduled Ativan and Morphine IVP. DNR with MD Mansoor signature in patient binder.

## 2017-08-14 NOTE — DISCHARGE SUMMARY
[facility] Hospital Medicine  Discharge Summary      Patient Name: Clint Brown  MRN: [id number]  Admission Date: 8/3/2017  Hospital Length of Stay: 11 days  Discharge Date and Time:  08/14/2017 3:51 PM  Attending Physician: Audie Max MD   Discharging Provider: Alberto Farrar MD  Primary Care Provider: Jean Claude Griffith DO  [facility] Medicine Team: [facility]P MED 4 Alberto Farrar MD    HPI:   Mr. Brown is a 70 y/o M with a history of metastatic mesothelioma, Type A aortic dissection (s/p repair in April 2016), persistent atrial flutter (s/p RFA ablation on 7/28), CAD, CKD, and HTN who presents with dry mouth and slurred speech. He reports that he hasn't eaten in 7 days due to to difficulty swallowing and now has consequential dry mouth and difficulty speaking. He has difficulty swallowing solid foods but is able to tolerate liquids. He does not think the dysphagia has progressively worsened; he just anticipated it to get better and it hasn't. Pt denies any fevers, chills, n/v, cough, confusion, hemiparesis or gait problems. Just reports feeling weak and fatigued. He feels thirsty and is requesting a Coke.      He has had a 5lb weight loss in the last 7 days. His decreased appetite is not new and previously he was started on periactin but continues to lose weight. The difference is that previously he didn't have appetite, and now he feels like he physically can't eat. Pt has constipation 2/2 decreased oral intake and daily opioid use. Pt unable to recall last BM.      Pt recently underwent a PADMINI + RFA ablation on 7/28 for Aflutter with RVR.  He has had difficulty swallowing since that hospitalization, although prior notes indicate he may have had anorexia and dysphagia in the past that was attributed to malignant mesothelioma. Since procedure, he denies any palpitation, chest pain, SOB.       He was diagnosed with mesothelioma on 4/17 when he presented mike left sided chest pain and  heaviness, was found to have a left-sided pleural effusion and underwent thoracentesis, cultures from which revealed this malignancy. Since then he has undergone a VATS procedure in 5/17 to drain the fluids. He has Stage IV mesothelioma secondary to mets to upper abdomen and left illiac bones. He has seen Dr. Nichols (Heme-Onc) in clinic, chemo-port placed 6/22 but was deferred chemo until his Aflutter was taken care of. He takes MS contin 15mg BID and percocet 1/day for pain on left and was started on periactin for poor appetite.    * No surgery found *      Indwelling Lines/Drains at time of discharge:   Lines/Drains/Airways     Central Venous Catheter Line                 Port A Cath Single Lumen 06/28/17 0954 left subclavian 47 days          Drain                 Chest Tube Left Pleural 98551 days         Drain/Device  05/31/17 0000 Left chest other (see comments) 75 days              Hospital Course:   Mr. Brown is a 72 y/o M with a history of metastatic mesothelioma, Type A aortic dissection (s/p repair in April 2016), persistent atrial flutter (s/p RFA ablation on 7/28), CAD, CKD, and HTN who presents with dry mouth and slurred speech. MBS was ordered which patient failed. 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 Vascular consulted for the aberrant right subclavian artery, recs against interventions as he is high risk for procedures. Vascular and GI didn't think this was causing such a severe, persistent dysphagia. PPN started on HD#5.     On HD#7, patient was scheduled to undergo an esophagram which was held as radiology believed, based on his prior studies, that he was at high risk of aspiration. Speech continued to follow and he failed to show improvement in his oropharyngeal dysphagia. He became increasingly more disoriented on HD#8-9 and was started on ciprofloxacin in the event that he had a UTI. Evidence of UTI  did not emerge, although patient appeared to slightly improve with antibiotics and delirium precautions. MRI Brain Neck w/wo contrast was ordered on HD#10 per neuro recs as it was believed at that time that this considerable dysphagia could be related to malignant extension of mesothelioma into the CNS. MRI C-spine revealed a spinal lesion that looks like a met, awaiting results of MRI Brain and Neuro to do an Lp. F/u the results of the CSF studies. Patients family has opted against PEG tube and is amenable to hospice placement.   Review of Systems   Constitutional: Positive for appetite change (decreased). Negative for activity change, chills and fever.   HENT: Positive for trouble swallowing and voice change (slurred speech).    Respiratory: Negative for chest tightness and shortness of breath.    Cardiovascular: Negative for chest pain, palpitations and leg swelling.   Gastrointestinal: Negative for abdominal distention, abdominal pain, constipation, diarrhea, nausea and vomiting.   Endocrine: Negative for cold intolerance and heat intolerance.   Skin: Negative for color change.   Neurological: Negative for weakness, light-headedness and headaches.   Hematological: Negative for adenopathy. Does not bruise/bleed easily.   Psychiatric/Behavioral: Negative for agitation and behavioral problems.        Vital Signs (Most Recent):  Temp: 98.2 °F (36.8 °C) (08/13/17 0500)  Pulse: 83 (08/13/17 0700)  Resp: 20 (08/12/17 2000)  BP: 132/64 (08/13/17 0500)  SpO2: 96 % (08/13/17 0500) Vital Signs (24h Range):  Temp:  [96.8 °F (36 °C)-98.2 °F (36.8 °C)] 98.2 °F (36.8 °C)  Pulse:  [] 83  Resp:  [20-24] 20  SpO2:  [91 %-96 %] 96 %  BP: (101-132)/(55-82) 132/64     Weight: 68 kg (150 lb)  Body mass index is 21.52 kg/m².     Intake/Output Summary (Last 24 hours) at 08/13/17 0805  Last data filed at 08/13/17 0600    Gross per 24 hour   Intake          2473.33 ml   Output              200 ml   Net          2273.33 ml       Physical Exam   Constitutional: He is oriented to person, place, and time. He appears well-developed and well-nourished.   HENT:   Head: Normocephalic and atraumatic.   Mouth/Throat: Oropharynx is clear and moist and mucous membranes are normal.   Eyes: Conjunctivae and EOM are normal.   Neck: No JVD present. No tracheal deviation present.   Cardiovascular: Normal rate, regular rhythm and normal heart sounds.    Pulmonary/Chest: Effort normal. He has decreased breath sounds in the left middle field.   Increased WOB   Abdominal: Soft. Bowel sounds are normal.   Musculoskeletal: He exhibits no edema or deformity.   Neurological: He is alert and oriented to person, place, and time.   Skin: Skin is warm and dry.   Psychiatric: He has a normal mood and affect. His behavior is normal.   Vitals reviewed.         Consults:   Consults         Status Ordering Provider     Inpatient consult to Dietary  Once     Provider:  (Not yet assigned)    Completed DEDE OATES     Inpatient consult to ENT  Once     Provider:  (Not yet assigned)    Completed ESME ELLIS     Inpatient consult to Gastroenterology  Once     Provider:  (Not yet assigned)    Completed ESME ELLIS     Inpatient consult to Hospice  Once     Provider:  (Not yet assigned)    Acknowledged YASMINE MORFIN     Inpatient consult to Midline team  Once     Provider:  (Not yet assigned)    Completed EVANGELISTA BURROWS     Inpatient consult to Midline team  Once     Provider:  (Not yet assigned)    Completed EVANGELISTA BURROWS     Inpatient consult to Neurology  Once     Provider:  (Not yet assigned)    Completed DEDE OATES     Inpatient consult to PICC team ([facility])  Once     Provider:  (Not yet assigned)    Completed YASMINE MORFIN     Inpatient consult to Vascular Surgery  Once     Provider:  (Not yet assigned)    Completed DEDE OATES          Pending Diagnostic Studies:     None        Final Active Diagnoses:    Diagnosis Date Noted  POA    PRINCIPAL PROBLEM:  Oropharyngeal dysphagia [R13.12] 08/08/2017 Yes    Palliative care encounter [Z51.5] 08/14/2017 Not Applicable    Hyponatremia [E87.1] 08/13/2017 Unknown    Goals of care, counseling/discussion [Z71.89] 08/13/2017 Not Applicable    Metastatic cancer to spine T3 [C79.51] 08/13/2017 No    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] 08/12/2017 Not Applicable    On total parenteral nutrition (TPN) [Z78.9] 08/11/2017 No    Recurrent left pleural effusion [J90] 08/11/2017 Yes    Encephalopathy [G93.40] 08/10/2017 Yes    Aberrant right subclavian artery [Q27.8] 08/08/2017 Not Applicable    Slow transit constipation [K59.01] 08/04/2017 Yes    Hypomagnesemia [E83.42] 08/04/2017 Yes    Typical atrial flutter [I48.3] 07/12/2017 Yes    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 [C45.7] 06/23/2017 Yes     Chronic    Chronic obstructive pulmonary disease [J44.9]  Yes    CKD (chronic kidney disease) stage 3, GFR 30-59 ml/min [N18.3] 06/27/2016 Yes    Hypercholesteremia [E78.00] 05/20/2016 Yes    S/P ascending aortic replacement [Z95.828] 04/18/2016 Not Applicable    Essential hypertension [I10] 04/01/2016 Yes    Coronary artery disease involving native coronary artery of native heart without angina pectoris [I25.10] 04/01/2016 Yes      Problems Resolved During this Admission:    Diagnosis Date Noted Date Resolved POA    Hypocalcemia [E83.51] 08/04/2017 08/05/2017 Yes    Leukocytosis [D72.829] 08/04/2017 08/04/2017 Yes    Dehydration [E86.0] 08/03/2017 08/12/2017 Yes      No new Assessment & Plan notes have been filed under this hospital service since the last note was generated.  Service: Hospital Medicine      Discharged Condition: stable    Disposition: Hospice/Medical Facility    Follow Up:    Patient Instructions:     Referral to Hospice   Referral Priority: Routine Referral Type: Consultation   Referral Reason: Specialty Services Required    Requested  Specialty: Hospice Services    Number of Visits Requested: 1        Medications:  Reconciled Home Medications:   Current Discharge Medication List      STOP taking these medications       apixaban 5 mg Tab Comments:   Reason for Stopping:         aspirin (ECOTRIN) 81 MG EC tablet Comments:   Reason for Stopping:         cyproheptadine (PERIACTIN) 4 mg tablet Comments:   Reason for Stopping:         DOCUSATE SODIUM (COLACE ORAL) Comments:   Reason for Stopping:         folic acid (FOLVITE) 1 MG tablet Comments:   Reason for Stopping:         metoprolol succinate (TOPROL-XL) 25 MG 24 hr tablet Comments:   Reason for Stopping:         morphine (MS CONTIN) 15 MG 12 hr tablet Comments:   Reason for Stopping:         multivitamin (THERAGRAN) per tablet Comments:   Reason for Stopping:         omeprazole (PRILOSEC) 20 MG capsule Comments:   Reason for Stopping:         ondansetron (ZOFRAN) 8 MG tablet Comments:   Reason for Stopping:         oxycodone-acetaminophen (PERCOCET) 5-325 mg per tablet Comments:   Reason for Stopping:         ranitidine (ZANTAC) 150 MG tablet Comments:   Reason for Stopping:         rosuvastatin (CRESTOR) 20 MG tablet Comments:   Reason for Stopping:               HOS POC IP DISCHARGE SUMMARY    Alberto Farrar MD  Department of Hospital Medicine  [facility]

## 2017-08-14 NOTE — PLAN OF CARE
SW received call from Allen from [facility] stating that they were not in network with Pt's insurance, [employer]. SW questioned this because as far as she knew Pt's always went back on their Traditional Medicare when going to hospice. Allen said she had checked with her billing people who said this was not the case. FAROOQ also placed call to Davida with Pt's insurance and left a message requesting a return call regarding this issue.     JULIA GoldW

## 2017-08-14 NOTE — SUBJECTIVE & OBJECTIVE
Subjective:     Interval History:   Unable to tolerate MRI study with contrast, only noncontrast studies obtained.  Pt reports he did not sleep well overnight, +SOB.    Current Neurological Medications:   Lorazepam 4 mg Q6hrs    Current Facility-Administered Medications   Medication Dose Route Frequency Provider Last Rate Last Dose    acetaminophen suppository 650 mg  650 mg Rectal Q6H PRN Audie Max MD   650 mg at 08/13/17 2102    lorazepam injection 4 mg  4 mg Intravenous Q6H JULIO Ireland        metoprolol injection 5 mg  5 mg Intravenous Once Abeejana Singer MD        metoprolol injection 5 mg  5 mg Intravenous Q5 Min PRN JULIO Ireland   5 mg at 08/12/17 0707    metoprolol injection 5 mg  5 mg Intravenous Q4H Jose R Lynn MD   5 mg at 08/14/17 0535    morphine injection 4 mg  4 mg Intravenous Q6H JULIO Ireland        ondansetron injection 8 mg  8 mg Intravenous Q6H PRN Jose R Lynn MD   8 mg at 08/12/17 1429       Review of Systems   Reason unable to perform ROS: limited 2/2 AMS.   Constitutional:        Reports unable to sleep overnight   HENT: Positive for trouble swallowing and voice change.    Respiratory: Positive for shortness of breath.      Objective:     Vital Signs (Most Recent):  Temp: 97.7 °F (36.5 °C) (08/14/17 0811)  Pulse: (!) 113 (08/14/17 0957)  Resp: 20 (08/14/17 0811)  BP: (!) 127/59 (08/14/17 0957)  SpO2: (!) 93 % (08/14/17 0811) Vital Signs (24h Range):  Temp:  [97.4 °F (36.3 °C)-99.1 °F (37.3 °C)] 97.7 °F (36.5 °C)  Pulse:  [] 113  Resp:  [18-28] 20  SpO2:  [93 %-96 %] 93 %  BP: (112-145)/(56-69) 127/59     Weight: 68 kg (150 lb)  Body mass index is 21.52 kg/m².    Physical Exam   Constitutional: He appears well-developed. No distress.   HENT:   Head: Normocephalic.   Eyes: EOM are normal. Pupils are equal, round, and reactive to light.   Cardiovascular: Normal rate.  Exam reveals no friction rub.    No murmur heard.  Irregular  "rhythm   Pulmonary/Chest:   Increased effort, using accessory muscles of respiration.  Decreased breath sounds on L lung fields.   Abdominal: Soft. Bowel sounds are normal. He exhibits no distension. There is tenderness.   Musculoskeletal: He exhibits no edema.   Neurological:   Reflex Scores:       Tricep reflexes are 1+ on the right side and 1+ on the left side.       Bicep reflexes are 1+ on the right side and 1+ on the left side.       Brachioradialis reflexes are 1+ on the right side and 1+ on the left side.       Patellar reflexes are 1+ on the right side and 1+ on the left side.       Achilles reflexes are 1+ on the right side and 1+ on the left side.      NEUROLOGICAL EXAMINATION:     MENTAL STATUS   Level of consciousness: alert       Oriented to person only.  NOT oriented to place or time.  Follows 1 step commands.     CRANIAL NERVES     CN II   Visual fields full to confrontation.     CN III, IV, VI   Pupils are equal, round, and reactive to light.  Extraocular motions are normal.     CN V   Facial sensation intact.     CN VII   Facial expression full, symmetric.     CN VIII   CN VIII normal.     CN XI   Right sternocleidomastoid strength: normal  Left sternocleidomastoid strength: normal  Right trapezius strength: normal  Left trapezius strength: normal       Tongue protrudes midline, unable to visualize uvula.  Mouth is dry.     MOTOR EXAM   Muscle bulk: decreased  Overall muscle tone: normal    Strength   Right deltoid: 4/5  Left deltoid: 4/5  Right biceps: 4/5  Left biceps: 4/5  Right triceps: 4/5  Left triceps: 4/5  Right interossei: 4/5  Left interossei: 4/5       Strength exam complicated by pt's respiratory status and fatigue.  Pt asking "please stop," further examination deferred.     REFLEXES     Reflexes   Right brachioradialis: 1+  Left brachioradialis: 1+  Right biceps: 1+  Left biceps: 1+  Right triceps: 1+  Left triceps: 1+  Right patellar: 1+  Left patellar: 1+  Right achilles: 1+  Left " achilles: 1+  Right plantar: normal  Left plantar: normal  Right ankle clonus: absent  Left ankle clonus: absent    SENSORY EXAM   Light touch normal.     GAIT AND COORDINATION        Unable to assess finger/nose and heel/shin secondary to respiratory status       Significant Labs:   Recent Results (from the past 24 hour(s))   Basic metabolic panel    Collection Time: 08/14/17  5:15 AM   Result Value Ref Range    Sodium 130 (L) 136 - 145 mmol/L    Potassium 4.6 3.5 - 5.1 mmol/L    Chloride 99 95 - 110 mmol/L    CO2 20 (L) 23 - 29 mmol/L    Glucose 114 (H) 70 - 110 mg/dL    BUN, Bld 17 8 - 23 mg/dL    Creatinine 0.6 0.5 - 1.4 mg/dL    Calcium 8.4 (L) 8.7 - 10.5 mg/dL    Anion Gap 11 8 - 16 mmol/L    eGFR if African American >60.0 >60 mL/min/1.73 m^2    eGFR if non African American >60.0 >60 mL/min/1.73 m^2   Magnesium    Collection Time: 08/14/17  5:15 AM   Result Value Ref Range    Magnesium 1.5 (L) 1.6 - 2.6 mg/dL   Phosphorus    Collection Time: 08/14/17  5:15 AM   Result Value Ref Range    Phosphorus 3.8 2.7 - 4.5 mg/dL       Microbiology Results (last 7 days)     Procedure Component Value Units Date/Time    Blood culture [[id number]] Collected:  08/10/17 1606    Order Status:  Completed Specimen:  Blood from Line, Port A Cath Updated:  08/13/17 2012     Blood Culture, Routine No Growth to date     Blood Culture, Routine No Growth to date     Blood Culture, Routine No Growth to date     Blood Culture, Routine No Growth to date    Gram stain [[id number]]     Order Status:  Canceled Specimen:  CSF (Spinal Fluid)     CSF culture [[id number]]     Order Status:  Canceled Specimen:  CSF (Spinal Fluid)     Blood culture [[id number]] Collected:  08/04/17 0446    Order Status:  Completed Specimen:  Blood Updated:  08/09/17 0812     Blood Culture, Routine No growth after 5 days.    Narrative:       Aerobic \T\ anaerobic from site #1    Blood culture [[id number]] Collected:  08/04/17 0443    Order Status:  Completed Specimen:   Blood Updated:  17 0812     Blood Culture, Routine No growth after 5 days.    Narrative:       Aerobic \T\ anaerobic from site #2          Significant Imagin17 MRI C spine, pt unable to complete exam with contrast: Per resident review,  Possible hyperintensities at C5, C6.  Radiology reported concerning for metastatic disease at T3.        17 MRI Brain, pt unable to complete exam with contrast: Per resident review,  No acute infarcts, mass effect.  Remote L occipital infarct.

## 2017-08-14 NOTE — PROGRESS NOTES
Report called to SUSANA Mckay at [facility]. Stated to leave Port accessed for Hospice care.

## 2017-08-14 NOTE — PLAN OF CARE
FAROOQ learned that Pt and Pt's famuily had decided to go with Inpatient Hospice and would like Pt to be referred to [facility] in [place]. FAROOQ placed call to [facility] ([phone]) and spoke to Shanthi to inform her of referral. FAROOQ agreed to send necessary referral information, including orders, via [facility]. Shanthi said she or someone else would be out to see Pt and his family this afternoon.     Radha Schwartz, JULIAW

## 2017-08-14 NOTE — MEDICAL/APP STUDENT
Neurology Consult Note  8/14/2017 8:59am     History:  Mr. Clint Brown is a 70 yo man with PMHx of metastatic mesothelioma, Type A aortic dissection (s/p repair April 2016), persistent atrial flutter (s/p RFA ablation 7/28), CAD, CKD and HTN for whom neurology is consulted to assess for dysphagia and slurred speech for the past 11 days.    Mr. Brown's symptoms began 18 days ago with decreased oral intake due to difficulty swallowing. This is accompanied by dry mouth, trouble speaking and weight loss. The patient complains of chest pain when moving his legs and decreased bowel motions. The oropharyngeal dysphagia has worsened while in the hospital. Pt is scheduled for an MRI of the brain and neck with and without contrast to assess for malignant extension of mesothelioma into the CNS.     ROS:  Review of Systems   Constitutional: Positive for weight loss. Negative for chills and fever.   Eyes: Negative for blurred vision and double vision.   Respiratory: Positive for cough and shortness of breath. Negative for hemoptysis and sputum production.    Cardiovascular: Positive for chest pain. Negative for palpitations and leg swelling.   Gastrointestinal: Positive for constipation. Negative for abdominal pain, diarrhea, nausea and vomiting.   Genitourinary: Negative for dysuria.   Skin: Negative for rash.   Neurological: Positive for speech change, focal weakness (  throat) and weakness. Negative for dizziness, tingling, tremors, sensory change, seizures, loss of consciousness and headaches.       Objective:  Vitals:    08/14/17 0811   BP: (!) 145/66   Pulse: 100   Resp: 20   Temp: 97.7 °F (36.5 °C)     Neuro Exam:  1. Mental status: Oriented to person, place with prompting, Not oriented to time; follows commands with demonstration, able to concentrate  2. Language: mild dysarthria, fluid  3. Cranial Nerves II-XII intact  4. Motor: RLeg 5/5, RArm 5/5, LLeg 4/5, LArm 5/5  5. Reflexes diminished or absent, likely due to  user error  6. Light touch intact  7. Finger-to-nose coordination intact, unable to perform heel-to-shin; gait not assessed    Labs:  Pertinent labs reviewed     Imaging:   MRI of C-spine showed T3 vertebral body lesion worrisome for metastatic disease. Recommended contrast enhanced MRI of the thoracic spine.     Assessment:  Mr. Brown is a 72 yo male with metastatic mesothelioma and extensive PMHx with metastatic disease likely to his thoracic spine and probably CNS as demonstrated by imaging and the symptoms of dysphagia and dysarthria unexplained by other cause.    Plan:    Mesothelioma with metastatic disease in bone, upper abdomen, thoracic spine, and likely CNS  - MRI with and without contrast recommended for confirmatory imaging  - Lumbar puncture recommended to assess for disease within the CNS  - These recommended investigations are to be weighed against their degree of invasion vs treatment options vs likelihood of beneficial outcome; it is possible the family and patient will choose to proceed with hospice care and deny further investigations as per Audie Max's note on 8/14      Tiffany Ortiz

## 2017-08-14 NOTE — PROGRESS NOTES
Attempt to call report to SUSANA Mcaky at [facility]. Stated will call back in ~15 minutes. Will follow up on report

## 2017-08-14 NOTE — PLAN OF CARE
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]  Not Applicable    Slow transit constipation [K59.01]  Yes    Hypomagnesemia [E83.42]  Yes    Typical atrial flutter [I48.3]  Yes    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 [C45.7]  Yes     Chronic    Chronic obstructive pulmonary disease [J44.9]  Yes    CKD (chronic kidney disease) stage 3, GFR 30-59 ml/min [N18.3]  Yes    Hypercholesteremia [E78.00]  Yes    S/P ascending aortic replacement [Z95.828]  Not Applicable    Essential hypertension [I10]  Yes    Coronary artery disease involving native coronary artery of native heart without angina pectoris [I25.10]  Yes      Resolved Hospital Problems    Diagnosis Date Resolved POA    Hypocalcemia [E83.51] 08/05/2017 Yes    Leukocytosis [D72.829] 08/04/2017 Yes    Dehydration [E86.0] 08/12/2017 Yes       Hospice Qualifying Diagnoses: mesothelioma stage 4 cancer with AMS and dysphagia        Patient has a life expectancy < 6 months due to these conditions.    Vital Signs: Routine per Hospice Protocol.    Allergies:  Review of patient's allergies indicates:   Allergen Reactions     Lipitor [atorvastatin] Other (See Comments)     Leg cramps    Cephalexin Nausea And Vomiting       Diet: NPO    Activities: As tolerated    Nursing:  Per Hospice Routine                   Left Pleurx cathter Javed every other day for comfort.      Future Orders:  Hospice Medical Director may dictate new orders for comfortable care measures & sign death certificate.    Medications:         Comfort Care Medications Only            _________________________________  JULIO Lanza  08/14/2017

## 2017-08-14 NOTE — ASSESSMENT & PLAN NOTE
Mr. Brown is a 70 yo male w/ PMH significant for recently diagnosed mesothelioma (mets to upper abd and L iliac bone; not on chemotherapy), A flutter (s/p ablation 7/28), aortic dissection (s/p repair 4/2016), CAD, HTN, who presented with dry mouth and dysarthria.  Neurology consulted to eval dysphagia.    History is most concerning for malignancy.  MRI concerning for bony mets, no contrast given 2/2 pt inability to tolerate exam.  Primary team discussing PEG vs hospice with pt's family.      Recommendations  -Could consider repeat MRI with contrast, however, given discussions for hospice vs PEG with family, not sure that any interventions would be available if MRI revealed central metastases.   -Correct/treat any metabolic/infectious abnormalities  -Delirium precautions as below  -Continue ST eval and treat    At this point, remain most suspicious for CNS metastases contributing to pt's symptoms.  Neurology will sign off given ongoing discussions for Hospice vs PEG.  Please call for any questions.      Delirium Precautions  -Re-orient patient frequently and keep pt's whiteboard up to date with current day and team member's names  -Keep shades open/room lit during day  -Low light at night (close blinds at night)  -Low stimulation, minimize interruptions at night  -Minimize use of restraints  -Encourage family to be at bedside  -Avoid opiates, benzodiazepines, antihistamines, anticholinergics, hypnotics as much as possible

## 2017-08-14 NOTE — PROGRESS NOTES
08/14/17 1035   Vital Signs   Pulse (!) 160   Heart Rate Source Monitor   DNR form not signed. pT -170';teodoro Lantigua, IM4 udpated ordered to give prn Lopressor and Morphine IVP. 12 lead EKG ordered and implemented at bedside.

## 2017-08-14 NOTE — PROGRESS NOTES
[facility]  Neurology  Progress Note    Patient Name: Clint Brown  MRN: [id number]  Admission Date: 8/3/2017  Hospital Length of Stay: 11 days  Code Status: DNR   Attending Provider: Audie Max MD  Primary Care Physician: Jean Claude Griffith DO   Principal Problem:Oropharyngeal dysphagia      Subjective:     Interval History:   Unable to tolerate MRI study with contrast, only noncontrast studies obtained.  Pt reports he did not sleep well overnight, +SOB.    Current Neurological Medications:   Lorazepam 4 mg Q6hrs    Current Facility-Administered Medications   Medication Dose Route Frequency Provider Last Rate Last Dose    acetaminophen suppository 650 mg  650 mg Rectal Q6H PRN Audie Max MD   650 mg at 08/13/17 2102    lorazepam injection 4 mg  4 mg Intravenous Q6H JULIO Ireland        metoprolol injection 5 mg  5 mg Intravenous Once Abeer MARILYN Singer MD        metoprolol injection 5 mg  5 mg Intravenous Q5 Min PRN JULIO Ireland   5 mg at 08/12/17 0707    metoprolol injection 5 mg  5 mg Intravenous Q4H Jose R Lynn MD   5 mg at 08/14/17 0535    morphine injection 4 mg  4 mg Intravenous Q6H JULIO Ireland        ondansetron injection 8 mg  8 mg Intravenous Q6H PRN Jose R Lynn MD   8 mg at 08/12/17 1429       Review of Systems   Reason unable to perform ROS: limited 2/2 AMS.   Constitutional:        Reports unable to sleep overnight   HENT: Positive for trouble swallowing and voice change.    Respiratory: Positive for shortness of breath.      Objective:     Vital Signs (Most Recent):  Temp: 97.7 °F (36.5 °C) (08/14/17 0811)  Pulse: (!) 113 (08/14/17 0957)  Resp: 20 (08/14/17 0811)  BP: (!) 127/59 (08/14/17 0957)  SpO2: (!) 93 % (08/14/17 0811) Vital Signs (24h Range):  Temp:  [97.4 °F (36.3 °C)-99.1 °F (37.3 °C)] 97.7 °F (36.5 °C)  Pulse:  [] 113  Resp:  [18-28] 20  SpO2:  [93 %-96 %] 93 %  BP: (112-145)/(56-69) 127/59     Weight: 68 kg  (150 lb)  Body mass index is 21.52 kg/m².    Physical Exam   Constitutional: He appears well-developed. No distress.   HENT:   Head: Normocephalic.   Eyes: EOM are normal. Pupils are equal, round, and reactive to light.   Cardiovascular: Normal rate.  Exam reveals no friction rub.    No murmur heard.  Irregular rhythm   Pulmonary/Chest:   Increased effort, using accessory muscles of respiration.  Decreased breath sounds on L lung fields.   Abdominal: Soft. Bowel sounds are normal. He exhibits no distension. There is tenderness.   Musculoskeletal: He exhibits no edema.   Neurological:   Reflex Scores:       Tricep reflexes are 1+ on the right side and 1+ on the left side.       Bicep reflexes are 1+ on the right side and 1+ on the left side.       Brachioradialis reflexes are 1+ on the right side and 1+ on the left side.       Patellar reflexes are 1+ on the right side and 1+ on the left side.       Achilles reflexes are 1+ on the right side and 1+ on the left side.      NEUROLOGICAL EXAMINATION:     MENTAL STATUS   Level of consciousness: alert       Oriented to person only.  NOT oriented to place or time.  Follows 1 step commands.     CRANIAL NERVES     CN II   Visual fields full to confrontation.     CN III, IV, VI   Pupils are equal, round, and reactive to light.  Extraocular motions are normal.     CN V   Facial sensation intact.     CN VII   Facial expression full, symmetric.     CN VIII   CN VIII normal.     CN XI   Right sternocleidomastoid strength: normal  Left sternocleidomastoid strength: normal  Right trapezius strength: normal  Left trapezius strength: normal       Tongue protrudes midline, unable to visualize uvula.  Mouth is dry.     MOTOR EXAM   Muscle bulk: decreased  Overall muscle tone: normal    Strength   Right deltoid: 4/5  Left deltoid: 4/5  Right biceps: 4/5  Left biceps: 4/5  Right triceps: 4/5  Left triceps: 4/5  Right interossei: 4/5  Left interossei: 4/5       Strength exam complicated by  "pt's respiratory status and fatigue.  Pt asking "please stop," further examination deferred.     REFLEXES     Reflexes   Right brachioradialis: 1+  Left brachioradialis: 1+  Right biceps: 1+  Left biceps: 1+  Right triceps: 1+  Left triceps: 1+  Right patellar: 1+  Left patellar: 1+  Right achilles: 1+  Left achilles: 1+  Right plantar: normal  Left plantar: normal  Right ankle clonus: absent  Left ankle clonus: absent    SENSORY EXAM   Light touch normal.     GAIT AND COORDINATION        Unable to assess finger/nose and heel/shin secondary to respiratory status       Significant Labs:   Recent Results (from the past 24 hour(s))   Basic metabolic panel    Collection Time: 08/14/17  5:15 AM   Result Value Ref Range    Sodium 130 (L) 136 - 145 mmol/L    Potassium 4.6 3.5 - 5.1 mmol/L    Chloride 99 95 - 110 mmol/L    CO2 20 (L) 23 - 29 mmol/L    Glucose 114 (H) 70 - 110 mg/dL    BUN, Bld 17 8 - 23 mg/dL    Creatinine 0.6 0.5 - 1.4 mg/dL    Calcium 8.4 (L) 8.7 - 10.5 mg/dL    Anion Gap 11 8 - 16 mmol/L    eGFR if African American >60.0 >60 mL/min/1.73 m^2    eGFR if non African American >60.0 >60 mL/min/1.73 m^2   Magnesium    Collection Time: 08/14/17  5:15 AM   Result Value Ref Range    Magnesium 1.5 (L) 1.6 - 2.6 mg/dL   Phosphorus    Collection Time: 08/14/17  5:15 AM   Result Value Ref Range    Phosphorus 3.8 2.7 - 4.5 mg/dL       Microbiology Results (last 7 days)     Procedure Component Value Units Date/Time    Blood culture [[id number]] Collected:  08/10/17 1606    Order Status:  Completed Specimen:  Blood from Line, Port A Cath Updated:  08/13/17 2012     Blood Culture, Routine No Growth to date     Blood Culture, Routine No Growth to date     Blood Culture, Routine No Growth to date     Blood Culture, Routine No Growth to date    Gram stain [[id number]]     Order Status:  Canceled Specimen:  CSF (Spinal Fluid)     CSF culture [[id number]]     Order Status:  Canceled Specimen:  CSF (Spinal Fluid)     Blood " culture [[id number]] Collected:  17 0446    Order Status:  Completed Specimen:  Blood Updated:  17 0812     Blood Culture, Routine No growth after 5 days.    Narrative:       Aerobic \T\ anaerobic from site #1    Blood culture [[id number]] Collected:  17 0443    Order Status:  Completed Specimen:  Blood Updated:  17 0812     Blood Culture, Routine No growth after 5 days.    Narrative:       Aerobic \T\ anaerobic from site #2          Significant Imagin17 MRI C spine, pt unable to complete exam with contrast: Per resident review,  Possible hyperintensities at C5, C6.  Radiology reported concerning for metastatic disease at T3.        17 MRI Brain, pt unable to complete exam with contrast: Per resident review,  No acute infarcts, mass effect.  Remote L occipital infarct.        Assessment and Plan:     * Oropharyngeal dysphagia    Mr. Brown is a 70 yo male w/ PMH significant for recently diagnosed mesothelioma (mets to upper abd and L iliac bone; not on chemotherapy), A flutter (s/p ablation ), aortic dissection (s/p repair 2016), CAD, HTN, who presented with dry mouth and dysarthria.  Neurology consulted to eval dysphagia.    History is most concerning for malignancy.  MRI concerning for bony mets, no contrast given 2/2 pt inability to tolerate exam.  Primary team discussing PEG vs hospice with pt's family.      Recommendations  -Could consider repeat MRI with contrast, however, given discussions for hospice vs PEG with family, not sure that any interventions would be available if MRI revealed central metastases.  For similar reasons, do not believe LP would be of clinical value at this time.  -Correct/treat any metabolic/infectious abnormalities  -Delirium precautions as below  -Continue Speech Therapy eval and treat    At this point, remain most suspicious for CNS metastases contributing to pt's symptoms.  Neurology will sign off given ongoing discussions for Hospice vs  PEG.  Please call for any questions.      Delirium Precautions  -Re-orient patient frequently and keep pt's whiteboard up to date with current day and team member's names  -Keep shades open/room lit during day  -Low light at night (close blinds at night)  -Low stimulation, minimize interruptions at night  -Minimize use of restraints  -Encourage family to be at bedside  -Avoid opiates, benzodiazepines, antihistamines, anticholinergics, hypnotics as much as possible                    VTE Risk Mitigation         Ordered     Medium Risk of VTE  Once      08/03/17 2073          Danilo Medrano MD  Neurology  [facility]

## 2017-08-14 NOTE — PLAN OF CARE
FAROOQ received call from Allen with [facility] stating they were willing to take Pt, regardless of insurance issues. She said they took a certain number of indigent cases. FAROOQ asked if this was the case even if the Pt was actually indigent. Allen said they just wouldn't bill the insurance. FAROOQ said she could reach out to Pt's family and see if they wished to do this. FAROOQ received call back from Allen who said Pt's family was on their way to complete paperwork and that transport could be set up for 5pm. F[id number] FAROOQ provided the Pt's nurse with the number for report ([phone]).     Radha Schwartz LCSW

## 2017-08-15 LAB — BACTERIA BLD CULT: NORMAL

## 2017-09-11 ENCOUNTER — TELEPHONE (OUTPATIENT)
Dept: ELECTROPHYSIOLOGY | Facility: CLINIC | Age: 72
End: 2017-09-11

## 2017-09-11 DIAGNOSIS — I48.92 ATRIAL FLUTTER, UNSPECIFIED TYPE: Primary | ICD-10-CM

## (undated) DEVICE — SPONGE DERMA 8PLY 2X2

## (undated) DEVICE — DRESSING TRANS 4X4 TEGADERM

## (undated) DEVICE — NDL HYPO A BEVEL 22X1 1/2

## (undated) DEVICE — ELECTRODE REM PLYHSV RETURN 9

## (undated) DEVICE — DRAPE C ARM 42 X 120 10/BX

## (undated) DEVICE — GAUZE SPONGE 4X4 12PLY

## (undated) DEVICE — KIT ANTIFOG

## (undated) DEVICE — BLADE SURG CARBON STEEL SZ11

## (undated) DEVICE — DRAPE ABDOMINAL TIBURON 14X11

## (undated) DEVICE — DRESSING TEGADERM 2X2 3/4

## (undated) DEVICE — SYR SLIP TIP 5CC

## (undated) DEVICE — DRAPE STERI INSTRUMENT 1018

## (undated) DEVICE — SUT VICRYL 3-0 27 SH

## (undated) DEVICE — SUT VICRYL 2-0 27 CT-1

## (undated) DEVICE — SET DECANTER MEDICHOICE

## (undated) DEVICE — CATH THORACIC 24FR ST

## (undated) DEVICE — WARMER DRAPE STERILE LF

## (undated) DEVICE — SEE MEDLINE ITEM 157131

## (undated) DEVICE — DRAPE THYROID WITH ARMBOARD

## (undated) DEVICE — DRAPE INCISE IOBAN 2 23X17IN

## (undated) DEVICE — SUT ETHILON 2-0 BLK PS-2

## (undated) DEVICE — PLEDGET SUT SOFT 3/8X3/16X1/16

## (undated) DEVICE — PLEURY PATIENT STARTER KIT

## (undated) DEVICE — SUT PROLENE 4-0 SH BLU 36IN

## (undated) DEVICE — CLOSURE SKIN STERI STRIP 1/2X4

## (undated) DEVICE — SUT 2-0 VICRYL / CT-1

## (undated) DEVICE — ADHESIVE MASTISOL VIAL 48/BX

## (undated) DEVICE — SUT CTD VICRYL 0 UND BR CT

## (undated) DEVICE — SOL NACL 0.9% INJ PF/50151

## (undated) DEVICE — SPONGE TONSIL LARGE

## (undated) DEVICE — HEMOSTAT SURGICEL 4X8IN

## (undated) DEVICE — SUT MONOCYRL 4-0 PS2 UND

## (undated) DEVICE — TAPE MEDIPORE 4IN X 2YDS

## (undated) DEVICE — DRESSING TELFA STRL 4X3 LF

## (undated) DEVICE — SUT SILK 0 BLK BR FSL 18 IN

## (undated) DEVICE — DRAIN CHEST DRY SUCTION

## (undated) DEVICE — PACK DRAPE UNIVERSAL CONVERTOR

## (undated) DEVICE — SYR ONLY LUER LOCK 20CC

## (undated) DEVICE — BLADE ELECTRO EDGE INSULATED

## (undated) DEVICE — SOL CLEARIFY VISUALIZATION LAP

## (undated) DEVICE — TRAY MINOR GEN SURG

## (undated) DEVICE — TAPE SILK 3IN

## (undated) DEVICE — DEVICE EVACUATION PLEURX FUSN

## (undated) DEVICE — SEE MEDLINE ITEM 157117

## (undated) DEVICE — SEE MEDLINE ITEM 146420

## (undated) DEVICE — ELECTRODE BLADE INSULATED 1 IN

## (undated) DEVICE — SEE MEDLINE ITEM 146313

## (undated) DEVICE — CONTAINER SPECIMEN STRL 4OZ

## (undated) DEVICE — DRESSING TELFA PAD N ADH 2X3